# Patient Record
Sex: FEMALE | Race: WHITE | Employment: OTHER | URBAN - METROPOLITAN AREA
[De-identification: names, ages, dates, MRNs, and addresses within clinical notes are randomized per-mention and may not be internally consistent; named-entity substitution may affect disease eponyms.]

---

## 2017-01-16 PROBLEM — E11.9 DIABETES MELLITUS, TYPE 2 (H): Chronic | Status: ACTIVE | Noted: 2017-01-16

## 2017-01-23 ENCOUNTER — FCC EXTENDED DOCUMENTATION (OUTPATIENT)
Dept: BEHAVIORAL HEALTH | Facility: CLINIC | Age: 57
End: 2017-01-23

## 2017-01-23 NOTE — PROGRESS NOTES
Discharge Summary  Multiple Sessions    Client Name: Melody Ferguson MRN#: [id number] YOB: 1960      Intake / Discharge Date: 6/20/16  -  1/23/2017       DSM5 Diagnoses: (Sustained by DSM5 Criteria Listed Above)  DSM5 Diagnoses: (Sustained by DSM5 Criteria Listed Above)  Diagnoses: 296.32 Major Depressive Disorder, Recurrent Episode, Moderate _  300.01 (F41.0) Panic Disorder  300.02 (F41.1) Generalized Anxiety Disorder  309.81 (F43.10) Posttraumatic Stress Disorder (includes Posttraumatic Stress Disorder for Children 6 Years and Younger)  With dissociative symptoms  Psychosocial & Contextual Factors: financial stress  WHODAS 2.0 (12 item)            This questionnaire asks about difficulties due to health conditions. Health conditions  include  disease or illnesses, other health problems that may be short or long lasting,  injuries, mental health or emotional problems, and problems with alcohol or drugs.                     Think back over the past 30 days and answer these questions, thinking about how much  difficulty you had doing the following activities. For each question, please [place] only  one response.    S1 Standing for long periods such as 30 minutes? Mild =           2   S2 Taking care of household responsibilities? Mild =           2   S3 Learning a new task, for example, learning how to get to a new place? None =         1   S4 How much of a problem do you have joining community activities (for example, festivals, Worship or other activities) in the same way as anyone else can? Moderate =   3   S5 How much have you been emotionally affected by your health problems? Mild =           2     In the past 30 days, how much difficulty did you have in:   S6 Concentrating on doing something for ten minutes? None =         1   S7 Walking a long distance such as a kilometer (or equivalent)? Extreme / or cannot do = 5   S8 Washing your whole body? None =         1   S9 Getting  dressed? None =         1   S10 Dealing with people you do not know? Severe =       4   S11 Maintaining a friendship? None =         1   S12 Your day to day work? Mild =           2     H1 Overall, in the past 30 days, how many days were these difficulties present? Record number of days 15   H2 In the past 30 days, for how many days were you totally unable to carry out your usual activities or work because of any health condition? Record number of days  6   H3 In the past 30 days, not counting the days that you were totally unable, for how many days did you cut back or reduce your usual activities or work because of any health condition? Record number of days 20-25             Presenting Concern:  Ptsd, anxiety      Reason for Discharge:  Client did not return      Disposition at Time of Last Encounter:   Comments:   n/a     Risk Management:   Client denies a history of suicidal ideation, suicide attempts, self-injurious behavior, homicidal ideation, homicidal behavior and and other safety concerns  A safety and risk management plan has not been developed at this time, however client was given the after-hours number / 911 should there be a change in any of these risk factors.      Referred To:  PCP.  Client can return to [facility] at any point in the future if desired.          Garrett Villafuerte, LICSW   1/23/2017

## 2017-01-24 DIAGNOSIS — E66.01 MORBID OBESITY WITH BODY MASS INDEX OF 45.0-49.9 IN ADULT (H): ICD-10-CM

## 2017-01-24 DIAGNOSIS — G89.4 CHRONIC PAIN SYNDROME: Primary | ICD-10-CM

## 2017-01-24 DIAGNOSIS — M54.32 LEFT SIDED SCIATICA: ICD-10-CM

## 2017-01-24 DIAGNOSIS — M62.830 BACK MUSCLE SPASM: ICD-10-CM

## 2017-01-24 DIAGNOSIS — M54.5 LOW BACK PAIN, UNSPECIFIED BACK PAIN LATERALITY, UNSPECIFIED CHRONICITY, WITH SCIATICA PRESENCE UNSPECIFIED: ICD-10-CM

## 2017-01-24 RX ORDER — TRAMADOL HYDROCHLORIDE 50 MG/1
TABLET ORAL
Qty: 30 TABLET | Refills: 0 | Status: SHIPPED | OUTPATIENT
Start: 2017-01-24 | End: 2017-02-09

## 2017-01-24 RX ORDER — CYCLOBENZAPRINE HCL 5 MG
TABLET ORAL
Qty: 60 TABLET | Refills: 0 | Status: SHIPPED | OUTPATIENT
Start: 2017-01-24 | End: 2017-03-07

## 2017-01-24 NOTE — TELEPHONE ENCOUNTER
traMADol (ULTRAM) 50 MG tablet      Last Written Prescription Date:  12/17/16  Last Fill Quantity: 30,   # refills: 0  Last Office Visit with [facility], [facility] or [facility] prescribing provider: 11/10/16  Future Office visit:    Next 5 appointments (look out 90 days)     Jan 31, 2017 11:00 AM   [facility] Visit with Armond George MD   [facility]    [address] [facility] [address]   [phone]            Feb 01, 2017  7:30 AM   Return Visit with Shelby Lance MD   [facility])    [address] [place] [address]   [phone]                   Routing refill request to provider for review/approval because:  Drug not on the [facility], [facility] or [facility] refill protocol or controlled substance      cyclobenzaprine (FLEXERIL) 5 MG tablet      Last Written Prescription Date:  12/29/16  Last Fill Quantity: 60,   # refills: 0  Last Office Visit with [facility], [facility] or [facility] prescribing provider: 11/10/16  Future Office visit:    Next 5 appointments (look out 90 days)     Jan 31, 2017 11:00 AM   Return Visit with Armond George MD   [facility])    [address] [facility] [address]   [phone]            Feb 01, 2017  7:30 AM   Return Visit with Shelby Lance MD   [facility])    [address] [place] [address]   [phone]                   Routing refill request to provider for review/approval because:  Drug not on the [facility], [facility] or [facility] refill protocol or controlled substance      Kiki Pearson  [facility]

## 2017-01-24 NOTE — TELEPHONE ENCOUNTER
diclofenac (VOLTAREN) 25 MG EC tablet      Last Written Prescription Date: 12/29/16  Last Quantity: 180, # refills: 0  Last Office Visit with [facility] or [facility] prescribing provider: 11/10/16   Next 5 appointments (look out 90 days)     Jan 31, 2017 11:00 AM   [facility]   [facility]    [address] [facility] [place] [facility] [address]   [phone]            Feb 01, 2017  7:30 AM   Return Visit with Shelby Lance MD   [facility])    [address] [place] [id number] [phone]                   CREATININE   Date Value Ref Range Status   11/01/2016 0.47* 0.52 - 1.04 mg/dL Final     AST       11   11/1/2016  ALT       27   11/1/2016  BP Readings from Last 3 Encounters:   12/05/16 117/74   11/10/16 138/80   10/10/16 132/68         Nystatin (NYSTOP) 365356 UNIT/GM POWD pow      Last Written Prescription Date: 12/03/16  Last Fill Quantity: 60g,  # refills: 0   Last Office Visit with [facility] or [facility] prescribing provider: 11/10/16                                         Next 5 appointments (look out 90 days)     Jan 31, 2017 11:00 AM   [facility] Visit with [facility]ond [facility] MD   [facility]    [address] [facility] [place] [facility] [address]   [phone]            Feb 01, 2017  7:30 AM   Return Visit with Shelby Lance MD   [facility])    [address] [place] [id number] [phone]                      Kiki Pearson  [facility]

## 2017-01-24 NOTE — TELEPHONE ENCOUNTER
polyethylene glycol (MIRALAX/GLYCOLAX) powder      Last Written Prescription Date: 11/30/16  Last Fill Quantity: 510g,  # refills: 1   Last Office Visit with [facility], [facility] or [facility] prescribing provider: 11/10/16                                         Next 5 appointments (look out 90 days)     Jan 31, 2017 11:00 AM   Return Visit with Armond George MD   [facility]    [address] [facility] [id number] [phone]            Feb 01, 2017  7:30 AM   Return Visit with Shelby Lance MD   [facility]    [address] [place] [address] [phone]                          Jose Carlos Faarax  [facility]

## 2017-01-24 NOTE — TELEPHONE ENCOUNTER
Faxed Rx for the Ultram to [facility],  [phone], right fax confirmed at 4:12 pm today.  Yuli Giang MA/  For [employer] and Radha

## 2017-01-26 RX ORDER — NYSTATIN 100000 [USP'U]/G
POWDER TOPICAL
Qty: 60 G | Refills: 0 | Status: SHIPPED | OUTPATIENT
Start: 2017-01-26 | End: 2017-04-18

## 2017-01-26 RX ORDER — DICLOFENAC SODIUM 25 MG/1
25 TABLET, DELAYED RELEASE ORAL 3 TIMES DAILY PRN
Qty: 180 TABLET | Refills: 0 | Status: SHIPPED | OUTPATIENT
Start: 2017-01-26 | End: 2017-07-03

## 2017-01-26 RX ORDER — POLYETHYLENE GLYCOL 3350 17 G/17G
POWDER, FOR SOLUTION ORAL
Qty: 510 G | Refills: 1 | Status: SHIPPED | OUTPATIENT
Start: 2017-01-26 | End: 2017-03-07

## 2017-01-26 NOTE — TELEPHONE ENCOUNTER
Nystatin - Prescription approved per [facility] Refill Protocol.    Miralax - Prescription approved per [facility] Refill Protocol.    Voltaren - Routing refill request to provider for review/approval because:  Labs out of range:  Creatinine  Cara Maloney RN

## 2017-01-30 ENCOUNTER — TELEPHONE (OUTPATIENT)
Dept: ORTHOPEDICS | Facility: CLINIC | Age: 57
End: 2017-01-30

## 2017-01-30 RX ORDER — CYCLOBENZAPRINE HCL 5 MG
TABLET ORAL
Qty: 60 TABLET | Refills: 0 | OUTPATIENT
Start: 2017-01-30

## 2017-01-30 NOTE — TELEPHONE ENCOUNTER
Reason for Call:  Other call back    Detailed comments: Patient has questions regarding her up coming appointment scheduled for 01/31/17- patient would like to know if Gel will be used?  Also will she need a  to attend the appointment with her?  Please call to discuss further    Phone Number Patient can be reached at: Cell number on file:    Telephone Information:   Mobile [phone]       Best Time: anything    Can we leave a detailed message on this number? YES    Call taken on 1/30/2017 at 10:59 AM by Anthony Kimbrough

## 2017-01-30 NOTE — TELEPHONE ENCOUNTER
Spoke with patient regarding her appointment for 1/31/17. I let her know that we have her scheduled for a viscosupplementation injection. I asked if she had a chance to check with her insurance and she said that the shots would be covered. I informed her that she will not need a  for her appointment. She did not have any further questions.    CRESCENCIO PadillaC  Supervising physician: Armond George MD  Dept. of Orthopedics  [facility]

## 2017-01-30 NOTE — TELEPHONE ENCOUNTER
RN called pharmacy and was informed that the medication has been picked up.  No further action is required at this time.    Radha Diaz RN

## 2017-01-31 ENCOUNTER — OFFICE VISIT (OUTPATIENT)
Dept: ORTHOPEDICS | Facility: CLINIC | Age: 57
End: 2017-01-31
Payer: MEDICARE

## 2017-01-31 VITALS — HEIGHT: 68 IN | WEIGHT: 293 LBS | BODY MASS INDEX: 44.41 KG/M2 | RESPIRATION RATE: 18 BRPM

## 2017-01-31 DIAGNOSIS — M17.0 PRIMARY OSTEOARTHRITIS OF BOTH KNEES: Primary | ICD-10-CM

## 2017-01-31 PROCEDURE — 20610 DRAIN/INJ JOINT/BURSA W/O US: CPT | Mod: 50 | Performed by: ORTHOPAEDIC SURGERY

## 2017-01-31 NOTE — PROGRESS NOTES
The patient's bilateral knee was prepped with betadine solution after verification of allergies. Area approximately 10 cm x 10 cm prepped in a sterile fashion. After injection, betadine removed with soap and water and band-aids applied.    3 ml gel one was injected into each knee for a total of 6 ml by Dr. Armond George  PO:[id number]  LOT# [id number]  Exp.8-30-17

## 2017-01-31 NOTE — NURSING NOTE
"Chief Complaint   Patient presents with     RECHECK     Bilateral knee OA last injection 12/13/16.       Initial Resp 18  Ht 1.727 m (5' 8\")  Wt 142.883 kg (315 lb)  BMI 47.91 kg/m2 Estimated body mass index is 47.91 kg/(m^2) as calculated from the following:    Height as of this encounter: 1.727 m (5' 8\").    Weight as of this encounter: 142.883 kg (315 lb).  BP completed using cuff size: NA (Not Taken)  Stacey Freedman MA      "

## 2017-01-31 NOTE — PATIENT INSTRUCTIONS
Your knee was injected with Gel-1 (a lubricant) today.  It will not numb the knee.  There should be some improvement right away, but it reaches peak effect in two months.  Resume activity as tolerated.

## 2017-01-31 NOTE — PROGRESS NOTES
Follow up bilateral knee osteoarthritis.  X-ray on 3/15/16 shows bilateral moderate osteoarthritis.  Has had prior treatment with Symptom treatment  Activity modification  Steroid injection  NSAID use: diclofenac ([place], Voltaren)  Physical Therapy: No  Pain limits her with standing, walking and ambulating stairs.  Range of motion 2-114, 2-119.    Tenderness at medial knee.  Prior Gel-1 injection? No  Any other joint disease such as rheumatoid arthritis or psoriatic arthritis?  No    Assessment:  Bilateral knee osteoarthritis.  She  desires Gel-1 injection today of bilateral knee(s).  Risks, benefits, potential complications and alternatives were discussed.   With the patient's consent, sterile prep was performed of bilateral knee(s).  Each knee was injected with 3 cc Gel-1 after lidocaine infiltration of the needle tract at anterolateral site.  Return to clinic as needed.

## 2017-02-01 ENCOUNTER — OFFICE VISIT (OUTPATIENT)
Dept: OPHTHALMOLOGY | Facility: CLINIC | Age: 57
End: 2017-02-01
Payer: MEDICARE

## 2017-02-01 DIAGNOSIS — H02.836 DERMATOCHALASIS OF EYELIDS OF BOTH EYES: Primary | ICD-10-CM

## 2017-02-01 DIAGNOSIS — H02.833 DERMATOCHALASIS OF EYELIDS OF BOTH EYES: Primary | ICD-10-CM

## 2017-02-01 PROCEDURE — 99024 POSTOP FOLLOW-UP VISIT: CPT | Performed by: OPHTHALMOLOGY

## 2017-02-01 ASSESSMENT — VISUAL ACUITY
OS_SC: 20/20
OD_SC: 20/25
METHOD: SNELLEN - LINEAR

## 2017-02-01 NOTE — PROGRESS NOTES
Postop both upper eyelid blepharoplasty and ptosis repair (mmcr 10, 10+1). Very happy with result. No concerns. No dry eye issues.     MRD1: 4 right eye, 3.5 left eye, no lagophthalmos. Significantly improved compared to preop photographs.    Warm soaks  Sees Dr. Longo annually.   F/u with me as needed.    Attending Physician Attestation:  Complete documentation of historical and exam elements from today's encounter can be found in the full encounter summary report (not reduplicated in this progress note).  I personally obtained the chief complaint(s) and history of present illness.  I confirmed and edited as necessary the review of systems, past medical/surgical history, family history, social history, and examination findings as documented by others; and I examined the patient myself.  I personally reviewed the relevant tests, images, and reports as documented above.  I formulated and edited as necessary the assessment and plan and discussed the findings and management plan with the patient and family. - Shelby Lance MD

## 2017-02-09 ENCOUNTER — MYC MEDICAL ADVICE (OUTPATIENT)
Dept: FAMILY MEDICINE | Facility: CLINIC | Age: 57
End: 2017-02-09

## 2017-02-09 ENCOUNTER — MYC REFILL (OUTPATIENT)
Dept: FAMILY MEDICINE | Facility: CLINIC | Age: 57
End: 2017-02-09

## 2017-02-09 DIAGNOSIS — G89.4 CHRONIC PAIN SYNDROME: ICD-10-CM

## 2017-02-09 DIAGNOSIS — M54.42 ACUTE LEFT-SIDED LOW BACK PAIN WITH LEFT-SIDED SCIATICA: ICD-10-CM

## 2017-02-09 RX ORDER — TRAMADOL HYDROCHLORIDE 50 MG/1
TABLET ORAL
Qty: 30 TABLET | Refills: 0 | Status: SHIPPED | OUTPATIENT
Start: 2017-02-24 | End: 2017-03-07

## 2017-02-09 RX ORDER — GABAPENTIN 300 MG/1
300 CAPSULE ORAL AT BEDTIME
Qty: 90 CAPSULE | Refills: 1 | Status: SHIPPED | OUTPATIENT
Start: 2017-02-09 | End: 2017-03-07

## 2017-02-09 NOTE — TELEPHONE ENCOUNTER
Message from Ayla:  Original authorizing provider: Elizabeth Lamas MD    Melody Freguson would like a refill of the following medications:  gabapentin (NEURONTIN) 300 MG capsule [Elizabeth Lamas MD]  traMADol (ULTRAM) 50 MG tablet [Elizabeth Lamas MD]    Preferred pharmacy: [facility] [id number] [place] [address] - [address] AT [facility] OF [facility] [address]    Comment:      Medication renewals requested in this message routed to other providers:  pioglitazone (ACTOS) 15 MG tablet [CARMEN Burton CNP]  polyethylene glycol (MIRALAX/GLYCOLAX) powder [CARMEN Burton CNP]  diclofenac (VOLTAREN) 25 MG EC tablet [CARMEN Burton CNP]  Nystatin (NYSTOP) [id number] UNIT/GM POWD powder [CARMEN Burton CNP]

## 2017-02-10 NOTE — TELEPHONE ENCOUNTER
Received Rx, unsigned. Trisha Cast signed Ultram  Rx for Dr Lamas. Faxed Rx for the Ultram to [facility], [phone], right fax confirmed at 9:48  Am today.  Yuli Giang MA/  For [employer] and Radha

## 2017-02-13 RX ORDER — POLYETHYLENE GLYCOL 3350 17 G/17G
POWDER, FOR SOLUTION ORAL
Qty: 510 G | Refills: 0 | OUTPATIENT
Start: 2017-02-13

## 2017-03-07 ENCOUNTER — MYC REFILL (OUTPATIENT)
Dept: FAMILY MEDICINE | Facility: CLINIC | Age: 57
End: 2017-03-07

## 2017-03-07 DIAGNOSIS — E66.01 MORBID OBESITY WITH BODY MASS INDEX OF 45.0-49.9 IN ADULT (H): ICD-10-CM

## 2017-03-07 DIAGNOSIS — M54.32 LEFT SIDED SCIATICA: ICD-10-CM

## 2017-03-07 DIAGNOSIS — E11.21 TYPE 2 DIABETES MELLITUS WITH DIABETIC NEPHROPATHY, WITHOUT LONG-TERM CURRENT USE OF INSULIN (H): Chronic | ICD-10-CM

## 2017-03-07 DIAGNOSIS — G89.4 CHRONIC PAIN SYNDROME: ICD-10-CM

## 2017-03-07 DIAGNOSIS — M54.42 ACUTE LEFT-SIDED LOW BACK PAIN WITH LEFT-SIDED SCIATICA: ICD-10-CM

## 2017-03-07 DIAGNOSIS — M62.830 BACK MUSCLE SPASM: ICD-10-CM

## 2017-03-07 DIAGNOSIS — M54.5 LOW BACK PAIN, UNSPECIFIED BACK PAIN LATERALITY, UNSPECIFIED CHRONICITY, WITH SCIATICA PRESENCE UNSPECIFIED: ICD-10-CM

## 2017-03-07 NOTE — TELEPHONE ENCOUNTER
Message from [employer]  Original authorizing provider: CARMEN Burton CNP    Melody Ferguson would like a refill of the following medications:  polyethylene glycol (MIRALAX/GLYCOLAX) powder [CARMEN Burton CNP]    Preferred pharmacy: [facility] [id number] [place] [address] - [address] AT [facility] OF [facility] & [address]    Comment:      Medication renewals requested in this message routed to other providers:  cyclobenzaprine (FLEXERIL) 5 MG tablet [Elizabeth Lamas MD]  gabapentin (NEURONTIN) 300 MG capsule [Elizabeth Lamas MD]

## 2017-03-07 NOTE — TELEPHONE ENCOUNTER
cyclobenzaprine       Last Written Prescription Date:  1/24/17  Last Fill Quantity: 60,   # refills: 0  Last Office Visit with [facility], [facility] or [facility] prescribing provider: 11/10/16  Future Office visit:    Next 5 appointments (look out 90 days)     Mar 14, 2017  9:00 AM CDT   [facility]    [address] [facility] [address]   [phone]                   Routing refill request to provider for review/approval because:  Drug not on the [facility], [facility] or [facility] refill protocol or controlled substance      traMADol       Last Written Prescription Date:  2/24/17  Last Fill Quantity: 30,   # refills: 0  Last Office Visit with [facility], [facility] or [facility] prescribing provider: 11/10/16  Future Office visit:    Next 5 appointments (look out 90 days)     Mar 14, 2017  9:00 AM CDT   [facility] Visit with Armond [facility] MD   [facility]    [address] [facility] [address]   [phone]                   Routing refill request to provider for review/approval because:  Drug not on the [facility], [facility] or [facility] refill protocol or controlled substance      Nystatin       Last Written Prescription Date: 1/26/17  Last Fill Quantity: 60g,  # refills: 0   Last Office Visit with [facility], [facility] or [facility] prescribing provider: 11/10/16                                         Next 5 appointments (look out 90 days)     Mar 14, 2017  9:00 AM CDT   Return Visit with Armond George MD   [facility]    [address] [facility] [address]   [phone]                    diclofenac       Last Written Prescription Date: 1/26/17  Last Quantity: 180, # refills: 0  Last Office Visit with [facility], [facility] or [facility] prescribing provider: 11/10/16  Next 5 appointments (look out 90 days)     Mar 14, 2017  9:00 AM CDT    Return Visit with Armond George MD   [facility] ([facility])    [address] [place] [address] [phone]                   Creatinine   Date Value Ref Range Status   11/01/2016 0.47 (L) 0.52 - 1.04 mg/dL Final     Lab Results   Component Value Date    AST 11 11/01/2016     Lab Results   Component Value Date    ALT 27 11/01/2016     BP Readings from Last 3 Encounters:   12/05/16 117/74   11/10/16 138/80   10/10/16 132/68

## 2017-03-07 NOTE — TELEPHONE ENCOUNTER
Message from [facility]:  Original authorizing provider: Elizabeth Lamas MD    Melody Ferguson would like a refill of the following medications:  cyclobenzaprine (FLEXERIL) 5 MG tablet [Elizabeth Lamas MD]  gabapentin (NEURONTIN) 300 MG capsule [Elizabeth Lamas MD]    Preferred pharmacy: [facility] [id number] [place] [address] - [address] AT [facility] OF [facility] [address]    Comment:      Medication renewals requested in this message routed to other providers:  polyethylene glycol (MIRALAX/GLYCOLAX) powder [Randa Cast, APRN CNP]

## 2017-03-07 NOTE — TELEPHONE ENCOUNTER
Message from [employer]:  Original authorizing provider: Anahi Macias MD    Melody Ferguson would like a refill of the following medications:  pioglitazone (ACTOS) 30 MG tablet [Anahi Macias MD]    Preferred pharmacy: [facility] [address] [place], [place] - [address] [facility] [address]    Comment:  Bupropion SR 150mg Tablets Rx #: [id number] Qty: 60 Nexium 40mg Capsules Rx #: [id number] Qty: 30-- Vesicare 10mg Tablets Rx #: [id number] Qty: 30 -- Need refills for these also    Medication renewals requested in this message routed to other providers:  pioglitazone (ACTOS) 15 MG tablet [Randa Cast, APRN CNP]

## 2017-03-08 RX ORDER — PIOGLITAZONEHYDROCHLORIDE 30 MG/1
TABLET ORAL
Qty: 135 TABLET | Refills: 1 | Status: CANCELLED | OUTPATIENT
Start: 2017-03-08

## 2017-03-08 NOTE — TELEPHONE ENCOUNTER
Routing refill request to provider for review/approval because:  Labs out of range:  Creatinine  Radha Diaz RN

## 2017-03-08 NOTE — TELEPHONE ENCOUNTER
polyethylene glycol (MIRALAX/GLYCOLAX) powder      Last Written Prescription Date: 1/26/17  Last Fill Quantity: 510g,  # refills: 1   Last Office Visit with [facility], [facility] or [facility] prescribing provider: 11/10/16                                         Next 5 appointments (look out 90 days)     Mar 14, 2017  9:00 AM CDT   Return Visit with Armond George MD   [facility] ([facility])    [address] [place] [address] [phone]

## 2017-03-08 NOTE — TELEPHONE ENCOUNTER
pioglitazone (ACTOS) 30 MG tablet         Last Written Prescription Date: 11/18/16  Last Fill Quantity: 135, # refills: 1  Last Office Visit with [facility], [facility] or [facility] prescribing provider:  11/10/16   Next 5 appointments (look out 90 days)     Mar 14, 2017  9:00 AM CDT   Return Visit with Armond George MD   [facility]    [address] [place] [address] [phone]                   BP Readings from Last 3 Encounters:   12/05/16 117/74   11/10/16 138/80   10/10/16 132/68     Lab Results   Component Value Date    MICROL 7 11/01/2016     No results found for: MICROALBUMIN  Creatinine   Date Value Ref Range Status   11/01/2016 0.47 (L) 0.52 - 1.04 mg/dL Final   ]  GFR Estimate   Date Value Ref Range Status   11/01/2016 >90  Non  GFR Calc   >60 mL/min/1.7m2 Final   03/01/2016 >90  Non  GFR Calc   >60 mL/min/1.7m2 Final     GFR Estimate If Black   Date Value Ref Range Status   11/01/2016 >90   GFR Calc   >60 mL/min/1.7m2 Final   03/01/2016 >90   GFR Calc   >60 mL/min/1.7m2 Final     Lab Results   Component Value Date    CHOL 168 11/01/2016     Lab Results   Component Value Date    HDL 61 11/01/2016     Lab Results   Component Value Date    LDL 75 11/01/2016     Lab Results   Component Value Date    TRIG 159 11/01/2016     No results found for: CHOLHDLRATIO  Lab Results   Component Value Date    AST 11 11/01/2016     Lab Results   Component Value Date    ALT 27 11/01/2016     Lab Results   Component Value Date    A1C 6.7 11/01/2016    A1C 6.2 03/01/2016     Potassium   Date Value Ref Range Status   11/01/2016 3.8 3.4 - 5.3 mmol/L Final

## 2017-03-09 RX ORDER — DICLOFENAC SODIUM 25 MG/1
TABLET, DELAYED RELEASE ORAL
Qty: 180 TABLET | Refills: 0 | OUTPATIENT
Start: 2017-03-09

## 2017-03-09 RX ORDER — CYCLOBENZAPRINE HCL 5 MG
TABLET ORAL
Qty: 60 TABLET | Refills: 0 | Status: SHIPPED | OUTPATIENT
Start: 2017-03-09 | End: 2017-07-11

## 2017-03-09 RX ORDER — PIOGLITAZONEHYDROCHLORIDE 30 MG/1
TABLET ORAL
Qty: 135 TABLET | Refills: 1 | Status: SHIPPED | OUTPATIENT
Start: 2017-03-09 | End: 2017-07-13

## 2017-03-09 RX ORDER — TRAMADOL HYDROCHLORIDE 50 MG/1
TABLET ORAL
Qty: 30 TABLET | Refills: 0 | Status: SHIPPED | OUTPATIENT
Start: 2017-03-09 | End: 2017-04-13

## 2017-03-09 RX ORDER — NYSTATIN 100000 [USP'U]/G
POWDER TOPICAL
Qty: 60 G | Refills: 0 | Status: SHIPPED | OUTPATIENT
Start: 2017-03-09 | End: 2017-04-13

## 2017-03-09 NOTE — TELEPHONE ENCOUNTER
Faxed Rx for the Ultram to [facility],  [phone], right fax confirmed at 9:45 am today.  Yuli Giang MA/  For [employer] and Radha

## 2017-03-10 RX ORDER — POLYETHYLENE GLYCOL 3350 17 G/17G
POWDER, FOR SOLUTION ORAL
Qty: 510 G | Refills: 1 | Status: SHIPPED | OUTPATIENT
Start: 2017-03-10 | End: 2017-05-12

## 2017-03-10 RX ORDER — GABAPENTIN 300 MG/1
300 CAPSULE ORAL AT BEDTIME
Qty: 90 CAPSULE | Refills: 1 | Status: SHIPPED | OUTPATIENT
Start: 2017-03-10 | End: 2017-04-13

## 2017-03-10 RX ORDER — CYCLOBENZAPRINE HCL 5 MG
TABLET ORAL
Qty: 60 TABLET | Refills: 0 | Status: SHIPPED | OUTPATIENT
Start: 2017-03-10 | End: 2017-04-18

## 2017-03-10 NOTE — TELEPHONE ENCOUNTER
Prescription approved per [facility] Refill Protocol.  MABEL Alberto, Clinical RN [facility].

## 2017-03-10 NOTE — TELEPHONE ENCOUNTER
Routing refill request to provider for review/approval because:  Drug not on the [facility] refill protocol   MABEL Alberto, Clinical RN [facility].

## 2017-03-14 ENCOUNTER — RADIANT APPOINTMENT (OUTPATIENT)
Dept: GENERAL RADIOLOGY | Facility: CLINIC | Age: 57
End: 2017-03-14
Attending: ORTHOPAEDIC SURGERY
Payer: MEDICARE

## 2017-03-14 ENCOUNTER — OFFICE VISIT (OUTPATIENT)
Dept: ORTHOPEDICS | Facility: CLINIC | Age: 57
End: 2017-03-14
Payer: MEDICARE

## 2017-03-14 ENCOUNTER — MYC MEDICAL ADVICE (OUTPATIENT)
Dept: FAMILY MEDICINE | Facility: CLINIC | Age: 57
End: 2017-03-14

## 2017-03-14 VITALS — BODY MASS INDEX: 45.99 KG/M2 | HEIGHT: 67 IN | WEIGHT: 293 LBS | RESPIRATION RATE: 18 BRPM

## 2017-03-14 DIAGNOSIS — M47.817 LUMBOSACRAL SPONDYLOSIS WITHOUT MYELOPATHY: Primary | ICD-10-CM

## 2017-03-14 DIAGNOSIS — M17.12 PRIMARY OSTEOARTHRITIS OF LEFT KNEE: Primary | ICD-10-CM

## 2017-03-14 DIAGNOSIS — M17.0 PRIMARY OSTEOARTHRITIS OF BOTH KNEES: ICD-10-CM

## 2017-03-14 DIAGNOSIS — M17.11 PRIMARY OSTEOARTHRITIS OF RIGHT KNEE: ICD-10-CM

## 2017-03-14 PROCEDURE — 99213 OFFICE O/P EST LOW 20 MIN: CPT | Mod: 25 | Performed by: ORTHOPAEDIC SURGERY

## 2017-03-14 PROCEDURE — 20610 DRAIN/INJ JOINT/BURSA W/O US: CPT | Mod: RT | Performed by: ORTHOPAEDIC SURGERY

## 2017-03-14 PROCEDURE — 73562 X-RAY EXAM OF KNEE 3: CPT | Mod: RT

## 2017-03-14 RX ORDER — METHYLPREDNISOLONE ACETATE 80 MG/ML
80 INJECTION, SUSPENSION INTRA-ARTICULAR; INTRALESIONAL; INTRAMUSCULAR; SOFT TISSUE ONCE
Qty: 1 ML | Refills: 0 | OUTPATIENT
Start: 2017-03-14 | End: 2017-03-14

## 2017-03-14 NOTE — PROGRESS NOTES
The patient's right knee was prepped with betadine solution after verification of allergies. Area approximately 10 cm x 10 cm prepped in a sterile fashion. After injection, betadine removed with soap and water and band-aids applied.    1ml depo medrol with 1% lidocaine plain injected into patient's right knee by Dr. Armond George  LOT# [id number]  Exp.3/19

## 2017-03-14 NOTE — MR AVS SNAPSHOT
After Visit Summary   3/14/2017    Melody Ferguson    MRN: [id number]           Patient Information     Date Of Birth          1960        Visit Information        Provider Department      3/14/2017 9:00 AM Armond George MD [facility]        Today's Diagnoses     Primary osteoarthritis of both knees    -  1      Care Instructions    Ms. Ferguson and I talked about her condition and diagnosis.  Because of severe arthritis, and failure of conservative measures, we have decided to proceed with  total knee arthroplasty.      We have talked in depth about the procedure and the risks, which include, but are not limited to:  * blood loss possibly requiring transfusion,   * blood clots with possible pulmonary embolism  * infection or wound healing problems  * nerve damage - there will always be a bit of numbness just to outside of knee,  * vascular circulation problems  * continued pain  * instability of the knee.  * Loosening or wear  * anesthetic risks  * The possibility of needing additional procedures.      We also talked about recovery time, which differs from patient to patient.    Average 2-3 days in the hospital.  Most people can return home at that point.  You will be in Physical Therapy for 1-2 months until you have gained full range of motion.    We've encouraged attendance at the joint replacement class at the hospital.    Once you are placed on the surgery schedule, the [facility] will call you with the joint replacement class dates and times.  If you wish to schedule this yourself, or have additional questions about the class, you may call them at [phone].  Preop xrays have been ordered, and medical clearance will need to be obtained.    Preop physical with primary physician is needed within 30 days of the surgery.  Nothing to eat or drink for 8 hours before surgery.  Stop blood thinners 5 days before surgery (aspirin, diclofenac).        Call  "[phone] to schedule your surgery.              Follow-ups after your visit        Your next 10 appointments already scheduled     Apr 10, 2017 12:45 PM CDT   New Visit with Talon Katz MD   [facility] ([facility] [place])    [address]  [facility] [id number] [phone]              Who to contact     If you have questions or need follow up information about today's clinic visit or your schedule please contact [facility] [place] directly at [phone].  Normal or non-critical lab and imaging results will be communicated to you by Kanmuhart, letter or phone within 4 business days after the clinic has received the results. If you do not hear from us within 7 days, please contact the clinic through Kanmuhart or phone. If you have a critical or abnormal lab result, we will notify you by phone as soon as possible.  Submit refill requests through Psonar or call your pharmacy and they will forward the refill request to us. Please allow 3 business days for your refill to be completed.          Additional Information About Your Visit        MyChart Information     Psonar gives you secure access to your electronic health record. If you see a primary care provider, you can also send messages to your care team and make appointments. If you have questions, please call your primary care clinic.  If you do not have a primary care provider, please call [phone] and they will assist you.        Care EveryWhere ID     This is your Care EveryWhere ID. This could be used by other organizations to access your [place] medical records  [id number]        Your Vitals Were     Respirations Height BMI (Body Mass Index)             18 1.702 m (5' 7\") 50.43 kg/m2          Blood Pressure from Last 3 Encounters:   12/05/16 117/74   11/10/16 138/80   10/10/16 132/68    Weight from Last 3 Encounters:   03/14/17 (!) 146.1 " kg (322 lb)   01/31/17 (!) 142.9 kg (315 lb)   12/13/16 (!) 142.9 kg (315 lb)               Primary Care Provider Office Phone # Fax #    Elizabeth Lamas [phone]       [facility] [address]  [facility] [address]        Thank you!     Thank you for choosing [facility]  for your care. Our goal is always to provide you with excellent care. Hearing back from our patients is one way we can continue to improve our services. Please take a few minutes to complete the written survey that you may receive in the mail after your visit with us. Thank you!             Your Updated Medication List - Protect others around you: Learn how to safely use, store and throw away your medicines at www.disposemymeds.org.          This list is accurate as of: 3/14/17  9:32 AM.  Always use your most recent med list.                   Brand Name Dispense Instructions for use    ACE NOT PRESCRIBED (INTENTIONAL)     0 each    ACE Inhibitor not prescribed due to Allergy       albuterol 108 (90 BASE) MCG/ACT Inhaler    PROAIR HFA/PROVENTIL HFA/VENTOLIN HFA     Inhale 2 puffs into the lungs every 6 hours       ASPIRIN PO      Take 81 mg by mouth daily       ATORVASTATIN CALCIUM PO      Take 40 mg by mouth daily       BUPROPION HCL PO          citalopram 40 MG tablet    celeXA    90 tablet    Take 1 tablet (40 mg) by mouth every morning       * cyclobenzaprine 5 MG tablet    FLEXERIL    60 tablet    TAKE 1 TABLET BY MOUTH TWICE DAILY AS NEEDED( GENERIC EQUIVALENT FOR FLEXERIL)       * cyclobenzaprine 5 MG tablet    FLEXERIL    60 tablet    TAKE 1 TABLET BY MOUTH TWICE DAILY AS NEEDED       diclofenac 25 MG EC tablet    VOLTAREN    180 tablet    Take 1 tablet (25 mg) by mouth 3 times daily as needed for moderate pain       ESOMEPRAZOLE MAGNESIUM PO      Take 40 mg by mouth every morning (before breakfast)       fluticasone 27.5 MCG/SPRAY spray    VERAMYST     Spray 2 sprays into both  nostrils daily       gabapentin 300 MG capsule    NEURONTIN    90 capsule    Take 1 capsule (300 mg) by mouth At Bedtime       glipiZIDE-metFORMIN 5-500 MG per tablet    METAGLIP    360 tablet    Take 2 tablets by mouth 2 times daily (before meals)       * LOSARTAN POTASSIUM PO      Take 50 mg by mouth daily       * losartan 50 MG tablet    COZAAR    90 tablet    Take 1 tablet (50 mg) by mouth daily       * melatonin 3 MG tablet     30 tablet    TAKE 1 TABLET BY MOUTH ONCE AT BEDTIME AS NEEDED FOR SLEEP       * melatonin 3 MG tablet     30 tablet    TAKE 1 TABLET BY MOUTH AT BEDTIME AS NEEDED FOR SLEEP       montelukast 10 MG tablet    SINGULAIR    90 tablet    TAKE 1 TABLET BY MOUTH AT BEDTIME       * NYSTOP 011165 UNIT/GM Powd powder     60 g    APPLY TO THE AFFECTED AREA TWO TO THREE TIMES DAILY AS NEEDED       * NYSTOP 814530 UNIT/GM Powd powder     60 g    APPLY TO THE AFFECTED AREA TWO TO THREE TIMES DAILY AS NEEDED       order for DME     3 Month    Diabetic test strips and lancets per insurance formulary Check blood sugar 2 times daily       * pioglitazone 15 MG tablet    ACTOS    90 tablet    Take 1 tablet (15 mg) by mouth daily       * pioglitazone 30 MG tablet    ACTOS    135 tablet    Take 0.5 tablet by mouth in the morning with a meal and 1 tablet by mouth in the evening with a meal.       polyethylene glycol powder    MIRALAX/GLYCOLAX    510 g    DISSOLVE 17 GRAMS IN LIQUID AND DRINK DAILY AS DIRECTED       SOLIFENACIN SUCCINATE PO      Take 10 mg by mouth daily       traMADol 50 MG tablet    ULTRAM    30 tablet    TAKE 1 TABLET BY MOUTH EVERY NIGHT AT BEDTIME AS NEEDED       * Notice:  This list has 10 medication(s) that are the same as other medications prescribed for you. Read the directions carefully, and ask your doctor or other care provider to review them with you.

## 2017-03-14 NOTE — PATIENT INSTRUCTIONS
Ms. Ferguson and I talked about her condition and diagnosis.  Because of severe arthritis, and failure of conservative measures, we have decided to proceed with  total knee arthroplasty.      We have talked in depth about the procedure and the risks, which include, but are not limited to:  * blood loss possibly requiring transfusion,   * blood clots with possible pulmonary embolism  * infection or wound healing problems  * nerve damage - there will always be a bit of numbness just to outside of knee,  * vascular circulation problems  * continued pain  * instability of the knee.  * Loosening or wear  * anesthetic risks  * The possibility of needing additional procedures.      We also talked about recovery time, which differs from patient to patient.    Average 2-3 days in the hospital.  Most people can return home at that point.  You will be in Physical Therapy for 1-2 months until you have gained full range of motion.    We've encouraged attendance at the joint replacement class at the hospital.    Once you are placed on the surgery schedule, the [facility] will call you with the joint replacement class dates and times.  If you wish to schedule this yourself, or have additional questions about the class, you may call them at [phone].  Preop xrays have been ordered, and medical clearance will need to be obtained.    Preop physical with primary physician is needed within 30 days of the surgery.  Nothing to eat or drink for 8 hours before surgery.  Stop blood thinners 5 days before surgery (aspirin, diclofenac).        Call [phone] to schedule your surgery.

## 2017-03-14 NOTE — PROGRESS NOTES
HISTORY OF PRESENT ILLNESS    Melody Ferguson is a 56 year old female who is seen in follow-up for evaluation of  bilateral knee pain that has been present approximately several years.      Present symptoms: pain medially  and anteriorly, pain sharp, shooting, dull/achy , severe pain, mild swelling, +catching/popping, no locking, no giving way.    Symptoms occur walking, pivoting, getting up from seated or lying-down position , going up and down stairs and at night.    The symptoms occur are constant.  The symptoms are increasing  Limitations of activities of daily living:  All activities limited.  Fall risk?: No    Treatments tried to this point: NSAIDs, Glucosamine, Corticosteroid injections and viscous supplementation injection  Response to treatment:   NSAIDs: diclofenac   Glucosamine: no help.   Corticosteroid injections: multiple   viscous supplementation injection: Gel-One no help.   Arthroscopy: Not done   Physical Therapy: Not done      History reviewed. No pertinent past medical history.    History reviewed. No pertinent past surgical history.    Family History   Problem Relation Age of Onset     Thyroid Disease Sister      CANCER Brother      CANCER Sister      breast cancer     CEREBROVASCULAR DISEASE Sister 62     Other - See Comments Sister      Angioplasty 8/2016     Hypertension Father      Glaucoma Maternal Grandmother      CANCER Maternal Grandfather      CANCER Paternal Grandmother      DIABETES No family hx of      Macular Degeneration No family hx of        Social History     Social History     Marital status: Single     Spouse name: N/A     Number of children: N/A     Years of education: N/A     Occupational History      Disabled     Social History Main Topics     Smoking status: Never Smoker     Smokeless tobacco: Never Used     Alcohol use 0.0 oz/week     0 Standard drinks or equivalent per week     Drug use: No     Sexual activity: Yes     Partners: Female     Other Topics Concern     Not on  file     Social History Narrative       Current Outpatient Prescriptions   Medication Sig Dispense Refill     methylPREDNISolone acetate (DEPO-MEDROL) 80 MG/ML injection 1 mL (80 mg) by INTRA-ARTICULAR route once for 1 dose 1 mL 0     polyethylene glycol (MIRALAX/GLYCOLAX) powder DISSOLVE 17 GRAMS IN LIQUID AND DRINK DAILY AS DIRECTED 510 g 1     cyclobenzaprine (FLEXERIL) 5 MG tablet TAKE 1 TABLET BY MOUTH TWICE DAILY AS NEEDED 60 tablet 0     gabapentin (NEURONTIN) 300 MG capsule Take 1 capsule (300 mg) by mouth At Bedtime 90 capsule 1     traMADol (ULTRAM) 50 MG tablet TAKE 1 TABLET BY MOUTH EVERY NIGHT AT BEDTIME AS NEEDED 30 tablet 0     Nystatin (NYSTOP) 567374 UNIT/GM POWD powder APPLY TO THE AFFECTED AREA TWO TO THREE TIMES DAILY AS NEEDED 60 g 0     cyclobenzaprine (FLEXERIL) 5 MG tablet TAKE 1 TABLET BY MOUTH TWICE DAILY AS NEEDED( GENERIC EQUIVALENT FOR FLEXERIL) 60 tablet 0     pioglitazone (ACTOS) 30 MG tablet Take 0.5 tablet by mouth in the morning with a meal and 1 tablet by mouth in the evening with a meal. 135 tablet 1     diclofenac (VOLTAREN) 25 MG EC tablet Take 1 tablet (25 mg) by mouth 3 times daily as needed for moderate pain 180 tablet 0     Nystatin (NYSTOP) 044926 UNIT/GM POWD powder APPLY TO THE AFFECTED AREA TWO TO THREE TIMES DAILY AS NEEDED 60 g 0     losartan (COZAAR) 50 MG tablet Take 1 tablet (50 mg) by mouth daily 90 tablet 1     glipiZIDE-metFORMIN (METAGLIP) 5-500 MG per tablet Take 2 tablets by mouth 2 times daily (before meals) 360 tablet 1     melatonin 3 MG tablet TAKE 1 TABLET BY MOUTH AT BEDTIME AS NEEDED FOR SLEEP 30 tablet 3     pioglitazone (ACTOS) 15 MG tablet Take 1 tablet (15 mg) by mouth daily 90 tablet 1     melatonin 3 MG tablet TAKE 1 TABLET BY MOUTH ONCE AT BEDTIME AS NEEDED FOR SLEEP 30 tablet 0     citalopram (CELEXA) 40 MG tablet Take 1 tablet (40 mg) by mouth every morning 90 tablet 1     montelukast (SINGULAIR) 10 MG tablet TAKE 1 TABLET BY MOUTH AT BEDTIME  "90 tablet 1     order for DME Diabetic test strips and lancets per insurance formulary Check blood sugar 2 times daily 3 Month 3     albuterol (PROAIR HFA, PROVENTIL HFA, VENTOLIN HFA) 108 (90 BASE) MCG/ACT inhaler Inhale 2 puffs into the lungs every 6 hours       ASPIRIN PO Take 81 mg by mouth daily       ATORVASTATIN CALCIUM PO Take 40 mg by mouth daily       BUPROPION HCL PO        ESOMEPRAZOLE MAGNESIUM PO Take 40 mg by mouth every morning (before breakfast)       fluticasone (VERAMYST) 27.5 MCG/SPRAY nasal spray Spray 2 sprays into both nostrils daily       SOLIFENACIN SUCCINATE PO Take 10 mg by mouth daily       LOSARTAN POTASSIUM PO Take 50 mg by mouth daily       ACE NOT PRESCRIBED, INTENTIONAL, ACE Inhibitor not prescribed due to Allergy 0 each 0       Allergies   Allergen Reactions     Bee Venom      Latex      Lisinopril Cough     Chlorine Rash       REVIEW OF SYSTEMS:  CONSTITUTIONAL:  NEGATIVE for fever, chills, change in weight  INTEGUMENTARY/SKIN:  NEGATIVE for worrisome rashes, moles or lesions  EYES:  NEGATIVE for vision changes or irritation  ENT/MOUTH:  NEGATIVE for ear, mouth and throat problems  RESP:  NEGATIVE for significant cough or SOB  BREAST:  NEGATIVE for masses, tenderness or discharge  CV:  NEGATIVE for chest pain, palpitations or peripheral edema  GI:  NEGATIVE for nausea, abdominal pain, heartburn, or change in bowel habits  :  Negative   MUSCULOSKELETAL:  See HPI above  NEURO:  NEGATIVE for weakness, dizziness or paresthesias  ENDOCRINE:  NEGATIVE for temperature intolerance, skin/hair changes  HEME/ALLERGY/IMMUNE:  NEGATIVE for bleeding problems  PSYCHIATRIC:  NEGATIVE for changes in mood or affect    PHYSICAL EXAM:    Vitals: Resp 18  Ht 1.702 m (5' 7\")  Wt (!) 146.1 kg (322 lb)  BMI 50.43 kg/m2  BMI= Body mass index is 50.43 kg/(m^2).  GENERAL APPEARANCE: healthy, alert and no distress   SKIN: no suspicious lesions or rashes  NEURO: Normal strength and tone, mentation intact and " speech normal  PSYCH:  mentation appears normal and affect normal/bright  HEENT:  Atraumatic, EOMI  Neck:  Neck supple with midline trachea  Respiratory: Breathing not labored.  Cardiovascular: Regular rate and rhythm  Abdomen: obese Abdomen soft, non-tender without masses or organomegaly  Lymph: no adenopathy  Skin: No rashes,worrisome lesions or skin problems  SPINE: no pain to palpation, good flexion and extension, negative SLR test  Sacroiliac: non-tender  Sciatic notch: non-tender  HIPS:  Full range of motion.  Greater trochanters: non-tender    KNEE EXAM:   Gait: walks with antalgic gait favoring both sides  Alignment: Right: moderate varus, Left:  moderate varus  ROM: Right knee: 5 extension to 115 flexion, Left knee: 5 extension to 115 flexion  Effusion: Right: mild, Left: mild  Tender: Right: medial joint line, Left: medial joint line  Ligaments:  Lachman's Stable, Anterior and posterior drawer stable.  Right medial collateral ligament:  no laxity,  Lateral collateral ligament  no laxity.    Left medial collateral ligament:  no laxity, lateral collateral ligament:  no laxity.   moderate pain with Varus/Valgus stress testing.    Patellofemoral joint: moderate crepitations in the bilateral patellofemoral joint.    Apprehension: negative      X-RAY:  From today 3/14/2017: shows severe bilateral medial joint space narrowing  There is bone-on-bone medially  by x-ray.    Oxford score: 6     Impression: Right Knee: Osteoarthritis severe.  Left Knee: Osteoarthritis severe.    Plan: Total Knee Arthroplasty: We talked about the patient's condition and diagnosis.  Because of severe arthritis, and failure of conservative measures, I have suggested total knee arthroplasty of the left knee(s).  I've talked in depth about the procedure and the risks, which include, but are not limited to blood loss requiring transfusion, infection, neurovascular injury, DVT, PE, continued pain, (both perioperative and persistent  post-recovery pain), numbness around the wound, instability, and potential anesthetic and perioperative medical complications, and the possibility of needing additional procedures. We also talked about recovery time, which differs from patient to patient.  We've encouraged attendance at the arthroplasty class at the hospital.  Medical clearance will need to be obtained.  Special considerations: ingrowth.    She  desires injection today of right knee(s).  Risks, benefits, potential complications and alternatives were discussed.   With the patient's consent, sterile prep was performed of right knee(s).  Right knee was injected with Depo Medrol 80 mg and lidocaine at anteromedial site.  Return to clinic as needed.

## 2017-03-15 ENCOUNTER — TELEPHONE (OUTPATIENT)
Dept: ORTHOPEDICS | Facility: CLINIC | Age: 57
End: 2017-03-15

## 2017-03-20 ENCOUNTER — MYC MEDICAL ADVICE (OUTPATIENT)
Dept: FAMILY MEDICINE | Facility: CLINIC | Age: 57
End: 2017-03-20

## 2017-03-20 DIAGNOSIS — E11.9 DM TYPE 2 WITHOUT RETINOPATHY (H): Primary | ICD-10-CM

## 2017-03-22 DIAGNOSIS — F33.1 MAJOR DEPRESSIVE DISORDER, RECURRENT EPISODE, MODERATE (H): ICD-10-CM

## 2017-03-23 NOTE — TELEPHONE ENCOUNTER
Routing refill request to provider for review/approval because:  PHQ-9 over 4  Radha Diaz RN

## 2017-03-23 NOTE — TELEPHONE ENCOUNTER
citalopram      Last Written Prescription Date: 10/10/16  Last Fill Quantity: 90, # refills: 1  Last Office Visit with [facility] primary care provider:  11/10/16   Next 5 appointments (look out 90 days)     Apr 18, 2017  8:00 AM CDT   Pre-Op physical with Elizabeth Lamas MD   [facility]    [address] [address]   [phone]            May 09, 2017  8:00 AM CDT   Return Visit with Armond George MD   [facility])    [address] [id number] [phone]                   Last PHQ-9 score on record=   PHQ-9 SCORE 12/15/2016   Total Score MyChart -   Total Score 14

## 2017-03-25 RX ORDER — CITALOPRAM HYDROBROMIDE 40 MG/1
TABLET ORAL
Qty: 90 TABLET | Refills: 0 | Status: SHIPPED | OUTPATIENT
Start: 2017-03-25 | End: 2017-07-03

## 2017-04-13 ENCOUNTER — MYC REFILL (OUTPATIENT)
Dept: FAMILY MEDICINE | Facility: CLINIC | Age: 57
End: 2017-04-13

## 2017-04-13 ENCOUNTER — TELEPHONE (OUTPATIENT)
Dept: FAMILY MEDICINE | Facility: CLINIC | Age: 57
End: 2017-04-13

## 2017-04-13 DIAGNOSIS — M54.5 LOW BACK PAIN, UNSPECIFIED BACK PAIN LATERALITY, UNSPECIFIED CHRONICITY, WITH SCIATICA PRESENCE UNSPECIFIED: ICD-10-CM

## 2017-04-13 DIAGNOSIS — G89.4 CHRONIC PAIN SYNDROME: ICD-10-CM

## 2017-04-13 DIAGNOSIS — E66.01 MORBID OBESITY WITH BODY MASS INDEX OF 45.0-49.9 IN ADULT (H): ICD-10-CM

## 2017-04-13 DIAGNOSIS — M54.42 ACUTE LEFT-SIDED LOW BACK PAIN WITH LEFT-SIDED SCIATICA: ICD-10-CM

## 2017-04-13 RX ORDER — NYSTATIN 100000 [USP'U]/G
POWDER TOPICAL
Qty: 60 G | Refills: 0 | Status: SHIPPED | OUTPATIENT
Start: 2017-04-13 | End: 2017-05-12

## 2017-04-13 RX ORDER — GABAPENTIN 300 MG/1
300 CAPSULE ORAL AT BEDTIME
Qty: 90 CAPSULE | Refills: 1 | Status: SHIPPED | OUTPATIENT
Start: 2017-04-13 | End: 2017-07-13

## 2017-04-13 RX ORDER — TRAMADOL HYDROCHLORIDE 50 MG/1
TABLET ORAL
Qty: 30 TABLET | Refills: 0 | Status: SHIPPED | OUTPATIENT
Start: 2017-04-13 | End: 2017-05-12

## 2017-04-13 NOTE — TELEPHONE ENCOUNTER
gabapentin (NEURONTIN) 300 MG capsule      Last Written Prescription Date: 03/10/17  Last Quantity: 90, # refills: 1  Last Office Visit with [facility], P or [facility] prescribing provider: 11/10/16    Next 5 appointments (look out 90 days)     Apr 18, 2017  8:00 AM CDT   [facility]    [address] [facility] [address] [address]   [phone]            May 09, 2017  8:00 AM CDT   Return Visit with Armond [facility] MD   [facility]    [address] [facility] [address] [facility] [address]   [phone]                   Creatinine   Date Value Ref Range Status   11/01/2016 0.47 (L) 0.52 - 1.04 mg/dL Final     Lab Results   Component Value Date    AST 11 11/01/2016     Lab Results   Component Value Date    ALT 27 11/01/2016     BP Readings from Last 3 Encounters:   12/05/16 117/74   11/10/16 138/80   10/10/16 132/68         Kiki Pearson  [facility]

## 2017-04-13 NOTE — TELEPHONE ENCOUNTER
Routing refill request to provider for review/approval because:  Drug not on the [facility] refill protocol   Radha Diaz RN

## 2017-04-13 NOTE — TELEPHONE ENCOUNTER
diclofenac (VOLTAREN) 25 MG EC tablet      Last Written Prescription Date: 01/26/17  Last Quantity: 180, # refills: 0  Last Office Visit with G, P or [facility] prescribing provider: 11/10/16    Next 5 appointments (look out 90 days)     Apr 18, 2017  8:00 AM CDT   Pre-Op physical with Elizabeth Lamas MD   [facility]    [address] [address] [facility] [address]   [phone]            May 09, 2017  8:00 AM CDT   Return Visit with Armond George MD   [facility] ([facility])    [address] [facility] [address]   [phone]                   Creatinine   Date Value Ref Range Status   11/01/2016 0.47 (L) 0.52 - 1.04 mg/dL Final     Lab Results   Component Value Date    AST 11 11/01/2016     Lab Results   Component Value Date    ALT 27 11/01/2016     BP Readings from Last 3 Encounters:   12/05/16 117/74   11/10/16 138/80   10/10/16 132/68         Kiki Pearson  [facility]

## 2017-04-13 NOTE — TELEPHONE ENCOUNTER
Faxed Rx for the Ultram to [facility],  Right fax confirmed at 3:52 pm today.  Yuli Giang MA/  For [employer] and Radha

## 2017-04-13 NOTE — TELEPHONE ENCOUNTER
Message from [employer]:  Original authorizing provider: Elizabeth Lamas MD    Melody Ferguson would like a refill of the following medications:  gabapentin (NEURONTIN) 300 MG capsule [Elizabeth Lamas MD]    Preferred pharmacy: [id number] - [place], [place] - [address] [facility] [address] [facility] [facility] [address]    Comment:

## 2017-04-13 NOTE — TELEPHONE ENCOUNTER
Nystatin (NYSTOP) 293138 UNIT/GM POWD powder      Last Written Prescription Date: 03/09/17  Last Fill Quantity: 60g,  # refills: 0   Last Office Visit with [facility], [facility] or [facility] prescribing provider: 11/10/16                                         Next 5 appointments (look out 90 days)     Apr 18, 2017  8:00 AM CDT   Pre-Op physical with Elizabeth Lamas MD   [facility]    [address] [address]   [phone]            May 09, 2017  8:00 AM CDT   Return Visit with Armond George MD   [facility] ([facility])    [address] [address]   [phone]                    traMADol (ULTRAM) 50 MG tablet      Last Written Prescription Date:  03/09/17  Last Fill Quantity: 30,   # refills: 0  Last Office Visit with [facility], [facility] or [facility] prescribing provider: 11/10/16  Future Office visit:    Next 5 appointments (look out 90 days)     Apr 18, 2017  8:00 AM CDT   Pre-Op physical with Elizabeth Lamas MD   [facility] ([facility]    [address] [address]   [phone]            May 09, 2017  8:00 AM CDT   Return Visit with Armond George MD   [facility] ([facility])    [address] [address]   [phone]                   Routing refill request to provider for review/approval because:  Drug not on the [facility], [facility] or [facility] refill protocol or controlled substance        Kiki Pearson  [facility]

## 2017-04-18 ENCOUNTER — OFFICE VISIT (OUTPATIENT)
Dept: FAMILY MEDICINE | Facility: CLINIC | Age: 57
End: 2017-04-18
Payer: MEDICARE

## 2017-04-18 ENCOUNTER — TELEPHONE (OUTPATIENT)
Dept: FAMILY MEDICINE | Facility: CLINIC | Age: 57
End: 2017-04-18

## 2017-04-18 VITALS
OXYGEN SATURATION: 98 % | TEMPERATURE: 98.7 F | BODY MASS INDEX: 45.99 KG/M2 | WEIGHT: 293 LBS | HEART RATE: 91 BPM | DIASTOLIC BLOOD PRESSURE: 72 MMHG | HEIGHT: 67 IN | SYSTOLIC BLOOD PRESSURE: 131 MMHG

## 2017-04-18 VITALS
BODY MASS INDEX: 45.99 KG/M2 | TEMPERATURE: 98.7 F | DIASTOLIC BLOOD PRESSURE: 72 MMHG | HEART RATE: 91 BPM | WEIGHT: 293 LBS | HEIGHT: 67 IN | SYSTOLIC BLOOD PRESSURE: 131 MMHG | OXYGEN SATURATION: 98 %

## 2017-04-18 DIAGNOSIS — M17.0 PRIMARY OSTEOARTHRITIS OF BOTH KNEES: ICD-10-CM

## 2017-04-18 DIAGNOSIS — R94.31 ABNORMAL ELECTROCARDIOGRAM: ICD-10-CM

## 2017-04-18 DIAGNOSIS — E11.21 TYPE 2 DIABETES MELLITUS WITH DIABETIC NEPHROPATHY, WITHOUT LONG-TERM CURRENT USE OF INSULIN (H): ICD-10-CM

## 2017-04-18 DIAGNOSIS — Z01.818 PRE-OP EXAM: Primary | ICD-10-CM

## 2017-04-18 DIAGNOSIS — J45.20 MILD INTERMITTENT ASTHMA WITHOUT COMPLICATION: Chronic | ICD-10-CM

## 2017-04-18 DIAGNOSIS — E66.01 MORBID OBESITY WITH BMI OF 45.0-49.9, ADULT (H): Chronic | ICD-10-CM

## 2017-04-18 DIAGNOSIS — R94.31 NONSPECIFIC ABNORMAL ELECTROCARDIOGRAM (ECG) (EKG): ICD-10-CM

## 2017-04-18 DIAGNOSIS — I10 HTN, GOAL BELOW 140/90: Chronic | ICD-10-CM

## 2017-04-18 DIAGNOSIS — R94.39 ABNORMAL CARDIOVASCULAR STRESS TEST: ICD-10-CM

## 2017-04-18 DIAGNOSIS — Z01.818 PREOP GENERAL PHYSICAL EXAM: Primary | ICD-10-CM

## 2017-04-18 LAB
ALBUMIN SERPL-MCNC: 3.8 G/DL (ref 3.4–5)
ALBUMIN UR-MCNC: NEGATIVE MG/DL
ANION GAP SERPL CALCULATED.3IONS-SCNC: 11 MMOL/L (ref 3–14)
APPEARANCE UR: CLEAR
BACTERIA #/AREA URNS HPF: ABNORMAL /HPF
BILIRUB UR QL STRIP: NEGATIVE
BUN SERPL-MCNC: 11 MG/DL (ref 7–30)
CALCIUM SERPL-MCNC: 9.6 MG/DL (ref 8.5–10.1)
CHLORIDE SERPL-SCNC: 106 MMOL/L (ref 94–109)
CO2 SERPL-SCNC: 25 MMOL/L (ref 20–32)
COLOR UR AUTO: YELLOW
CREAT SERPL-MCNC: 0.47 MG/DL (ref 0.52–1.04)
ERYTHROCYTE [DISTWIDTH] IN BLOOD BY AUTOMATED COUNT: 14.5 % (ref 10–15)
GFR SERPL CREATININE-BSD FRML MDRD: ABNORMAL ML/MIN/1.7M2
GLUCOSE SERPL-MCNC: 125 MG/DL (ref 70–99)
GLUCOSE UR STRIP-MCNC: NEGATIVE MG/DL
HBA1C MFR BLD: 6.1 % (ref 4.3–6)
HCT VFR BLD AUTO: 38.8 % (ref 35–47)
HGB BLD-MCNC: 12.8 G/DL (ref 11.7–15.7)
HGB UR QL STRIP: NEGATIVE
KETONES UR STRIP-MCNC: NEGATIVE MG/DL
LEUKOCYTE ESTERASE UR QL STRIP: ABNORMAL
MCH RBC QN AUTO: 30 PG (ref 26.5–33)
MCHC RBC AUTO-ENTMCNC: 33 G/DL (ref 31.5–36.5)
MCV RBC AUTO: 91 FL (ref 78–100)
NITRATE UR QL: NEGATIVE
NON-SQ EPI CELLS #/AREA URNS LPF: ABNORMAL /LPF
PH UR STRIP: 6.5 PH (ref 5–7)
PLATELET # BLD AUTO: 259 10E9/L (ref 150–450)
POTASSIUM SERPL-SCNC: 3.9 MMOL/L (ref 3.4–5.3)
RBC # BLD AUTO: 4.27 10E12/L (ref 3.8–5.2)
RBC #/AREA URNS AUTO: ABNORMAL /HPF (ref 0–2)
SODIUM SERPL-SCNC: 142 MMOL/L (ref 133–144)
SP GR UR STRIP: 1.01 (ref 1–1.03)
URN SPEC COLLECT METH UR: ABNORMAL
UROBILINOGEN UR STRIP-ACNC: 1 EU/DL (ref 0.2–1)
WBC # BLD AUTO: 8 10E9/L (ref 4–11)
WBC #/AREA URNS AUTO: ABNORMAL /HPF (ref 0–2)

## 2017-04-18 PROCEDURE — 85027 COMPLETE CBC AUTOMATED: CPT | Performed by: PREVENTIVE MEDICINE

## 2017-04-18 PROCEDURE — 93000 ELECTROCARDIOGRAM COMPLETE: CPT | Performed by: PREVENTIVE MEDICINE

## 2017-04-18 PROCEDURE — 81001 URINALYSIS AUTO W/SCOPE: CPT | Performed by: PREVENTIVE MEDICINE

## 2017-04-18 PROCEDURE — 99212 OFFICE O/P EST SF 10 MIN: CPT | Performed by: PREVENTIVE MEDICINE

## 2017-04-18 PROCEDURE — 36415 COLL VENOUS BLD VENIPUNCTURE: CPT | Performed by: PREVENTIVE MEDICINE

## 2017-04-18 PROCEDURE — 83036 HEMOGLOBIN GLYCOSYLATED A1C: CPT | Performed by: PREVENTIVE MEDICINE

## 2017-04-18 PROCEDURE — 99214 OFFICE O/P EST MOD 30 MIN: CPT | Performed by: PREVENTIVE MEDICINE

## 2017-04-18 PROCEDURE — 82040 ASSAY OF SERUM ALBUMIN: CPT | Performed by: PREVENTIVE MEDICINE

## 2017-04-18 PROCEDURE — 80048 BASIC METABOLIC PNL TOTAL CA: CPT | Performed by: PREVENTIVE MEDICINE

## 2017-04-18 RX ORDER — DICLOFENAC SODIUM 25 MG/1
TABLET, DELAYED RELEASE ORAL
Qty: 60 TABLET | Refills: 0 | Status: ON HOLD | OUTPATIENT
Start: 2017-04-18 | End: 2017-04-24

## 2017-04-18 ASSESSMENT — PAIN SCALES - GENERAL
PAINLEVEL: EXTREME PAIN (8)
PAINLEVEL: NO PAIN (0)

## 2017-04-18 NOTE — MR AVS SNAPSHOT
After Visit Summary   4/18/2017    Melody Ferguson    MRN: [id number]           Patient Information     Date Of Birth          1960        Visit Information        Provider Department      4/18/2017 2:20 PM Elizabeth Lamas MD [facility]        Today's Diagnoses     Pre-op exam    -  1    Abnormal electrocardiogram        Type 2 diabetes mellitus with diabetic nephropathy, without long-term current use of insulin (H)          Care Instructions    At [facility], we strive to deliver an exceptional experience to you, every time we see you.    If you receive a survey in the mail, please send us back your thoughts. We really do value your feedback.    Thank you for visiting [facility]    Normal or non-critical lab and imaging results will be communicated to you by MyChart, letter or phone within 7 days.  If you do not hear from us within 10 days, please call the clinic. If you have a critical or abnormal lab result, we will notify you by phone as soon as possible.     If you have any questions regarding your visit please contact:     Team Radha/Spirit  Clinic Hours Telephone Number   Dr. Betzy Smith   7am-7pm  Monday through Thursday  7am-5pm Friday [phone]  Milly COSTELLO RN   Pharmacy 8:30am-9pm Monday-Friday    9am-5pm Saturday-Sunday [phone]   Urgent Care 11am-9pm Monday-Friday        9am-5pm Saturday-Sunday [phone]     After hours, weekend or if you need to make an appointment with your primary provider please call [phone].   After Hours nurse advise: call [place] [facility]: [phone]    Use [email or web address] (secure email communication and access to your chart) to send your primary care provider a message or make an appointment. Ask someone on your Team how to sign up for enGene. To log on to Santech or for more  information in Carista Appt please visit the website at [email or web address].   As of October 8, 2013, all password changes, disabled accounts, or ID changes in CredSimplet/MyHealth will be done by our Access Services Department.   If you need help with your account or password, call: [phone]. Clinic staff no longer has the ability to change passwords.           Follow-ups after your visit        Follow-up notes from your care team     Return if symptoms worsen or fail to improve.      Your next 10 appointments already scheduled     Apr 19, 2017  8:30 AM CDT   New Visit with Talon Katz MD   [facility] ([facility] [place])    [facility] [address]  [facility] [address]   [phone]            Apr 20, 2017 11:30 AM CDT   NM SH CV MPI MULT RST ST DAY 1 with SCINM1   [facility] ([facility])    [address]  [address]  [facility] [address]   [phone]            Apr 21, 2017  9:30 AM CDT   NM SH CV MPI MULT RST ST DAY 2 with SCINM1   [facility] ([facility])    [address]  [address]  [facility] [address]   [phone]            May 09, 2017  8:00 AM CDT   Return Visit with Armond George MD   [facility] ([facility]    [address] [facility] [address]   [phone]            May 15, 2017 11:00 AM CDT   REMEDIOS Extremity with Zandra Wilkins PT   [facility] [place] ([facility]  )    [address] [facility] [id number] [phone]            May 17, 2017 10:10 AM CDT   REMEDIOS Extremity with Zandra Wilkins PT   [facility] [place] ([facility]  )    [address]  [facility] [address] [phone]            May  19, 2017 10:10 AM CDT   REMEDIOS Extremity with Z[facility], [facility] ([facility] [place]  )    [address]  [facility] [address]   [phone]            May 22, 2017 10:10 AM CDT   REMEDIOS Extremity with [facility] ([facility]  )    [address]  [facility] [address]   [phone]            May 24, 2017 10:10 AM CDT   REMEDIOS Extremity with [facility]  )    [address]  [facility] [address]   [phone]              Future tests that were ordered for you today     Open Future Orders        Priority Expected Expires Ordered    NM Exercise stress test Routine  4/18/2018 4/18/2017    NM Exercise stress test Routine  4/18/2018 4/18/2017            Who to contact     If you have questions or need follow up information about today's clinic visit or your schedule please contact [facility] directly at [phone].  Normal or non-critical lab and imaging results will be communicated to you by Lazada Indonesiahart, letter or phone within 4 business days after the clinic has received the results. If you do not hear from us within 7 days, please contact the clinic through Stichert or phone. If you have a critical or abnormal lab result, we will notify you by phone as soon as possible.  Submit refill requests through Dataminr or call your pharmacy and they will forward the refill request to us. Please allow 3 business days for your refill to be completed.          Additional Information About Your Visit        Dataminr Information     Dataminr gives you secure access to your electronic health record. If you see a primary care provider, you can also send messages to your care team and make appointments. If you have questions, please call your primary care clinic.  If you do not have a primary care provider, please  "call [phone] and they will assist you.        Care EveryWhere ID     This is your Care EveryWhere ID. This could be used by other organizations to access your [place] medical records  [id number]        Your Vitals Were     Pulse Temperature Height Pulse Oximetry Breastfeeding? BMI (Body Mass Index)    91 98.7  F (37.1  C) 5' 7\" (1.702 m) 98% No 50.43 kg/m2       Blood Pressure from Last 3 Encounters:   04/18/17 131/72   04/18/17 131/72   12/05/16 117/74    Weight from Last 3 Encounters:   04/18/17 (!) 322 lb (146.1 kg)   04/18/17 (!) 322 lb (146.1 kg)   03/14/17 (!) 322 lb (146.1 kg)                 Today's Medication Changes          These changes are accurate as of: 4/18/17  3:17 PM.  If you have any questions, ask your nurse or doctor.               These medicines have changed or have updated prescriptions.        Dose/Directions    cyclobenzaprine 5 MG tablet   Commonly known as:  FLEXERIL   This may have changed:  Another medication with the same name was removed. Continue taking this medication, and follow the directions you see here.   Used for:  Left sided sciatica, Back muscle spasm   Changed by:  Elizabeth Lamas MD        TAKE 1 TABLET BY MOUTH TWICE DAILY AS NEEDED( GENERIC EQUIVALENT FOR FLEXERIL)   Quantity:  60 tablet   Refills:  0       losartan 50 MG tablet   Commonly known as:  COZAAR   This may have changed:  Another medication with the same name was removed. Continue taking this medication, and follow the directions you see here.   Used for:  HTN, goal below 140/90, Type 2 diabetes mellitus with diabetic nephropathy, without long-term current use of insulin (H)   Changed by:  Elizabeth Lamas MD        Dose:  50 mg   Take 1 tablet (50 mg) by mouth daily   Quantity:  90 tablet   Refills:  1       melatonin 3 MG tablet   This may have changed:  Another medication with the same name was removed. Continue taking this medication, and follow the directions you see here.   Used for:  Major " depressive disorder, recurrent episode, moderate (H)   Changed by:  Elizabeth Lamas MD        TAKE 1 TABLET BY MOUTH ONCE AT BEDTIME AS NEEDED FOR SLEEP   Quantity:  30 tablet   Refills:  0       NYSTOP [id number] UNIT/GM Powd   This may have changed:  Another medication with the same name was removed. Continue taking this medication, and follow the directions you see here.   Used for:  Morbid obesity with body mass index of 45.0-49.9 in adult (H)   Generic drug:  nystatin   Changed by:  Elizabeth Lamas MD        APPLY TO THE AFFECTED AREA TWO TO THREE TIMES DAILY AS NEEDED   Quantity:  60 g   Refills:  0                Primary Care Provider Office Phone # Fax #    Elizabeth Lamas [phone]       [facility] [address] [facility] [address]        Thank you!     Thank you for choosing [facility]  for your care. Our goal is always to provide you with excellent care. Hearing back from our patients is one way we can continue to improve our services. Please take a few minutes to complete the written survey that you may receive in the mail after your visit with us. Thank you!             Your Updated Medication List - Protect others around you: Learn how to safely use, store and throw away your medicines at www.disposemymeds.org.          This list is accurate as of: 4/18/17  3:17 PM.  Always use your most recent med list.                   Brand Name Dispense Instructions for use    ACE NOT PRESCRIBED (INTENTIONAL)     0 each    ACE Inhibitor not prescribed due to Allergy       albuterol 108 (90 BASE) MCG/ACT Inhaler    PROAIR HFA/PROVENTIL HFA/VENTOLIN HFA     Inhale 2 puffs into the lungs every 6 hours       ASPIRIN PO      Take 81 mg by mouth daily       ATORVASTATIN CALCIUM PO      Take 40 mg by mouth daily       BUPROPION HCL PO          citalopram 40 MG tablet    celeXA    90 tablet    TAKE 1 TABLET BY MOUTH EVERY MORNING       cyclobenzaprine 5 MG tablet     FLEXERIL    60 tablet    TAKE 1 TABLET BY MOUTH TWICE DAILY AS NEEDED( GENERIC EQUIVALENT FOR FLEXERIL)       * diclofenac 25 MG EC tablet    VOLTAREN    180 tablet    Take 1 tablet (25 mg) by mouth 3 times daily as needed for moderate pain       * diclofenac 25 MG EC tablet    VOLTAREN    60 tablet    TAKE 3 TABLETS BY MOUTH TWICE DAILY AS NEEDED FOR MODERATE PAIN. TAKE WITH FOOD       ESOMEPRAZOLE MAGNESIUM PO      Take 40 mg by mouth every morning (before breakfast)       fluticasone 27.5 MCG/SPRAY spray    VERAMYST     Spray 2 sprays into both nostrils daily       gabapentin 300 MG capsule    NEURONTIN    90 capsule    Take 1 capsule (300 mg) by mouth At Bedtime       glipiZIDE-metFORMIN 5-500 MG per tablet    METAGLIP    360 tablet    Take 2 tablets by mouth 2 times daily (before meals)       losartan 50 MG tablet    COZAAR    90 tablet    Take 1 tablet (50 mg) by mouth daily       melatonin 3 MG tablet     30 tablet    TAKE 1 TABLET BY MOUTH ONCE AT BEDTIME AS NEEDED FOR SLEEP       montelukast 10 MG tablet    SINGULAIR    90 tablet    TAKE 1 TABLET BY MOUTH AT BEDTIME       NYSTOP [id number] UNIT/GM Powd   Generic drug:  nystatin     60 g    APPLY TO THE AFFECTED AREA TWO TO THREE TIMES DAILY AS NEEDED       order for DME     3 Month    Diabetic test strips and lancets per insurance formulary Check blood sugar 2 times daily       pioglitazone 30 MG tablet    ACTOS    135 tablet    Take 0.5 tablet by mouth in the morning with a meal and 1 tablet by mouth in the evening with a meal.       polyethylene glycol powder    MIRALAX/GLYCOLAX    510 g    DISSOLVE 17 GRAMS IN LIQUID AND DRINK DAILY AS DIRECTED       SOLIFENACIN SUCCINATE PO      Take 10 mg by mouth daily       traMADol 50 MG tablet    ULTRAM    30 tablet    TAKE 1 TABLET BY MOUTH EVERY NIGHT AT BEDTIME AS NEEDED       * Notice:  This list has 2 medication(s) that are the same as other medications prescribed for you. Read the directions carefully, and ask  your doctor or other care provider to review them with you.

## 2017-04-18 NOTE — NURSING NOTE
"Chief Complaint   Patient presents with     Pre-Op Exam       Initial /72  Pulse 91  Temp 98.7  F (37.1  C) (Oral)  Ht 5' 7\" (1.702 m)  Wt (!) 322 lb (146.1 kg)  SpO2 98%  Breastfeeding? No  BMI 50.43 kg/m2 Estimated body mass index is 50.43 kg/(m^2) as calculated from the following:    Height as of this encounter: 5' 7\" (1.702 m).    Weight as of this encounter: 322 lb (146.1 kg).  Medication Reconciliation: complete   Arianne ROBERTS        "

## 2017-04-18 NOTE — MR AVS SNAPSHOT
After Visit Summary   4/18/2017    Melody Ferguson    MRN: [id number]           Patient Information     Date Of Birth          1960        Visit Information        Provider Department      4/18/2017 8:00 AM Elizabeth Lamas MD [facility]        Today's Diagnoses     Preop general physical exam    -  1    Type 2 diabetes mellitus with diabetic nephropathy, without long-term current use of insulin (H)        Morbid obesity with BMI of 45.0-49.9, adult (H)        Primary osteoarthritis of both knees        Mild intermittent asthma without complication        HTN, goal below 140/90        Nonspecific abnormal electrocardiogram (ECG) (EKG)          Care Instructions    At [facility], we strive to deliver an exceptional experience to you, every time we see you.    If you receive a survey in the mail, please send us back your thoughts. We really do value your feedback.    Thank you for visiting [facility]    Normal or non-critical lab and imaging results will be communicated to you by MyChart, letter or phone within 7 days.  If you do not hear from us within 10 days, please call the clinic. If you have a critical or abnormal lab result, we will notify you by phone as soon as possible.     If you have any questions regarding your visit please contact:     Team Radha/Spirit  Clinic Hours Telephone Number   Dr. Betzy Smith   7am-7pm  Monday through Thursday  7am-5pm Friday [phone]  Milly COSTELLO RN   Pharmacy 8:30am-9pm Monday-Friday    9am-5pm Saturday-Sunday [phone]   Urgent Care 11am-9pm Monday-Friday        9am-5pm Saturday-Sunday [phone]     After hours, weekend or if you need to make an appointment with your primary provider please call [phone].   After Hours nurse advise: call [place] [facility]:  [phone]    Use Webee (secure email communication and access to your chart) to send your primary care provider a message or make an appointment. Ask someone on your Team how to sign up for Webee. To log on to SilverRail Technologies or for more information in Adpeps please visit the website at [email or web address].   As of October 8, 2013, all password changes, disabled accounts, or ID changes in Webee/MyHealth will be done by our Access Services Department.   If you need help with your account or password, call: [phone]. Clinic staff no longer has the ability to change passwords.     Before Your Surgery      Call your surgeon if there is any change in your health. This includes signs of a cold or flu (such as a sore throat, runny nose, cough, rash or fever).    Do not smoke, drink alcohol or take over the counter medicine (unless your surgeon or primary care doctor tells you to) for the 24 hours before and after surgery.    If you take prescribed drugs: Follow your doctor s orders about which medicines to take and which to stop until after surgery.    Eating and drinking prior to surgery: follow the instructions from your surgeon    Take a shower or bath the night before surgery. Use the soap your surgeon gave you to gently clean your skin. If you do not have soap from your surgeon, use your regular soap. Do not shave or scrub the surgery site.  Wear clean pajamas and have clean sheets on your bed.         Follow-ups after your visit        Your next 10 appointments already scheduled     Apr 19, 2017  8:30 AM CDT   New Visit with Talon Katz MD   [facility] ([facility] [place])    [address]  [facility] [address] [phone]            May 09, 2017  8:00 AM CDT   Return Visit with Armond George MD   [facility] ([facility])    [address] [place] [address]  "[place] [address]   [phone]              Future tests that were ordered for you today     Open Future Orders        Priority Expected Expires Ordered    NM Exercise stress test Routine  4/18/2018 4/18/2017            Who to contact     If you have questions or need follow up information about today's clinic visit or your schedule please contact [facility] MELVIN VELAZQUEZ directly at [phone].  Normal or non-critical lab and imaging results will be communicated to you by Flipiturehart, letter or phone within 4 business days after the clinic has received the results. If you do not hear from us within 7 days, please contact the clinic through Stingray Geophysicalt or phone. If you have a critical or abnormal lab result, we will notify you by phone as soon as possible.  Submit refill requests through UrbanBuz or call your pharmacy and they will forward the refill request to us. Please allow 3 business days for your refill to be completed.          Additional Information About Your Visit        FlipitureharGaiaX Co.Ltd. Information     UrbanBuz gives you secure access to your electronic health record. If you see a primary care provider, you can also send messages to your care team and make appointments. If you have questions, please call your primary care clinic.  If you do not have a primary care provider, please call [phone] and they will assist you.        Care EveryWhere ID     This is your Care EveryWhere ID. This could be used by other organizations to access your [place] medical records  [id number]        Your Vitals Were     Pulse Temperature Height Pulse Oximetry Breastfeeding? BMI (Body Mass Index)    91 98.7  F (37.1  C) (Oral) 5' 7\" (1.702 m) 98% No 50.43 kg/m2       Blood Pressure from Last 3 Encounters:   04/18/17 131/72   12/05/16 117/74   11/10/16 138/80    Weight from Last 3 Encounters:   04/18/17 (!) 322 lb (146.1 kg)   03/14/17 (!) 322 lb (146.1 kg)   01/31/17 (!) 315 lb (142.9 kg)              We Performed the " Following     ALBUMIN SERUM     Asthma Action Plan (AAP)     Basic metabolic panel     CBC with platelets     EKG 12-lead complete w/read - Clinics     Hemoglobin A1c     UA reflex to Microscopic and Culture          Today's Medication Changes          These changes are accurate as of: 4/18/17  8:44 AM.  If you have any questions, ask your nurse or doctor.               These medicines have changed or have updated prescriptions.        Dose/Directions    cyclobenzaprine 5 MG tablet   Commonly known as:  FLEXERIL   This may have changed:  Another medication with the same name was removed. Continue taking this medication, and follow the directions you see here.   Used for:  Left sided sciatica, Back muscle spasm   Changed by:  Elizabeth Lamas MD        TAKE 1 TABLET BY MOUTH TWICE DAILY AS NEEDED( GENERIC EQUIVALENT FOR FLEXERIL)   Quantity:  60 tablet   Refills:  0       losartan 50 MG tablet   Commonly known as:  COZAAR   This may have changed:  Another medication with the same name was removed. Continue taking this medication, and follow the directions you see here.   Used for:  HTN, goal below 140/90, Type 2 diabetes mellitus with diabetic nephropathy, without long-term current use of insulin (H)   Changed by:  Elizabeth Lamas MD        Dose:  50 mg   Take 1 tablet (50 mg) by mouth daily   Quantity:  90 tablet   Refills:  1       melatonin 3 MG tablet   This may have changed:  Another medication with the same name was removed. Continue taking this medication, and follow the directions you see here.   Used for:  Major depressive disorder, recurrent episode, moderate (H)   Changed by:  Elizabeth Lamas MD        TAKE 1 TABLET BY MOUTH ONCE AT BEDTIME AS NEEDED FOR SLEEP   Quantity:  30 tablet   Refills:  0       NYSTOP [id number] UNIT/GM Powd   This may have changed:  Another medication with the same name was removed. Continue taking this medication, and follow the directions you see here.   Used for:  Morbid obesity  with body mass index of 45.0-49.9 in adult (H)   Generic drug:  nystatin   Changed by:  Elizabeth Lamas MD        APPLY TO THE AFFECTED AREA TWO TO THREE TIMES DAILY AS NEEDED   Quantity:  60 g   Refills:  0                Primary Care Provider Office Phone # Fax #    Elizabeth Lamas [phone]       [facility] [address]  [facility] [address]        Thank you!     Thank you for choosing [facility]  for your care. Our goal is always to provide you with excellent care. Hearing back from our patients is one way we can continue to improve our services. Please take a few minutes to complete the written survey that you may receive in the mail after your visit with us. Thank you!             Your Updated Medication List - Protect others around you: Learn how to safely use, store and throw away your medicines at www.disposemymeds.org.          This list is accurate as of: 4/18/17  8:44 AM.  Always use your most recent med list.                   Brand Name Dispense Instructions for use    ACE NOT PRESCRIBED (INTENTIONAL)     0 each    ACE Inhibitor not prescribed due to Allergy       albuterol 108 (90 BASE) MCG/ACT Inhaler    PROAIR HFA/PROVENTIL HFA/VENTOLIN HFA     Inhale 2 puffs into the lungs every 6 hours       ASPIRIN PO      Take 81 mg by mouth daily       ATORVASTATIN CALCIUM PO      Take 40 mg by mouth daily       BUPROPION HCL PO          citalopram 40 MG tablet    celeXA    90 tablet    TAKE 1 TABLET BY MOUTH EVERY MORNING       cyclobenzaprine 5 MG tablet    FLEXERIL    60 tablet    TAKE 1 TABLET BY MOUTH TWICE DAILY AS NEEDED( GENERIC EQUIVALENT FOR FLEXERIL)       diclofenac 25 MG EC tablet    VOLTAREN    180 tablet    Take 1 tablet (25 mg) by mouth 3 times daily as needed for moderate pain       ESOMEPRAZOLE MAGNESIUM PO      Take 40 mg by mouth every morning (before breakfast)       fluticasone 27.5 MCG/SPRAY spray    VERAMYST     [place] 2 sprays  into both nostrils daily       gabapentin 300 MG capsule    NEURONTIN    90 capsule    Take 1 capsule (300 mg) by mouth At Bedtime       glipiZIDE-metFORMIN 5-500 MG per tablet    METAGLIP    360 tablet    Take 2 tablets by mouth 2 times daily (before meals)       losartan 50 MG tablet    COZAAR    90 tablet    Take 1 tablet (50 mg) by mouth daily       melatonin 3 MG tablet     30 tablet    TAKE 1 TABLET BY MOUTH ONCE AT BEDTIME AS NEEDED FOR SLEEP       montelukast 10 MG tablet    SINGULAIR    90 tablet    TAKE 1 TABLET BY MOUTH AT BEDTIME       NYSTOP [id number] UNIT/GM Powd   Generic drug:  nystatin     60 g    APPLY TO THE AFFECTED AREA TWO TO THREE TIMES DAILY AS NEEDED       order for DME     3 Month    Diabetic test strips and lancets per insurance formulary Check blood sugar 2 times daily       pioglitazone 30 MG tablet    ACTOS    135 tablet    Take 0.5 tablet by mouth in the morning with a meal and 1 tablet by mouth in the evening with a meal.       polyethylene glycol powder    MIRALAX/GLYCOLAX    510 g    DISSOLVE 17 GRAMS IN LIQUID AND DRINK DAILY AS DIRECTED       SOLIFENACIN SUCCINATE PO      Take 10 mg by mouth daily       traMADol 50 MG tablet    ULTRAM    30 tablet    TAKE 1 TABLET BY MOUTH EVERY NIGHT AT BEDTIME AS NEEDED

## 2017-04-18 NOTE — PROGRESS NOTES
SUBJECTIVE:                                                    Melody Ferguson is a 56 year old female who presents to clinic today for the following health issues:      Wants another EKG:    Patient comes in this afternoon requesting a repeat EKG.   Had one this morning with me as part of a pre op visit. There were a few changes compared to previous EKG, hence was referred to have a stress test as is scheduled to undergo joint replacement on 4/26/17. Was unable to get an appointment for a stress test at [facility]. Does not want to delay surgery in any way.       Problem list and histories reviewed & adjusted, as indicated.  Additional history: as documented    Patient Active Problem List   Diagnosis     Primary osteoarthritis of both knees     PTSD (post-traumatic stress disorder)     Chronic pain syndrome     Fibromyalgia     History of TIA (transient ischemic attack) and stroke     Major depressive disorder, recurrent episode, moderate (H)     Urge incontinence of urine     Prolapse of vaginal wall     Mild intermittent asthma without complication     HTN, goal below 140/90     Hyperlipidemia LDL goal <100     Type 2 diabetes mellitus with diabetic nephropathy (H)     Allergic rhinitis, unspecified allergic rhinitis type     Morbid obesity with BMI of 45.0-49.9, adult (H)     History of colonic polyps     Vaginal high risk HPV DNA test positive     DM type 2 without retinopathy (H)     Hiatal hernia     DDD (degenerative disc disease), lumbar     Diabetes mellitus, type 2 (H)     No past surgical history on file.    Social History   Substance Use Topics     Smoking status: Never Smoker     Smokeless tobacco: Never Used     Alcohol use 0.0 oz/week     0 Standard drinks or equivalent per week     Family History   Problem Relation Age of Onset     Thyroid Disease Sister      CANCER Brother      CANCER Sister      breast cancer     CEREBROVASCULAR DISEASE Sister 62     Other - See Comments Sister      Angioplasty  8/2016     Hypertension Father      Glaucoma Maternal Grandmother      CANCER Maternal Grandfather      CANCER Paternal Grandmother      DIABETES No family hx of      Macular Degeneration No family hx of          Current Outpatient Prescriptions   Medication Sig Dispense Refill     diclofenac (VOLTAREN) 25 MG EC tablet TAKE 3 TABLETS BY MOUTH TWICE DAILY AS NEEDED FOR MODERATE PAIN. TAKE WITH FOOD 60 tablet 0     NYSTOP 335965 UNIT/GM POWD powder APPLY TO THE AFFECTED AREA TWO TO THREE TIMES DAILY AS NEEDED 60 g 0     traMADol (ULTRAM) 50 MG tablet TAKE 1 TABLET BY MOUTH EVERY NIGHT AT BEDTIME AS NEEDED 30 tablet 0     gabapentin (NEURONTIN) 300 MG capsule Take 1 capsule (300 mg) by mouth At Bedtime 90 capsule 1     citalopram (CELEXA) 40 MG tablet TAKE 1 TABLET BY MOUTH EVERY MORNING 90 tablet 0     polyethylene glycol (MIRALAX/GLYCOLAX) powder DISSOLVE 17 GRAMS IN LIQUID AND DRINK DAILY AS DIRECTED 510 g 1     cyclobenzaprine (FLEXERIL) 5 MG tablet TAKE 1 TABLET BY MOUTH TWICE DAILY AS NEEDED( GENERIC EQUIVALENT FOR FLEXERIL) 60 tablet 0     pioglitazone (ACTOS) 30 MG tablet Take 0.5 tablet by mouth in the morning with a meal and 1 tablet by mouth in the evening with a meal. 135 tablet 1     diclofenac (VOLTAREN) 25 MG EC tablet Take 1 tablet (25 mg) by mouth 3 times daily as needed for moderate pain 180 tablet 0     losartan (COZAAR) 50 MG tablet Take 1 tablet (50 mg) by mouth daily 90 tablet 1     glipiZIDE-metFORMIN (METAGLIP) 5-500 MG per tablet Take 2 tablets by mouth 2 times daily (before meals) 360 tablet 1     melatonin 3 MG tablet TAKE 1 TABLET BY MOUTH ONCE AT BEDTIME AS NEEDED FOR SLEEP 30 tablet 0     montelukast (SINGULAIR) 10 MG tablet TAKE 1 TABLET BY MOUTH AT BEDTIME 90 tablet 1     order for DME Diabetic test strips and lancets per insurance formulary Check blood sugar 2 times daily 3 Month 3     albuterol (PROAIR HFA, PROVENTIL HFA, VENTOLIN HFA) 108 (90 BASE) MCG/ACT inhaler Inhale 2 puffs into  the lungs every 6 hours       ASPIRIN PO Take 81 mg by mouth daily       ATORVASTATIN CALCIUM PO Take 40 mg by mouth daily       BUPROPION HCL PO        ESOMEPRAZOLE MAGNESIUM PO Take 40 mg by mouth every morning (before breakfast)       fluticasone (VERAMYST) 27.5 MCG/SPRAY nasal spray Spray 2 sprays into both nostrils daily       SOLIFENACIN SUCCINATE PO Take 10 mg by mouth daily       ACE NOT PRESCRIBED, INTENTIONAL, ACE Inhibitor not prescribed due to Allergy 0 each 0     [DISCONTINUED] pioglitazone (ACTOS) 15 MG tablet Take 1 tablet (15 mg) by mouth daily 90 tablet 1     [DISCONTINUED] LOSARTAN POTASSIUM PO Take 50 mg by mouth daily       Allergies   Allergen Reactions     Bee Venom Swelling     Latex      Lisinopril Cough     Onion GI Disturbance     Aloe Rash     pain     Chlorine Rash     Recent Labs   Lab Test  04/18/17   0900  11/01/16   0752  03/01/16   0806   A1C  6.1*  6.7*  6.2*   LDL   --   75  66   HDL   --   61  53   TRIG   --   159*  109   ALT   --   27   --    CR  0.47*  0.47*  0.56   GFRESTIMATED  >90  Non  GFR Calc    >90  Non  GFR Calc    >90  Non  GFR Calc     GFRESTBLACK  >90   GFR Calc    >90   GFR Calc    >90   GFR Calc     POTASSIUM  3.9  3.8  3.4   TSH   --   1.20   --       BP Readings from Last 3 Encounters:   04/18/17 131/72   04/18/17 131/72   12/05/16 117/74    Wt Readings from Last 3 Encounters:   04/18/17 (!) 322 lb (146.1 kg)   04/18/17 (!) 322 lb (146.1 kg)   03/14/17 (!) 322 lb (146.1 kg)                    Reviewed and updated as needed this visit by clinical staff  Tobacco  Allergies  Meds       Reviewed and updated as needed this visit by Provider         ROS:  Constitutional, HEENT, cardiovascular, pulmonary, gi and gu systems are negative, except as otherwise noted.    OBJECTIVE:                                                    /72  Pulse 91  Temp 98.7  F (37.1  C)   "Ht 5' 7\" (1.702 m)  Wt (!) 322 lb (146.1 kg)  SpO2 98%  Breastfeeding? No  BMI 50.43 kg/m2  Body mass index is 50.43 kg/(m^2).  GENERAL APPEARANCE: healthy, alert and no distress  SKIN: no suspicious lesions or rashes  NEURO: Normal strength and tone, mentation intact and speech normal  PSYCH: mentation appears normal and affect normal/bright    Diagnostic test results:  Diagnostic Test Results:  Results for orders placed or performed in visit on 04/18/17 (from the past 24 hour(s))   CBC with platelets   Result Value Ref Range    WBC 8.0 4.0 - 11.0 10e9/L    RBC Count 4.27 3.8 - 5.2 10e12/L    Hemoglobin 12.8 11.7 - 15.7 g/dL    Hematocrit 38.8 35.0 - 47.0 %    MCV 91 78 - 100 fl    MCH 30.0 26.5 - 33.0 pg    MCHC 33.0 31.5 - 36.5 g/dL    RDW 14.5 10.0 - 15.0 %    Platelet Count 259 150 - 450 10e9/L   Hemoglobin A1c   Result Value Ref Range    Hemoglobin A1C 6.1 (H) 4.3 - 6.0 %   Basic metabolic panel   Result Value Ref Range    Sodium 142 133 - 144 mmol/L    Potassium 3.9 3.4 - 5.3 mmol/L    Chloride 106 94 - 109 mmol/L    Carbon Dioxide 25 20 - 32 mmol/L    Anion Gap 11 3 - 14 mmol/L    Glucose 125 (H) 70 - 99 mg/dL    Urea Nitrogen 11 7 - 30 mg/dL    Creatinine 0.47 (L) 0.52 - 1.04 mg/dL    GFR Estimate >90  Non  GFR Calc   >60 mL/min/1.7m2    GFR Estimate If Black >90   GFR Calc   >60 mL/min/1.7m2    Calcium 9.6 8.5 - 10.1 mg/dL   Albumin level   Result Value Ref Range    Albumin 3.8 3.4 - 5.0 g/dL   UA reflex to Microscopic and Culture   Result Value Ref Range    Color Urine Yellow     Appearance Urine Clear     Glucose Urine Negative NEG mg/dL    Bilirubin Urine Negative NEG    Ketones Urine Negative NEG mg/dL    Specific Gravity Urine 1.010 1.003 - 1.035    Blood Urine Negative NEG    pH Urine 6.5 5.0 - 7.0 pH    Protein Albumin Urine Negative NEG mg/dL    Urobilinogen Urine 1.0 0.2 - 1.0 EU/dL    Nitrite Urine Negative NEG    Leukocyte Esterase Urine Small (A) NEG    " Source Midstream Urine    Urine Microscopic   Result Value Ref Range    WBC Urine 2-5 (A) 0 - 2 /HPF    RBC Urine O - 2 0 - 2 /HPF    Squamous Epithelial /LPF Urine Few FEW /LPF    Bacteria Urine Moderate (A) NEG /HPF        ASSESSMENT/PLAN:                                                    1. Pre-op exam  -Pending final approval     2. Abnormal electrocardiogram  - NM Exercise stress test; Future  -We were able to get the patient scheduled for a stress test at [facility] for 4/20/17 and 4/21/17 (2 day test)     3. Type 2 diabetes mellitus with diabetic nephropathy, without long-term current use of insulin (H)  - NM Exercise stress test; Future      Follow up with Provider - Will contact with results when available      Elizabeth Lamas MD MPH    [facility]

## 2017-04-18 NOTE — PATIENT INSTRUCTIONS
At [facility], we strive to deliver an exceptional experience to you, every time we see you.    If you receive a survey in the mail, please send us back your thoughts. We really do value your feedback.    Thank you for visiting [facility]    Normal or non-critical lab and imaging results will be communicated to you by MyChart, letter or phone within 7 days.  If you do not hear from us within 10 days, please call the clinic. If you have a critical or abnormal lab result, we will notify you by phone as soon as possible.     If you have any questions regarding your visit please contact:     Team [facility]  Clinic Hours Telephone Number   Dr. Betzy Smith   7am-7pm  Monday through Thursday  7am-5pm Friday [phone]  Milly COSTELLO RN   Pharmacy 8:30am-9pm Monday-Friday    9am-5pm Saturday-Sunday [phone]   Urgent Care 11am-9pm Monday-Friday        9am-5pm Saturday-Sunday [phone]     After hours, weekend or if you need to make an appointment with your primary provider please call [phone].   After Hours nurse advise: call [facility]: [phone]    Use [employer] (secure email communication and access to your chart) to send your primary care provider a message or make an appointment. Ask someone on your Team how to sign up for [employer]. To log on to [employer] or for more information in [employer] please visit the website at [email or web address].   As of October 8, 2013, all password changes, disabled accounts, or ID changes in [email or web address]/MyHealth will be done by our Access Services Department.   If you need help with your account or password, call: [phone]. Clinic staff no longer has the ability to change passwords.

## 2017-04-18 NOTE — PROGRESS NOTES
[address] [address]  [phone]  Dept: [phone]    PRE-OP EVALUATION:  Today's date: 2017    Melody Ferguson (: 1960) presents for pre-operative evaluation assessment as requested by Dr. George.  She requires evaluation and anesthesia risk assessment prior to undergoing surgery/procedure for treatment of OA left knee .  Proposed procedure: left knee replacement    Date of Surgery/ Procedure: 17  Time of Surgery/ Procedure: 7:30 am  Hospital/Surgical Facility: [facility]  Fax number for surgical facility: [phone]  Primary Physician: Elizabeth Lamas  Type of Anesthesia Anticipated: General    Patient has a Health Care Directive or Living Will:  NO    1. NO - Do you have a history of heart attack, stroke, stent, bypass or surgery on an artery in the head, neck, heart or legs?  2. NO - Do you ever have any pain or discomfort in your chest?  3. NO - Do you have a history of  Heart Failure?  4. NO - Are you troubled by shortness of breath when: walking on the level, up a slight hill or at night?  5. NO - Do you currently have a cold, bronchitis or other respiratory infection?  6. NO - Do you have a cough, shortness of breath or wheezing?  7. YES - Do you sometimes get pains in the calves of your legs when you walk? Pain in knees and inner thighs   8. YES - Do you or anyone in your family have previous history of blood clots? Blood clot in father at age 83 years   9. NO - Do you or does anyone in your family have a serious bleeding problem such as prolonged bleeding following surgeries or cuts?  10. YES - Have you ever had problems with anemia or been told to take iron pills? Iron pills many years ago after pregnancy   11. NO - Have you had any abnormal blood loss such as black, tarry or bloody stools, or abnormal vaginal bleeding?  12. NO - Have you ever had a blood transfusion?  13. NO - Have you or any of your  relatives ever had problems with anesthesia?  14. NO - Do you have sleep apnea, excessive snoring or daytime drowsiness?  15. NO - Do you have any prosthetic heart valves?  16. NO - Do you have prosthetic joints?  17. NO - Is there any chance that you may be pregnant?      HPI:                                                      Brief HPI related to upcoming procedure: Pain in both knees for many years, no improvement with medication, physical therapy, cortisone and gel injections.       See problem list for active medical problems.  Problems all longstanding and stable, except as noted/documented.  See ROS for pertinent symptoms related to these conditions.                                                                                                  .    MEDICAL HISTORY:                                                      Patient Active Problem List    Diagnosis Date Noted     Diabetes mellitus, type 2 (H) 01/16/2017     Priority: Medium     DDD (degenerative disc disease), lumbar 11/02/2016     Priority: Medium     Hiatal hernia 07/27/2016     Priority: Medium     DM type 2 without retinopathy (H) 04/20/2016     Priority: Medium     Primary osteoarthritis of both knees 03/01/2016     Priority: Medium     PTSD (post-traumatic stress disorder) 03/01/2016     Priority: Medium     Chronic pain syndrome 03/01/2016     Priority: Medium       Patient is followed by Elizabeth Lamas MD for ongoing prescription of pain medication.  All refills should be approved by this provider, or covering partner.    Medication(s): Tramadol.   Maximum quantity per month: 30  Clinic visit frequency required: Q 3 months     Controlled substance agreement:  Encounter-Level CSA - 3/1/16:               Controlled Substance Agreement - Scan on 3/7/2016  9:41 AM : CONTROLLED SUBSTANCE AGREEMENT (below)            Pain Clinic evaluation in the past: Yes       Date/Location:  [place] several years ago    DIRE Total Score(s):  No flowsheet data  found.    Last [facility] website verification:  done on 12/15/16   [email or web address]         Fibromyalgia 03/01/2016     Priority: Medium     Major depressive disorder, recurrent episode, moderate (H) 03/01/2016     Priority: Medium     Urge incontinence of urine 03/01/2016     Priority: Medium     Prolapse of vaginal wall 03/01/2016     Priority: Medium     Mild intermittent asthma without complication 03/01/2016     Priority: Medium     HTN, goal below 140/90 03/01/2016     Priority: Medium     Hyperlipidemia LDL goal <100 03/01/2016     Priority: Medium     Type 2 diabetes mellitus with diabetic nephropathy (H) 03/01/2016     Priority: Medium     Allergic rhinitis, unspecified allergic rhinitis type 03/01/2016     Priority: Medium     Morbid obesity with BMI of 45.0-49.9, adult (H) 03/01/2016     Priority: Medium     Vaginal high risk HPV DNA test positive 03/01/2016     Priority: Medium     Last Pap smear 12/2015 in [place]       History of TIA (transient ischemic attack) and stroke 03/01/2016     In [place]        History of colonic polyps 03/01/2016     Colonoscopy in 2015 in [place]        No past medical history on file.  No past surgical history on file.  Current Outpatient Prescriptions   Medication Sig Dispense Refill     NYSTOP 638814 UNIT/GM POWD powder APPLY TO THE AFFECTED AREA TWO TO THREE TIMES DAILY AS NEEDED 60 g 0     traMADol (ULTRAM) 50 MG tablet TAKE 1 TABLET BY MOUTH EVERY NIGHT AT BEDTIME AS NEEDED 30 tablet 0     gabapentin (NEURONTIN) 300 MG capsule Take 1 capsule (300 mg) by mouth At Bedtime 90 capsule 1     citalopram (CELEXA) 40 MG tablet TAKE 1 TABLET BY MOUTH EVERY MORNING 90 tablet 0     polyethylene glycol (MIRALAX/GLYCOLAX) powder DISSOLVE 17 GRAMS IN LIQUID AND DRINK DAILY AS DIRECTED 510 g 1     cyclobenzaprine (FLEXERIL) 5 MG tablet TAKE 1 TABLET BY MOUTH TWICE DAILY AS NEEDED 60 tablet 0     cyclobenzaprine (FLEXERIL) 5 MG tablet TAKE 1 TABLET BY MOUTH TWICE DAILY AS NEEDED(  GENERIC EQUIVALENT FOR FLEXERIL) 60 tablet 0     pioglitazone (ACTOS) 30 MG tablet Take 0.5 tablet by mouth in the morning with a meal and 1 tablet by mouth in the evening with a meal. 135 tablet 1     diclofenac (VOLTAREN) 25 MG EC tablet Take 1 tablet (25 mg) by mouth 3 times daily as needed for moderate pain 180 tablet 0     Nystatin (NYSTOP) 256251 UNIT/GM POWD powder APPLY TO THE AFFECTED AREA TWO TO THREE TIMES DAILY AS NEEDED 60 g 0     losartan (COZAAR) 50 MG tablet Take 1 tablet (50 mg) by mouth daily 90 tablet 1     glipiZIDE-metFORMIN (METAGLIP) 5-500 MG per tablet Take 2 tablets by mouth 2 times daily (before meals) 360 tablet 1     melatonin 3 MG tablet TAKE 1 TABLET BY MOUTH AT BEDTIME AS NEEDED FOR SLEEP 30 tablet 3     pioglitazone (ACTOS) 15 MG tablet Take 1 tablet (15 mg) by mouth daily 90 tablet 1     melatonin 3 MG tablet TAKE 1 TABLET BY MOUTH ONCE AT BEDTIME AS NEEDED FOR SLEEP 30 tablet 0     montelukast (SINGULAIR) 10 MG tablet TAKE 1 TABLET BY MOUTH AT BEDTIME 90 tablet 1     order for DME Diabetic test strips and lancets per insurance formulary Check blood sugar 2 times daily 3 Month 3     albuterol (PROAIR HFA, PROVENTIL HFA, VENTOLIN HFA) 108 (90 BASE) MCG/ACT inhaler Inhale 2 puffs into the lungs every 6 hours       ASPIRIN PO Take 81 mg by mouth daily       ATORVASTATIN CALCIUM PO Take 40 mg by mouth daily       BUPROPION HCL PO        ESOMEPRAZOLE MAGNESIUM PO Take 40 mg by mouth every morning (before breakfast)       fluticasone (VERAMYST) 27.5 MCG/SPRAY nasal spray Spray 2 sprays into both nostrils daily       SOLIFENACIN SUCCINATE PO Take 10 mg by mouth daily       LOSARTAN POTASSIUM PO Take 50 mg by mouth daily       ACE NOT PRESCRIBED, INTENTIONAL, ACE Inhibitor not prescribed due to Allergy 0 each 0     OTC products: Aspirin and NSAIDS    Allergies   Allergen Reactions     Bee Venom      Latex      Lisinopril Cough     Chlorine Rash      Latex Allergy: YES: Precautions to take:  "Avoid Latex products     Social History   Substance Use Topics     Smoking status: Never Smoker     Smokeless tobacco: Never Used     Alcohol use 0.0 oz/week     0 Standard drinks or equivalent per week     History   Drug Use No       REVIEW OF SYSTEMS:                                                    Constitutional, neuro, ENT, endocrine, pulmonary, cardiac, gastrointestinal, genitourinary, musculoskeletal, integument and psychiatric systems are negative, except as otherwise noted.    EXAM:                                                    /72  Pulse 91  Temp 98.7  F (37.1  C) (Oral)  Ht 5' 7\" (1.702 m)  Wt (!) 322 lb (146.1 kg)  SpO2 98%  Breastfeeding? No  BMI 50.43 kg/m2    GENERAL APPEARANCE: healthy, alert and no distress     EYES: EOMI, PERRL     HENT: ear canals and TM's normal and nose and mouth without ulcers or lesions     NECK: no adenopathy, no asymmetry, masses     RESP: lungs clear to auscultation - no rales, rhonchi or wheezes     CV: regular rates and rhythm, normal S1 S2, no S3 or S4 and no murmur, click or rub     ABDOMEN:  soft, nontender, no HSM or masses      MS: extremities normal- no gross deformities noted, no evidence of inflammation in joints, FROM in all extremities.     SKIN: no suspicious lesions or rashes     NEURO: Normal strength and tone, sensory exam grossly normal, mentation intact and speech normal     PSYCH: mentation appears normal. and affect normal/bright     LYMPHATICS: No cervical, or supraclavicular nodes    DIAGNOSTICS:                                                      EKG: Normal sinus rhythm, no acute ST changes. Incomplete left bundle branch block, changes in lead V6 compared to previous EKG.  Labs Resulted Today:   Results for orders placed or performed in visit on 03/14/17   XR Knee Bilateral 3 Views    Narrative    KNEE BILATERAL THREE VIEWS  3/14/2017 8:55 AM     HISTORY: Bilateral primary osteoarthritis of knee.    COMPARISON: 3/15/2016.      " Impression    IMPRESSION: There is complete loss of the medial compartment joint  spaces bilaterally, progressed since the previous examination. Mild  degenerative changes in the lateral compartments. Moderate  degenerative change in the right patellofemoral compartment. No  evidence of fracture.    KAYLEY VILLALBA MD     Labs Drawn and in Process:   Unresulted Labs Ordered in the Past 30 Days of this Admission     No orders found from 2/17/2017 to 4/19/2017.          Recent Labs   Lab Test  11/01/16   0752  03/01/16   0806   NA  140  140   POTASSIUM  3.8  3.4   CR  0.47*  0.56   A1C  6.7*  6.2*      GATED MYOCARDIAL PERFUSION SCINTIGRAPHY WITH INTRAVENOUS PHARMACOLOGIC  VASODILATATION LEXISCAN -TWO DAY STUDY     4/21/2017 9:31 AM EDISON BORDEN 56 years Female 1960.     Exams Performed on: Rest 4/20/2017 and Stress 4/21/2017     Indication/Clinical History: Preop clearance, abnormal EKG     Impression  1. Myocardial perfusion imaging using single isotope technique  demonstrated moderate size partially reversible inferolateral and  apicolateral defect that could represent a small basal nontransmural  scar with mild to moderate ischemia. Presence of significant visceral  tracer artifact decreases specificity of this finding. If clinically  appropriate, consider cardiology consultation and CT coronary  angiography.   2. Gated images demonstrated borderline dilated left ventricle with  possible mild inferolateral hypokinesis. The left ventricular  systolic function is borderline reduced with ejection fraction 49%.  Visually, the ejection fraction appears at least low normal.  3. Compared to the prior study from  .Findings discussed with Dr. Bethany Alexander (covering for Dr Lamas). Will arrange cardiology consult  per her request.        EKG Findings  The resting EKG demonstrated sinus rhythm with poor R wave  progression. The stress EKG demonstrated no EKG changes suggestive  ischemia. Occasional premature ventricular  complexes were seen.      PENNIE RAMOS MD  IMPRESSION:                                                    Reason for surgery/procedure: Primary osteoarthritis of both knees, scheduled for left knee replacement   Diagnosis/reason for consult: Pre operative evaluation     The proposed surgical procedure is considered INTERMEDIATE risk.    REVISED CARDIAC RISK INDEX  The patient has the following serious cardiovascular risks for perioperative complications such as (MI, PE, VFib and 3  AV Block):  No serious cardiac risks  INTERPRETATION: 1 risks: Class II (low risk - 0.9% complication rate)    The patient has the following additional risks for perioperative complications:  No identified additional risks  The 10-year ASCVD risk score ([place] TIM Jr, et al., 2013) is: 5.7%    Values used to calculate the score:      Age: 56 years      Sex: Female      Is Non- : No      Diabetic: Yes      Tobacco smoker: No      Systolic Blood Pressure: 131 mmHg      Is BP treated: Yes      HDL Cholesterol: 61 mg/dL      Total Cholesterol: 168 mg/dL    Encounter Diagnoses   Name Primary?     Preop general physical exam Yes     Type 2 diabetes mellitus with diabetic nephropathy, without long-term current use of insulin (H)      Morbid obesity with BMI of 45.0-49.9, adult (H)      Primary osteoarthritis of both knees      Mild intermittent asthma without complication      HTN, goal below 140/90      Nonspecific abnormal electrocardiogram (ECG) (EKG)      Abnormal cardiovascular stress test          RECOMMENDATIONS:                                                      Due to abnormal stress test, the patient will need to undergo Cardiology evaluation prior to proceeding with elective knee replacement.   I am unable to provide clearance for surgery at this time. Clearance will have to be provided by Cardiology.  ER precautions reviewed, if develops any chest pain then needs to call 911.       Signed Electronically by:  Elizabeth Lamas MD MPH    Copy of this evaluation report is provided to requesting physician.    [place] Preop Guidelines

## 2017-04-18 NOTE — LETTER
My Asthma Action Plan  Name: Melody Ferguson   YOB: 1960  Date: 4/18/2017   My doctor: Elizabeth Lamas MD   My clinic: [facility]        My Control Medicine: None  My Rescue Medicine: Albuterol (Proair/Ventolin/Proventil) inhaler as needed every 6 hours   My Asthma Severity: intermittent  Avoid your asthma triggers: upper respiratory infections               GREEN ZONE     Good Control    I feel good    No cough or wheeze    Can work, sleep and play without asthma symptoms       Take your asthma control medicine every day.     1. If exercise triggers your asthma, take your rescue medication    15 minutes before exercise or sports, and    During exercise if you have asthma symptoms  2. Spacer to use with inhaler: If you have a spacer, make sure to use it with your inhaler             YELLOW ZONE     Getting Worse  I have ANY of these:    I do not feel good    Cough or wheeze    Chest feels tight    Wake up at night   1. Keep taking your Green Zone medications  2. Start taking your rescue medicine:    every 20 minutes for up to 1 hour. Then every 4 hours for 24-48 hours.  3. If you stay in the Yellow Zone for more than 12-24 hours, contact your doctor.  4. If you do not return to the Green Zone in 12-24 hours or you get worse, start taking your oral steroid medicine if prescribed by your provider.           RED ZONE     Medical Alert - Get Help  I have ANY of these:    I feel awful    Medicine is not helping    Breathing getting harder    Trouble walking or talking    Nose opens wide to breathe       1. Take your rescue medicine NOW  2. If your provider has prescribed an oral steroid medicine, start taking it NOW  3. Call your doctor NOW  4. If you are still in the Red Zone after 20 minutes and you have not reached your doctor:    Take your rescue medicine again and    Call 911 or go to the emergency room right away    See your regular doctor within 2 weeks of an Emergency Room or  Urgent Care visit for follow-up treatment.        Electronically signed by: Elizabeth Lamas, April 18, 2017    Annual Reminders:  Meet with Asthma Educator,  Flu Shot in the Fall, consider Pneumonia Vaccination for patients with asthma (aged 19 and older).    Pharmacy: [facility] [id number] [place] [address] - [address] [facility] [address]                    Asthma Triggers  How To Control Things That Make Your Asthma Worse    Triggers are things that make your asthma worse.  Look at the list below to help you find your triggers and what you can do about them.  You can help prevent asthma flare-ups by staying away from your triggers.      Trigger                                                          What you can do   Cigarette Smoke  Tobacco smoke can make asthma worse. Do not allow smoking in your home, car or around you.  Be sure no one smokes at a child s day care or school.  If you smoke, ask your health care provider for ways to help you quit.  Ask family members to quit too.  Ask your health care provider for a referral to Quit Plan to help you quit smoking, or call 2-151-404PLAN.     Colds, Flu, Bronchitis  These are common triggers of asthma. Wash your hands often.  Don t touch your eyes, nose or mouth.  Get a flu shot every year.     Dust Mites  These are tiny bugs that live in cloth or carpet. They are too small to see. Wash sheets and blankets in hot water every week.   Encase pillows and mattress in dust mite proof covers.  Avoid having carpet if you can. If you have carpet, vacuum weekly.   Use a dust mask and HEPA vacuum.   Pollen and Outdoor Mold  Some people are allergic to trees, grass, or weed pollen, or molds. Try to keep your windows closed.  Limit time out doors when pollen count is high.   Ask you health care provider about taking medicine during allergy season.     Animal Dander  Some people are allergic to skin flakes, urine or saliva from pets with fur or feathers. Keep  pets with fur or feathers out of your home.    If you can t keep the pet outdoors, then keep the pet out of your bedroom.  Keep the bedroom door closed.  Keep pets off cloth furniture and away from stuffed toys.     Mice, Rats, and Cockroaches  Some people are allergic to the waste from these pests.   Cover food and garbage.  Clean up spills and food crumbs.  Store grease in the refrigerator.   Keep food out of the bedroom.   Indoor Mold  This can be a trigger if your home has high moisture. Fix leaking faucets, pipes, or other sources of water.   Clean moldy surfaces.  Dehumidify basement if it is damp and smelly.   Smoke, Strong Odors, and Sprays  These can reduce air quality. Stay away from strong odors and sprays, such as perfume, powder, hair spray, paints, smoke incense, paint, cleaning products, candles and new carpet.   Exercise or Sports  Some people with asthma have this trigger. Be active!  Ask your doctor about taking medicine before sports or exercise to prevent symptoms.    Warm up for 5-10 minutes before and after sports or exercise.     Other Triggers of Asthma  Cold air:  Cover your nose and mouth with a scarf.  Sometimes laughing or crying can be a trigger.  Some medicines and food can trigger asthma.

## 2017-04-18 NOTE — PATIENT INSTRUCTIONS
At [facility], we strive to deliver an exceptional experience to you, every time we see you.    If you receive a survey in the mail, please send us back your thoughts. We really do value your feedback.    Thank you for visiting [facility]    Normal or non-critical lab and imaging results will be communicated to you by MyChart, letter or phone within 7 days.  If you do not hear from us within 10 days, please call the clinic. If you have a critical or abnormal lab result, we will notify you by phone as soon as possible.     If you have any questions regarding your visit please contact:     Team Radha/Spirit  Clinic Hours Telephone Number   Dr. Betzy Smith   7am-7pm  Monday through Thursday  7am-5pm Friday [phone]  Milly COSTELLO RN   Pharmacy 8:30am-9pm Monday-Friday    9am-5pm Saturday-Sunday [phone]   Urgent Care 11am-9pm Monday-Friday        9am-5pm Saturday-Sunday [phone]     After hours, weekend or if you need to make an appointment with your primary provider please call [phone].   After Hours nurse advise: call [facility]: [phone]    Use [employer]zz[employer] (secure email communication and access to your chart) to send your primary care provider a message or make an appointment. Ask someone on your Team how to sign up for uberlife. To log on to [facility] [employer] or for more information in [place] please visit the website at [email or web address].   As of October 8, 2013, all password changes, disabled accounts, or ID changes in uberlife/MyHealth will be done by our Access Services Department.   If you need help with your account or password, call: [phone]. Clinic staff no longer has the ability to change passwords.     Before Your Surgery      Call your surgeon if there is any change in your health. This includes signs of a cold or flu (such  as a sore throat, runny nose, cough, rash or fever).    Do not smoke, drink alcohol or take over the counter medicine (unless your surgeon or primary care doctor tells you to) for the 24 hours before and after surgery.    If you take prescribed drugs: Follow your doctor s orders about which medicines to take and which to stop until after surgery.    Eating and drinking prior to surgery: follow the instructions from your surgeon    Take a shower or bath the night before surgery. Use the soap your surgeon gave you to gently clean your skin. If you do not have soap from your surgeon, use your regular soap. Do not shave or scrub the surgery site.  Wear clean pajamas and have clean sheets on your bed.

## 2017-04-18 NOTE — TELEPHONE ENCOUNTER
Reason for Call:  Other appointment    Detailed comments: cant get into cardiology until 4/26 for ekg follow up    Phone Number Patient can be reached at: Cell number on file:    Telephone Information:   Mobile [phone]       Best Time: any    Can we leave a detailed message on this number? YES    Call taken on 4/18/2017 at 9:42 AM by Norma Grossman

## 2017-04-19 ENCOUNTER — MYC MEDICAL ADVICE (OUTPATIENT)
Dept: FAMILY MEDICINE | Facility: CLINIC | Age: 57
End: 2017-04-19

## 2017-04-19 DIAGNOSIS — R10.13 DYSPEPSIA: Primary | ICD-10-CM

## 2017-04-19 RX ORDER — ESOMEPRAZOLE MAGNESIUM 40 MG/1
CAPSULE, DELAYED RELEASE ORAL
Qty: 30 CAPSULE | Refills: 0 | Status: SHIPPED | OUTPATIENT
Start: 2017-04-19 | End: 2017-05-12

## 2017-04-19 ASSESSMENT — ASTHMA QUESTIONNAIRES: ACT_TOTALSCORE: 25

## 2017-04-19 NOTE — TELEPHONE ENCOUNTER
ESOMEPRAZOL      Last Written Prescription Date:  na  Last Fill Quantity: na,   # refills: na  Last Office Visit with [facility], [facility] or [facility] prescribing provider: 04/18/17  Future Office visit:    Next 5 appointments (look out 90 days)     May 09, 2017  8:00 AM CDT   Return Visit with Armond George MD   [facility] ([facility])    [address] [place] [address] [id number] [phone]                   Routing refill request to provider for review/approval because:  Medication is reported/historical      Kiki Pearson  [facility]

## 2017-04-21 ENCOUNTER — HOSPITAL ENCOUNTER (OUTPATIENT)
Dept: CARDIOLOGY | Facility: CLINIC | Age: 57
End: 2017-04-21
Payer: MEDICARE

## 2017-04-21 ENCOUNTER — TELEPHONE (OUTPATIENT)
Dept: FAMILY MEDICINE | Facility: CLINIC | Age: 57
End: 2017-04-21

## 2017-04-21 VITALS — DIASTOLIC BLOOD PRESSURE: 60 MMHG | HEART RATE: 75 BPM | SYSTOLIC BLOOD PRESSURE: 124 MMHG

## 2017-04-21 DIAGNOSIS — R94.31 NONSPECIFIC ABNORMAL ELECTROCARDIOGRAM (ECG) (EKG): ICD-10-CM

## 2017-04-21 DIAGNOSIS — Z01.818 PREOP GENERAL PHYSICAL EXAM: ICD-10-CM

## 2017-04-21 DIAGNOSIS — R94.39 ABNORMAL NUCLEAR STRESS TEST: Primary | ICD-10-CM

## 2017-04-21 DIAGNOSIS — Z01.810 PRE-OPERATIVE CARDIOVASCULAR EXAMINATION: ICD-10-CM

## 2017-04-21 PROCEDURE — A9502 TC99M TETROFOSMIN: HCPCS | Performed by: PREVENTIVE MEDICINE

## 2017-04-21 PROCEDURE — 93018 CV STRESS TEST I&R ONLY: CPT | Performed by: INTERNAL MEDICINE

## 2017-04-21 PROCEDURE — 25000128 H RX IP 250 OP 636: Performed by: INTERNAL MEDICINE

## 2017-04-21 PROCEDURE — 34300033 ZZH RX 343: Performed by: PREVENTIVE MEDICINE

## 2017-04-21 PROCEDURE — 93016 CV STRESS TEST SUPVJ ONLY: CPT | Performed by: INTERNAL MEDICINE

## 2017-04-21 PROCEDURE — 93017 CV STRESS TEST TRACING ONLY: CPT

## 2017-04-21 PROCEDURE — 78452 HT MUSCLE IMAGE SPECT MULT: CPT | Mod: 26 | Performed by: INTERNAL MEDICINE

## 2017-04-21 RX ORDER — REGADENOSON 0.08 MG/ML
0.4 INJECTION, SOLUTION INTRAVENOUS ONCE
Status: COMPLETED | OUTPATIENT
Start: 2017-04-21 | End: 2017-04-21

## 2017-04-21 RX ORDER — ALBUTEROL SULFATE 90 UG/1
2 AEROSOL, METERED RESPIRATORY (INHALATION) EVERY 5 MIN PRN
Status: DISCONTINUED | OUTPATIENT
Start: 2017-04-21 | End: 2017-04-22 | Stop reason: HOSPADM

## 2017-04-21 RX ORDER — ACYCLOVIR 200 MG/1
0-1 CAPSULE ORAL
Status: DISCONTINUED | OUTPATIENT
Start: 2017-04-21 | End: 2017-04-22 | Stop reason: HOSPADM

## 2017-04-21 RX ORDER — AMINOPHYLLINE 25 MG/ML
50-100 INJECTION, SOLUTION INTRAVENOUS
Status: DISCONTINUED | OUTPATIENT
Start: 2017-04-21 | End: 2017-04-22 | Stop reason: HOSPADM

## 2017-04-21 RX ADMIN — Medication 20.8 MCI.: at 09:30

## 2017-04-21 RX ADMIN — REGADENOSON 0.4 MG: 0.08 INJECTION, SOLUTION INTRAVENOUS at 09:25

## 2017-04-21 NOTE — TELEPHONE ENCOUNTER
Abnormal Nuclear stress test and needs cardiology follow up before cleared for surgery. Referral made to [facility]. Please call surgery to let them know that she will need more evaluation and possible cardiac treatment before surgery.    Caller Dr. Valle. He will set up cardiology appointment at [facility].     Aida Lamas MD

## 2017-04-21 NOTE — TELEPHONE ENCOUNTER
Called patient. Patient was told by someone that her results were back. they told her that she should speak with her dr. about  her results . She is wondering if she is to be resting or can do daily activities for the weekend.  Will route to provider to advise.    Bambi Rainey CMA

## 2017-04-21 NOTE — TELEPHONE ENCOUNTER
Reason for Call:  Request for results:    Name of test or procedure: NUC MED TEST CARDIAC    Date of test of procedure: 4/20 & 4/21    Location of the test or procedure:     OK to leave the result message on voice mail or with a family member? YES    Phone number Patient can be reached at:  Cell number on file:    Telephone Information:   Mobile [phone]       Additional comments:     Call taken on 4/21/2017 at 4:01 PM by Halie Rodriguez

## 2017-04-23 NOTE — PROGRESS NOTES
Melody,     The stress test was Abnormal. This is concerning for heart disease. The cardiologists who did the stress test will be contacting you to set up an appointment. I have also placed a referral for the [facility] location if you prefer that, you can call [phone] to make an appointment.    At this time, I am unable to provide clearance for your surgery, this will have to be delayed till you have been evaluated by the Heart Specialist, they will need to provide any surgical clearance at this point.     Please continue with your normal level of physical activity. Do not start any new exercise programs. If you develop any chest pain, jaw pain or sudden shortness of breath, call [phone]. The office will also be contacting you with this information.     Please do not hesitate to call us at [phone] if you have any questions or concerns.    Thank you,    Elizabeth Lamas MD MPH

## 2017-04-23 NOTE — TELEPHONE ENCOUNTER
Please CALL patient. The stress test for the heart was Abnormal, this is concerning for heart disease. She needs to be seen by Cardiology prior to proceeding with the surgery. Hence, I am unable to clear her for surgery at this time, elective knee replacement will have to be rescheduled pending Cardiology evaluation. The Cardiologists who did the stress test will be contacting her to set up an appointment. I have also placed a referral for the [facility] location if she prefers that (call [phone]) to make an appointment.     In the meantime, continue with usual physical activity routine. Do not start any new exercise regimens. If she develops any chest pain, jaw pain, sudden shortness of breath, she needs to call [phone]. Please let me know if any questions.    Thanks,    Elizabeth Lamas MD MPH

## 2017-04-23 NOTE — PROGRESS NOTES
Melody,     Urine sample did not show a definite infection. Basic blood count did not show anemia. Electrolytes, non fasting glucose, protein levels and kidney function are normal. Three month glucose value looks great at 6.1     Please do not hesitate to call us at [phone] if you have any questions or concerns.    Thank you,    Elizabeth Lamas MD MPH

## 2017-04-24 ENCOUNTER — MYC MEDICAL ADVICE (OUTPATIENT)
Dept: FAMILY MEDICINE | Facility: CLINIC | Age: 57
End: 2017-04-24

## 2017-04-24 ENCOUNTER — HOSPITAL ENCOUNTER (OUTPATIENT)
Facility: CLINIC | Age: 57
Discharge: HOME | End: 2017-04-24
Attending: INTERNAL MEDICINE | Admitting: INTERNAL MEDICINE
Payer: MEDICARE

## 2017-04-24 ENCOUNTER — OFFICE VISIT (OUTPATIENT)
Dept: CARDIOLOGY | Facility: CLINIC | Age: 57
End: 2017-04-24
Payer: MEDICARE

## 2017-04-24 ENCOUNTER — APPOINTMENT (OUTPATIENT)
Dept: CARDIOLOGY | Facility: CLINIC | Age: 57
End: 2017-04-24
Attending: INTERNAL MEDICINE
Payer: MEDICARE

## 2017-04-24 VITALS
DIASTOLIC BLOOD PRESSURE: 88 MMHG | SYSTOLIC BLOOD PRESSURE: 148 MMHG | TEMPERATURE: 98.3 F | OXYGEN SATURATION: 93 % | HEIGHT: 67 IN | RESPIRATION RATE: 16 BRPM | WEIGHT: 293 LBS | HEART RATE: 82 BPM | BODY MASS INDEX: 45.99 KG/M2

## 2017-04-24 VITALS
BODY MASS INDEX: 45.99 KG/M2 | HEIGHT: 67 IN | DIASTOLIC BLOOD PRESSURE: 80 MMHG | HEART RATE: 88 BPM | WEIGHT: 293 LBS | SYSTOLIC BLOOD PRESSURE: 134 MMHG

## 2017-04-24 DIAGNOSIS — R94.31 ABNORMAL ELECTROCARDIOGRAM: ICD-10-CM

## 2017-04-24 DIAGNOSIS — E11.21 TYPE 2 DIABETES MELLITUS WITH DIABETIC NEPHROPATHY, WITHOUT LONG-TERM CURRENT USE OF INSULIN (H): ICD-10-CM

## 2017-04-24 DIAGNOSIS — I25.2 OLD MYOCARDIAL INFARCTION: Primary | ICD-10-CM

## 2017-04-24 DIAGNOSIS — R94.31 NONSPECIFIC ABNORMAL ELECTROCARDIOGRAM (ECG) (EKG): ICD-10-CM

## 2017-04-24 DIAGNOSIS — R94.39 ABNORMAL CARDIOVASCULAR STRESS TEST: Primary | ICD-10-CM

## 2017-04-24 DIAGNOSIS — I25.2 OLD MYOCARDIAL INFARCTION: ICD-10-CM

## 2017-04-24 DIAGNOSIS — E78.5 HYPERLIPIDEMIA LDL GOAL <100: ICD-10-CM

## 2017-04-24 LAB
GLUCOSE BLDC GLUCOMTR-MCNC: 102 MG/DL (ref 70–99)
GLUCOSE BLDC GLUCOMTR-MCNC: 128 MG/DL (ref 70–99)

## 2017-04-24 PROCEDURE — 99152 MOD SED SAME PHYS/QHP 5/>YRS: CPT | Mod: GC | Performed by: INTERNAL MEDICINE

## 2017-04-24 PROCEDURE — 93458 L HRT ARTERY/VENTRICLE ANGIO: CPT | Mod: 26 | Performed by: INTERNAL MEDICINE

## 2017-04-24 PROCEDURE — 99153 MOD SED SAME PHYS/QHP EA: CPT

## 2017-04-24 PROCEDURE — 27210795 ZZH PAD DEFIB QUICK CR4

## 2017-04-24 PROCEDURE — 82962 GLUCOSE BLOOD TEST: CPT | Mod: 91

## 2017-04-24 PROCEDURE — 40000852 ZZH STATISTIC HEART CATH LAB OR EP LAB

## 2017-04-24 PROCEDURE — B2111ZZ FLUOROSCOPY OF MULTIPLE CORONARY ARTERIES USING LOW OSMOLAR CONTRAST: ICD-10-PCS | Performed by: INTERNAL MEDICINE

## 2017-04-24 PROCEDURE — 25000125 ZZHC RX 250: Performed by: INTERNAL MEDICINE

## 2017-04-24 PROCEDURE — 25000132 ZZH RX MED GY IP 250 OP 250 PS 637: Mod: GY | Performed by: INTERNAL MEDICINE

## 2017-04-24 PROCEDURE — 27210787 ZZH MANIFOLD CR2

## 2017-04-24 PROCEDURE — A9270 NON-COVERED ITEM OR SERVICE: HCPCS | Mod: GY | Performed by: INTERNAL MEDICINE

## 2017-04-24 PROCEDURE — 25000128 H RX IP 250 OP 636: Performed by: INTERNAL MEDICINE

## 2017-04-24 PROCEDURE — 27211089 ZZH KIT ACIST INJECTOR CR3

## 2017-04-24 PROCEDURE — 4A023N7 MEASUREMENT OF CARDIAC SAMPLING AND PRESSURE, LEFT HEART, PERCUTANEOUS APPROACH: ICD-10-PCS | Performed by: INTERNAL MEDICINE

## 2017-04-24 PROCEDURE — 99153 MOD SED SAME PHYS/QHP EA: CPT | Mod: GC | Performed by: INTERNAL MEDICINE

## 2017-04-24 PROCEDURE — 27210946 ZZH KIT HC TOTES DISP CR8

## 2017-04-24 PROCEDURE — B2151ZZ FLUOROSCOPY OF LEFT HEART USING LOW OSMOLAR CONTRAST: ICD-10-PCS | Performed by: INTERNAL MEDICINE

## 2017-04-24 PROCEDURE — 93458 L HRT ARTERY/VENTRICLE ANGIO: CPT

## 2017-04-24 PROCEDURE — 40000141 ZZH STATISTIC PERIPHERAL IV START W/O US GUIDANCE

## 2017-04-24 PROCEDURE — 27210856 ZZH ACCESS HEART CATH CR2

## 2017-04-24 PROCEDURE — 99204 OFFICE O/P NEW MOD 45 MIN: CPT | Performed by: INTERNAL MEDICINE

## 2017-04-24 PROCEDURE — 99152 MOD SED SAME PHYS/QHP 5/>YRS: CPT

## 2017-04-24 PROCEDURE — C1769 GUIDE WIRE: HCPCS

## 2017-04-24 PROCEDURE — 27210914 ZZH SHEATH CR8

## 2017-04-24 RX ORDER — DIPHENHYDRAMINE HYDROCHLORIDE 50 MG/ML
25-50 INJECTION INTRAMUSCULAR; INTRAVENOUS
Status: DISCONTINUED | OUTPATIENT
Start: 2017-04-24 | End: 2017-04-24 | Stop reason: HOSPADM

## 2017-04-24 RX ORDER — LIDOCAINE 40 MG/G
CREAM TOPICAL
Status: CANCELLED | OUTPATIENT
Start: 2017-04-24

## 2017-04-24 RX ORDER — SODIUM CHLORIDE 9 MG/ML
INJECTION, SOLUTION INTRAVENOUS CONTINUOUS
Status: DISCONTINUED | OUTPATIENT
Start: 2017-04-24 | End: 2017-04-24 | Stop reason: HOSPADM

## 2017-04-24 RX ORDER — NIFEDIPINE 10 MG/1
10 CAPSULE ORAL
Status: DISCONTINUED | OUTPATIENT
Start: 2017-04-24 | End: 2017-04-24 | Stop reason: HOSPADM

## 2017-04-24 RX ORDER — NALOXONE HYDROCHLORIDE 0.4 MG/ML
0.4 INJECTION, SOLUTION INTRAMUSCULAR; INTRAVENOUS; SUBCUTANEOUS EVERY 5 MIN PRN
Status: DISCONTINUED | OUTPATIENT
Start: 2017-04-24 | End: 2017-04-24 | Stop reason: HOSPADM

## 2017-04-24 RX ORDER — NICARDIPINE HYDROCHLORIDE 2.5 MG/ML
100 INJECTION INTRAVENOUS
Status: DISCONTINUED | OUTPATIENT
Start: 2017-04-24 | End: 2017-04-24 | Stop reason: HOSPADM

## 2017-04-24 RX ORDER — POTASSIUM CHLORIDE 29.8 MG/ML
20 INJECTION INTRAVENOUS
Status: DISCONTINUED | OUTPATIENT
Start: 2017-04-24 | End: 2017-04-24 | Stop reason: HOSPADM

## 2017-04-24 RX ORDER — FENTANYL CITRATE 50 UG/ML
25-50 INJECTION, SOLUTION INTRAMUSCULAR; INTRAVENOUS
Status: DISCONTINUED | OUTPATIENT
Start: 2017-04-24 | End: 2017-04-24 | Stop reason: HOSPADM

## 2017-04-24 RX ORDER — PRASUGREL 10 MG/1
10-60 TABLET, FILM COATED ORAL
Status: DISCONTINUED | OUTPATIENT
Start: 2017-04-24 | End: 2017-04-24 | Stop reason: HOSPADM

## 2017-04-24 RX ORDER — DOPAMINE HYDROCHLORIDE 160 MG/100ML
2-20 INJECTION, SOLUTION INTRAVENOUS CONTINUOUS PRN
Status: DISCONTINUED | OUTPATIENT
Start: 2017-04-24 | End: 2017-04-24 | Stop reason: HOSPADM

## 2017-04-24 RX ORDER — NITROGLYCERIN 0.4 MG/1
0.4 TABLET SUBLINGUAL EVERY 5 MIN PRN
Status: DISCONTINUED | OUTPATIENT
Start: 2017-04-24 | End: 2017-04-24 | Stop reason: HOSPADM

## 2017-04-24 RX ORDER — POTASSIUM CHLORIDE 1500 MG/1
20 TABLET, EXTENDED RELEASE ORAL
Status: CANCELLED | OUTPATIENT
Start: 2017-04-24

## 2017-04-24 RX ORDER — SODIUM CHLORIDE 9 MG/ML
INJECTION, SOLUTION INTRAVENOUS CONTINUOUS
Status: CANCELLED | OUTPATIENT
Start: 2017-04-24

## 2017-04-24 RX ORDER — IOPAMIDOL 755 MG/ML
105 INJECTION, SOLUTION INTRAVASCULAR ONCE
Status: COMPLETED | OUTPATIENT
Start: 2017-04-24 | End: 2017-04-24

## 2017-04-24 RX ORDER — ATROPINE SULFATE 0.1 MG/ML
0.5 INJECTION INTRAVENOUS EVERY 5 MIN PRN
Status: DISCONTINUED | OUTPATIENT
Start: 2017-04-24 | End: 2017-04-24 | Stop reason: HOSPADM

## 2017-04-24 RX ORDER — MORPHINE SULFATE 2 MG/ML
1-2 INJECTION, SOLUTION INTRAMUSCULAR; INTRAVENOUS EVERY 5 MIN PRN
Status: DISCONTINUED | OUTPATIENT
Start: 2017-04-24 | End: 2017-04-24 | Stop reason: HOSPADM

## 2017-04-24 RX ORDER — FLUMAZENIL 0.1 MG/ML
0.2 INJECTION, SOLUTION INTRAVENOUS
Status: DISCONTINUED | OUTPATIENT
Start: 2017-04-24 | End: 2017-04-24 | Stop reason: HOSPADM

## 2017-04-24 RX ORDER — LIDOCAINE HYDROCHLORIDE 10 MG/ML
30 INJECTION, SOLUTION EPIDURAL; INFILTRATION; INTRACAUDAL; PERINEURAL
Status: DISCONTINUED | OUTPATIENT
Start: 2017-04-24 | End: 2017-04-24 | Stop reason: HOSPADM

## 2017-04-24 RX ORDER — EPTIFIBATIDE 2 MG/ML
15 INJECTION, SOLUTION INTRAVENOUS CONTINUOUS PRN
Status: DISCONTINUED | OUTPATIENT
Start: 2017-04-24 | End: 2017-04-24 | Stop reason: HOSPADM

## 2017-04-24 RX ORDER — ENALAPRILAT 1.25 MG/ML
1.25-2.5 INJECTION INTRAVENOUS
Status: DISCONTINUED | OUTPATIENT
Start: 2017-04-24 | End: 2017-04-24 | Stop reason: HOSPADM

## 2017-04-24 RX ORDER — CLOPIDOGREL BISULFATE 75 MG/1
75 TABLET ORAL
Status: DISCONTINUED | OUTPATIENT
Start: 2017-04-24 | End: 2017-04-24 | Stop reason: HOSPADM

## 2017-04-24 RX ORDER — ATROPINE SULFATE 0.1 MG/ML
.5-1 INJECTION INTRAVENOUS
Status: DISCONTINUED | OUTPATIENT
Start: 2017-04-24 | End: 2017-04-24 | Stop reason: HOSPADM

## 2017-04-24 RX ORDER — ADENOSINE 3 MG/ML
12-12000 INJECTION, SOLUTION INTRAVENOUS
Status: DISCONTINUED | OUTPATIENT
Start: 2017-04-24 | End: 2017-04-24 | Stop reason: HOSPADM

## 2017-04-24 RX ORDER — HYDROCODONE BITARTRATE AND ACETAMINOPHEN 5; 325 MG/1; MG/1
1-2 TABLET ORAL EVERY 4 HOURS PRN
Status: DISCONTINUED | OUTPATIENT
Start: 2017-04-24 | End: 2017-04-24 | Stop reason: HOSPADM

## 2017-04-24 RX ORDER — HEPARIN SODIUM 1000 [USP'U]/ML
1000-10000 INJECTION, SOLUTION INTRAVENOUS; SUBCUTANEOUS EVERY 5 MIN PRN
Status: DISCONTINUED | OUTPATIENT
Start: 2017-04-24 | End: 2017-04-24 | Stop reason: HOSPADM

## 2017-04-24 RX ORDER — ASPIRIN 325 MG
325 TABLET ORAL
Status: DISCONTINUED | OUTPATIENT
Start: 2017-04-24 | End: 2017-04-24 | Stop reason: HOSPADM

## 2017-04-24 RX ORDER — BUPIVACAINE HYDROCHLORIDE 2.5 MG/ML
1-10 INJECTION, SOLUTION EPIDURAL; INFILTRATION; INTRACAUDAL
Status: DISCONTINUED | OUTPATIENT
Start: 2017-04-24 | End: 2017-04-24 | Stop reason: HOSPADM

## 2017-04-24 RX ORDER — CLOPIDOGREL BISULFATE 75 MG/1
300-600 TABLET ORAL
Status: DISCONTINUED | OUTPATIENT
Start: 2017-04-24 | End: 2017-04-24 | Stop reason: HOSPADM

## 2017-04-24 RX ORDER — PROMETHAZINE HYDROCHLORIDE 25 MG/ML
6.25-25 INJECTION, SOLUTION INTRAMUSCULAR; INTRAVENOUS EVERY 4 HOURS PRN
Status: DISCONTINUED | OUTPATIENT
Start: 2017-04-24 | End: 2017-04-24 | Stop reason: HOSPADM

## 2017-04-24 RX ORDER — DOBUTAMINE HYDROCHLORIDE 200 MG/100ML
2-20 INJECTION INTRAVENOUS CONTINUOUS PRN
Status: DISCONTINUED | OUTPATIENT
Start: 2017-04-24 | End: 2017-04-24 | Stop reason: HOSPADM

## 2017-04-24 RX ORDER — LIDOCAINE 40 MG/G
CREAM TOPICAL
Status: DISCONTINUED | OUTPATIENT
Start: 2017-04-24 | End: 2017-04-24 | Stop reason: HOSPADM

## 2017-04-24 RX ORDER — PROTAMINE SULFATE 10 MG/ML
25-100 INJECTION, SOLUTION INTRAVENOUS EVERY 5 MIN PRN
Status: DISCONTINUED | OUTPATIENT
Start: 2017-04-24 | End: 2017-04-24 | Stop reason: HOSPADM

## 2017-04-24 RX ORDER — NITROGLYCERIN 5 MG/ML
100-200 VIAL (ML) INTRAVENOUS
Status: DISCONTINUED | OUTPATIENT
Start: 2017-04-24 | End: 2017-04-24 | Stop reason: HOSPADM

## 2017-04-24 RX ORDER — ONDANSETRON 2 MG/ML
4 INJECTION INTRAMUSCULAR; INTRAVENOUS EVERY 4 HOURS PRN
Status: DISCONTINUED | OUTPATIENT
Start: 2017-04-24 | End: 2017-04-24 | Stop reason: HOSPADM

## 2017-04-24 RX ORDER — EPTIFIBATIDE 2 MG/ML
180 INJECTION, SOLUTION INTRAVENOUS EVERY 10 MIN PRN
Status: DISCONTINUED | OUTPATIENT
Start: 2017-04-24 | End: 2017-04-24 | Stop reason: HOSPADM

## 2017-04-24 RX ORDER — DEXTROSE MONOHYDRATE 25 G/50ML
12.5-5 INJECTION, SOLUTION INTRAVENOUS EVERY 30 MIN PRN
Status: DISCONTINUED | OUTPATIENT
Start: 2017-04-24 | End: 2017-04-24 | Stop reason: HOSPADM

## 2017-04-24 RX ORDER — POTASSIUM CHLORIDE 7.45 MG/ML
10 INJECTION INTRAVENOUS
Status: DISCONTINUED | OUTPATIENT
Start: 2017-04-24 | End: 2017-04-24 | Stop reason: HOSPADM

## 2017-04-24 RX ORDER — ACETAMINOPHEN 325 MG/1
325-650 TABLET ORAL EVERY 4 HOURS PRN
Status: DISCONTINUED | OUTPATIENT
Start: 2017-04-24 | End: 2017-04-24 | Stop reason: HOSPADM

## 2017-04-24 RX ORDER — HYDRALAZINE HYDROCHLORIDE 20 MG/ML
10-20 INJECTION INTRAMUSCULAR; INTRAVENOUS
Status: DISCONTINUED | OUTPATIENT
Start: 2017-04-24 | End: 2017-04-24 | Stop reason: HOSPADM

## 2017-04-24 RX ORDER — ASPIRIN 81 MG/1
81-324 TABLET, CHEWABLE ORAL
Status: DISCONTINUED | OUTPATIENT
Start: 2017-04-24 | End: 2017-04-24 | Stop reason: HOSPADM

## 2017-04-24 RX ORDER — PHENYLEPHRINE HCL IN 0.9% NACL 1 MG/10 ML
20-100 SYRINGE (ML) INTRAVENOUS
Status: DISCONTINUED | OUTPATIENT
Start: 2017-04-24 | End: 2017-04-24 | Stop reason: HOSPADM

## 2017-04-24 RX ORDER — METOPROLOL SUCCINATE 25 MG/1
25 TABLET, EXTENDED RELEASE ORAL DAILY
Qty: 30 TABLET | Refills: 3 | Status: SHIPPED | OUTPATIENT
Start: 2017-04-24 | End: 2017-09-12

## 2017-04-24 RX ORDER — NALOXONE HYDROCHLORIDE 0.4 MG/ML
.2-.4 INJECTION, SOLUTION INTRAMUSCULAR; INTRAVENOUS; SUBCUTANEOUS
Status: DISCONTINUED | OUTPATIENT
Start: 2017-04-24 | End: 2017-04-24 | Stop reason: HOSPADM

## 2017-04-24 RX ORDER — NALOXONE HYDROCHLORIDE 0.4 MG/ML
.1-.4 INJECTION, SOLUTION INTRAMUSCULAR; INTRAVENOUS; SUBCUTANEOUS
Status: DISCONTINUED | OUTPATIENT
Start: 2017-04-24 | End: 2017-04-24 | Stop reason: HOSPADM

## 2017-04-24 RX ORDER — METOPROLOL TARTRATE 1 MG/ML
5 INJECTION, SOLUTION INTRAVENOUS EVERY 5 MIN PRN
Status: DISCONTINUED | OUTPATIENT
Start: 2017-04-24 | End: 2017-04-24 | Stop reason: HOSPADM

## 2017-04-24 RX ORDER — VERAPAMIL HYDROCHLORIDE 2.5 MG/ML
1-2.5 INJECTION, SOLUTION INTRAVENOUS
Status: COMPLETED | OUTPATIENT
Start: 2017-04-24 | End: 2017-04-24

## 2017-04-24 RX ORDER — PROTAMINE SULFATE 10 MG/ML
1-5 INJECTION, SOLUTION INTRAVENOUS
Status: DISCONTINUED | OUTPATIENT
Start: 2017-04-24 | End: 2017-04-24 | Stop reason: HOSPADM

## 2017-04-24 RX ORDER — SODIUM NITROPRUSSIDE 25 MG/ML
100-200 INJECTION INTRAVENOUS
Status: DISCONTINUED | OUTPATIENT
Start: 2017-04-24 | End: 2017-04-24 | Stop reason: HOSPADM

## 2017-04-24 RX ORDER — METHYLPREDNISOLONE SODIUM SUCCINATE 125 MG/2ML
125 INJECTION, POWDER, LYOPHILIZED, FOR SOLUTION INTRAMUSCULAR; INTRAVENOUS
Status: DISCONTINUED | OUTPATIENT
Start: 2017-04-24 | End: 2017-04-24 | Stop reason: HOSPADM

## 2017-04-24 RX ORDER — NITROGLYCERIN 5 MG/ML
100-500 VIAL (ML) INTRAVENOUS
Status: COMPLETED | OUTPATIENT
Start: 2017-04-24 | End: 2017-04-24

## 2017-04-24 RX ORDER — LIDOCAINE HYDROCHLORIDE 10 MG/ML
1-10 INJECTION, SOLUTION EPIDURAL; INFILTRATION; INTRACAUDAL; PERINEURAL
Status: COMPLETED | OUTPATIENT
Start: 2017-04-24 | End: 2017-04-24

## 2017-04-24 RX ORDER — FUROSEMIDE 10 MG/ML
20-100 INJECTION INTRAMUSCULAR; INTRAVENOUS
Status: DISCONTINUED | OUTPATIENT
Start: 2017-04-24 | End: 2017-04-24 | Stop reason: HOSPADM

## 2017-04-24 RX ORDER — NITROGLYCERIN 20 MG/100ML
.07-1.38 INJECTION INTRAVENOUS CONTINUOUS PRN
Status: DISCONTINUED | OUTPATIENT
Start: 2017-04-24 | End: 2017-04-24 | Stop reason: HOSPADM

## 2017-04-24 RX ORDER — POTASSIUM CHLORIDE 1500 MG/1
20 TABLET, EXTENDED RELEASE ORAL
Status: COMPLETED | OUTPATIENT
Start: 2017-04-24 | End: 2017-04-24

## 2017-04-24 RX ORDER — LORAZEPAM 2 MG/ML
.5-2 INJECTION INTRAMUSCULAR EVERY 4 HOURS PRN
Status: DISCONTINUED | OUTPATIENT
Start: 2017-04-24 | End: 2017-04-24 | Stop reason: HOSPADM

## 2017-04-24 RX ADMIN — LIDOCAINE HYDROCHLORIDE 20 MG: 10 INJECTION, SOLUTION EPIDURAL; INFILTRATION; INTRACAUDAL; PERINEURAL at 13:51

## 2017-04-24 RX ADMIN — FENTANYL CITRATE 25 MCG: 50 INJECTION, SOLUTION INTRAMUSCULAR; INTRAVENOUS at 13:51

## 2017-04-24 RX ADMIN — ASPIRIN 325 MG: 325 TABLET, DELAYED RELEASE ORAL at 12:46

## 2017-04-24 RX ADMIN — MIDAZOLAM HYDROCHLORIDE 1 MG: 1 INJECTION, SOLUTION INTRAMUSCULAR; INTRAVENOUS at 13:48

## 2017-04-24 RX ADMIN — IOPAMIDOL 105 ML: 755 INJECTION, SOLUTION INTRAVASCULAR at 14:30

## 2017-04-24 RX ADMIN — POTASSIUM CHLORIDE 20 MEQ: 1500 TABLET, EXTENDED RELEASE ORAL at 12:46

## 2017-04-24 RX ADMIN — LIDOCAINE HYDROCHLORIDE 10 MG: 10 INJECTION, SOLUTION EPIDURAL; INFILTRATION; INTRACAUDAL; PERINEURAL at 13:48

## 2017-04-24 RX ADMIN — MIDAZOLAM HYDROCHLORIDE 1 MG: 1 INJECTION, SOLUTION INTRAMUSCULAR; INTRAVENOUS at 13:46

## 2017-04-24 RX ADMIN — FENTANYL CITRATE 50 MCG: 50 INJECTION, SOLUTION INTRAMUSCULAR; INTRAVENOUS at 13:46

## 2017-04-24 RX ADMIN — VERAPAMIL HYDROCHLORIDE 2.5 MG: 2.5 INJECTION, SOLUTION INTRAVENOUS at 13:49

## 2017-04-24 RX ADMIN — Medication 5000 UNITS: at 13:55

## 2017-04-24 RX ADMIN — SODIUM CHLORIDE: 9 INJECTION, SOLUTION INTRAVENOUS at 12:20

## 2017-04-24 RX ADMIN — NITROGLYCERIN 200 MCG: 5 INJECTION, SOLUTION INTRAVENOUS at 13:48

## 2017-04-24 RX ADMIN — FENTANYL CITRATE 25 MCG: 50 INJECTION, SOLUTION INTRAMUSCULAR; INTRAVENOUS at 13:48

## 2017-04-24 NOTE — TELEPHONE ENCOUNTER
Patient notified of the message below.  Patient states that she saw Cardiologist and had angiogram done, which came back normal.    Radha Diaz RN

## 2017-04-24 NOTE — TELEPHONE ENCOUNTER
This writer attempted to contact Melody on 04/24/17    Reason for call Results and left message to return call.    When patient calls back, please contact clinic RN team. If no one available, document that pt called and route to care team. routine priority.        Radha Diaz, LASHAY

## 2017-04-24 NOTE — PROGRESS NOTES
HPI and Plan:   See dictation    No orders of the defined types were placed in this encounter.    Orders Placed This Encounter   Medications     metoprolol (TOPROL XL) 25 MG 24 hr tablet     Sig: Take 1 tablet (25 mg) by mouth daily     Dispense:  30 tablet     Refill:  3     There are no discontinued medications.      Encounter Diagnoses   Name Primary?     Old myocardial infarction Yes     Nonspecific abnormal electrocardiogram (ECG) (EKG)      Abnormal electrocardiogram      Type 2 diabetes mellitus with diabetic nephropathy, without long-term current use of insulin (H)      Hyperlipidemia LDL goal <100        CURRENT MEDICATIONS:  Current Outpatient Prescriptions   Medication Sig Dispense Refill     metoprolol (TOPROL XL) 25 MG 24 hr tablet Take 1 tablet (25 mg) by mouth daily 30 tablet 3     esomeprazole (NEXIUM) 40 MG CR capsule TAKE 1 CAPSULE BY MOUTH EVERY DAY 30 capsule 0     diclofenac (VOLTAREN) 25 MG EC tablet TAKE 3 TABLETS BY MOUTH TWICE DAILY AS NEEDED FOR MODERATE PAIN. TAKE WITH FOOD 60 tablet 0     NYSTOP 816830 UNIT/GM POWD powder APPLY TO THE AFFECTED AREA TWO TO THREE TIMES DAILY AS NEEDED 60 g 0     traMADol (ULTRAM) 50 MG tablet TAKE 1 TABLET BY MOUTH EVERY NIGHT AT BEDTIME AS NEEDED 30 tablet 0     gabapentin (NEURONTIN) 300 MG capsule Take 1 capsule (300 mg) by mouth At Bedtime 90 capsule 1     citalopram (CELEXA) 40 MG tablet TAKE 1 TABLET BY MOUTH EVERY MORNING 90 tablet 0     polyethylene glycol (MIRALAX/GLYCOLAX) powder DISSOLVE 17 GRAMS IN LIQUID AND DRINK DAILY AS DIRECTED 510 g 1     cyclobenzaprine (FLEXERIL) 5 MG tablet TAKE 1 TABLET BY MOUTH TWICE DAILY AS NEEDED( GENERIC EQUIVALENT FOR FLEXERIL) 60 tablet 0     pioglitazone (ACTOS) 30 MG tablet Take 0.5 tablet by mouth in the morning with a meal and 1 tablet by mouth in the evening with a meal. 135 tablet 1     diclofenac (VOLTAREN) 25 MG EC tablet Take 1 tablet (25 mg) by mouth 3 times daily as needed for moderate pain 180 tablet  0     losartan (COZAAR) 50 MG tablet Take 1 tablet (50 mg) by mouth daily 90 tablet 1     glipiZIDE-metFORMIN (METAGLIP) 5-500 MG per tablet Take 2 tablets by mouth 2 times daily (before meals) 360 tablet 1     melatonin 3 MG tablet TAKE 1 TABLET BY MOUTH ONCE AT BEDTIME AS NEEDED FOR SLEEP 30 tablet 0     montelukast (SINGULAIR) 10 MG tablet TAKE 1 TABLET BY MOUTH AT BEDTIME 90 tablet 1     order for DME Diabetic test strips and lancets per insurance formulary Check blood sugar 2 times daily 3 Month 3     albuterol (PROAIR HFA, PROVENTIL HFA, VENTOLIN HFA) 108 (90 BASE) MCG/ACT inhaler Inhale 2 puffs into the lungs every 6 hours       ASPIRIN PO Take 81 mg by mouth daily       ATORVASTATIN CALCIUM PO Take 40 mg by mouth daily       BUPROPION HCL PO        fluticasone (VERAMYST) 27.5 MCG/SPRAY nasal spray Spray 2 sprays into both nostrils daily       SOLIFENACIN SUCCINATE PO Take 10 mg by mouth daily       ACE NOT PRESCRIBED, INTENTIONAL, Reported on 4/24/2017 0 each 0       ALLERGIES     Allergies   Allergen Reactions     Bee Venom Swelling     Latex      Lisinopril Cough     Onion GI Disturbance     Aloe Rash     pain     Chlorine Rash       PAST MEDICAL HISTORY:  Past Medical History:   Diagnosis Date     DM type 2 (diabetes mellitus, type 2) (H)      History of TIA (transient ischemic attack)      HTN (hypertension)      Hyperlipidemia        PAST SURGICAL HISTORY:  No past surgical history on file.    FAMILY HISTORY:  Family History   Problem Relation Age of Onset     Thyroid Disease Sister      CANCER Brother      CANCER Sister      breast cancer     CEREBROVASCULAR DISEASE Sister 62     Other - See Comments Sister      Angioplasty 8/2016     Hypertension Father      Glaucoma Maternal Grandmother      CANCER Maternal Grandfather      CANCER Paternal Grandmother      DIABETES No family hx of      Macular Degeneration No family hx of        SOCIAL HISTORY:  Social History     Social History     Marital status:  "Single     Spouse name: N/A     Number of children: N/A     Years of education: N/A     Occupational History      Disabled     Social History Main Topics     Smoking status: Never Smoker     Smokeless tobacco: Never Used     Alcohol use 0.0 oz/week     0 Standard drinks or equivalent per week      Comment: moderate     Drug use: No     Sexual activity: Yes     Partners: Female     Other Topics Concern     None     Social History Narrative       Review of Systems:  Skin:  Negative     Eyes:  Negative    ENT:  Negative    Respiratory:  Positive for cough  Cardiovascular:    Positive for;edema;fatigue  Gastroenterology: Positive for    Genitourinary:  Negative    Musculoskeletal:  Positive for back pain;arthritis;joint pain  Neurologic:  Negative numbness or tingling of hands  Psychiatric:  Positive for sleep disturbances;anxiety;depression;excessive stress  Heme/Lymph/Imm:  Negative    Endocrine:  Positive for diabetes          Physical Exam:  Vitals: /80  Pulse 88  Ht 1.702 m (5' 7\")  Wt (!) 144.7 kg (319 lb 1.6 oz)  BMI 49.98 kg/m2  Constitutional: cooperative, alert and oriented, well developed, well nourished, in no acute distress   Skin: warm and dry to the touch, no apparent skin lesions or masses noted   Head: normocephalic, no masses or lesions   Eyes: pupils equal and round, conjunctivae and lids unremarkable, sclera white, no xanthalasma, EOMS intact, no nystagmus   ENT: no pallor or cyanosis, dentition good   Neck: carotid pulses are full and equal bilaterally, JVP normal, no carotid bruit, no thyromegaly   Chest: normal breath sounds, clear to auscultation, normal A-P diameter, normal symmetry, normal respiratory excursion, no use of accessory muscles   Cardiac: regular rhythm, normal S1/S2, no S3 or S4, apical impulse not displaced, no murmurs, gallops or rubs no presence of murmur   Abdomen: abdomen soft, non-tender, BS normoactive, no mass, no HSM, no bruits   Vascular: pulses full and equal, " no bruits auscultated   Extremities and Back: no deformities, clubbing, cyanosis, erythema observed   Neurological: affect appropriate, oriented to time, person and place     Recent Lab Results:  LIPID RESULTS:  Lab Results   Component Value Date    CHOL 168 11/01/2016    HDL 61 11/01/2016    LDL 75 11/01/2016    TRIG 159 (H) 11/01/2016       LIVER ENZYME RESULTS:  Lab Results   Component Value Date    AST 11 11/01/2016    ALT 27 11/01/2016       CBC RESULTS:  Lab Results   Component Value Date    WBC 8.0 04/18/2017    RBC 4.27 04/18/2017    HGB 12.8 04/18/2017    HCT 38.8 04/18/2017    MCV 91 04/18/2017    MCH 30.0 04/18/2017    MCHC 33.0 04/18/2017    RDW 14.5 04/18/2017     04/18/2017       BMP RESULTS:  Lab Results   Component Value Date     04/18/2017    POTASSIUM 3.9 04/18/2017    CHLORIDE 106 04/18/2017    CO2 25 04/18/2017    ANIONGAP 11 04/18/2017     (H) 04/18/2017    BUN 11 04/18/2017    CR 0.47 (L) 04/18/2017    GFRESTIMATED >90  Non  GFR Calc   04/18/2017    GFRESTBLACK >90   GFR Calc   04/18/2017    DHRUV 9.6 04/18/2017        A1C RESULTS:  Lab Results   Component Value Date    A1C 6.1 (H) 04/18/2017       INR RESULTS:  No results found for: INR        CC  Elizabeth Lamas MD  [facility] [place]  [address] [place] [address]

## 2017-04-24 NOTE — LETTER
4/24/2017    Elizabeth Lamas MD  [facility]  [address] [place] [address]    RE: Melody Ferguson       Dear Colleague,    I had the pleasure of seeing Melody Ferguson in the [facility].    Melody is a very pleasant 56-year-old female referred here for an abnormal stress test prior to orthopedic surgery.  She has a history dating back - she states she had a myocardial infarction many years ago in the early 1990s, perhaps 25 years ago or roughly.  She had chest discomfort off and on throughout the night and then the following day came into her primary physician, who did an EKG and told her she had a myocardial infarction.  There are Q-waves seen inferiorly.  No further diagnostic workup has been done and she states she has had no recurrent chest pain since then.  However, of late, she has had difficulty ambulating.  She is requiring surgery on both knees.  She has difficulty climbing stairs and really does not do so.  She does no exercise.  Because of her abnormal EKG, she underwent a nuclear stress test which showed inferior scar, small, with a moderate area of merry-infarction ischemia.  Her ejection fraction was mildly decreased at 49%.  TID was absent.      Melody has a history of asthma.  She also has a history of diabetes type 2 for many years, as well as hyperlipidemia.  She is not a smoker.  She has a sister who had a severe stroke at age 60.  She has never had otherwise congestive heart failure.  She has some swelling of her left lower extremity.  She has never had palpitations, heart racing, arrhythmia or syncope.     Outpatient Encounter Prescriptions as of 4/24/2017   Medication Sig Dispense Refill     metoprolol (TOPROL XL) 25 MG 24 hr tablet Take 1 tablet (25 mg) by mouth daily 30 tablet 3     [DISCONTINUED] esomeprazole (NEXIUM) 40 MG CR capsule TAKE 1 CAPSULE BY MOUTH EVERY DAY 30 capsule 0     [DISCONTINUED] diclofenac (VOLTAREN) 25 MG EC tablet TAKE 3  TABLETS BY MOUTH TWICE DAILY AS NEEDED FOR MODERATE PAIN. TAKE WITH FOOD 60 tablet 0     [DISCONTINUED] NYSTOP 361543 UNIT/GM POWD powder APPLY TO THE AFFECTED AREA TWO TO THREE TIMES DAILY AS NEEDED 60 g 0     [DISCONTINUED] traMADol (ULTRAM) 50 MG tablet TAKE 1 TABLET BY MOUTH EVERY NIGHT AT BEDTIME AS NEEDED 30 tablet 0     [DISCONTINUED] gabapentin (NEURONTIN) 300 MG capsule Take 1 capsule (300 mg) by mouth At Bedtime 90 capsule 1     [DISCONTINUED] citalopram (CELEXA) 40 MG tablet TAKE 1 TABLET BY MOUTH EVERY MORNING 90 tablet 0     [DISCONTINUED] polyethylene glycol (MIRALAX/GLYCOLAX) powder DISSOLVE 17 GRAMS IN LIQUID AND DRINK DAILY AS DIRECTED 510 g 1     [DISCONTINUED] cyclobenzaprine (FLEXERIL) 5 MG tablet TAKE 1 TABLET BY MOUTH TWICE DAILY AS NEEDED( GENERIC EQUIVALENT FOR FLEXERIL) 60 tablet 0     [DISCONTINUED] pioglitazone (ACTOS) 30 MG tablet Take 0.5 tablet by mouth in the morning with a meal and 1 tablet by mouth in the evening with a meal. 135 tablet 1     [DISCONTINUED] diclofenac (VOLTAREN) 25 MG EC tablet Take 1 tablet (25 mg) by mouth 3 times daily as needed for moderate pain 180 tablet 0     [DISCONTINUED] losartan (COZAAR) 50 MG tablet Take 1 tablet (50 mg) by mouth daily 90 tablet 1     [DISCONTINUED] glipiZIDE-metFORMIN (METAGLIP) 5-500 MG per tablet Take 2 tablets by mouth 2 times daily (before meals) 360 tablet 1     melatonin 3 MG tablet TAKE 1 TABLET BY MOUTH ONCE AT BEDTIME AS NEEDED FOR SLEEP 30 tablet 0     [DISCONTINUED] montelukast (SINGULAIR) 10 MG tablet TAKE 1 TABLET BY MOUTH AT BEDTIME 90 tablet 1     order for DME Diabetic test strips and lancets per insurance formulary Check blood sugar 2 times daily 3 Month 3     albuterol (PROAIR HFA, PROVENTIL HFA, VENTOLIN HFA) 108 (90 BASE) MCG/ACT inhaler Inhale 2 puffs into the lungs every 6 hours       ASPIRIN PO Take 81 mg by mouth daily       ATORVASTATIN CALCIUM PO Take 40 mg by mouth daily       BUPROPION HCL PO Take 50 mg by  mouth 2 times daily        fluticasone (VERAMYST) 27.5 MCG/SPRAY nasal spray Spray 2 sprays into both nostrils daily       [DISCONTINUED] SOLIFENACIN SUCCINATE PO Take 10 mg by mouth daily       ACE NOT PRESCRIBED, INTENTIONAL, Reported on 5/10/2017 0 each 0     No facility-administered encounter medications on file as of 4/24/2017.         ASSESSMENT AND PLAN:  Overall, Melody Ferguson is a very pleasant 56-year-old female with morbid obesity, sedentary lifestyle, particularly because of the recent knee injuries, diabetes, hyperlipidemia, who has had a prior infarction.  She is showing moderate ischemia on the stress test inferiorly.  I really want to proceed with cardiac catheterization, coronary angiogram prior to surgery so that we can make sure that it is not multivessel coronary disease.  The risks and benefits of the angiogram have been explained to the patient who agrees to proceed.  I have also placed her on beta blockers, Toprol-XL 25 mg p.o. q.p.m. as standard treatment post-myocardial infarction and with evidence of ischemia on stress testing.       ADDENDUM:    Angiogram from 4/24 showed no significant coronary artery disease.  Patient cleared to proceed with orthopedic surgery.      Again, thank you for allowing me to participate in the care of your patient.      Sincerely,    TAO POP MD     [facility]

## 2017-04-24 NOTE — PROGRESS NOTES
HISTORY OF PRESENT ILLNESS:  Melody is a very pleasant 56-year-old female referred here for an abnormal stress test prior to orthopedic surgery.  She has a history dating back - she states she had a myocardial infarction many years ago in the early 1990s, perhaps 25 years ago or roughly.  She had chest discomfort off and on throughout the night and then the following day came into her primary physician, who did an EKG and told her she had a myocardial infarction.  There are Q-waves seen inferiorly.  No further diagnostic workup has been done and she states she has had no recurrent chest pain since then.  However, of late, she has had difficulty ambulating.  She is requiring surgery on both knees.  She has difficulty climbing stairs and really does not do so.  She does no exercise.  Because of her abnormal EKG, she underwent a nuclear stress test which showed inferior scar, small, with a moderate area of merry-infarction ischemia.  Her ejection fraction was mildly decreased at 49%.  TID was absent.      Melody has a history of asthma.  She also has a history of diabetes type 2 for many years, as well as hyperlipidemia.  She is not a smoker.  She has a sister who had a severe stroke at age 60.  She has never had otherwise congestive heart failure.  She has some swelling of her left lower extremity.  She has never had palpitations, heart racing, arrhythmia or syncope.      ASSESSMENT AND PLAN:  Overall, Melody Ferguson is a very pleasant 56-year-old female with morbid obesity, sedentary lifestyle, particularly because of the recent knee injuries, diabetes, hyperlipidemia, who has had a prior infarction.  She is showing moderate ischemia on the stress test inferiorly.  I really want to proceed with cardiac catheterization, coronary angiogram prior to surgery so that we can make sure that it is not multivessel coronary disease.  The risks and benefits of the angiogram have been explained to the patient who agrees to proceed.   I have also placed her on beta blockers, Toprol-XL 25 mg p.o. q.p.m. as standard treatment post-myocardial infarction and with evidence of ischemia on stress testing.       ADDENDUM:    Angiogram from  showed no significant coronary artery disease.  Patient cleared to proceed with orthopedic surgery.          MD TAO Nick MD [facility]             D: 2017 10:55   T: 2017 11:45   MT: al      Name:     EDISON BORDEN   MRN:      [id number]        Account:      [id number]   :      1960           Service Date: 2017      Document: [id number]

## 2017-04-24 NOTE — IP AVS SNAPSHOT
MRN:[id number]                      After Visit Summary   4/24/2017    Melody Ferguson    MRN: [id number]           Visit Information        Department      4/24/2017 11:19 AM [facility]          Review of your medicines      UNREVIEWED medicines. Ask your doctor about these medicines        Dose / Directions    ACE NOT PRESCRIBED (INTENTIONAL)   Used for:  Type 2 diabetes mellitus without complication (H)        Reported on 4/24/2017   Quantity:  0 each   Refills:  0       albuterol 108 (90 BASE) MCG/ACT Inhaler   Commonly known as:  PROAIR HFA/PROVENTIL HFA/VENTOLIN HFA        Dose:  2 puff   Inhale 2 puffs into the lungs every 6 hours   Refills:  0       ASPIRIN PO        Dose:  81 mg   Take 81 mg by mouth daily   Refills:  0       ATORVASTATIN CALCIUM PO        Dose:  40 mg   Take 40 mg by mouth daily   Refills:  0       BUPROPION HCL PO        Refills:  0       citalopram 40 MG tablet   Commonly known as:  celeXA   Used for:  Major depressive disorder, recurrent episode, moderate (H)        TAKE 1 TABLET BY MOUTH EVERY MORNING   Quantity:  90 tablet   Refills:  0       cyclobenzaprine 5 MG tablet   Commonly known as:  FLEXERIL   Used for:  Left sided sciatica, Back muscle spasm        TAKE 1 TABLET BY MOUTH TWICE DAILY AS NEEDED( GENERIC EQUIVALENT FOR FLEXERIL)   Quantity:  60 tablet   Refills:  0       diclofenac 25 MG EC tablet   Commonly known as:  VOLTAREN   Used for:  Low back pain, unspecified back pain laterality, unspecified chronicity, with sciatica presence unspecified        Dose:  25 mg   Take 1 tablet (25 mg) by mouth 3 times daily as needed for moderate pain   Quantity:  180 tablet   Refills:  0       esomeprazole 40 MG CR capsule   Commonly known as:  nexIUM   Used for:  Dyspepsia        TAKE 1 CAPSULE BY MOUTH EVERY DAY   Quantity:  30 capsule   Refills:  0       fluticasone 27.5 MCG/SPRAY spray   Commonly known as:  VERAMYST        Dose:  2 spray   Spray 2  sprays into both nostrils daily   Refills:  0       gabapentin 300 MG capsule   Commonly known as:  NEURONTIN   Used for:  Acute left-sided low back pain with left-sided sciatica        Dose:  300 mg   Take 1 capsule (300 mg) by mouth At Bedtime   Quantity:  90 capsule   Refills:  1       glipiZIDE-metFORMIN 5-500 MG per tablet   Commonly known as:  METAGLIP   Used for:  Type 2 diabetes mellitus with diabetic nephropathy, without long-term current use of insulin (H)        Dose:  2 tablet   Take 2 tablets by mouth 2 times daily (before meals)   Quantity:  360 tablet   Refills:  1       losartan 50 MG tablet   Commonly known as:  COZAAR   Used for:  HTN, goal below 140/90, Type 2 diabetes mellitus with diabetic nephropathy, without long-term current use of insulin (H)        Dose:  50 mg   Take 1 tablet (50 mg) by mouth daily   Quantity:  90 tablet   Refills:  1       melatonin 3 MG tablet   Used for:  Major depressive disorder, recurrent episode, moderate (H)        TAKE 1 TABLET BY MOUTH ONCE AT BEDTIME AS NEEDED FOR SLEEP   Quantity:  30 tablet   Refills:  0       metoprolol 25 MG 24 hr tablet   Commonly known as:  TOPROL XL   Used for:  Old myocardial infarction        Dose:  25 mg   Take 1 tablet (25 mg) by mouth daily   Quantity:  30 tablet   Refills:  3       montelukast 10 MG tablet   Commonly known as:  SINGULAIR   Used for:  Sciatica of left side        TAKE 1 TABLET BY MOUTH AT BEDTIME   Quantity:  90 tablet   Refills:  1       NYSTOP 724767 UNIT/GM Powd   Used for:  Morbid obesity with body mass index of 45.0-49.9 in adult (H)   Generic drug:  nystatin        APPLY TO THE AFFECTED AREA TWO TO THREE TIMES DAILY AS NEEDED   Quantity:  60 g   Refills:  0       pioglitazone 30 MG tablet   Commonly known as:  ACTOS   Used for:  Type 2 diabetes mellitus with diabetic nephropathy, without long-term current use of insulin (H)        Take 0.5 tablet by mouth in the morning with a meal and 1 tablet by mouth in the  evening with a meal.   Quantity:  135 tablet   Refills:  1       polyethylene glycol powder   Commonly known as:  MIRALAX/GLYCOLAX   Used for:  Chronic pain syndrome        DISSOLVE 17 GRAMS IN LIQUID AND DRINK DAILY AS DIRECTED   Quantity:  510 g   Refills:  1       SOLIFENACIN SUCCINATE PO        Dose:  10 mg   Take 10 mg by mouth daily   Refills:  0       traMADol 50 MG tablet   Commonly known as:  ULTRAM   Used for:  Chronic pain syndrome        TAKE 1 TABLET BY MOUTH EVERY NIGHT AT BEDTIME AS NEEDED   Quantity:  30 tablet   Refills:  0         CONTINUE these medicines which have NOT CHANGED        Dose / Directions    order for DME   Used for:  Type 2 diabetes mellitus with diabetic nephropathy (H)        Diabetic test strips and lancets per insurance formulary Check blood sugar 2 times daily   Quantity:  3 Month   Refills:  3                Protect others around you: Learn how to safely use, store and throw away your medicines at www.disposemymeds.org.         Follow-ups after your visit        Your next 10 appointments already scheduled     May 09, 2017  8:00 AM CDT   [facility]    [address] [place] [address]   [phone]            May 10, 2017 10:10 AM CDT   Return Visit with Cristhian Dickens PA-C   [facility] [place] ([facility])    [address] [place] [address]   [phone]            May 15, 2017 11:00 AM CDT   REMEDIOS Extremity with Zandra Wilkins PT   [place] [facility] ([facility]  )    [address] [facility] [address]   [phone]            May 17, 2017 10:10 AM CDT   REMEDIOS Extremity with Zandra Wilkins PT   [facility] ([facility]  )    [address] [facility] [address]   [phone]            May 19, 2017  10:10 AM CDT   REMEDIOS Extremity with Zandra Wilkins PT   [place] [facility] ([facility]  )    [address]  [facility] [address]   [phone]            May 22, 2017 10:10 AM CDT   REMEDIOS Extremity with Elizabeth Myers [facility] ([facility]  )    [address]  [facility] [address]   [phone]            May 24, 2017 10:10 AM CDT   REMEDIOS Extremity with Elizabeth Myers [facility] ([facility]  )    [address]  [facility] [address]   [phone]               Care Instructions        After Care Instructions     Discharge Instructions - Follow up with [facility] Nurse Practitioner        Follow up with [facility] Nurse Practitioner at [facility] of patient preference in 7-10 days.            Discharge Instructions - IF on Metformin (Glucophage or Glucovance) or Metformin containing medications       IF on Metformin (Glucophage or Glucovance) or Metformin containing medications , schedule a Basic Metabolic Panel at [facility] or Primary Clinic in 48 - 72 hours post procedure and PRIOR TO resuming the Metformin or Metformin containing medications.  Hold Metformin (Glucophage or Glucovance) or Metformin containing medications until after the Basic Metabolic Panel on the 2nd or 3rd day following the procedure.  May resume after blood draw is complete.                  Further instructions from your care team       Cardiac Angiogram Discharge Instructions - Radial    After you go home:      Have an adult stay with you until tomorrow.    Drink extra fluids for 2 days.    You may resume your normal diet.    No smoking       For 24 hours - due to the sedation you received:    Relax and take it easy.    Do NOT make any important or legal decisions.    Do NOT drive or operate machines at home or at work.    Do NOT drink alcohol.    Care of Wrist Puncture  Site:      For the first 24 hrs - check the puncture site every 1-2 hours while awake.    It is normal to have soreness at the puncture site and mild tingling in your hand for up to 3 days.    Remove the bandaid after 24 hours. If there is minor oozing, apply another bandaid and remove it after 12 hours.    You may shower tomorrow.  Do NOT take a bath, or use a hot tub or pool for at least 3 days. Do NOT scrub the site. Do not use lotion or powder near the puncture site.           Activity:        For 2 days:     do not use your hand or arm to support your weight (such as rising from a chair)     do not bend your wrist (such as lifting a garage door).    do not lift more than 5 pounds or exercise your arm (such as tennis, golf or bowling).    Do NOT do any heavy activity such as exercise, lifting, or straining.     Bleeding:      If you start bleeding from the site in your wrist, sit down and press firmly on/above the site for 10 minutes.     Once bleeding stops, keep arm still for 2 hours.     Call [facility] as soon as you can.       Call 911 right away if you have heavy bleeding or bleeding that does not stop.      Medicines:      If you are taking antiplatelet medications such as Plavix, Brilinta or Effient, do not stop taking it until you talk to your cardiologist.        If you are on Metformin (Glucophage), do not restart it until you have blood tests (within 2 to 3 days after discharge).  After you have your blood drawn, you may restart the Metformin.    Take your medications, including blood thinners, unless your provider tells you not to.  If you take Coumadin (Warfarin), have your INR checked by your provider in  3-5 days. Call your clinic to schedule this.    If you have stopped any other medicines, check with your provider about when to restart them.    Follow Up Appointments:      Follow up with [facility] Nurse Practitioner at [facility] of patient preference in 7-10 days.    Call the clinic  "if:      You have a large or growing hard lump around the site.    The site is red, swollen, hot or tender.    Blood or fluid is draining from the site.    You have chills or a fever greater than 101 F (38 C).    Your arm turns feels numb, cool or changes color.    You have hives, a rash or unusual itching.    Any questions or concerns.          [facility] [place]:    [phone] UMP (7 days a week)             Additional Information About Your Visit        Insider PagesharHSTYLE Information     [employer] gives you secure access to your electronic health record. If you see a primary care provider, you can also send messages to your care team and make appointments. If you have questions, please call your primary care clinic.  If you do not have a primary care provider, please call [phone] and they will assist you.        Care EveryWhere ID     This is your Care EveryWhere ID. This could be used by other organizations to access your [place] medical records  [id number]        Your Vitals Were     Blood Pressure Pulse Temperature Respirations Height Weight    130/75 82 98.3  F (36.8  C) (Oral) 16 1.702 m (5' 7\") 144.6 kg (318 lb 12.8 oz)    Pulse Oximetry BMI (Body Mass Index)                97% 49.93 kg/m2           Primary Care Provider Office Phone # Fax #    Elizabeth Lamas [phone]      Thank you!     Thank you for choosing [place] for your care. Our goal is always to provide you with excellent care. Hearing back from our patients is one way we can continue to improve our services. Please take a few minutes to complete the written survey that you may receive in the mail after you visit with us. Thank you!             Medication List: This is a list of all your medications and when to take them. Check marks below indicate your daily home schedule. Keep this list as a reference.      Medications           Morning Afternoon Evening Bedtime As Needed    ACE NOT PRESCRIBED " (INTENTIONAL)   Reported on 4/24/2017                                albuterol 108 (90 BASE) MCG/ACT Inhaler   Commonly known as:  PROAIR HFA/PROVENTIL HFA/VENTOLIN HFA   Inhale 2 puffs into the lungs every 6 hours                                ASPIRIN PO   Take 81 mg by mouth daily   Last time this was given:  325 mg on 4/24/2017 12:46 PM                                ATORVASTATIN CALCIUM PO   Take 40 mg by mouth daily                                BUPROPION HCL PO                                citalopram 40 MG tablet   Commonly known as:  celeXA   TAKE 1 TABLET BY MOUTH EVERY MORNING                                cyclobenzaprine 5 MG tablet   Commonly known as:  FLEXERIL   TAKE 1 TABLET BY MOUTH TWICE DAILY AS NEEDED( GENERIC EQUIVALENT FOR FLEXERIL)                                diclofenac 25 MG EC tablet   Commonly known as:  VOLTAREN   Take 1 tablet (25 mg) by mouth 3 times daily as needed for moderate pain                                esomeprazole 40 MG CR capsule   Commonly known as:  nexIUM   TAKE 1 CAPSULE BY MOUTH EVERY DAY                                fluticasone 27.5 MCG/SPRAY spray   Commonly known as:  VERAMYST   Spray 2 sprays into both nostrils daily                                gabapentin 300 MG capsule   Commonly known as:  NEURONTIN   Take 1 capsule (300 mg) by mouth At Bedtime                                glipiZIDE-metFORMIN 5-500 MG per tablet   Commonly known as:  METAGLIP   Take 2 tablets by mouth 2 times daily (before meals)                                losartan 50 MG tablet   Commonly known as:  COZAAR   Take 1 tablet (50 mg) by mouth daily                                melatonin 3 MG tablet   TAKE 1 TABLET BY MOUTH ONCE AT BEDTIME AS NEEDED FOR SLEEP                                metoprolol 25 MG 24 hr tablet   Commonly known as:  TOPROL XL   Take 1 tablet (25 mg) by mouth daily                                montelukast 10 MG tablet   Commonly known as:  SINGULAIR   TAKE  1 TABLET BY MOUTH AT BEDTIME                                NYSTOP [id number] UNIT/GM Powd   APPLY TO THE AFFECTED AREA TWO TO THREE TIMES DAILY AS NEEDED   Generic drug:  nystatin                                order for DME   Diabetic test strips and lancets per insurance formulary Check blood sugar 2 times daily                                pioglitazone 30 MG tablet   Commonly known as:  ACTOS   Take 0.5 tablet by mouth in the morning with a meal and 1 tablet by mouth in the evening with a meal.                                polyethylene glycol powder   Commonly known as:  MIRALAX/GLYCOLAX   DISSOLVE 17 GRAMS IN LIQUID AND DRINK DAILY AS DIRECTED                                SOLIFENACIN SUCCINATE PO   Take 10 mg by mouth daily                                traMADol 50 MG tablet   Commonly known as:  ULTRAM   TAKE 1 TABLET BY MOUTH EVERY NIGHT AT BEDTIME AS NEEDED

## 2017-04-24 NOTE — MR AVS SNAPSHOT
After Visit Summary   4/24/2017    Melody Ferguson    MRN: [id number]           Patient Information     Date Of Birth          1960        Visit Information        Provider Department      4/24/2017 10:15 AM Eduardo Benson MD [facility] [place]        Today's Diagnoses     Old myocardial infarction    -  1    Nonspecific abnormal electrocardiogram (ECG) (EKG)        Abnormal electrocardiogram        Type 2 diabetes mellitus with diabetic nephropathy, without long-term current use of insulin (H)        Hyperlipidemia LDL goal <100           Follow-ups after your visit        Your next 10 appointments already scheduled     May 09, 2017  8:00 AM CDT   [facility]    [address] [facility] [address]   [phone]            May 10, 2017 10:10 AM CDT   Return Visit with Cristhian Dickens PA-C   [facility] [place] ([facility])    [address] [place] [address]   [phone]            May 15, 2017 11:00 AM CDT   REMEDIOS Extremity with Zandra Wilkins, PT   [place] [facility] [place] ([facility]  )    [address]  [facility] [address]   [phone]            May 17, 2017 10:10 AM CDT   REMEDIOS Extremity with Zandra Wilkins, PT   [place] [facility] [place] ([facility]  )    [address]  [facility] [address]   [phone]            May 19, 2017 10:10 AM CDT   REMEDIOS Extremity with Zandra Wilkins PT   [place] [facility] [place] ([facility]  )    [address]  [facility] [address]   [phone]            May 22, 2017 10:10 AM CDT   REMEDIOS Extremity with Elizabeth Myers PTA   [place] [facility] [place] ([facility]  )    [address]  [place]  "[address]   [phone]            May 24, 2017 10:10 AM CDT   REMEDIOS Extremity with Elizabeth Myers [facility]   [place] [facility] ([facility]  )    [address]  [place] [address]   [phone]              Who to contact     If you have questions or need follow up information about today's clinic visit or your schedule please contact [facility] AT [place] directly at [phone].  Normal or non-critical lab and imaging results will be communicated to you by EnzymeRxhart, letter or phone within 4 business days after the clinic has received the results. If you do not hear from us within 7 days, please contact the clinic through Kalibrrt or phone. If you have a critical or abnormal lab result, we will notify you by phone as soon as possible.  Submit refill requests through Ferfics or call your pharmacy and they will forward the refill request to us. Please allow 3 business days for your refill to be completed.          Additional Information About Your Visit        EnzymeRxhartrustedsafe Information     [employer] gives you secure access to your electronic health record. If you see a primary care provider, you can also send messages to your care team and make appointments. If you have questions, please call your primary care clinic.  If you do not have a primary care provider, please call [phone] and they will assist you.        Care EveryWhere ID     This is your Care EveryWhere ID. This could be used by other organizations to access your [place] medical records  [id number]        Your Vitals Were     Pulse Height BMI (Body Mass Index)             88 1.702 m (5' 7\") 49.98 kg/m2          Blood Pressure from Last 3 Encounters:   No data found for BP    Weight from Last 3 Encounters:   No data found for Wt              Today, you had the following     No orders found for display         Today's Medication Changes          These changes are accurate as of: " 4/24/17 11:19 AM.  If you have any questions, ask your nurse or doctor.               Start taking these medicines.        Dose/Directions    metoprolol 25 MG 24 hr tablet   Commonly known as:  TOPROL XL   Used for:  Old myocardial infarction        Dose:  25 mg   Take 1 tablet (25 mg) by mouth daily   Quantity:  30 tablet   Refills:  3            Where to get your medicines      These medications were sent to [facility] - [place], [place] - [address] AT [facility] [address], [facility] [address]     Phone:  [phone]     metoprolol 25 MG 24 hr tablet                Primary Care Provider Office Phone # Fax #    Elizabeth Lamas [phone]       [facility] [address] [facility] [address]        Thank you!     Thank you for choosing [facility] [place]  for your care. Our goal is always to provide you with excellent care. Hearing back from our patients is one way we can continue to improve our services. Please take a few minutes to complete the written survey that you may receive in the mail after your visit with us. Thank you!             Your Updated Medication List - Protect others around you: Learn how to safely use, store and throw away your medicines at www.disposemymeds.org.          This list is accurate as of: 4/24/17 11:19 AM.  Always use your most recent med list.                   Brand Name Dispense Instructions for use    ACE NOT PRESCRIBED (INTENTIONAL)     0 each    Reported on 4/24/2017       albuterol 108 (90 BASE) MCG/ACT Inhaler    PROAIR HFA/PROVENTIL HFA/VENTOLIN HFA     Inhale 2 puffs into the lungs every 6 hours       ASPIRIN PO      Take 81 mg by mouth daily       ATORVASTATIN CALCIUM PO      Take 40 mg by mouth daily       BUPROPION HCL PO          citalopram 40 MG tablet    celeXA    90 tablet    TAKE 1 TABLET BY MOUTH EVERY MORNING       cyclobenzaprine 5 MG tablet     FLEXERIL    60 tablet    TAKE 1 TABLET BY MOUTH TWICE DAILY AS NEEDED( GENERIC EQUIVALENT FOR FLEXERIL)       diclofenac 25 MG EC tablet    VOLTAREN    180 tablet    Take 1 tablet (25 mg) by mouth 3 times daily as needed for moderate pain       esomeprazole 40 MG CR capsule    nexIUM    30 capsule    TAKE 1 CAPSULE BY MOUTH EVERY DAY       fluticasone 27.5 MCG/SPRAY spray    VERAMYST     Spray 2 sprays into both nostrils daily       gabapentin 300 MG capsule    NEURONTIN    90 capsule    Take 1 capsule (300 mg) by mouth At Bedtime       glipiZIDE-metFORMIN 5-500 MG per tablet    METAGLIP    360 tablet    Take 2 tablets by mouth 2 times daily (before meals)       losartan 50 MG tablet    COZAAR    90 tablet    Take 1 tablet (50 mg) by mouth daily       melatonin 3 MG tablet     30 tablet    TAKE 1 TABLET BY MOUTH ONCE AT BEDTIME AS NEEDED FOR SLEEP       metoprolol 25 MG 24 hr tablet    TOPROL XL    30 tablet    Take 1 tablet (25 mg) by mouth daily       montelukast 10 MG tablet    SINGULAIR    90 tablet    TAKE 1 TABLET BY MOUTH AT BEDTIME       NYSTOP [id number] UNIT/GM Powd   Generic drug:  nystatin     60 g    APPLY TO THE AFFECTED AREA TWO TO THREE TIMES DAILY AS NEEDED       order for DME     3 Month    Diabetic test strips and lancets per insurance formulary Check blood sugar 2 times daily       pioglitazone 30 MG tablet    ACTOS    135 tablet    Take 0.5 tablet by mouth in the morning with a meal and 1 tablet by mouth in the evening with a meal.       polyethylene glycol powder    MIRALAX/GLYCOLAX    510 g    DISSOLVE 17 GRAMS IN LIQUID AND DRINK DAILY AS DIRECTED       SOLIFENACIN SUCCINATE PO      Take 10 mg by mouth daily       traMADol 50 MG tablet    ULTRAM    30 tablet    TAKE 1 TABLET BY MOUTH EVERY NIGHT AT BEDTIME AS NEEDED

## 2017-04-24 NOTE — IP AVS SNAPSHOT
[place] [address]    [place] [address]    Phone:  [phone]                                       After Visit Summary   4/24/2017    Melody Ferguson    MRN: [id number]           After Visit Summary Signature Page     I have received my discharge instructions, and my questions have been answered. I have discussed any challenges I see with this plan with the nurse or doctor.    ..........................................................................................................................................  Patient/Patient Representative Signature      ..........................................................................................................................................  Patient Representative Print Name and Relationship to Patient    ..................................................               ................................................  Date                                            Time    ..........................................................................................................................................  Reviewed by Signature/Title    ...................................................              ..............................................  Date                                                            Time

## 2017-04-24 NOTE — PROGRESS NOTES
Back to room post procedure. VSS. Denies pain TR band to left wrist. No bleeding/swelling at site.Has 15cc air. Instructed on activity restrictions. Taking fluids.

## 2017-04-24 NOTE — TELEPHONE ENCOUNTER
Pt returned call.   In an attempt to get her connected, pt hung up after waiting.  Thank you  HENRY Leon  [facility]

## 2017-04-24 NOTE — PROGRESS NOTES
1200  Patient is here for heart cath, denies chest discomfort.  Has been NPO since last evening.  Diabetic, type II.  Patient watched angio video.  Has current EKG and lab work.

## 2017-04-24 NOTE — DISCHARGE INSTRUCTIONS
Cardiac Angiogram Discharge Instructions - Radial    After you go home:      Have an adult stay with you until tomorrow.    Drink extra fluids for 2 days.    You may resume your normal diet.    No smoking       For 24 hours - due to the sedation you received:    Relax and take it easy.    Do NOT make any important or legal decisions.    Do NOT drive or operate machines at home or at work.    Do NOT drink alcohol.    Care of Wrist Puncture Site:      For the first 24 hrs - check the puncture site every 1-2 hours while awake.    It is normal to have soreness at the puncture site and mild tingling in your hand for up to 3 days.    Remove the bandaid after 24 hours. If there is minor oozing, apply another bandaid and remove it after 12 hours.    You may shower tomorrow.  Do NOT take a bath, or use a hot tub or pool for at least 3 days. Do NOT scrub the site. Do not use lotion or powder near the puncture site.           Activity:        For 2 days:     do not use your hand or arm to support your weight (such as rising from a chair)     do not bend your wrist (such as lifting a garage door).    do not lift more than 5 pounds or exercise your arm (such as tennis, golf or bowling).    Do NOT do any heavy activity such as exercise, lifting, or straining.     Bleeding:      If you start bleeding from the site in your wrist, sit down and press firmly on/above the site for 10 minutes.     Once bleeding stops, keep arm still for 2 hours.     Call [facility] as soon as you can.       Call 911 right away if you have heavy bleeding or bleeding that does not stop.      Medicines:      If you are taking antiplatelet medications such as Plavix, Brilinta or Effient, do not stop taking it until you talk to your cardiologist.        If you are on Metformin (Glucophage), do not restart it until you have blood tests (within 2 to 3 days after discharge).  After you have your blood drawn, you may restart the Metformin.    Take your  medications, including blood thinners, unless your provider tells you not to.  If you take Coumadin (Warfarin), have your INR checked by your provider in  3-5 days. Call your clinic to schedule this.    If you have stopped any other medicines, check with your provider about when to restart them.    Follow Up Appointments:      Follow up with [facility] Nurse Practitioner at [facility] of patient preference in 7-10 days.    Call the clinic if:      You have a large or growing hard lump around the site.    The site is red, swollen, hot or tender.    Blood or fluid is draining from the site.    You have chills or a fever greater than 101 F (38 C).    Your arm turns feels numb, cool or changes color.    You have hives, a rash or unusual itching.    Any questions or concerns.          [facility] [place]:    [phone] [facility] (7 days a week)

## 2017-04-24 NOTE — PROGRESS NOTES
Tolerated light diet. TR band off. No bleeding/swelling at site. VSS. Up in chair. Voided. Discharge instruction done with pt. States understanding. Will observe 30 min. Until discharge.    1750 IV DCD. Pt discharged to door #1 with friend. Left wrist remains D/I

## 2017-04-25 ENCOUNTER — TELEPHONE (OUTPATIENT)
Dept: FAMILY MEDICINE | Facility: CLINIC | Age: 57
End: 2017-04-25

## 2017-04-25 NOTE — TELEPHONE ENCOUNTER
Mike Prado [employer]        Phone Number: [phone]                     Routed to the wrong care team.  Mike Prado,  [facility]            Previous Messages       ----- Message -----      From: Xena Walls      Sent: 4/25/2017  11:48 AM        To: Elizabeth Lamas MD, Robbie [employer]   Subject: FW: Failed Preop  for surgery 04/26/2017          [facility] is still waiting for a ok on patients preop if this surgery has been canceled there are other patients that will need to be moved. Please advise Thank you  / [facility] [phone] or surgery scheduling   [phone]   ----- Message -----      From: Xena Walls      Sent: 4/25/2017   9:58 AM        To: Bk [employer]   Subject: Fail Preop  for surgery 04/26/2017               Lisa from [facility] called regarding patients failed preop, they have spoke with patient who is noting that she has followed up with Cardiology and had additional tested and that Dr. Lamas passed patient for surgery. Please contact [facility] to confirm that patient can have surgery with Dr. George 04/26/2017     [facility] [phone] Thank you.

## 2017-04-25 NOTE — TELEPHONE ENCOUNTER
It is 12:05 and Dr Benson still has not signed the notes.  Called and spoke to Dr Sutton nurse and she states that   The provider is still in surgery but will have him sign and they  Will call the surgery scheduling to let them know that this has  Been cleared.  I spoke to Xena in Surgery scheduling, [phone] and explained  That Dr Benson in Cardiology need to sign his notes....  Yuli Giang MA/  For [employer] [facility]

## 2017-04-25 NOTE — TELEPHONE ENCOUNTER
Patient was seen by Cardiology and underwent an angiogram. Hence, will close encounter.  Elizabeth Lamas MD MPH

## 2017-04-25 NOTE — TELEPHONE ENCOUNTER
Spoke to Dr Benson's nurse this am and she stated that she will   Be in contact with Dr Benson and to explain that his notes need to be signed.  I am to call nurse back if notes not signed by frank, [phone].  Yuli Giang MA/  [facility]

## 2017-04-25 NOTE — TELEPHONE ENCOUNTER
Mike Prado [employer]        Phone Number: [phone]                     Routed to the wrong care team.  Mike Prado,  [facility]            Previous Messages       ----- Message -----      From: Xena Walls      Sent: 4/25/2017   9:58 AM        To: Robbie [employer]   Subject: Fail Preop  for surgery 04/26/2017               Lisa from [facility] called regarding patients failed preop, they have spoke with patient who is noting that she has followed up with Cardiology and had additional tested and that Dr. Lamas passed patient for surgery. Please contact [facility] to confirm that patient can have surgery with Dr. George 04/26/2017     [facility] [phone] Thank you.

## 2017-04-25 NOTE — TELEPHONE ENCOUNTER
"Looks like Cardiology signed notes. Called surgery scheduling  And spoke to Xena. She stated that the clearance should have  Gone to Maryellen Sood. Xena to call and see if they have received.  Xena called back and stated that they did not. We review Dr Benson's  Notes together and it does state that patient is \"cleared for orthopedic  Surgery\". Xena stated that she will print notes and send.  Yuli Giang MA/  For [employer] and Radha    "

## 2017-04-26 ENCOUNTER — TRANSFERRED RECORDS (OUTPATIENT)
Dept: HEALTH INFORMATION MANAGEMENT | Facility: CLINIC | Age: 57
End: 2017-04-26

## 2017-04-27 ENCOUNTER — TRANSFERRED RECORDS (OUTPATIENT)
Dept: HEALTH INFORMATION MANAGEMENT | Facility: CLINIC | Age: 57
End: 2017-04-27

## 2017-04-27 DIAGNOSIS — Z96.652 STATUS POST TOTAL LEFT KNEE REPLACEMENT: Primary | ICD-10-CM

## 2017-04-30 ENCOUNTER — TELEPHONE (OUTPATIENT)
Dept: NURSING | Facility: CLINIC | Age: 57
End: 2017-04-30

## 2017-04-30 NOTE — TELEPHONE ENCOUNTER
Call Type: Triage Call    Presenting Problem: LASHAY Joseph with [facility] calling to request  verbal order for OK to start Home Care Service until tomorrow 5/1  due to unexpected staffing constraints.  Triage Note:  Guideline Title: Information Only Call; No Symptom Triage (Adult)  Recommended Disposition: Call Provider When Office is Open  Original Inclination: Would have called clinic  Override Disposition:  Intended Action: Follow advice given  Physician Contacted: No  Requesting provider information for recently scheduled test, procedure; no triage  required. Needed information not available per approved resources or clinical  experience. ?  YES  Requesting regular office appointment ? NO  Sign(s) or symptom(s) associated with a diagnosed condition or with a new illness  ? NO  Requesting information about provider, services or community resources ? NO  Call back to complete assessment/clarification of information from prior caller to  complete triage ? NO  Requesting information and provider is best resource; no triage required. ? NO  Physician Instructions:  Care Advice:

## 2017-05-01 ENCOUNTER — TELEPHONE (OUTPATIENT)
Dept: ORTHOPEDICS | Facility: CLINIC | Age: 57
End: 2017-05-01

## 2017-05-01 ENCOUNTER — TELEPHONE (OUTPATIENT)
Dept: FAMILY MEDICINE | Facility: CLINIC | Age: 57
End: 2017-05-01

## 2017-05-01 ENCOUNTER — OFFICE VISIT (OUTPATIENT)
Dept: ORTHOPEDICS | Facility: CLINIC | Age: 57
End: 2017-05-01
Payer: MEDICARE

## 2017-05-01 VITALS — HEIGHT: 67 IN | TEMPERATURE: 97.4 F | WEIGHT: 293 LBS | BODY MASS INDEX: 45.99 KG/M2 | RESPIRATION RATE: 18 BRPM

## 2017-05-01 DIAGNOSIS — Z96.652 HISTORY OF TOTAL KNEE ARTHROPLASTY, LEFT: Primary | ICD-10-CM

## 2017-05-01 PROCEDURE — 99024 POSTOP FOLLOW-UP VISIT: CPT | Performed by: ORTHOPAEDIC SURGERY

## 2017-05-01 RX ORDER — OXYCODONE AND ACETAMINOPHEN 5; 325 MG/1; MG/1
TABLET ORAL EVERY 4 HOURS PRN
COMMUNITY
End: 2017-05-09

## 2017-05-01 NOTE — PATIENT INSTRUCTIONS
left total knee arthroplasty with bruising, hemovac seepage and bloody oozing from bottom of wound.  I think this is due to hemarthrosis and swelling.  Wound cleaned.  New aquacel applied.  2 ace wraps applied.  Hold aspirin until drainage stops.  Return to clinic next week.

## 2017-05-01 NOTE — TELEPHONE ENCOUNTER
Reason for Call:  Home Health Care    Skilled Nursing: need delay start of care skilled rn start 5/2 pt doing great    Pt Provider: Adams     Phone Number Homecare Nurse can be reached at: [phone]    Can we leave a detailed message on this number? YES    Best Time: any    Call taken on 5/1/2017 at 9:08 AM by Norma Grossman

## 2017-05-01 NOTE — TELEPHONE ENCOUNTER
Calling for orders    Skilled nursing   3 times a week for 2 weeks  2 times a week for 2 weeks  1 time a week for 2 weeks  3 prn    PT / OT eval and treat one day one    Ok to leave voicemail  And wants to talk to nurse regarding pt dressing  And condition of her knee  Thinks pt should be seen    STEPHEN (HOME CARE) [phone]

## 2017-05-01 NOTE — MR AVS SNAPSHOT
After Visit Summary   5/1/2017    Melody Ferguson    MRN: [id number]           Patient Information     Date Of Birth          1960        Visit Information        Provider Department      5/1/2017 4:00 PM Armond George MD [facility]        Today's Diagnoses     History of total knee arthroplasty, left    -  1      Care Instructions     left total knee arthroplasty with bruising, hemovac seepage and bloody oozing from bottom of wound.  I think this is due to hemarthrosis and swelling.  Wound cleaned.  New aquacel applied.  2 ace wraps applied.  Hold aspirin until drainage stops.  Return to clinic next week.        Follow-ups after your visit        Your next 10 appointments already scheduled     May 09, 2017  8:00 AM CDT   Return Visit with Armond George MD   [facility]    [address] [address]   [phone]            May 10, 2017 10:10 AM CDT   Return Visit with Cristhian Dickens PA-C   [facility] ([facility])    [address] [place] [address]   [phone]            May 15, 2017 11:00 AM CDT   REMEDIOS Extremity with Zandra Wilkins, PT   [facility] [place] ([facility]  )    [address]  [place] [address]   [phone]            May 17, 2017 10:10 AM CDT   REMEDIOS Extremity with Zandra Wilkins, PT   [facility] ([facility]  )    [address]  [place] [address]   [phone]            May 19, 2017 10:10 AM CDT   REMEDIOS Extremity with Zandra Wilkins PT   [facility] ([facility]  )    [address]  [place] [address]   [phone]            May 22, 2017 10:10 AM CDT   REMEDIOS Extremity with Elizabeth Myers PTA   [facility] ([facility]  "[facility]  )    [address]  [place] [address]   [phone]            May 24, 2017 10:10 AM BIMAL Kat with Elizabeth Myers, [facility]   [facility] [place] ([facility]  )    [address]  [place] [address]   [phone]              Who to contact     If you have questions or need follow up information about today's clinic visit or your schedule please contact [facility] directly at [phone].  Normal or non-critical lab and imaging results will be communicated to you by iContainershart, letter or phone within 4 business days after the clinic has received the results. If you do not hear from us within 7 days, please contact the clinic through Bensatat or phone. If you have a critical or abnormal lab result, we will notify you by phone as soon as possible.  Submit refill requests through Green A or call your pharmacy and they will forward the refill request to us. Please allow 3 business days for your refill to be completed.          Additional Information About Your Visit        iContainershart Information     Green A gives you secure access to your electronic health record. If you see a primary care provider, you can also send messages to your care team and make appointments. If you have questions, please call your primary care clinic.  If you do not have a primary care provider, please call [phone] and they will assist you.        Care EveryWhere ID     This is your Care EveryWhere ID. This could be used by other organizations to access your [facility] medical records  [id number]        Your Vitals Were     Temperature Respirations Height BMI (Body Mass Index)          97.4  F (36.3  C) 18 1.702 m (5' 7\") 50.12 kg/m2         Blood Pressure from Last 3 Encounters:   04/24/17 148/88   04/24/17 134/80   04/21/17 124/60    Weight from Last 3 Encounters:   05/01/17 (!) 145.2 kg (320 lb)   04/24/17 (!) 144.6 kg (318 lb 12.8 oz)   04/24/17 (!) " 144.7 kg (319 lb 1.6 oz)              Today, you had the following     No orders found for display       Primary Care Provider Office Phone # Fax #    Elizabeth Lamas [phone]       [facility] [address]  [facility] [address]        Thank you!     Thank you for choosing [facility]  for your care. Our goal is always to provide you with excellent care. Hearing back from our patients is one way we can continue to improve our services. Please take a few minutes to complete the written survey that you may receive in the mail after your visit with us. Thank you!             Your Updated Medication List - Protect others around you: Learn how to safely use, store and throw away your medicines at www.disposemymeds.org.          This list is accurate as of: 5/1/17  4:59 PM.  Always use your most recent med list.                   Brand Name Dispense Instructions for use    ACE NOT PRESCRIBED (INTENTIONAL)     0 each    Reported on 4/24/2017       albuterol 108 (90 BASE) MCG/ACT Inhaler    PROAIR HFA/PROVENTIL HFA/VENTOLIN HFA     Inhale 2 puffs into the lungs every 6 hours       ASPIRIN PO      Take 81 mg by mouth daily       ATORVASTATIN CALCIUM PO      Take 40 mg by mouth daily       BUPROPION HCL PO          citalopram 40 MG tablet    celeXA    90 tablet    TAKE 1 TABLET BY MOUTH EVERY MORNING       cyclobenzaprine 5 MG tablet    FLEXERIL    60 tablet    TAKE 1 TABLET BY MOUTH TWICE DAILY AS NEEDED( GENERIC EQUIVALENT FOR FLEXERIL)       diclofenac 25 MG EC tablet    VOLTAREN    180 tablet    Take 1 tablet (25 mg) by mouth 3 times daily as needed for moderate pain       esomeprazole 40 MG CR capsule    nexIUM    30 capsule    TAKE 1 CAPSULE BY MOUTH EVERY DAY       fluticasone 27.5 MCG/SPRAY spray    VERAMYST     Spray 2 sprays into both nostrils daily       gabapentin 300 MG capsule    NEURONTIN    90 capsule    Take 1 capsule (300 mg) by mouth At Bedtime        glipiZIDE-metFORMIN 5-500 MG per tablet    METAGLIP    360 tablet    Take 2 tablets by mouth 2 times daily (before meals)       losartan 50 MG tablet    COZAAR    90 tablet    Take 1 tablet (50 mg) by mouth daily       melatonin 3 MG tablet     30 tablet    TAKE 1 TABLET BY MOUTH ONCE AT BEDTIME AS NEEDED FOR SLEEP       metoprolol 25 MG 24 hr tablet    TOPROL XL    30 tablet    Take 1 tablet (25 mg) by mouth daily       montelukast 10 MG tablet    SINGULAIR    90 tablet    TAKE 1 TABLET BY MOUTH AT BEDTIME       NYSTOP 895728 UNIT/GM Powd   Generic drug:  nystatin     60 g    APPLY TO THE AFFECTED AREA TWO TO THREE TIMES DAILY AS NEEDED       order for DME     3 Month    Diabetic test strips and lancets per insurance formulary Check blood sugar 2 times daily       oxyCODONE-acetaminophen 5-325 MG per tablet    PERCOCET     Take by mouth every 4 hours as needed for moderate to severe pain       pioglitazone 30 MG tablet    ACTOS    135 tablet    Take 0.5 tablet by mouth in the morning with a meal and 1 tablet by mouth in the evening with a meal.       polyethylene glycol powder    MIRALAX/GLYCOLAX    510 g    DISSOLVE 17 GRAMS IN LIQUID AND DRINK DAILY AS DIRECTED       SOLIFENACIN SUCCINATE PO      Take 10 mg by mouth daily       traMADol 50 MG tablet    ULTRAM    30 tablet    TAKE 1 TABLET BY MOUTH EVERY NIGHT AT BEDTIME AS NEEDED

## 2017-05-01 NOTE — LETTER
5/1/2017       RE: Melody Ferguson  [address] [address]           Dear Colleague,    Thank you for referring your patient, Melody Ferguson, to the [facility]. Please see a copy of my visit note below.    Follow up left total knee arthroplasty on 4/26/17.   She complains of bloody dressing, hemovac seepage and wound discoloration.  Physical Therapy was concerned.  She has not been using compression.  Pain: moderate  Wound is covered with aquacel.  The lower 2/3 of aquacel was removed.  There is bloody drainage under the dermabond on lowest aspect of wound.  This was seen on Saturday when I changed dressing also.  There is serous seepage from hemovac sites.  Erythema: mild.  Appears to be mostly due to hemarthrosis in knee.  Increased warmth: mild   Tenderness: mild  Effusion: mild  Range of motion 7-90 degrees  Stability: good      Assessment/Plan: left total knee arthroplasty with bruising, hemovac seepage and bloody oozing from bottom of wound.  I think this is due to hemarthrosis and swelling.  Wound cleaned.  New aquacel applied.  2 ace wraps applied.  Hold aspirin until drainage stops.  Return to clinic next week.        Again, thank you for allowing me to participate in the care of your patient.        Sincerely,              Armond George MD

## 2017-05-01 NOTE — PROGRESS NOTES
Follow up left total knee arthroplasty on 4/26/17.   She complains of bloody dressing, hemovac seepage and wound discoloration.  Physical Therapy was concerned.  She has not been using compression.  Pain: moderate  Wound is covered with aquacel.  The lower 2/3 of aquacel was removed.  There is bloody drainage under the dermabond on lowest aspect of wound.  This was seen on Saturday when I changed dressing also.  There is serous seepage from hemovac sites.  Erythema: mild.  Appears to be mostly due to hemarthrosis in knee.  Increased warmth: mild   Tenderness: mild  Effusion: mild  Range of motion 7-90 degrees  Stability: good      Assessment/Plan: left total knee arthroplasty with bruising, hemovac seepage and bloody oozing from bottom of wound.  I think this is due to hemarthrosis and swelling.  Wound cleaned.  New aquacel applied.  2 ace wraps applied.  Hold aspirin until drainage stops.  Return to clinic next week.

## 2017-05-01 NOTE — TELEPHONE ENCOUNTER
Spoke with Ramin from home care regarding Melody. She stated that the dressing that Melody had placed at the hospital has fresh blood soaked on it as of today and the site of the hemovac drain is weeping fluid. I relayed this information to Dr. George and I let Nasimaevangelista know that we would like to see Melody today at the [facility]. Ramin also inquired about orders for a home health aid and I told her that I would discuss this with Dr. George. She relayed the instructions to Melody and Melody said that she is going to arrive at the [facility] around 4pm. There were no further questions at this time.    Talon [place] PA-C  Supervising physician: Armond George MD  Dept. of Orthopedics  [facility]

## 2017-05-02 ENCOUNTER — TELEPHONE (OUTPATIENT)
Dept: ORTHOPEDICS | Facility: CLINIC | Age: 57
End: 2017-05-02

## 2017-05-02 NOTE — TELEPHONE ENCOUNTER
Spoke with Sua letting her know that per Dr. George, she has tolerated this combination of medications in the past without incident. Nasimaa had no more questions at this time.    CRESCENCIO PadillaC  Supervising physician: Armond George MD  Dept. of Orthopedics  [facility]

## 2017-05-02 NOTE — TELEPHONE ENCOUNTER
Reason for Call: Request for an order or referral:    Order or referral being requested: Sua calling back for status of order requested yesterday for skilled nursing, PT, Home aide and OT as listed in Epic.    Date needed: as soon as possible    Has the patient been seen by the PCP for this problem? YES    Additional comments: Na    Phone number Patient can be reached at:  Other phone number:  [phone]    Best Time:  ASAP    Can we leave a detailed message on this number?  YES    Call taken on 5/2/2017 at 8:16 AM by Lesa Gilliland

## 2017-05-02 NOTE — TELEPHONE ENCOUNTER
Reason for Call:  Other prescription and     Detailed comments: Per Sua patient is presently taking percocet and flexeril, which could post a possible medication interaction. Suevangelista would like advise    Phone Number Patient can be reached at: Other phone number:  [phone]    Best Time: anytime    Can we leave a detailed message on this number? YES    Call taken on 5/2/2017 at 8:21 AM by Lesa Gilliland

## 2017-05-02 NOTE — TELEPHONE ENCOUNTER
Spoke with Ramin letting her know that Dr. George has given verbal approval for these orders. She had no further questions at this time.    CRESCENCIO PadillaC  Supervising physician: Armond George MD  Dept. of Orthopedics  [facility]

## 2017-05-03 ENCOUNTER — TELEPHONE (OUTPATIENT)
Dept: ORTHOPEDICS | Facility: CLINIC | Age: 57
End: 2017-05-03

## 2017-05-03 NOTE — TELEPHONE ENCOUNTER
Reason for Call:  Home Health Care    Anabela with [facility] called regarding (reason for call): Orders    Orders are needed for this patient. PT    PT: 2 a week for 2 weeks starting next week for therapeutic exercise, range of motion, gate and transfer training and home exercise program.     OT: FELICIA    Skilled Nursing: NA    Pt Provider:     Phone Number Homecare Nurse can be reached at: [phone]    Can we leave a detailed message on this number? YES    Phone number patient can be reached at: Home number on file [phone] (home)    Best Time: any    Call taken on 5/3/2017 at 9:36 AM by Johana Toro

## 2017-05-03 NOTE — TELEPHONE ENCOUNTER
Left Anabela with  Homecare a message regarding her request to have her return my call to discuss.    CRESCENCIO PadillaC  Supervising physician: Armond George MD  Dept. of Orthopedics  [facility]

## 2017-05-04 NOTE — TELEPHONE ENCOUNTER
Left a message for Anabela giving her verbal approval for the requested physical therapy for Melody. Left my callback number in case of further needs or questions.    Talon Fenton PA-C  Supervising physician: Armond George MD  Dept. of Orthopedics  [facility]

## 2017-05-08 ENCOUNTER — TELEPHONE (OUTPATIENT)
Dept: FAMILY MEDICINE | Facility: CLINIC | Age: 57
End: 2017-05-08

## 2017-05-08 DIAGNOSIS — M54.5 LOW BACK PAIN, UNSPECIFIED BACK PAIN LATERALITY, UNSPECIFIED CHRONICITY, WITH SCIATICA PRESENCE UNSPECIFIED: ICD-10-CM

## 2017-05-08 DIAGNOSIS — E11.9 TYPE 2 DIABETES MELLITUS WITHOUT COMPLICATION, WITHOUT LONG-TERM CURRENT USE OF INSULIN (H): Primary | ICD-10-CM

## 2017-05-08 NOTE — TELEPHONE ENCOUNTER
[facility] OT, [phone] called to request verbal orders:    2 x this month for OT.    Requesting orders to dispense a raised toilet seat with arms and shower transfer bench.    Ok to leave message.    Thank you,    Hanna Nolasco

## 2017-05-08 NOTE — TELEPHONE ENCOUNTER
diclofenac (VOLTAREN) 25 MG EC tablet      Last Written Prescription Date: 1/26/17  Last Quantity: 180, # refills: 0  Last Office Visit with [facility] or  Health prescribing provider: 4/18/17  Next 5 appointments (look out 90 days)     May 09, 2017  8:00 AM CDT   [facility] with [facility]ond [facility] MD   [facility]    [address] [address]   [phone]            May 10, 2017 10:10 AM CDT   Return Visit with Cristhian Dickens PA-C   [facility] [place] ([facility])    [address]  [place] [address] [phone]                   Creatinine   Date Value Ref Range Status   04/18/2017 0.47 (L) 0.52 - 1.04 mg/dL Final     Lab Results   Component Value Date    AST 11 11/01/2016     Lab Results   Component Value Date    ALT 27 11/01/2016     BP Readings from Last 3 Encounters:   04/24/17 148/88   04/24/17 134/80   04/21/17 124/60       pioglitazone (ACTOS) 30 MG tablet         Last Written Prescription Date: 3/9/17  Last Fill Quantity: 135, # refills: 1  Last Office Visit with [facility] or  Health prescribing provider:  4/18/17   Next 5 appointments (look out 90 days)     May 09, 2017  8:00 AM CDT   [facility] Visit with Armond George MD   [facility]    [address] [address]   [phone]            May 10, 2017 10:10 AM CDT   Return Visit with Cristhian Dickens PA-C   [facility] [place] ([facility])    [address]  [place] [address] [phone]                   BP Readings from Last 3 Encounters:   04/24/17 148/88   04/24/17 134/80   04/21/17 124/60     Lab Results   Component Value Date    MICROL 7 11/01/2016     Lab Results   Component Value Date    UMALCR 11.57 11/01/2016     Creatinine   Date Value Ref Range Status    04/18/2017 0.47 (L) 0.52 - 1.04 mg/dL Final   ]  GFR Estimate   Date Value Ref Range Status   04/18/2017 >90  Non  GFR Calc   >60 mL/min/1.7m2 Final   11/01/2016 >90  Non  GFR Calc   >60 mL/min/1.7m2 Final   03/01/2016 >90  Non  GFR Calc   >60 mL/min/1.7m2 Final     GFR Estimate If Black   Date Value Ref Range Status   04/18/2017 >90   GFR Calc   >60 mL/min/1.7m2 Final   11/01/2016 >90   GFR Calc   >60 mL/min/1.7m2 Final   03/01/2016 >90   GFR Calc   >60 mL/min/1.7m2 Final     Lab Results   Component Value Date    CHOL 168 11/01/2016     Lab Results   Component Value Date    HDL 61 11/01/2016     Lab Results   Component Value Date    LDL 75 11/01/2016     Lab Results   Component Value Date    TRIG 159 11/01/2016     No results found for: CHOLHDLRATIO  Lab Results   Component Value Date    AST 11 11/01/2016     Lab Results   Component Value Date    ALT 27 11/01/2016     Lab Results   Component Value Date    A1C 6.1 04/18/2017    A1C 6.7 11/01/2016    A1C 6.2 03/01/2016     Potassium   Date Value Ref Range Status   04/18/2017 3.9 3.4 - 5.3 mmol/L Final

## 2017-05-09 ENCOUNTER — RADIANT APPOINTMENT (OUTPATIENT)
Dept: GENERAL RADIOLOGY | Facility: CLINIC | Age: 57
End: 2017-05-09
Attending: ORTHOPAEDIC SURGERY
Payer: MEDICARE

## 2017-05-09 ENCOUNTER — OFFICE VISIT (OUTPATIENT)
Dept: ORTHOPEDICS | Facility: CLINIC | Age: 57
End: 2017-05-09
Payer: MEDICARE

## 2017-05-09 VITALS — TEMPERATURE: 97.4 F | BODY MASS INDEX: 45.99 KG/M2 | RESPIRATION RATE: 18 BRPM | HEIGHT: 67 IN | WEIGHT: 293 LBS

## 2017-05-09 DIAGNOSIS — Z96.652 STATUS POST TOTAL LEFT KNEE REPLACEMENT: ICD-10-CM

## 2017-05-09 DIAGNOSIS — Z96.652 STATUS POST TOTAL LEFT KNEE REPLACEMENT: Primary | ICD-10-CM

## 2017-05-09 PROCEDURE — 99024 POSTOP FOLLOW-UP VISIT: CPT | Performed by: ORTHOPAEDIC SURGERY

## 2017-05-09 PROCEDURE — 73562 X-RAY EXAM OF KNEE 3: CPT | Mod: LT

## 2017-05-09 RX ORDER — OXYCODONE AND ACETAMINOPHEN 5; 325 MG/1; MG/1
1-2 TABLET ORAL EVERY 4 HOURS PRN
Qty: 80 TABLET | Refills: 0 | Status: SHIPPED | OUTPATIENT
Start: 2017-05-09 | End: 2017-07-11

## 2017-05-09 NOTE — MR AVS SNAPSHOT
After Visit Summary   5/9/2017    Melody Ferguson    MRN: [id number]           Patient Information     Date Of Birth          1960        Visit Information        Provider Department      5/9/2017 9:30 AM Armond George MD [facility]        Today's Diagnoses     Status post total left knee replacement    -  1      Care Instructions    Continue physical therapy 2-3 x week for 4 weeks.   Use TEDS for 2 more weeks.  They may be off at night.  Use aspirin for 4 more weeks.  Start scar massage with vitamin-E cream.   Medication renewal:  Percocet # 80.  Return to clinic 4 weeks.        Follow-ups after your visit        Your next 10 appointments already scheduled     May 10, 2017 10:10 AM CDT   Return Visit with Cristhian Dickens PA-C   [facility] ([facility])    [address] [place] [address]   [phone]            May 15, 2017 11:00 AM CDT   REMEDIOS Extremity with Zandra Wilkins PT   [facility] ([facility]  )    [address] [facility] [address]   [phone]            May 17, 2017 10:10 AM CDT   REMEDIOS Extremity with Zandra Wilkins PT   [facility] ([facility]  )    [address] [facility] [address]   [phone]            May 19, 2017 10:10 AM CDT   REMEDIOS Extremity with Zandra Wilkins PT   [facility] ([facility]  )    [address] [facility] [address]   [phone]            May 22, 2017 10:10 AM CDT   REMEDIOS Extremity with Elizabeth Myers PTA   [facility] ([facility]  )    [address] [facility] [address]   [phone]            May 24, 2017 10:10 AM CDT   REMEDIOS Extremity with Elizabeth Myers PTA   [facility] ([facility]  )    [address]  [place] [address]   [phone]              Who to contact     If you have questions or need follow up information about today's clinic visit or your schedule please contact [facility] [place] directly at [phone].  Normal or non-critical lab and imaging results will be communicated to you by MyChart, letter or phone within 4 business days after the clinic has received the results. If you do not hear from us within 7 days, please contact the clinic through AudioCaseFileshart or phone. If you have a critical or abnormal lab result, we will notify you by phone as soon as possible.  Submit refill requests through Jingdong or call your pharmacy and they will forward the refill request to us. Please allow 3 business days for your refill to be completed.          Additional Information About Your Visit        AudioCaseFileshart Information     Jingdong gives you secure access to your electronic health record. If you see a primary care provider, you can also send messages to your care team and make appointments. If you have questions, please call your primary care clinic.  If you do not have a primary care provider, please call [phone] and they will assist you.        Care EveryWhere ID     This is your Care EveryWhere ID. This could be used by other organizations to access your [place] medical records  [id number]        Your Vitals Were     Temperature Respirations Height BMI (Body Mass Index)          97.4  F (36.3  C) 18 1.702 m (5' 7\") 50.12 kg/m2         Blood Pressure from Last 3 Encounters:   04/24/17 148/88   04/24/17 134/80   04/21/17 124/60    Weight from Last 3 Encounters:   05/09/17 (!) 145.2 kg (320 lb)   05/01/17 (!) 145.2 kg (320 lb)   04/24/17 (!) 144.6 kg (318 lb 12.8 oz)                 Today's Medication Changes          These changes are accurate as of: 5/9/17  9:46 AM.  If you have any questions, ask your nurse or doctor.               These medicines have changed or have updated " prescriptions.        Dose/Directions    oxyCODONE-acetaminophen 5-325 MG per tablet   Commonly known as:  PERCOCET   This may have changed:  how much to take   Used for:  Status post total left knee replacement   Changed by:  Armond George MD        Dose:  1-2 tablet   Take 1-2 tablets by mouth every 4 hours as needed for moderate to severe pain   Quantity:  80 tablet   Refills:  0            Where to get your medicines      Some of these will need a paper prescription and others can be bought over the counter.  Ask your nurse if you have questions.     Bring a paper prescription for each of these medications     oxyCODONE-acetaminophen 5-325 MG per tablet                Primary Care Provider Office Phone # Fax #    Elizabeth Lamas [phone]       [facility] [address] [facility] [address]        Thank you!     Thank you for choosing [facility]  for your care. Our goal is always to provide you with excellent care. Hearing back from our patients is one way we can continue to improve our services. Please take a few minutes to complete the written survey that you may receive in the mail after your visit with us. Thank you!             Your Updated Medication List - Protect others around you: Learn how to safely use, store and throw away your medicines at www.disposemymeds.org.          This list is accurate as of: 5/9/17  9:46 AM.  Always use your most recent med list.                   Brand Name Dispense Instructions for use    ACE NOT PRESCRIBED (INTENTIONAL)     0 each    Reported on 4/24/2017       albuterol 108 (90 BASE) MCG/ACT Inhaler    PROAIR HFA/PROVENTIL HFA/VENTOLIN HFA     Inhale 2 puffs into the lungs every 6 hours       ASPIRIN PO      Take 81 mg by mouth daily       ATORVASTATIN CALCIUM PO      Take 40 mg by mouth daily       BUPROPION HCL PO          citalopram 40 MG tablet    celeXA    90 tablet    TAKE 1 TABLET BY MOUTH EVERY  MORNING       cyclobenzaprine 5 MG tablet    FLEXERIL    60 tablet    TAKE 1 TABLET BY MOUTH TWICE DAILY AS NEEDED( GENERIC EQUIVALENT FOR FLEXERIL)       diclofenac 25 MG EC tablet    VOLTAREN    180 tablet    Take 1 tablet (25 mg) by mouth 3 times daily as needed for moderate pain       esomeprazole 40 MG CR capsule    nexIUM    30 capsule    TAKE 1 CAPSULE BY MOUTH EVERY DAY       fluticasone 27.5 MCG/SPRAY spray    VERAMYST     Spray 2 sprays into both nostrils daily       gabapentin 300 MG capsule    NEURONTIN    90 capsule    Take 1 capsule (300 mg) by mouth At Bedtime       glipiZIDE-metFORMIN 5-500 MG per tablet    METAGLIP    360 tablet    Take 2 tablets by mouth 2 times daily (before meals)       losartan 50 MG tablet    COZAAR    90 tablet    Take 1 tablet (50 mg) by mouth daily       melatonin 3 MG tablet     30 tablet    TAKE 1 TABLET BY MOUTH ONCE AT BEDTIME AS NEEDED FOR SLEEP       metoprolol 25 MG 24 hr tablet    TOPROL XL    30 tablet    Take 1 tablet (25 mg) by mouth daily       montelukast 10 MG tablet    SINGULAIR    90 tablet    TAKE 1 TABLET BY MOUTH AT BEDTIME       NYSTOP 466259 UNIT/GM Powd   Generic drug:  nystatin     60 g    APPLY TO THE AFFECTED AREA TWO TO THREE TIMES DAILY AS NEEDED       order for DME     3 Month    Diabetic test strips and lancets per insurance formulary Check blood sugar 2 times daily       oxyCODONE-acetaminophen 5-325 MG per tablet    PERCOCET    80 tablet    Take 1-2 tablets by mouth every 4 hours as needed for moderate to severe pain       pioglitazone 30 MG tablet    ACTOS    135 tablet    Take 0.5 tablet by mouth in the morning with a meal and 1 tablet by mouth in the evening with a meal.       polyethylene glycol powder    MIRALAX/GLYCOLAX    510 g    DISSOLVE 17 GRAMS IN LIQUID AND DRINK DAILY AS DIRECTED       SOLIFENACIN SUCCINATE PO      Take 10 mg by mouth daily       traMADol 50 MG tablet    ULTRAM    30 tablet    TAKE 1 TABLET BY MOUTH EVERY NIGHT AT  BEDTIME AS NEEDED

## 2017-05-09 NOTE — LETTER
5/9/2017       RE: Melody Ferguson  [address] [address]           Dear Colleague,    Thank you for referring your patient, Melody Ferguson, to the [facility]. Please see a copy of my visit note below.    Follow up left total knee arthroplasty on 4/26/17.   She has no complaints  Pain: moderate  Wound is healing well.   Erythema: mild   Increased warmth: mild   Tenderness: mild  Effusion: mild  Range of motion 7-110 degrees  Stability: good    Xray: today's images were reviewed with the patient.  This shows excellent position of total knee arthroplasty components    Assessment/Plan: left total knee arthroplasty doing well.  Continue physical therapy 2-3 x week for 4 weeks.   Use TEDS for 2 more weeks.  They may be off at night.  Use aspirin for 4 more weeks.  Start scar massage with vitamin-E cream.   Medication renewal:  Percocet # 80.  Return to clinic 4 weeks.          Again, thank you for allowing me to participate in the care of your patient.        Sincerely,              Armond George MD

## 2017-05-09 NOTE — PATIENT INSTRUCTIONS
Continue physical therapy 2-3 x week for 4 weeks.   Use TEDS for 2 more weeks.  They may be off at night.  Use aspirin for 4 more weeks.  Start scar massage with vitamin-E cream.   Medication renewal:  Percocet # 80.  Return to clinic 4 weeks.

## 2017-05-09 NOTE — NURSING NOTE
"Chief Complaint   Patient presents with     RECHECK     Left TKA on 4/26/17.       Initial Temp 97.4  F (36.3  C)  Resp 18  Ht 1.702 m (5' 7\")  Wt (!) 145.2 kg (320 lb)  BMI 50.12 kg/m2 Estimated body mass index is 50.12 kg/(m^2) as calculated from the following:    Height as of this encounter: 1.702 m (5' 7\").    Weight as of this encounter: 145.2 kg (320 lb).  Medication Reconciliation: complete   Stacey Freedman MA      "

## 2017-05-09 NOTE — PROGRESS NOTES
Follow up left total knee arthroplasty on 4/26/17.   She has no complaints  Pain: moderate  Wound is healing well.   Erythema: mild   Increased warmth: mild   Tenderness: mild  Effusion: mild  Range of motion 7-110 degrees  Stability: good    Xray: today's images were reviewed with the patient.  This shows excellent position of total knee arthroplasty components    Assessment/Plan: left total knee arthroplasty doing well.  Continue physical therapy 2-3 x week for 4 weeks.   Use TEDS for 2 more weeks.  They may be off at night.  Use aspirin for 4 more weeks.  Start scar massage with vitamin-E cream.   Medication renewal:  Percocet # 80.  Return to clinic 4 weeks.

## 2017-05-10 ENCOUNTER — DOCUMENTATION ONLY (OUTPATIENT)
Dept: CARDIOLOGY | Facility: CLINIC | Age: 57
End: 2017-05-10

## 2017-05-10 ENCOUNTER — OFFICE VISIT (OUTPATIENT)
Dept: CARDIOLOGY | Facility: CLINIC | Age: 57
End: 2017-05-10
Payer: MEDICARE

## 2017-05-10 VITALS
BODY MASS INDEX: 45.99 KG/M2 | HEIGHT: 67 IN | HEART RATE: 80 BPM | DIASTOLIC BLOOD PRESSURE: 74 MMHG | WEIGHT: 293 LBS | SYSTOLIC BLOOD PRESSURE: 130 MMHG

## 2017-05-10 DIAGNOSIS — I42.9 CARDIOMYOPATHY, UNSPECIFIED (H): Primary | ICD-10-CM

## 2017-05-10 PROCEDURE — 99213 OFFICE O/P EST LOW 20 MIN: CPT | Mod: 24 | Performed by: PHYSICIAN ASSISTANT

## 2017-05-10 RX ORDER — DICLOFENAC SODIUM 25 MG/1
TABLET, DELAYED RELEASE ORAL
Qty: 60 TABLET | Refills: 0 | OUTPATIENT
Start: 2017-05-10

## 2017-05-10 RX ORDER — PIOGLITAZONEHYDROCHLORIDE 15 MG/1
TABLET ORAL
Qty: 90 TABLET | Refills: 0 | Status: SHIPPED | OUTPATIENT
Start: 2017-05-10 | End: 2017-06-13

## 2017-05-10 NOTE — PROGRESS NOTES
Lexiscan stress study shows mildly reduced EF. Please set up patient for echocardiogram for baseline evaluation. Order is in.     Thanks,     Cristhian Rios PA-C   5/10/2017  Pager: [phone]

## 2017-05-10 NOTE — PROGRESS NOTES
HPI and Plan:   See dictation. Confirmation # [id number].    Orders this Visit:  No orders of the defined types were placed in this encounter.    No orders of the defined types were placed in this encounter.    There are no discontinued medications.      No diagnosis found.    CURRENT MEDICATIONS:  Current Outpatient Prescriptions   Medication Sig Dispense Refill     oxyCODONE-acetaminophen (PERCOCET) 5-325 MG per tablet Take 1-2 tablets by mouth every 4 hours as needed for moderate to severe pain 80 tablet 0     metoprolol (TOPROL XL) 25 MG 24 hr tablet Take 1 tablet (25 mg) by mouth daily 30 tablet 3     esomeprazole (NEXIUM) 40 MG CR capsule TAKE 1 CAPSULE BY MOUTH EVERY DAY 30 capsule 0     NYSTOP 676407 UNIT/GM POWD powder APPLY TO THE AFFECTED AREA TWO TO THREE TIMES DAILY AS NEEDED 60 g 0     traMADol (ULTRAM) 50 MG tablet TAKE 1 TABLET BY MOUTH EVERY NIGHT AT BEDTIME AS NEEDED 30 tablet 0     gabapentin (NEURONTIN) 300 MG capsule Take 1 capsule (300 mg) by mouth At Bedtime 90 capsule 1     citalopram (CELEXA) 40 MG tablet TAKE 1 TABLET BY MOUTH EVERY MORNING 90 tablet 0     polyethylene glycol (MIRALAX/GLYCOLAX) powder DISSOLVE 17 GRAMS IN LIQUID AND DRINK DAILY AS DIRECTED 510 g 1     cyclobenzaprine (FLEXERIL) 5 MG tablet TAKE 1 TABLET BY MOUTH TWICE DAILY AS NEEDED( GENERIC EQUIVALENT FOR FLEXERIL) 60 tablet 0     pioglitazone (ACTOS) 30 MG tablet Take 0.5 tablet by mouth in the morning with a meal and 1 tablet by mouth in the evening with a meal. 135 tablet 1     diclofenac (VOLTAREN) 25 MG EC tablet Take 1 tablet (25 mg) by mouth 3 times daily as needed for moderate pain 180 tablet 0     losartan (COZAAR) 50 MG tablet Take 1 tablet (50 mg) by mouth daily 90 tablet 1     glipiZIDE-metFORMIN (METAGLIP) 5-500 MG per tablet Take 2 tablets by mouth 2 times daily (before meals) 360 tablet 1     melatonin 3 MG tablet TAKE 1 TABLET BY MOUTH ONCE AT BEDTIME AS NEEDED FOR SLEEP 30 tablet 0     montelukast (SINGULAIR)  10 MG tablet TAKE 1 TABLET BY MOUTH AT BEDTIME 90 tablet 1     order for DME Diabetic test strips and lancets per insurance formulary Check blood sugar 2 times daily 3 Month 3     albuterol (PROAIR HFA, PROVENTIL HFA, VENTOLIN HFA) 108 (90 BASE) MCG/ACT inhaler Inhale 2 puffs into the lungs every 6 hours       ASPIRIN PO Take 81 mg by mouth daily       ATORVASTATIN CALCIUM PO Take 40 mg by mouth daily       BUPROPION HCL PO        fluticasone (VERAMYST) 27.5 MCG/SPRAY nasal spray Spray 2 sprays into both nostrils daily       SOLIFENACIN SUCCINATE PO Take 10 mg by mouth daily       ACE NOT PRESCRIBED, INTENTIONAL, Reported on 5/10/2017 0 each 0       ALLERGIES     Allergies   Allergen Reactions     Milk Protein Extract GI Disturbance     lactose intolerance.  Can tolerate milk products if uses lactaid     Bee Venom Swelling     Latex      Lisinopril Cough     Onion GI Disturbance     Aloe Rash     pain     Chlorine Rash       PAST MEDICAL HISTORY:  Past Medical History:   Diagnosis Date     DM type 2 (diabetes mellitus, type 2) (H)      History of TIA (transient ischemic attack)      HTN (hypertension)      Hyperlipidemia        PAST SURGICAL HISTORY:  Past Surgical History:   Procedure Laterality Date     C TOTAL KNEE ARTHROPLASTY Left 04/26/2017    [facility]       FAMILY HISTORY:  Family History   Problem Relation Age of Onset     Thyroid Disease Sister      CANCER Brother      CANCER Sister      breast cancer     CEREBROVASCULAR DISEASE Sister 62     Other - See Comments Sister      Angioplasty 8/2016     Hypertension Father      Glaucoma Maternal Grandmother      CANCER Maternal Grandfather      CANCER Paternal Grandmother      DIABETES No family hx of      Macular Degeneration No family hx of        SOCIAL HISTORY:  Social History     Social History     Marital status: Single     Spouse name: N/A     Number of children: N/A     Years of education: N/A     Occupational History      Disabled     Social History  "Main Topics     Smoking status: Never Smoker     Smokeless tobacco: Never Used     Alcohol use 0.0 oz/week     0 Standard drinks or equivalent per week      Comment: moderate     Drug use: No     Sexual activity: Yes     Partners: Female     Other Topics Concern     None     Social History Narrative       Review of Systems:  Skin:  Negative     Eyes:  Negative    ENT:  Negative    Respiratory:  Negative    Cardiovascular:    Positive for;edema  Gastroenterology: Positive for    Genitourinary:  Negative    Musculoskeletal:  Positive for back pain;arthritis;joint pain  Neurologic:  Negative    Psychiatric:  Positive for sleep disturbances;anxiety;depression;excessive stress  Heme/Lymph/Imm:  Negative    Endocrine:  Positive for diabetes (DM 2)    Physical Exam:  Vitals: /74  Pulse 80  Ht 1.702 m (5' 7\")  Wt (!) 147 kg (324 lb)  BMI 50.75 kg/m2   Please refer to dictation for physical exam    Recent Lab Results:  LIPID RESULTS:  Lab Results   Component Value Date    CHOL 168 11/01/2016    HDL 61 11/01/2016    LDL 75 11/01/2016    TRIG 159 (H) 11/01/2016       LIVER ENZYME RESULTS:  Lab Results   Component Value Date    AST 11 11/01/2016    ALT 27 11/01/2016       CBC RESULTS:  Lab Results   Component Value Date    WBC 8.0 04/18/2017    RBC 4.27 04/18/2017    HGB 12.8 04/18/2017    HCT 38.8 04/18/2017    MCV 91 04/18/2017    MCH 30.0 04/18/2017    MCHC 33.0 04/18/2017    RDW 14.5 04/18/2017     04/18/2017       BMP RESULTS:  Lab Results   Component Value Date     04/18/2017    POTASSIUM 3.9 04/18/2017    CHLORIDE 106 04/18/2017    CO2 25 04/18/2017    ANIONGAP 11 04/18/2017     (H) 04/18/2017    BUN 11 04/18/2017    CR 0.47 (L) 04/18/2017    GFRESTIMATED >90  Non  GFR Calc   04/18/2017    GFRESTBLACK >90   GFR Calc   04/18/2017    DHRUV 9.6 04/18/2017        A1C RESULTS:  Lab Results   Component Value Date    A1C 6.1 (H) 04/18/2017       INR RESULTS:  No " results found for: INR        CC  Elizabeth Lamas MD  [facility] [place] [facility]  [address] [place] [address]

## 2017-05-10 NOTE — PATIENT INSTRUCTIONS
Today's Plan:   1) Your heart procedure is completely normal. Continue with Metoprolol.     If you have questions or concerns please call my nurse at [phone].     Scheduling phone number: [phone]  Reminder: Please bring in all current medications, over the counter supplements and vitamin bottles to your next appointment.    It was a pleasure seeing you today!     Cristhian Dickens  5/10/2017

## 2017-05-10 NOTE — LETTER
5/10/2017    Elizabeth Lamas MD  [facility]  [address] [place] [address]    RE: Melody Ferguson       Dear Colleague,    I had the pleasure of seeing Melody Ferguson in the [facility].    PRIMARY CARDIOLOGIST:  Dr. Eduardo Benson.      REASON FOR CLINIC VISIT:  Post-angiogram followup.      Melody is a delightful 56-year-old female who was referred to Dr. Benson in 04/2017 after an abnormal EKG.  Her EKG found incomplete left bundle branch block.  This was ordered as part of preoperative evaluation for her knee surgery.  She was subsequently set up for nuclear stress study showing inferior scar with small to moderate merry-infarct ischemia.  Her ejection fraction was mildly decreased at 49%.  She was seen by Dr. Benson, at which time she was started on Toprol and was set up for coronary angiogram.  Her coronary angiogram was performed on 04/24 via left radial approach.  She demonstrated no significant coronary artery disease.  The patient was cleared to proceed with her orthopedic surgery.      Melody comes back today stating that she is feeling well.  She underwent her knee surgery, and she is recovering well from that.  She is currently attending physical therapy for it.  She denies any symptoms of chest discomfort, shortness of breath, peripheral edema, PND, orthopnea or significant weight gain.  She is not on diuretics.  She tells me that she has never had any of these symptoms in the past.  She does have remote history of myocardial infarction in the early 1990s.  She states that she was seen in clinic, at which time her EKG showed evidence of myocardial infarction.  On her EKG, she does have inferior T waves.      CURRENT CARDIAC MEDICATIONS:   1.  Metoprolol succinate 25 mg daily.   2.  Losartan 50 mg daily.   3.  Aspirin 81 mg daily.      The remainder of her allergies, social history and review of systems were reviewed and as documented separately.       VITAL SIGNS:  Blood pressure 130/74 mmHg, pulse 80 beats per minute.  Weight 324 pounds.      PHYSICAL EXAMINATION:   GENERAL:  NAD, obese.   NECK:  Normal JVP, no carotid bruits.   RESPIRATORY:  Clear to auscultation bilaterally.   CARDIAC:  Regular rate and rhythm without murmurs, clicks or gallop.   ABDOMEN:  Soft, nontender but obese.   EXTREMITIES:  No edema of lower extremities bilaterally.   SKIN:  Warm and dry to touch without obvious mass or lesions.  No ecchymosis or evidence of hematoma over the left radial site.   VASCULAR:  Good peripheral pulses bilaterally.      ASSESSMENT AND PLAN:     Melody is a pleasant 56-year-old female who was recently referred to Dr. Benson for further evaluation of abnormal EKG that was part of a preoperative evaluation.  She was documented to have incomplete left bundle branch block.  She was set up for a nuclear stress study showing inferior scar with small to moderate merry-infarct ischemia with mildly decreased ejection fraction at 49%.  She was set up for a coronary angiogram showing no significant coronary artery disease.  She was cleared for orthopedic surgery.  She did undergo her knee surgery, and she is recovering well from this.  She is tolerating her Toprol-XL.  She does tell me that she has no symptoms of shortness of breath, peripheral edema, abdominal distention, orthopnea or PND.  She is on ARB and beta blocker as well as a baby dose of aspirin.  We will continue with the current medication regimen.      She is not a smoker.  She does have diabetes type 2.  She has no family history of premature cardiac disease.      All of her questions were answered to her satisfaction.  The patient would like to follow up with our Cardiology office.  Therefore, I will set her up for a 1-year followup.      Given her borderline reduction in EF, I would like to obtain an echocardiogram to get a baseline evaluation of her heart structure as well as valvular anatomy.  If this  looks normal, we will have her come back next year for reevaluation of her cardiac status.      Thank you for allowing me to participate in the care of this delightful patient today.     Sincerely,    Cristhian Rios PA-C     [facility]

## 2017-05-10 NOTE — MR AVS SNAPSHOT
After Visit Summary   5/10/2017    Meldoy Ferguson    MRN: [id number]           Patient Information     Date Of Birth          1960        Visit Information        Provider Department      5/10/2017 10:10 AM Cristhian Dickens PA-C [facility] [place]        Care Instructions    Today's Plan:   1) Your heart procedure is completely normal. Continue with Metoprolol.     If you have questions or concerns please call my nurse at [phone].     Scheduling phone number: [phone]  Reminder: Please bring in all current medications, over the counter supplements and vitamin bottles to your next appointment.    It was a pleasure seeing you today!     Cristhian Dickens  5/10/2017            Follow-ups after your visit        Your next 10 appointments already scheduled     May 15, 2017 11:00 AM CDT   REMEDIOS Extremity with Zandra Wilkins PT   [facility] ([facility]  )    [address]  [facility] [address]   [phone]            May 17, 2017 10:10 AM CDT   REMEDIOS Extremity with Zandra Wilkins PT   [facility] ([facility]  )    [address]  [facility] [address]   [phone]            May 19, 2017 10:10 AM CDT   REMEDIOS Extremity with Zandra Wilkins PT   [facility] ([facility]  )    [address]  [facility] [address]   [phone]            May 22, 2017 10:10 AM CDT   REMEDIOS Extremity with Elizabeth Myers PTA   [facility] ([facility]  )    [address]  [facility] [address]   [phone]            May 24, 2017 10:10 AM CDT   REMEDIOS Extremity with Elizabeth Myers PTA   [facility] ([facility]  )    [address]  [facility] [address]   [phone]              Who to contact     If you have questions or need follow up  "information about today's clinic visit or your schedule please contact [facility] directly at [phone].  Normal or non-critical lab and imaging results will be communicated to you by MyChart, letter or phone within 4 business days after the clinic has received the results. If you do not hear from us within 7 days, please contact the clinic through [employer] or phone. If you have a critical or abnormal lab result, we will notify you by phone as soon as possible.  Submit refill requests through [email or web address] or call your pharmacy and they will forward the refill request to us. Please allow 3 business days for your refill to be completed.          Additional Information About Your Visit        [employer] on [employer]OUD Information     [email or web address] gives you secure access to your electronic health record. If you see a primary care provider, you can also send messages to your care team and make appointments. If you have questions, please call your primary care clinic.  If you do not have a primary care provider, please call [phone] and they will assist you.        Care EveryWhere ID     This is your Care EveryWhere ID. This could be used by other organizations to access your [place] medical records  [id number]        Your Vitals Were     Pulse Height BMI (Body Mass Index)             80 1.702 m (5' 7\") 50.75 kg/m2          Blood Pressure from Last 3 Encounters:   05/10/17 130/74   04/24/17 148/88   04/24/17 134/80    Weight from Last 3 Encounters:   05/10/17 (!) 147 kg (324 lb)   05/09/17 (!) 145.2 kg (320 lb)   05/01/17 (!) 145.2 kg (320 lb)              Today, you had the following     No orders found for display       Primary Care Provider Office Phone # Fax #    Elizabeth Lamas [phone]       [facility] [address]  [facility] [address]        Thank you!     Thank you for choosing [facility]  for your care. Our " goal is always to provide you with excellent care. Hearing back from our patients is one way we can continue to improve our services. Please take a few minutes to complete the written survey that you may receive in the mail after your visit with us. Thank you!             Your Updated Medication List - Protect others around you: Learn how to safely use, store and throw away your medicines at www.disposemymeds.org.          This list is accurate as of: 5/10/17 10:20 AM.  Always use your most recent med list.                   Brand Name Dispense Instructions for use    ACE NOT PRESCRIBED (INTENTIONAL)     0 each    Reported on 5/10/2017       albuterol 108 (90 BASE) MCG/ACT Inhaler    PROAIR HFA/PROVENTIL HFA/VENTOLIN HFA     Inhale 2 puffs into the lungs every 6 hours       ASPIRIN PO      Take 81 mg by mouth daily       ATORVASTATIN CALCIUM PO      Take 40 mg by mouth daily       BUPROPION HCL PO          citalopram 40 MG tablet    celeXA    90 tablet    TAKE 1 TABLET BY MOUTH EVERY MORNING       cyclobenzaprine 5 MG tablet    FLEXERIL    60 tablet    TAKE 1 TABLET BY MOUTH TWICE DAILY AS NEEDED( GENERIC EQUIVALENT FOR FLEXERIL)       diclofenac 25 MG EC tablet    VOLTAREN    180 tablet    Take 1 tablet (25 mg) by mouth 3 times daily as needed for moderate pain       esomeprazole 40 MG CR capsule    nexIUM    30 capsule    TAKE 1 CAPSULE BY MOUTH EVERY DAY       fluticasone 27.5 MCG/SPRAY spray    VERAMYST     Spray 2 sprays into both nostrils daily       gabapentin 300 MG capsule    NEURONTIN    90 capsule    Take 1 capsule (300 mg) by mouth At Bedtime       glipiZIDE-metFORMIN 5-500 MG per tablet    METAGLIP    360 tablet    Take 2 tablets by mouth 2 times daily (before meals)       losartan 50 MG tablet    COZAAR    90 tablet    Take 1 tablet (50 mg) by mouth daily       melatonin 3 MG tablet     30 tablet    TAKE 1 TABLET BY MOUTH ONCE AT BEDTIME AS NEEDED FOR SLEEP       metoprolol 25 MG 24 hr tablet    TOPROL  XL    30 tablet    Take 1 tablet (25 mg) by mouth daily       montelukast 10 MG tablet    SINGULAIR    90 tablet    TAKE 1 TABLET BY MOUTH AT BEDTIME       NYSTOP 326878 UNIT/GM Powd   Generic drug:  nystatin     60 g    APPLY TO THE AFFECTED AREA TWO TO THREE TIMES DAILY AS NEEDED       order for DME     3 Month    Diabetic test strips and lancets per insurance formulary Check blood sugar 2 times daily       oxyCODONE-acetaminophen 5-325 MG per tablet    PERCOCET    80 tablet    Take 1-2 tablets by mouth every 4 hours as needed for moderate to severe pain       pioglitazone 30 MG tablet    ACTOS    135 tablet    Take 0.5 tablet by mouth in the morning with a meal and 1 tablet by mouth in the evening with a meal.       polyethylene glycol powder    MIRALAX/GLYCOLAX    510 g    DISSOLVE 17 GRAMS IN LIQUID AND DRINK DAILY AS DIRECTED       SOLIFENACIN SUCCINATE PO      Take 10 mg by mouth daily       traMADol 50 MG tablet    ULTRAM    30 tablet    TAKE 1 TABLET BY MOUTH EVERY NIGHT AT BEDTIME AS NEEDED

## 2017-05-10 NOTE — TELEPHONE ENCOUNTER
Left message on voicemail should not take Diclofenac while on other pain medication but Actos was refilled.  Arianne ROBERTS

## 2017-05-11 NOTE — PROGRESS NOTES
Called pt with recommendations from Cristhian MCCARTHY. Pt transferred to scheduling to arrange. LPenfield RN

## 2017-05-11 NOTE — PROGRESS NOTES
PRIMARY CARDIOLOGIST:  Dr. Eduardo Benson.      REASON FOR CLINIC VISIT:  Post-angiogram followup.      HISTORY OF PRESENT ILLNESS:   Melody is a delightful 56-year-old female who was referred to Dr. Benson in 04/2017 after an abnormal EKG.  Her EKG found incomplete left bundle branch block.  This was ordered as part of preoperative evaluation for her knee surgery.  She was subsequently set up for nuclear stress study showing inferior scar with small to moderate merry-infarct ischemia.  Her ejection fraction was mildly decreased at 49%.  She was seen by Dr. Benson, at which time she was started on Toprol and was set up for coronary angiogram.  Her coronary angiogram was performed on 04/24 via left radial approach.  She demonstrated no significant coronary artery disease.  The patient was cleared to proceed with her orthopedic surgery.      Melody comes back today stating that she is feeling well.  She underwent her knee surgery, and she is recovering well from that.  She is currently attending physical therapy for it.  She denies any symptoms of chest discomfort, shortness of breath, peripheral edema, PND, orthopnea or significant weight gain.  She is not on diuretics.  She tells me that she has never had any of these symptoms in the past.  She does have remote history of myocardial infarction in the early 1990s.  She states that she was seen in clinic, at which time her EKG showed evidence of myocardial infarction.  On her EKG, she does have inferior T waves.      CURRENT CARDIAC MEDICATIONS:   1.  Metoprolol succinate 25 mg daily.   2.  Losartan 50 mg daily.   3.  Aspirin 81 mg daily.      The remainder of her allergies, social history and review of systems were reviewed and as documented separately.      VITAL SIGNS:  Blood pressure 130/74 mmHg, pulse 80 beats per minute.  Weight 324 pounds.      PHYSICAL EXAMINATION:   GENERAL:  NAD, obese.   NECK:  Normal JVP, no carotid bruits.   RESPIRATORY:  Clear to  auscultation bilaterally.   CARDIAC:  Regular rate and rhythm without murmurs, clicks or gallop.   ABDOMEN:  Soft, nontender but obese.   EXTREMITIES:  No edema of lower extremities bilaterally.   SKIN:  Warm and dry to touch without obvious mass or lesions.  No ecchymosis or evidence of hematoma over the left radial site.   VASCULAR:  Good peripheral pulses bilaterally.      ASSESSMENT AND PLAN:     Melody is a pleasant 56-year-old female who was recently referred to Dr. Benson for further evaluation of abnormal EKG that was part of a preoperative evaluation.  She was documented to have incomplete left bundle branch block.  She was set up for a nuclear stress study showing inferior scar with small to moderate merry-infarct ischemia with mildly decreased ejection fraction at 49%.  She was set up for a coronary angiogram showing no significant coronary artery disease.  She was cleared for orthopedic surgery.  She did undergo her knee surgery, and she is recovering well from this.  She is tolerating her Toprol-XL.  She does tell me that she has no symptoms of shortness of breath, peripheral edema, abdominal distention, orthopnea or PND.  She is on ARB and beta blocker as well as a baby dose of aspirin.  We will continue with the current medication regimen.      She is not a smoker.  She does have diabetes type 2.  She has no family history of premature cardiac disease.      All of her questions were answered to her satisfaction.  The patient would like to follow up with our Cardiology office.  Therefore, I will set her up for a 1-year followup.      Given her borderline reduction in EF, I would like to obtain an echocardiogram to get a baseline evaluation of her heart structure as well as valvular anatomy.  If this looks normal, we will have her come back next year for reevaluation of her cardiac status.      Thank you for allowing me to participate in the care of this delightful patient today.         CARA HA,  MARLIN             D: 05/10/2017 10:46   T: 05/10/2017 23:32   MT: JUAN DAVID      Name:     EDISON BORDEN   MRN:      [id number]        Account:      [id number]   :      1960           Service Date: 05/10/2017      Document: [id number]

## 2017-05-12 ENCOUNTER — DOCUMENTATION ONLY (OUTPATIENT)
Dept: CARE COORDINATION | Facility: CLINIC | Age: 57
End: 2017-05-12

## 2017-05-12 ENCOUNTER — HOSPITAL ENCOUNTER (OUTPATIENT)
Dept: CARDIOLOGY | Facility: CLINIC | Age: 57
Discharge: HOME | End: 2017-05-12
Attending: PHYSICIAN ASSISTANT | Admitting: PHYSICIAN ASSISTANT
Payer: MEDICARE

## 2017-05-12 DIAGNOSIS — E66.01 MORBID OBESITY WITH BODY MASS INDEX OF 45.0-49.9 IN ADULT (H): ICD-10-CM

## 2017-05-12 DIAGNOSIS — R10.13 DYSPEPSIA: ICD-10-CM

## 2017-05-12 DIAGNOSIS — I42.9 CARDIOMYOPATHY, UNSPECIFIED (H): ICD-10-CM

## 2017-05-12 DIAGNOSIS — G89.4 CHRONIC PAIN SYNDROME: ICD-10-CM

## 2017-05-12 DIAGNOSIS — M62.830 BACK MUSCLE SPASM: ICD-10-CM

## 2017-05-12 DIAGNOSIS — M54.32 LEFT SIDED SCIATICA: ICD-10-CM

## 2017-05-12 PROCEDURE — 93306 TTE W/DOPPLER COMPLETE: CPT | Mod: 26 | Performed by: INTERNAL MEDICINE

## 2017-05-12 PROCEDURE — 40000264 ECHO COMPLETE WITH LUMASON

## 2017-05-12 PROCEDURE — 25500064 ZZH RX 255 OP 636: Performed by: PHYSICIAN ASSISTANT

## 2017-05-12 RX ADMIN — SULFUR HEXAFLUORIDE 5 ML: KIT at 10:15

## 2017-05-12 NOTE — TELEPHONE ENCOUNTER
polyethylene glycol (MIRALAX/GLYCOLAX) powder      Last Written Prescription Date: 03/10/17  Last Fill Quantity: 510g,  # refills: 1   Last Office Visit with [facility], [facility] or [facility] prescribing provider: 04/18/17      [facility]

## 2017-05-12 NOTE — PROGRESS NOTES
[facility] now requests orders and shares plan of care/discharge summaries for some patients through [employer].  Please REPLY TO THIS MESSAGE in order to give authorization for orders when needed.  This is considered a verbal order, you will still receive a faxed copy of orders for signature.  Thank you for your assistance in improving collaboration for our patients.    DISCHARGE SUMMARY    Discharge Summary, Patient status improved and no longer home bound, Discharge instructions given to patient    Goals met    OT  1. Pt will demonstrate safety with toileting, clothing management, pericare using appropriate adaptive equipment with Mod I within 1 month. Goal Met.  2. Pt will demonstrate safety with tub/shower transfer using appropriate equipment with Mod I within 1 month. Goal Met.    PT  Goals and progress/To be met in 3 wks  During course of therapy visits, patient will verbalize/demonstrate pain control techniques to allow for full participation in the rehabilitation process/MET.  Patient will improve knee extension by at least 5 degrees to improve overall gait pattern/MET.  Patient will regain knee adequate ROM to demonstrate safe ability to negotiate stairs/Progressing.   Patient will demonstrate safe ambulation on all surfaces with or without assistive device as appropriate/ongoing  Patient will demonstrate independent car transfers on both  and passenger sides/ongoing.  At dc, patient will demonstrate correct performance of home exercise routine for continued functional strength and motion gains/progressing.  Due to DC early, goals not fully completed by PT.    SN Met  Patient/Caregiver will verbalize s/s to report to Home Care clinician or physician and will verbalize community resources available and how to contact them.  Patient will demonstrate ability to maintain safety in home environment without injury/falls and will remain free of S/S of infection.  Patient will demonstrate  ability to maintain condition in the home without hospitalization, ER visit, or unplanned physician's visit.  Patient will demonstrate knowledge of disease process, treatment goals and self-care management.  Patient/caregiver will demonstrate effective disease management practices.  Patient will achieve adequate symptom control through use of medications or other therapies/treatments.  Patient will demonstrate compliance with treatment plan, diet, meds, exercise and will verbalize appropriate measures for managing changes in body image/lifestyle.    Summary of care\outcomes achieved  Patient was admitted to home care following a knee replacement.  She was seen for a total of 7 visits by SN, PT and OT.  Their summaries of goals are above.  Patient was instructed on cares for her feet due to her diabetes, protien intake to aid in healing and strengthening, ADA diet for management of her BS, fluid consumption to prevent constipation and stool softeners if needed and how to use, Vit E cream to massage in to incision, elevation of leg to reduce swelling, and how to monitor her wounds for any changes.  She is no longer home bound and is starting out patient therapy on Monday, 5/15/17 so she is being discharged from home care today.  Her vitals today /70, P 68, RR 18, T 97.6, SPO2 94 percent on RA, no complaint of SOB, lungs clear.      Follow up with MD as scheduled  Physician notified of discharge

## 2017-05-12 NOTE — TELEPHONE ENCOUNTER
esomeprazole (NEXIUM) 40 MG CR capsule      Last Written Prescription Date: 04/19/17  Last Fill Quantity: 30,  # refills: 0   Last Office Visit with [facility] or [facility] prescribing provider: 04/18/17      NYSTOP [id number] UNIT/GM POWD powder      Last Written Prescription Date: 04/13/17  Last Fill Quantity: 60g,  # refills: 0   Last Office Visit with [facility], [facility] or [facility] prescribing provider: 04/18/17                                                 traMADol (ULTRAM) 50 MG tablet      Last Written Prescription Date:  04/13/17  Last Fill Quantity: 30,   # refills: 0  Last Office Visit with [facility], [facility] or [facility] prescribing provider: 04/18/17  Future Office visit:       Routing refill request to provider for review/approval because:  Drug not on the [facility], [facility] or [facility] refill protocol or controlled substance      cyclobenzaprine (FLEXERIL) 5 MG tablet      Last Written Prescription Date:  03/09/17  Last Fill Quantity: 30,   # refills: 0  Last Office Visit with [facility], [facility] or [facility] prescribing provider: 04/18/17  Future Office visit:       Routing refill request to provider for review/approval because:  Drug not on the [facility], [facility] or [facility] refill protocol or controlled substance                                               [facility]

## 2017-05-16 RX ORDER — NYSTATIN 100000 [USP'U]/G
POWDER TOPICAL
Qty: 60 G | Refills: 0 | Status: SHIPPED | OUTPATIENT
Start: 2017-05-16 | End: 2017-07-03

## 2017-05-16 RX ORDER — POLYETHYLENE GLYCOL 3350 17 G/17G
POWDER, FOR SOLUTION ORAL
Qty: 510 G | Refills: 1 | Status: SHIPPED | OUTPATIENT
Start: 2017-05-16 | End: 2017-07-03

## 2017-05-16 RX ORDER — TRAMADOL HYDROCHLORIDE 50 MG/1
TABLET ORAL
Qty: 30 TABLET | Refills: 0 | Status: SHIPPED | OUTPATIENT
Start: 2017-05-16 | End: 2017-06-13

## 2017-05-16 RX ORDER — CYCLOBENZAPRINE HCL 5 MG
TABLET ORAL
Qty: 60 TABLET | Refills: 0 | Status: SHIPPED | OUTPATIENT
Start: 2017-05-16 | End: 2017-06-12

## 2017-05-16 RX ORDER — ESOMEPRAZOLE MAGNESIUM 40 MG/1
CAPSULE, DELAYED RELEASE ORAL
Qty: 30 CAPSULE | Refills: 0 | Status: SHIPPED | OUTPATIENT
Start: 2017-05-16 | End: 2017-06-13

## 2017-05-16 NOTE — TELEPHONE ENCOUNTER
Faxed Rx for the Ultram, Celina Ibarra, right fax confirmed at  9:39 am today.  Yuli Giang MA/  [employer] [facility]

## 2017-05-16 NOTE — TELEPHONE ENCOUNTER
Prescription approved per [facility] Refill Protocol.  Cara Maloney RN

## 2017-05-17 ENCOUNTER — MYC MEDICAL ADVICE (OUTPATIENT)
Dept: FAMILY MEDICINE | Facility: CLINIC | Age: 57
End: 2017-05-17

## 2017-05-17 DIAGNOSIS — M51.369 DDD (DEGENERATIVE DISC DISEASE), LUMBAR: Primary | Chronic | ICD-10-CM

## 2017-05-18 ENCOUNTER — TELEPHONE (OUTPATIENT)
Dept: CARDIOLOGY | Facility: CLINIC | Age: 57
End: 2017-05-18

## 2017-05-18 DIAGNOSIS — I42.9 CARDIOMYOPATHY, UNSPECIFIED (H): Primary | ICD-10-CM

## 2017-05-18 NOTE — TELEPHONE ENCOUNTER
Discussed the echocardiogram results with the patient. Reviewed these with Dr. Benson as well who recommends obtaining cardiac MRI. Patient is updated. Scheduling contact information is provided.     Cristhian Rios PA-C   5/18/2017  Pager: [phone]

## 2017-06-12 ENCOUNTER — OFFICE VISIT (OUTPATIENT)
Dept: UROLOGY | Facility: CLINIC | Age: 57
End: 2017-06-12
Payer: MEDICARE

## 2017-06-12 DIAGNOSIS — N39.3 SUI (STRESS URINARY INCONTINENCE, FEMALE): Primary | ICD-10-CM

## 2017-06-12 LAB
ALBUMIN UR-MCNC: NEGATIVE MG/DL
APPEARANCE UR: CLEAR
BILIRUB UR QL STRIP: NEGATIVE
COLOR UR AUTO: YELLOW
GLUCOSE UR STRIP-MCNC: 500 MG/DL
HGB UR QL STRIP: NEGATIVE
KETONES UR STRIP-MCNC: ABNORMAL MG/DL
LEUKOCYTE ESTERASE UR QL STRIP: ABNORMAL
NITRATE UR QL: NEGATIVE
PH UR STRIP: 7.5 PH (ref 5–7)
SP GR UR STRIP: 1.01 (ref 1–1.03)
URN SPEC COLLECT METH UR: ABNORMAL
UROBILINOGEN UR STRIP-ACNC: 2 EU/DL (ref 0.2–1)

## 2017-06-12 PROCEDURE — 81003 URINALYSIS AUTO W/O SCOPE: CPT | Mod: QW | Performed by: UROLOGY

## 2017-06-12 PROCEDURE — 52000 CYSTOURETHROSCOPY: CPT | Performed by: UROLOGY

## 2017-06-12 PROCEDURE — 99205 OFFICE O/P NEW HI 60 MIN: CPT | Mod: 25 | Performed by: UROLOGY

## 2017-06-12 NOTE — MR AVS SNAPSHOT
After Visit Summary   6/12/2017    Melody Ferguson    MRN: [id number]           Patient Information     Date Of Birth          1960        Visit Information        Provider Department      6/12/2017 4:30 PM Talon Katz MD [facility]        Today's Diagnoses     SEAN (stress urinary incontinence, female)    -  1       Follow-ups after your visit        Your next 10 appointments already scheduled     Jun 13, 2017  2:30 PM CDT   MR MYOCARDIUM W CONTRAST with [id number]   [facility] ([facility])    [address] [place] [address]  [address]  [facility] [id number] [phone]           Take your medicines as usual, unless your doctor tells you not to. Bring a list of your current medicines to your exam (including vitamins, minerals and over-the-counter drugs).  You will be given intravenous contrast for this exam. To prepare:   The day before your exam, drink extra fluids at least six 8-ounce glasses (unless your doctor tells you to restrict your fluids).   Have a blood test (creatinine test) within 30 days of your exam. Go to your clinic or Diagnostic Imaging Department for this test.  The MRI machine uses a strong magnet. Please wear clothes without metal (snaps, zippers). A sweatsuit works well, or we may give you a hospital gown.  Please remove any body piercings and hair extensions before you arrive. You will also remove watches, jewelry, hairpins, wallets, dentures, partial dental plates and hearing aids. You may wear contact lenses, and you may be able to wear your rings. We have a safe place to keep your personal items, but it is safer to leave them at home.   **IMPORTANT** THE INSTRUCTIONS BELOW ARE ONLY FOR THOSE PATIENTS WHO HAVE BEEN TOLD THEY WILL RECEIVE SEDATION OR GENERAL ANESTHESIA DURING THEIR MRI PROCEDURE:  IF YOU WILL RECEIVE SEDATION (take medicine to help you relax during your exam):    You must get the medicine from your doctor before you arrive. Bring the medicine to the exam. Do not take it at home.   Arrive one hour early. Bring someone who can take you home after the test. Your medicine will make you sleepy. After the exam, you may not drive, take a bus or take a taxi by yourself.   No eating 8 hours before your exam. You may have clear liquids up until 4 hours before your exam. (Clear liquids include water, clear tea, black coffee and fruit juice without pulp.)  IF YOU WILL RECEIVE ANESTHESIA (be asleep for your exam):   Arrive 1 1/2 hours early. Bring someone who can take you home after the test. You may not drive, take a bus or take a taxi by yourself.   No eating 8 hours before your exam. You may have clear liquids up until 4 hours before your exam. (Clear liquids include water, clear tea, black coffee and fruit juice without pulp.)  Please call the Imaging Department at your exam site with any questions.            Jun 14, 2017 10:30 AM CDT   [facility] with Keren Longo OD   [facility] ([facility])    [address] [place] [id number] [phone]              Who to contact     If you have questions or need follow up information about today's clinic visit or your schedule please contact [facility] directly at [phone].  Normal or non-critical lab and imaging results will be communicated to you by MyChart, letter or phone within 4 business days after the clinic has received the results. If you do not hear from us within 7 days, please contact the clinic through [employer] or phone. If you have a critical or abnormal lab result, we will notify you by phone as soon as possible.  Submit refill requests through [employer] or call your pharmacy and they will forward the refill request to us. Please allow 3 business days for your refill to be completed.          Additional  Information About Your Visit        [employer] Information     [employer] gives you secure access to your electronic health record. If you see a primary care provider, you can also send messages to your care team and make appointments. If you have questions, please call your primary care clinic.  If you do not have a primary care provider, please call [phone] and they will assist you.        Care EveryWhere ID     This is your Care EveryWhere ID. This could be used by other organizations to access your [facility] medical records  [id number]         Blood Pressure from Last 3 Encounters:   05/10/17 130/74   04/24/17 148/88   04/24/17 134/80    Weight from Last 3 Encounters:   05/10/17 (!) 324 lb (147 kg)   05/09/17 (!) 320 lb (145.2 kg)   05/01/17 (!) 320 lb (145.2 kg)              We Performed the Following     CYSTOURETHROSCOPY     UA without Microscopic        Primary Care Provider Office Phone # Fax #    Elizabeth Lamas [phone]       [facility] [address] [facility] [facility] [address]        Thank you!     Thank you for choosing [facility]  for your care. Our goal is always to provide you with excellent care. Hearing back from our patients is one way we can continue to improve our services. Please take a few minutes to complete the written survey that you may receive in the mail after your visit with us. Thank you!             Your Updated Medication List - Protect others around you: Learn how to safely use, store and throw away your medicines at www.disposemymeds.org.          This list is accurate as of: 6/12/17  5:31 PM.  Always use your most recent med list.                   Brand Name Dispense Instructions for use    ACE NOT PRESCRIBED (INTENTIONAL)     0 each    Reported on 5/10/2017       albuterol 108 (90 BASE) MCG/ACT Inhaler    PROAIR HFA/PROVENTIL HFA/VENTOLIN HFA     Inhale 2 puffs into the lungs every 6 hours       ASPIRIN PO       Take 81 mg by mouth daily       ATORVASTATIN CALCIUM PO      Take 40 mg by mouth daily       BUPROPION HCL PO          citalopram 40 MG tablet    celeXA    90 tablet    TAKE 1 TABLET BY MOUTH EVERY MORNING       cyclobenzaprine 5 MG tablet    FLEXERIL    60 tablet    TAKE 1 TABLET BY MOUTH TWICE DAILY AS NEEDED( GENERIC EQUIVALENT FOR FLEXERIL)       diclofenac 25 MG EC tablet    VOLTAREN    180 tablet    Take 1 tablet (25 mg) by mouth 3 times daily as needed for moderate pain       esomeprazole 40 MG CR capsule    nexIUM    30 capsule    TAKE 1 CAPSULE BY MOUTH EVERY DAY       fluticasone 27.5 MCG/SPRAY spray    VERAMYST     Spray 2 sprays into both nostrils daily       gabapentin 300 MG capsule    NEURONTIN    90 capsule    Take 1 capsule (300 mg) by mouth At Bedtime       glipiZIDE-metFORMIN 5-500 MG per tablet    METAGLIP    360 tablet    Take 2 tablets by mouth 2 times daily (before meals)       losartan 50 MG tablet    COZAAR    90 tablet    Take 1 tablet (50 mg) by mouth daily       melatonin 3 MG tablet     30 tablet    TAKE 1 TABLET BY MOUTH ONCE AT BEDTIME AS NEEDED FOR SLEEP       metoprolol 25 MG 24 hr tablet    TOPROL XL    30 tablet    Take 1 tablet (25 mg) by mouth daily       montelukast 10 MG tablet    SINGULAIR    90 tablet    TAKE 1 TABLET BY MOUTH AT BEDTIME       NYSTOP 937551 UNIT/GM Powd   Generic drug:  nystatin     60 g    APPLY TO THE AFFECTED AREA TWO TO THREE TIMES DAILY AS NEEDED       order for DME     3 Month    Diabetic test strips and lancets per insurance formulary Check blood sugar 2 times daily       oxyCODONE-acetaminophen 5-325 MG per tablet    PERCOCET    80 tablet    Take 1-2 tablets by mouth every 4 hours as needed for moderate to severe pain       * pioglitazone 30 MG tablet    ACTOS    135 tablet    Take 0.5 tablet by mouth in the morning with a meal and 1 tablet by mouth in the evening with a meal.       * pioglitazone 15 MG tablet    ACTOS    90 tablet    TAKE 1 TABLET  BY MOUTH EVERY MORNING WITH A MEAL       polyethylene glycol powder    MIRALAX/GLYCOLAX    510 g    DISSOLVE 17 GRAMS IN LIQUID AND DRINK DAILY AS DIRECTED       SOLIFENACIN SUCCINATE PO      Take 10 mg by mouth daily       traMADol 50 MG tablet    ULTRAM    30 tablet    TAKE 1 TABLET BY MOUTH EVERY NIGHT AT BEDTIME AS NEEDED       * Notice:  This list has 2 medication(s) that are the same as other medications prescribed for you. Read the directions carefully, and ask your doctor or other care provider to review them with you.

## 2017-06-12 NOTE — PROGRESS NOTES
"Melody Ferguson is a 56 year old female seen in consultation for incont. Consult from Elizabeth Lamas.      Pt reports 10+ yr hx urinary frequency, voids q 2-3 hrs during the day and noc x 4.    Second control is urge incontinence, wears 4-5 med pads per day, 1-2 per nite; some stress component, some insensate loss.    Presently on Vesicare 10, has been on for many yrs, not sure if it is helping at all.    Denies dysuria, gross hematuria.    Underwent AP repair \"because everything was falling out\" in 2005; a few months after her hyster in 2005. Hx 4 vag deliveries. Not on HRT.     Some constipation and diverticulosis; takes daily Miralax with good results; no fecal incont.    Reviewed extensive medical hx in detail. Sugar control is \"the best ever\" with recent A1c of 6.5. Recent left knee replacement without incident. Had TIA in 2005, none since. Extensive remote tobacco hx, none x 30 yrs.     Reviewed voiding diary; reflects above.      Past Medical History:   Diagnosis Date     DM type 2 (diabetes mellitus, type 2) (H)      History of TIA (transient ischemic attack)      HTN (hypertension)      Hyperlipidemia        Past Surgical History:   Procedure Laterality Date     C TOTAL KNEE ARTHROPLASTY Left 04/26/2017    DML at [facility]       Social History     Social History     Marital status: Single     Spouse name: N/A     Number of children: N/A     Years of education: N/A     Occupational History      Disabled     Social History Main Topics     Smoking status: Never Smoker     Smokeless tobacco: Never Used     Alcohol use 0.0 oz/week     0 Standard drinks or equivalent per week      Comment: moderate     Drug use: No     Sexual activity: Yes     Partners: Female     Other Topics Concern     Not on file     Social History Narrative       Current Outpatient Prescriptions   Medication Sig Dispense Refill     esomeprazole (NEXIUM) 40 MG CR capsule TAKE 1 CAPSULE BY MOUTH EVERY DAY 30 capsule 0     cyclobenzaprine (FLEXERIL) 5 " MG tablet TAKE 1 TABLET BY MOUTH TWICE DAILY AS NEEDED 60 tablet 0     traMADol (ULTRAM) 50 MG tablet TAKE 1 TABLET BY MOUTH EVERY NIGHT AT BEDTIME AS NEEDED 30 tablet 0     NYSTOP 005759 UNIT/GM POWD powder APPLY TO THE AFFECTED AREA TWO TO THREE TIMES DAILY AS NEEDED 60 g 0     polyethylene glycol (MIRALAX/GLYCOLAX) powder DISSOLVE 17 GRAMS IN LIQUID AND DRINK DAILY AS DIRECTED 510 g 1     pioglitazone (ACTOS) 15 MG tablet TAKE 1 TABLET BY MOUTH EVERY MORNING WITH A MEAL 90 tablet 0     oxyCODONE-acetaminophen (PERCOCET) 5-325 MG per tablet Take 1-2 tablets by mouth every 4 hours as needed for moderate to severe pain 80 tablet 0     metoprolol (TOPROL XL) 25 MG 24 hr tablet Take 1 tablet (25 mg) by mouth daily 30 tablet 3     gabapentin (NEURONTIN) 300 MG capsule Take 1 capsule (300 mg) by mouth At Bedtime 90 capsule 1     citalopram (CELEXA) 40 MG tablet TAKE 1 TABLET BY MOUTH EVERY MORNING 90 tablet 0     cyclobenzaprine (FLEXERIL) 5 MG tablet TAKE 1 TABLET BY MOUTH TWICE DAILY AS NEEDED( GENERIC EQUIVALENT FOR FLEXERIL) 60 tablet 0     pioglitazone (ACTOS) 30 MG tablet Take 0.5 tablet by mouth in the morning with a meal and 1 tablet by mouth in the evening with a meal. 135 tablet 1     diclofenac (VOLTAREN) 25 MG EC tablet Take 1 tablet (25 mg) by mouth 3 times daily as needed for moderate pain 180 tablet 0     losartan (COZAAR) 50 MG tablet Take 1 tablet (50 mg) by mouth daily 90 tablet 1     glipiZIDE-metFORMIN (METAGLIP) 5-500 MG per tablet Take 2 tablets by mouth 2 times daily (before meals) 360 tablet 1     melatonin 3 MG tablet TAKE 1 TABLET BY MOUTH ONCE AT BEDTIME AS NEEDED FOR SLEEP 30 tablet 0     montelukast (SINGULAIR) 10 MG tablet TAKE 1 TABLET BY MOUTH AT BEDTIME 90 tablet 1     order for DME Diabetic test strips and lancets per insurance formulary Check blood sugar 2 times daily 3 Month 3     albuterol (PROAIR HFA, PROVENTIL HFA, VENTOLIN HFA) 108 (90 BASE) MCG/ACT inhaler Inhale 2 puffs into the  "lungs every 6 hours       ASPIRIN PO Take 81 mg by mouth daily       ATORVASTATIN CALCIUM PO Take 40 mg by mouth daily       BUPROPION HCL PO        fluticasone (VERAMYST) 27.5 MCG/SPRAY nasal spray Spray 2 sprays into both nostrils daily       SOLIFENACIN SUCCINATE PO Take 10 mg by mouth daily       ACE NOT PRESCRIBED, INTENTIONAL, Reported on 5/10/2017 0 each 0       Physical Exam:    GENL: NAD. 5'7\"   315#   ABD: Obese, soft, non-tender, no masses.    EG: Well-estrogenized, no masses.    VAGINA: Well-estrogenized, no masses.    BN HYPERMOBILITY: Moderate.    CYSTOCELE: Grade 1.    APICAL PROLAPSE: Mild.    RECTOCELE: Mild.    BIMANUAL: No mass or tenderness.    Cysto:    (Informed consent obtained. Pause for cause performed)   Sterile prep.    17 Fr scope inserted through urethra. Systematic examination w 70 degree lens.   PVR: 20   MUCOSA: Normal without lesion   ORIFICES: Normal location and morphology   CAPACITY: 400 cc; no pain with filling   Scope withdrawn without untoward effect.    Valsalva:   Moderate hypermobility, marked leakage noted.    (Pt tolerated procedure without difficulty).      Results for orders placed or performed in visit on 06/12/17   UA without Microscopic   Result Value Ref Range    Color Urine Yellow     Appearance Urine Clear     Glucose Urine 500 (A) NEG mg/dL    Bilirubin Urine Negative NEG    Ketones Urine Trace (A) NEG mg/dL    Specific Gravity Urine 1.015 1.003 - 1.035    Blood Urine Negative NEG    pH Urine 7.5 (H) 5.0 - 7.0 pH    Protein Albumin Urine Negative NEG mg/dL    Urobilinogen Urine 2.0 (H) 0.2 - 1.0 EU/dL    Nitrite Urine Negative NEG    Leukocyte Esterase Urine Trace (A) NEG    Source Midstream Urine          IMP:  1. SEAN with hypermobility, bothersome  2. 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 Obesity, hx TIA, DM, other medical issues      PLAN:  1. Discussed situation with patient in detail.    2. Could try different meds, Botox, etc but marked SEAN on exam suggests that may be a " better 'next step.' Discussed in detail, pt expresses understanding.    ALTIS SLING DISCUSSION: We discussed the patients situation in detail including her specific anatomy and conditions. Diagrams are drawn.    We discussed the surgical treatment including Altis pubovaginal sling utilizing mesh material. We addressed the technical aspects of the procedure as well as the potential risks and complications incuding, but not limited to bleeding, infection, damage to organs or other injury. We discussed the recovery process and the potential effect on sexual function in detail. She also understands the alternative forms of therapy (including no therapy and treatment without mesh), and the potential need for additional therapy. We discussed the FDA report on the use of surgical mesh. All questions are answered in detail. Informed consent is obtained. Consent form signed. Handout given.    Pt is eager to proceed.    3. Would like to consider moderation of caffeine and continue with tight sugar control    4. Consider weaning off Vesicare at some point    5. 60 minutes spent with patient, more than 50% in counseling and coordination of care for incont, which did not include time spent for the procedure.    6. Copy S Adams

## 2017-06-13 DIAGNOSIS — G89.4 CHRONIC PAIN SYNDROME: ICD-10-CM

## 2017-06-13 DIAGNOSIS — R10.13 DYSPEPSIA: ICD-10-CM

## 2017-06-13 DIAGNOSIS — E11.9 TYPE 2 DIABETES MELLITUS WITHOUT COMPLICATION, WITHOUT LONG-TERM CURRENT USE OF INSULIN (H): ICD-10-CM

## 2017-06-13 DIAGNOSIS — M62.830 BACK MUSCLE SPASM: ICD-10-CM

## 2017-06-13 DIAGNOSIS — M54.32 LEFT SIDED SCIATICA: ICD-10-CM

## 2017-06-14 ENCOUNTER — MYC MEDICAL ADVICE (OUTPATIENT)
Dept: FAMILY MEDICINE | Facility: CLINIC | Age: 57
End: 2017-06-14

## 2017-06-14 DIAGNOSIS — M51.369 DDD (DEGENERATIVE DISC DISEASE), LUMBAR: Chronic | ICD-10-CM

## 2017-06-14 DIAGNOSIS — G89.4 CHRONIC PAIN SYNDROME: Primary | Chronic | ICD-10-CM

## 2017-06-14 RX ORDER — ESOMEPRAZOLE MAGNESIUM 40 MG/1
CAPSULE, DELAYED RELEASE ORAL
Qty: 30 CAPSULE | Refills: 0 | Status: SHIPPED | OUTPATIENT
Start: 2017-06-14 | End: 2017-07-07

## 2017-06-14 RX ORDER — TRAMADOL HYDROCHLORIDE 50 MG/1
TABLET ORAL
Qty: 30 TABLET | Refills: 0 | Status: SHIPPED | OUTPATIENT
Start: 2017-06-14 | End: 2017-07-13

## 2017-06-14 RX ORDER — PIOGLITAZONEHYDROCHLORIDE 15 MG/1
TABLET ORAL
Qty: 90 TABLET | Refills: 0 | Status: SHIPPED | OUTPATIENT
Start: 2017-06-14 | End: 2017-07-03

## 2017-06-14 RX ORDER — CYCLOBENZAPRINE HCL 5 MG
TABLET ORAL
Qty: 60 TABLET | Refills: 0 | Status: SHIPPED | OUTPATIENT
Start: 2017-06-14 | End: 2017-07-13

## 2017-06-14 NOTE — TELEPHONE ENCOUNTER
cyclobenzaprine (FLEXERIL) 5 MG tablet      Last Written Prescription Date:  3/9/17  Last Fill Quantity: 60,   # refills: 0  Last Office Visit with [facility] or  Health prescribing provider: 4/18/17  Future Office visit:       Routing refill request to provider for review/approval because:  Drug not on the [facility], P or  Health refill protocol or controlled substance      traMADol (ULTRAM) 50 MG tablet      Last Written Prescription Date:  5/6/17  Last Fill Quantity: 30,   # refills: 0  Last Office Visit with [facility], [facility] or  Health prescribing provider: 4/18/17  Future Office visit:       Routing refill request to provider for review/approval because:  Drug not on the [facility], P or  Health refill protocol or controlled substance    esomeprazole (NEXIUM) 40 MG CR capsule      Last Written Prescription Date: 5/16/17  Last Fill Quantity: 30,  # refills: 0   Last Office Visit with [facility], [facility] or  Health prescribing provider: 4/18/17      pioglitazone (ACTOS) 15 MG tablet         Last Written Prescription Date: 5/10/17  Last Fill Quantity: 90, # refills: 0  Last Office Visit with [facility], [facility] or  Health prescribing provider:  4/18/17        BP Readings from Last 3 Encounters:   05/10/17 130/74   04/24/17 148/88   04/24/17 134/80     Lab Results   Component Value Date    MICROL 7 11/01/2016     Lab Results   Component Value Date    UMALCR 11.57 11/01/2016     Creatinine   Date Value Ref Range Status   04/18/2017 0.47 (L) 0.52 - 1.04 mg/dL Final   ]  GFR Estimate   Date Value Ref Range Status   04/18/2017 >90  Non  GFR Calc   >60 mL/min/1.7m2 Final   11/01/2016 >90  Non  GFR Calc   >60 mL/min/1.7m2 Final   03/01/2016 >90  Non  GFR Calc   >60 mL/min/1.7m2 Final     GFR Estimate If Black   Date Value Ref Range Status   04/18/2017 >90   GFR Calc   >60 mL/min/1.7m2 Final   11/01/2016 >90   GFR Calc   >60 mL/min/1.7m2 Final   03/01/2016 >90    GFR Calc   >60 mL/min/1.7m2 Final     Lab Results   Component Value Date    CHOL 168 11/01/2016     Lab Results   Component Value Date    HDL 61 11/01/2016     Lab Results   Component Value Date    LDL 75 11/01/2016     Lab Results   Component Value Date    TRIG 159 11/01/2016     No results found for: CHOLHDLRATIO  Lab Results   Component Value Date    AST 11 11/01/2016     Lab Results   Component Value Date    ALT 27 11/01/2016     Lab Results   Component Value Date    A1C 6.1 04/18/2017    A1C 6.7 11/01/2016    A1C 6.2 03/01/2016     Potassium   Date Value Ref Range Status   04/18/2017 3.9 3.4 - 5.3 mmol/L Final                                                      Jose Carlos Faarax  [facility]

## 2017-06-15 ENCOUNTER — TELEPHONE (OUTPATIENT)
Dept: PALLIATIVE MEDICINE | Facility: CLINIC | Age: 57
End: 2017-06-15

## 2017-06-15 NOTE — TELEPHONE ENCOUNTER
Pre-screening Questions for Radiology Injections:    Injection to be done at which interventional clinic site? [facility]    Procedure ordered by     Procedure ordered? Lumbar Epidural Steroid Injection    What insurance would patient like us to bill for this procedure? Medicare      Worker's comp-Any injection DO NOT SCHEDULE and route to Azalia Gonzales.      [employer] insurance - For SI joint injections, DO NOT SCHEDULE and route Azalia Gonzales.      [employer]- MBB's must be scheduled at LEAST two weeks apart      [employer] - Any injection besides hip/shoulder/knee joint DO NOT SCHEDULE and route to Azalia Gonzales. She will obtain PA and call pt back to schedule procedure or notify pt of denial.     HP [employer]-PA REQUIRED FOR NON-JESUS OR Joint injections    Any chance of pregnancy? Not Applicable   If YES, do NOT schedule and route to RN pool    Is an  needed? No     Patient has a drive home? (mandatory) YES:     Is patient taking any blood thinners (plavix, coumadin, jantoven, warfarin, heparin, pradaxa or dabigatran )? No   If hold needed, do NOT schedule, route to RN pool     Is patient taking any aspirin products? Yes - Pt takes 325mg daily; instructed to hold 0 day(s) prior to procedure.      If more than 325mg/day do NOT schedule; route to RN pool     For CERVICAL procedures, hold all aspirin products for 6 days.      Does the patient have a bleeding or clotting disorder? No     If yes, okay to schedule AND route to RN nurse pool    **For any patients with platelet count <100, must be forwarded to provider**    Is patient diabetic?  Yes  If YES, have them bring their glucometer.    Does patient have an active infection or treated for one within the past week? No     Is patient currently taking any antibiotics?  No     For patients on chronic, preventative, or prophylactic antibiotics, procedures may be scheduled.     For patients on antibiotics for active or recent  infection:    Jina Padilla [place], [place]-antibiotic course must have been completed for 4 days    Maura De-antibiotic course must have been completed for 7 days    Is patient currently taking any steroid medications? (i.e. Prednisone, Medrol)  No     For patients on steroid medications:    Jina Padilla [place], [place]-steroid course must have been completed for 4 days    Maura De-steroid course must have been completed for 7 days    Reviewed with patient:  If you are started on any steroids or antibiotics between now and your appointment, you must contact us because it may affect our ability to perform your procedure.  Yes    Is patient actively being treated for cancer or immunocompromised, including the spleen having been removed? No    If YES, do NOT schedule and route to RN pool     **For Dr. Sweet patients without spleens should have the chart sent to her**    Are you able to get on and off an exam table with minimal or no assistance? Yes  If NO, do NOT schedule and route to RN pool    Are you able to roll over and lay on your stomach with minimal or no assistance? Yes  If NO, do NOT schedule and route to RN pool     Any allergies to contrast dye, iodine, shellfish, or numbing and steroid medications? No  If YES, route to RN pool AND add allergy information to appointment notes    Allergies: Milk protein extract; Bee venom; Latex; Lisinopril; Onion; Aloe; and Chlorine        Has the patient had a flu shot or any other vaccinations within 7 days before or after the procedure.  No       Does patient have an MRI/CT?  YES:   (SI joint, hip injections, lumbar sympathetic blocks, and stellate ganglion blocks do not require an MRI)    Was the MRI done w/in the last 3 years?  Yes    Was MRI done at [place]? Yes      If not, where was it done? N/A       If MRI was not done at [facility], [facility] or [facility] do NOT schedule and route to nursing.  If pt has an imaging  disc, the injection may be scheduled but pt has to bring disc to appt. If they show up w/out disc the injection cannot be done    Reminders (please tell patient if applicable):       Instructed pt to arrive 30 minutes early for IV start if this is for a cervical procedure, ALL sympathetic (stellate ganglion, hypogastric, or lumbar sympathetic block) and all sedation procedures (RFA, spinal cord stimulation trials).  Not Applicable    -IVs are not routinely placed for [place] and [place] cervical case       If NPO for sedation, informed patient that it is okay to take medications with sips of water (except if they are to hold blood thinners).  Not Applicable   *DO take blood pressure medication if it is prescribed*      If this is for a cervical JESUS, informed patient that aspirin needs to be held for 6 days.   Not Applicable      For all patients not having spinal cord stimulator (SCS) trials or radiofrequency ablations (RFAs), informed patient:  IV sedation is not provided for this procedure.  If you feel that an oral anti-anxiety medication is needed, you can discuss this further with your referring provider or primary care provider.  The Pain Clinic provider will discuss specifics of what the procedure includes at your appointment.  Most procedures last 10-20 minutes.  We use numbing medications to help with any discomfort during the procedure.  Not Applicable      Do not schedule procedures requiring IV placement in the first appointment of the day or first appointment after lunch.       For patients 85 or older we recommend having an adult stay w/ them for the remainder of the day.       Does the patient have any questions?  NO  Kellie Hardy  [facility]

## 2017-06-19 ENCOUNTER — RADIANT APPOINTMENT (OUTPATIENT)
Dept: GENERAL RADIOLOGY | Facility: CLINIC | Age: 57
End: 2017-06-19
Attending: PAIN MEDICINE

## 2017-06-19 ENCOUNTER — RADIOLOGY INJECTION OFFICE VISIT (OUTPATIENT)
Dept: PALLIATIVE MEDICINE | Facility: CLINIC | Age: 57
End: 2017-06-19
Payer: MEDICARE

## 2017-06-19 VITALS — DIASTOLIC BLOOD PRESSURE: 58 MMHG | HEART RATE: 75 BPM | SYSTOLIC BLOOD PRESSURE: 130 MMHG | OXYGEN SATURATION: 96 %

## 2017-06-19 DIAGNOSIS — M48.061 SPINAL STENOSIS OF LUMBAR REGION: ICD-10-CM

## 2017-06-19 DIAGNOSIS — M51.369 DDD (DEGENERATIVE DISC DISEASE), LUMBAR: ICD-10-CM

## 2017-06-19 DIAGNOSIS — M47.817 LUMBOSACRAL SPONDYLOSIS WITHOUT MYELOPATHY: Primary | ICD-10-CM

## 2017-06-19 PROCEDURE — 62323 NJX INTERLAMINAR LMBR/SAC: CPT | Performed by: PAIN MEDICINE

## 2017-06-19 ASSESSMENT — PAIN SCALES - GENERAL: PAINLEVEL: SEVERE PAIN (7)

## 2017-06-19 NOTE — PATIENT INSTRUCTIONS
[facility] Procedure Discharge Instructions    Today you saw:   Dr. Neda Sweet         Your procedure:  Lumbar epidural steroid injection       Medications used:  Lidocaine (anesthetic)     Kenalog (steroid)     Omnipaque (contrast)               Be cautious when walking as numbness and/or weakness in the legs may occur up to 6-8 hours after the procedure due to effect of the local anesthetic    Do not drive for 6 hours. The effect of the local anesthetic could slow your reflexes.     Avoid strenuous activity for the first 24 hours. You may resume your regular activities after that.     You may shower, however avoid swimming, tub baths or hot tubs for 24 hours following your procedure    You may have a mild to moderate increase in pain for several days following the injection.      You may use ice packs for 10-15 minutes, 3 to 4 times a day at the injection site for comfort    Do not use heat to painful areas for 6 to 8 hours. This will give the local anesthetic time to wear off and prevent you from accidentally burning your skin.    You may use anti-inflammatory medications (such as Ibuprofen/Advil or Aleve) or Tylenol for pain control if necessary    With diabetes, check your blood sugar more frequently than usual as your blood sugar may be higher than normal for 10-14 days following a steroid injection. Contact your doctor who manages your diabetes if your blood sugar is higher than usual    It may take up to 14 days for the steroid medication to start working although you may feel the effect as early as a few days after the procedure.       If you experience any of the following, call the pain center line during work hours at [phone] or on-call physician after hours at [phone]:  -Fever over 100 degree F  -Swelling, bleeding, redness, drainage, warmth at the injection site  -Progressive weakness or numbness in your legs   -Loss of bowel or bladder function  -Unusual headache that is not  relieved by Tylenol or your regular headache medication  -Unusual new onset of pain that is not improving    Phone #s:  Nurse triage line for general questions: [phone]

## 2017-06-19 NOTE — MR AVS SNAPSHOT
After Visit Summary   6/19/2017    Melody Ferguson    MRN: [id number]           Patient Information     Date Of Birth          1960        Visit Information        Provider Department      6/19/2017 3:00 PM Neda Sweet MD [facility]        Care Instructions    [facility] Procedure Discharge Instructions    Today you saw:   Dr. Neda Sweet         Your procedure:  Lumbar epidural steroid injection       Medications used:  Lidocaine (anesthetic)     Kenalog (steroid)     Omnipaque (contrast)               Be cautious when walking as numbness and/or weakness in the legs may occur up to 6-8 hours after the procedure due to effect of the local anesthetic    Do not drive for 6 hours. The effect of the local anesthetic could slow your reflexes.     Avoid strenuous activity for the first 24 hours. You may resume your regular activities after that.     You may shower, however avoid swimming, tub baths or hot tubs for 24 hours following your procedure    You may have a mild to moderate increase in pain for several days following the injection.      You may use ice packs for 10-15 minutes, 3 to 4 times a day at the injection site for comfort    Do not use heat to painful areas for 6 to 8 hours. This will give the local anesthetic time to wear off and prevent you from accidentally burning your skin.    You may use anti-inflammatory medications (such as Ibuprofen/Advil or Aleve) or Tylenol for pain control if necessary    With diabetes, check your blood sugar more frequently than usual as your blood sugar may be higher than normal for 10-14 days following a steroid injection. Contact your doctor who manages your diabetes if your blood sugar is higher than usual    It may take up to 14 days for the steroid medication to start working although you may feel the effect as early as a few days after the procedure.       If you experience any of the following, call the pain center line  during work hours at [phone] or on-call physician after hours at [phone]:  -Fever over 100 degree F  -Swelling, bleeding, redness, drainage, warmth at the injection site  -Progressive weakness or numbness in your legs   -Loss of bowel or bladder function  -Unusual headache that is not relieved by Tylenol or your regular headache medication  -Unusual new onset of pain that is not improving    Phone #s:  Nurse triage line for general questions: [phone]              Follow-ups after your visit        Who to contact     If you have questions or need follow up information about today's clinic visit or your schedule please contact [facility] directly at [phone].  Normal or non-critical lab and imaging results will be communicated to you by [employer], letter or phone within 4 business days after the clinic has received the results. If you do not hear from us within 7 days, please contact the clinic through [employer] or phone. If you have a critical or abnormal lab result, we will notify you by phone as soon as possible.  Submit refill requests through [employer] or call your pharmacy and they will forward the refill request to us. Please allow 3 business days for your refill to be completed.          Additional Information About Your Visit        [employer] Information     [employer] gives you secure access to your electronic health record. If you see a primary care provider, you can also send messages to your care team and make appointments. If you have questions, please call your primary care clinic.  If you do not have a primary care provider, please call [phone] and they will assist you.        Care EveryWhere ID     This is your Care EveryWhere ID. This could be used by other organizations to access your [facility] medical records  [id number]        Your Vitals Were     Pulse Pulse Oximetry Breastfeeding?             83 96% No          Blood Pressure from Last 3 Encounters:   05/10/17  130/74   04/24/17 148/88   04/24/17 134/80    Weight from Last 3 Encounters:   05/10/17 (!) 147 kg (324 lb)   05/09/17 (!) 145.2 kg (320 lb)   05/01/17 (!) 145.2 kg (320 lb)              Today, you had the following     No orders found for display       Primary Care Provider Office Phone # Fax #    Elizabeth Lamas [phone]       [facility] [address] [place]  [facility] [address]        Thank you!     Thank you for choosing [facility]  for your care. Our goal is always to provide you with excellent care. Hearing back from our patients is one way we can continue to improve our services. Please take a few minutes to complete the written survey that you may receive in the mail after your visit with us. Thank you!             Your Updated Medication List - Protect others around you: Learn how to safely use, store and throw away your medicines at www.disposemymeds.org.          This list is accurate as of: 6/19/17  3:04 PM.  Always use your most recent med list.                   Brand Name Dispense Instructions for use    ACE NOT PRESCRIBED (INTENTIONAL)     0 each    Reported on 5/10/2017       albuterol 108 (90 BASE) MCG/ACT Inhaler    PROAIR HFA/PROVENTIL HFA/VENTOLIN HFA     Inhale 2 puffs into the lungs every 6 hours       ASPIRIN PO      Take 81 mg by mouth daily       ATORVASTATIN CALCIUM PO      Take 40 mg by mouth daily       BUPROPION HCL PO          citalopram 40 MG tablet    celeXA    90 tablet    TAKE 1 TABLET BY MOUTH EVERY MORNING       * cyclobenzaprine 5 MG tablet    FLEXERIL    60 tablet    TAKE 1 TABLET BY MOUTH TWICE DAILY AS NEEDED( GENERIC EQUIVALENT FOR FLEXERIL)       * cyclobenzaprine 5 MG tablet    FLEXERIL    60 tablet    TAKE 1 TABLET BY MOUTH TWICE DAILY AS NEEDED       diclofenac 25 MG EC tablet    VOLTAREN    180 tablet    Take 1 tablet (25 mg) by mouth 3 times daily as needed for moderate pain       esomeprazole 40 MG CR capsule    nexIUM     30 capsule    TAKE 1 CAPSULE BY MOUTH EVERY DAY       fluticasone 27.5 MCG/SPRAY spray    VERAMYST     Spray 2 sprays into both nostrils daily       gabapentin 300 MG capsule    NEURONTIN    90 capsule    Take 1 capsule (300 mg) by mouth At Bedtime       glipiZIDE-metFORMIN 5-500 MG per tablet    METAGLIP    360 tablet    Take 2 tablets by mouth 2 times daily (before meals)       losartan 50 MG tablet    COZAAR    90 tablet    Take 1 tablet (50 mg) by mouth daily       melatonin 3 MG tablet     30 tablet    TAKE 1 TABLET BY MOUTH ONCE AT BEDTIME AS NEEDED FOR SLEEP       metoprolol 25 MG 24 hr tablet    TOPROL XL    30 tablet    Take 1 tablet (25 mg) by mouth daily       montelukast 10 MG tablet    SINGULAIR    90 tablet    TAKE 1 TABLET BY MOUTH AT BEDTIME       NYSTOP 252890 UNIT/GM Powd   Generic drug:  nystatin     60 g    APPLY TO THE AFFECTED AREA TWO TO THREE TIMES DAILY AS NEEDED       order for DME     3 Month    Diabetic test strips and lancets per insurance formulary Check blood sugar 2 times daily       oxyCODONE-acetaminophen 5-325 MG per tablet    PERCOCET    80 tablet    Take 1-2 tablets by mouth every 4 hours as needed for moderate to severe pain       * pioglitazone 30 MG tablet    ACTOS    135 tablet    Take 0.5 tablet by mouth in the morning with a meal and 1 tablet by mouth in the evening with a meal.       * pioglitazone 15 MG tablet    ACTOS    90 tablet    TAKE 1 TABLET BY MOUTH EVERY MORNING WITH A MEAL       polyethylene glycol powder    MIRALAX/GLYCOLAX    510 g    DISSOLVE 17 GRAMS IN LIQUID AND DRINK DAILY AS DIRECTED       SOLIFENACIN SUCCINATE PO      Take 10 mg by mouth daily       traMADol 50 MG tablet    ULTRAM    30 tablet    TAKE 1 TABLET BY MOUTH EVERY NIGHT AT BEDTIME AS NEEDED       * Notice:  This list has 4 medication(s) that are the same as other medications prescribed for you. Read the directions carefully, and ask your doctor or other care provider to review them with  you.

## 2017-06-19 NOTE — PROGRESS NOTES
[facility] - Procedure Note    Date of Visit: 2017    Indications: eMlody Ferguson is a 56-year-old female who is seen at the referral of Dr. Elizabeth Lamas for repeat lumbar epidural steroid injection. She underwent L5-S1 interlaminar epidural steroid injection on 16 and 16, with excellent relief; her most recent injection lasted for approximately 4 months. She currently describes pain of the low back, particularly on the left side. She has had occasional radiation into the buttocks and posterior thighs in the past. She denies new sensorimotor deficits. In the past, she has reported occasional tripping and weakness, which has improved following left knee surgery in 2017. She has history of urinary urgency/leakage.    Electronic Chart Review: Patient history, pertinent diagnostic studies, vitals, allergies, and medications were reviewed.    Review of Systems: The patient denies recent fever, chills, illness, use of antibiotics or anticoagulants. No allergy to local anesthetic, contrast dye, or steroid.     Physical Examination:  There were no vitals taken for this visit.  GEN: Alert. Well developed, well nourished.   CV/Resp: Symmetric chest wall excursion. Non-labored breathing. No audible wheezing.  Skin: No rashes/lesions of posterior torso.   Extremities: Distal extremities warm, well perfused.  Neuro/MSK: Increased adiposity. Power 5/5 throughout bilateral hip flexors, hip abductors/adductors, knee flexors/extensors, ADF, APF.     Imagin16 MRI lumbar spine (CDI): Overview: There is 4 mm L4 and 2 mm L3 degenerative anterolisthesis (sagittal images 8). There is minimal broad left convex curvature maximal at L4. Vertebral body heights are normal. There is spondylosis which is moderate at L2-L3 and L4-L5, mild at L3-L4 and above T12, with minor marrow degenerative changes along the spondylotic endplates. Developmentally, spinal canal AP diameter is normal although  there is acquired canal and recess stenosis from spondylosis and facet arthrosis (see below). There is no spondylolysis. Cord: Lower thoracic spinal cord imaged has normal contour and signal. Conus medullaris terminates normally at the L1-L2 disc level. Discs: Disc dehydration (long TR) is marked between L2 and L5 and above T12, mild at remaining levels. Disc height loss is moderate/marked at L2-L3 and L4-L5, moderate above T12, mild at L3-L4. Posterior annular fissuring is present at L4-L5. Facet joints: Arthrosis is marked at L4-L5 (axial images 10), moderate/marked at L3-L4 and L2-L3, and moderate at L5-S1 and L1-L2 within the lumbar segment. There is also thoracic facet arthrosis, moderate/marked at T11-T12 and on the left at T12-L1. Foramina: Stenosis is moderate on the right at L4-L5 (sagittal images 4) and T11-T12, mild bilaterally at L3-L4 and L2-L3. Specific findings at each level are as follows- L5-S1: Minimal circumferential disc bulge. Moderate facet arthrosis and overgrowth. Normal foramina. L4-L5: Marked facet arthrosis and facet overgrowth resulting in grade 1 degenerative L4 anterolisthesis, with a shallow circumferential disc bulge resulting in moderate right and mild left foraminal and marked right and moderate left subarticular recess and mild canal stenosis, sac AP diameter 9 mm. L3-L4: Moderate/marked facet arthrosis resulting in 2 mm degenerative L3 anterolisthesis. Shallow chronic circumferential disc bulge together with facet and ligament overgrowth result in marked bilateral subarticular recess (axial images 16) and moderate/marked central canal stenosis, sac AP diameter 5 mm, and mild foraminal stenosis. L2-L3: Moderate/marked facet arthrosis with disc bulging, shallow posteriorly, resulting in marked bilateral subarticular recess and moderate/marked central canal stenosis, sac AP diameter 6 mm. Mild foraminal stenosis. L1-L2: Normal posterior disc margin. T12-L1: Normal posterior disc  "margin. Moderate/marked left facet arthrosis. T11-T12: Shallow chronic circumferential disc bulge. Moderate right foraminal stenosis. Moderate/marked facet arthrosis. Ancillary: There is generalized paraspinous muscle mild atrophy. There is a 1 cm simple cyst in the left kidney. There are minor degenerative changes at the lower SI joints. Conclusion: 1. Facet arthrosis, marked at L4-L5 and moderate/marked at L3-L4, L2-L3, and T11-T12, and spondylosis resulting in, at L2-L3 and L3-L4, marked bilateral recess and moderate/marked central canal stenosis. Also marked right and moderate left L4-5 recess stenosis. 2. Grade 1 L4 and L3 degenerative anterolisthesis. 3. No fracture, neoplasm, or infection.    Procedure Description:    Pre-procedure Diagnosis: Lumbar spondylosis; Lumbar spinal stenosis  Post-procedure Diagnosis: Lumbar spondylosis; Lumbar spinal stenosis  Procedure performed: Lumbar interlaminar epidural steroid injection  : Neda Sweet MD    Level: L5-S1  Local anesthetic: 7 mL 1% lidocaine (preservative free)  Medication solution (injectate): 2 mL of 40 mg/mL triamcinolone + 1 mL of 1% lidocaine (preservative free)  Contrast: 1.5 mL of Omnipaque 300 used, 8.5 mL discarded  Sterile prep/cleansing solution: ChloraPrep    The procedure and risks were explained, and informed written consent was obtained from the patient. Risks include but are not limited to: increased pain, infection, bleeding, damage to soft tissue, nerve, muscle, and vasculature structures, paralysis, and stroke. Risks of steroid include but are not limited to: flushing, mood/sleep abnormality, allergic reaction, affects on blood pressure, blood glucose, eye conditions such as glaucoma/cataracts, and bone dysregulation/osteoporosis. The procedure site was marked using a disposable pen. Immediately prior to the start of the procedure, a \"time out\" safety check was conducted to confirm correct patient, procedure, and laterality. The " patient's heart rate and oxygen saturation were monitored throughout the procedure.    The patient was positioned prone on the fluoroscopy table with a pillow under the abdomen to help reduce the lumbar lordosis. The target vertebral levels were identified with use of fluoroscopy in AP view. The skin was prepped and draped in aseptic fashion. A 25-gauge, 1.5 inch needle was used to anesthetize the skin and subcutaneous tissue entry site with local anesthetic. Under fluoroscopic visualization, a 20-gauge, 6 inch Tuohy epidural needle was slowly advanced towards the epidural space, just left of midline. The latter part of the needle advancement was guided with fluoroscopy in the lateral and oblique views. The epidural space was identified using loss of resistance technique to normal saline. After negative aspiration for heme and cerebrospinal fluid, non-ionic contrast was injected to confirm needle placement. Epidurogram confirmed appropriate spread of contrast agent within the posterior epidural space. Following negative aspiration for heme and cerebrospinal fluid, the medication solution was injected in increments. The stylet was reinserted, and the needle was removed.    The patient tolerated the procedure well, and there was no evidence of procedural complications. No new sensory or motor deficits were noted following the procedure. The patient was stable and able to ambulate on discharge home. Post-procedure instructions were provided.    Comment: Tuohy 4.5 inch likely adequate for future injection.     Pre-procedure pain score: 7/10  Post-procedure pain score: 0/10    Assessment/Plan: Melody Ferguson is a 56-year-old female s/p L5-S1 interlaminar epidural steroid injection today.     1. Following today's procedure, the patient was advised to contact the [facility] for any of the following:   Fever, chills, or night sweats   New onset of pain, numbness, or weakness   Any questions/concerns  regarding the procedure      If unable to contact the Pain Center, the patient was instructed to go to a local Emergency Room for any complications.   2. Lumbar epidural steroid injection may be repeated up to 4 times in 12 month period. If only partial relief with interlaminar injection, may consider transforaminal approach in the future.    Neda Sweet MD  [facility]

## 2017-06-19 NOTE — NURSING NOTE
Discharge Information    IV Discontiued Time:  NA    Amount of Fluid Infused:  NA    Discharge Criteria = When patient returns to baseline or as per MD order    Consciousness:  Pt is fully awake    Circulation:  BP +/- 20% of pre-procedure level    Respiration:  Patient is able to breathe deeply    O2 Sat:  Patient is able to maintain O2 Sat >92% on room air    Activity:  Moves 4 extremities on command    Ambulation:  Patient is able to stand and walk    Dressing:  Clean/dry or No Dressing    Notes:   Discharge instructions and AVS given to patient    Patient meets criteria for discharge?  YES    Admitted to PCU?  No    Responsible adult present to accompany patient home?  Yes    Signature/Title:    Nina Guerra RN Care Coordinator  [facility]

## 2017-06-22 ENCOUNTER — TELEPHONE (OUTPATIENT)
Dept: UROLOGY | Facility: CLINIC | Age: 57
End: 2017-06-22

## 2017-06-22 NOTE — LETTER
[address] [address]  [phone]          July 6, 2017    Melody Ferguson  [address] [place] [id number]        Dear Melody,     Enclosed is the sheet with your surgery information.   Please call our office if you have questions, [phone].     Sincerely,  [facility]

## 2017-06-22 NOTE — LETTER
SURGERYPLANNING/SCHEDULING WORKSHEET                              [address] [address] [phone]                          Melody Ferguson                :  1960  MRN:  [id number]  Phone: [phone] (home)    Same Day Surgery [phone]   Surgeon: Talon Katz MD  Diagnosis:   Stress incontinence  Allergies:  Milk protein extract; Bee venom; Latex; Lisinopril; Onion; Aloe; and Chlorine   A preoperative evaluation by the primary care provider has been requested to summarize and modify, where possible, medical risks to the proposed surgery.  Specific risks requiring evaluation include   Patient Active Problem List    Diagnosis     History of total knee arthroplasty, left     Diabetes mellitus, type 2 (H)     DDD (degenerative disc disease), lumbar     Hiatal hernia     DM type 2 without retinopathy (H)     Primary osteoarthritis of both knees     PTSD (post-traumatic stress disorder)     Chronic pain syndrome     Fibromyalgia     History of TIA (transient ischemic attack) and stroke     Major depressive disorder, recurrent episode, moderate (H)     Urge incontinence of urine     Prolapse of vaginal wall     Mild intermittent asthma without complication     HTN, goal below 140/90     Hyperlipidemia LDL goal <100     Type 2 diabetes mellitus with diabetic nephropathy (H)     Allergic rhinitis, unspecified allergic rhinitis type     Morbid obesity with BMI of 45.0-49.9, adult (H)     History of colonic polyps     Vaginal high risk HPV DNA test positive     ====================================================  Surgical Procedure:  Sling, ([place])  Length of Procedure:  10 min  Type of anesthesia:MAC The proposed surgical procedure is considered LOW risk.  Date of Procedure:____2017____________    Time: TBD _____________________       Informed Consent Obtained and Signed:   NO  ====================================================  Instructions to Same Day Surgery Staff  Pneumoboots  Special Equipment: None  Preop Antibiotic:  Ancef 2 gm IV  pre-op within one hour prior to incision For > 80 kg  Preop Pain Meds:  None  PreOp Orders:  Routine Standing Orders.  ====================================================  Instructions to the patient:  Preop physical exam scheduled (within 30 days or 7 days prior) with:   ______[place]______________  Clinic:  ____[facility]________________                                         Date______________Time_________________________  Come to the hospital at: ______________TBD__________________  HOME PREPARATION:   Shower with Hibiclens the night before and the morning of surgery, gently cleaning skin from neck to feet  Bathe and brush teeth the morning of surgery.  Take medications with a sip of water the morning of surgery:   Check with  if taking insulin.  May have  a light meal, toast and clear liquids, up to 6 hrs before surgery  May have clear liquids (liquids one can read through) up to 2 hrs before surgery  NOTHING after 2 hrs before surgery  Stop aspirin 7-10 days before surgery  Stop NSAIDS (Ibuproven, Naproxen, etc) 5 days before surgery  Stop Plavix 7-10 days before surgery      Talon Katz MD    7/5/2017

## 2017-06-26 NOTE — TELEPHONE ENCOUNTER
Patient advised  that I am waiting on confirmation of surgical dates for Dr. Katz and that I will call her once I can get surgery set up.  Bhakti Florez, CMA

## 2017-07-03 ENCOUNTER — MYC REFILL (OUTPATIENT)
Dept: FAMILY MEDICINE | Facility: CLINIC | Age: 57
End: 2017-07-03

## 2017-07-03 DIAGNOSIS — E11.21 TYPE 2 DIABETES MELLITUS WITH DIABETIC NEPHROPATHY, WITHOUT LONG-TERM CURRENT USE OF INSULIN (H): Chronic | ICD-10-CM

## 2017-07-03 DIAGNOSIS — F33.1 MAJOR DEPRESSIVE DISORDER, RECURRENT EPISODE, MODERATE (H): ICD-10-CM

## 2017-07-03 DIAGNOSIS — E11.9 TYPE 2 DIABETES MELLITUS WITHOUT COMPLICATION, WITHOUT LONG-TERM CURRENT USE OF INSULIN (H): ICD-10-CM

## 2017-07-03 DIAGNOSIS — E66.01 MORBID OBESITY WITH BODY MASS INDEX OF 45.0-49.9 IN ADULT (H): ICD-10-CM

## 2017-07-03 DIAGNOSIS — I10 HTN, GOAL BELOW 140/90: Chronic | ICD-10-CM

## 2017-07-03 DIAGNOSIS — G89.4 CHRONIC PAIN SYNDROME: ICD-10-CM

## 2017-07-03 DIAGNOSIS — M54.32 SCIATICA OF LEFT SIDE: ICD-10-CM

## 2017-07-03 DIAGNOSIS — M54.5 LOW BACK PAIN, UNSPECIFIED BACK PAIN LATERALITY, UNSPECIFIED CHRONICITY, WITH SCIATICA PRESENCE UNSPECIFIED: ICD-10-CM

## 2017-07-03 NOTE — TELEPHONE ENCOUNTER
Duplicate order for Losartan.     pioglitazone (ACTOS) 15 MG tablet         Last Written Prescription Date: 6/14/17  Last Fill Quantity: 90, # refills: 0  Last Office Visit with [facility], [facility] or [facility] prescribing provider:  4/18/17        BP Readings from Last 3 Encounters:   06/19/17 130/58   05/10/17 130/74   04/24/17 148/88     Lab Results   Component Value Date    MICROL 7 11/01/2016     Lab Results   Component Value Date    UMALCR 11.57 11/01/2016     Creatinine   Date Value Ref Range Status   04/18/2017 0.47 (L) 0.52 - 1.04 mg/dL Final   ]  GFR Estimate   Date Value Ref Range Status   04/18/2017 >90  Non  GFR Calc   >60 mL/min/1.7m2 Final   11/01/2016 >90  Non  GFR Calc   >60 mL/min/1.7m2 Final   03/01/2016 >90  Non  GFR Calc   >60 mL/min/1.7m2 Final     GFR Estimate If Black   Date Value Ref Range Status   04/18/2017 >90   GFR Calc   >60 mL/min/1.7m2 Final   11/01/2016 >90   GFR Calc   >60 mL/min/1.7m2 Final   03/01/2016 >90   GFR Calc   >60 mL/min/1.7m2 Final     Lab Results   Component Value Date    CHOL 168 11/01/2016     Lab Results   Component Value Date    HDL 61 11/01/2016     Lab Results   Component Value Date    LDL 75 11/01/2016     Lab Results   Component Value Date    TRIG 159 11/01/2016     No results found for: CHOLHDLRATIO  Lab Results   Component Value Date    AST 11 11/01/2016     Lab Results   Component Value Date    ALT 27 11/01/2016     Lab Results   Component Value Date    A1C 6.1 04/18/2017    A1C 6.7 11/01/2016    A1C 6.2 03/01/2016     Potassium   Date Value Ref Range Status   04/18/2017 3.9 3.4 - 5.3 mmol/L Final       NYSTOP [id number] UNIT/GM POWD powder      Last Written Prescription Date:  5/16/17  Last Fill Quantity: 60,   # refills: 0  Last Office Visit with [facility], [facility] or [facility] prescribing provider: 4/18/17  Future Office visit:       Routing refill request to provider for review/approval  because:  Drug not on the [facility], [facility] or [facility] refill protocol or controlled substance        citalopram (CELEXA) 40 MG tablet     Last Written Prescription Date: 3/25/17  Last Fill Quantity: 90, # refills: 0  Last Office Visit with [facility] primary care provider:  4/18/17        Last PHQ-9 score on record=   PHQ-9 SCORE 12/15/2016   Total Score MyChart -   Total Score 14

## 2017-07-03 NOTE — TELEPHONE ENCOUNTER
losartan (COZAAR) 50 MG tablet      Last Written Prescription Date: 1/5/17  Last Fill Quantity: 90, # refills: 1  Last Office Visit with [facility] or [facility] prescribing provider: 4/18/17       Potassium   Date Value Ref Range Status   04/18/2017 3.9 3.4 - 5.3 mmol/L Final     Creatinine   Date Value Ref Range Status   04/18/2017 0.47 (L) 0.52 - 1.04 mg/dL Final     BP Readings from Last 3 Encounters:   06/19/17 130/58   05/10/17 130/74   04/24/17 148/88         Kathie Mccormick  [facility]

## 2017-07-03 NOTE — TELEPHONE ENCOUNTER
diclofenac (VOLTAREN) 25 MG EC tablet      Last Written Prescription Date: 4/18/17  Last Quantity: 180, # refills: 0  Last Office Visit with [facility], [facility] or [facility] prescribing provider: 1/26/17       Creatinine   Date Value Ref Range Status   04/18/2017 0.47 (L) 0.52 - 1.04 mg/dL Final     Lab Results   Component Value Date    AST 11 11/01/2016     Lab Results   Component Value Date    ALT 27 11/01/2016     BP Readings from Last 3 Encounters:   06/19/17 130/58   05/10/17 130/74   04/24/17 148/88         polyethylene glycol (MIRALAX/GLYCOLAX) powder      Last Written Prescription Date: 5/16/17  Last Fill Quantity: 510,  # refills: 1   Last Office Visit with [facility], [facility] or [facility] prescribing provider: 1/26/17      [facility]

## 2017-07-03 NOTE — TELEPHONE ENCOUNTER
Message from Ayla:  Original authorizing provider: Elizabeth Lamas MD    Melody Ferguson would like a refill of the following medications:  losartan (COZAAR) 50 MG tablet [Elizabeth Lamas MD]  citalopram (CELEXA) 40 MG tablet [Elizabeth Lamas MD]  NYSTOP [id number] UNIT/GM POWD powder [Elizabeth Lamas MD]  pioglitazone (ACTOS) 15 MG tablet [Elizabeth Lamas MD]    Preferred pharmacy: [facility] [address] [place] [address] - [address] [facility] [address]    Comment:      Medication renewals requested in this message routed to other providers:  diclofenac (VOLTAREN) 25 MG EC tablet [CARMEN Burton CNP]  polyethylene glycol (MIRALAX/GLYCOLAX) powder [CARMEN Burton CNP]  metoprolol (TOPROL XL) 25 MG 24 hr tablet [TAO POP MD]

## 2017-07-03 NOTE — TELEPHONE ENCOUNTER
montelukast (SINGULAIR) 10 MG tablet       Last Written Prescription Date: 10/10/16  Last Fill Quantity: 90, # refills: 1    Last Office Visit with [facility], [facility] or [facility] prescribing provider:  4/18/17   Future Office Visit:       Date of Last Asthma Action Plan Letter:   Asthma Action Plan Q1 Year    Topic Date Due     Asthma Action Plan - yearly  04/18/2018      Asthma Control Test:   ACT Total Scores 4/18/2017   ACT TOTAL SCORE (Goal Greater than or Equal to 20) 25   In the past 12 months, how many times did you visit the emergency room for your asthma without being admitted to the hospital? 0   In the past 12 months, how many times were you hospitalized overnight because of your asthma? 0       Date of Last Spirometry Test:   No results found for this or any previous visit.      [facility]athie [facility]

## 2017-07-03 NOTE — TELEPHONE ENCOUNTER
Message from [employer]:  Original authorizing provider: CARMEN Burton CNP    Melody Ferguson would like a refill of the following medications:  diclofenac (VOLTAREN) 25 MG EC tablet [CARMEN Burton CNP]  polyethylene glycol (MIRALAX/GLYCOLAX) powder [CARMEN Burton CNP]    Preferred pharmacy: [facility] [address] [place] [address] - [address] [facility] [address]    Comment:      Medication renewals requested in this message routed to other providers:  losartan (COZAAR) 50 MG tablet [Elizabeth Lamas MD]  citalopram (CELEXA) 40 MG tablet [Elizabeth Lamas MD]  NYSTOP [id number] UNIT/GM POWD powder [Elizabeth Lamas MD]  pioglitazone (ACTOS) 15 MG tablet [Elizabeth Lamas MD]  metoprolol (TOPROL XL) 25 MG 24 hr tablet [TAO POP MD]

## 2017-07-05 NOTE — TELEPHONE ENCOUNTER
[place] [facility] will arrange pre-op early next week. I will contact patient with details once surgery is confirmed. Letter emailed to MD. Will precert once confirmed.    Bhakti Florez CMA

## 2017-07-06 NOTE — TELEPHONE ENCOUNTER
Surgical letter faxed to [facility] . precert sent to managed care.  Bhakti Florez, CMA

## 2017-07-06 NOTE — TELEPHONE ENCOUNTER
Patient contacted and advised of surgery date. [place] will call to advise on time. Patient given pre-op instructions. Surgical packet mailed to patient with written instructions.  Bhakti Florez CMA

## 2017-07-07 DIAGNOSIS — R10.13 DYSPEPSIA: ICD-10-CM

## 2017-07-07 RX ORDER — LOSARTAN POTASSIUM 50 MG/1
TABLET ORAL
Qty: 90 TABLET | Refills: 0 | OUTPATIENT
Start: 2017-07-07

## 2017-07-07 RX ORDER — POLYETHYLENE GLYCOL 3350 17 G/17G
17 POWDER, FOR SOLUTION ORAL DAILY
Qty: 510 G | Refills: 3 | Status: SHIPPED | OUTPATIENT
Start: 2017-07-07 | End: 2017-11-06

## 2017-07-07 RX ORDER — NYSTATIN 100000 [USP'U]/G
POWDER TOPICAL
Qty: 60 G | Refills: 3 | Status: SHIPPED | OUTPATIENT
Start: 2017-07-07 | End: 2017-11-06

## 2017-07-07 RX ORDER — PIOGLITAZONEHYDROCHLORIDE 15 MG/1
15 TABLET ORAL DAILY
Qty: 90 TABLET | Refills: 0 | Status: SHIPPED | OUTPATIENT
Start: 2017-07-07 | End: 2017-07-13

## 2017-07-07 NOTE — TELEPHONE ENCOUNTER
esomeprazole (NEXIUM) 40 MG CR capsule      Last Written Prescription Date: 06/14/17  Last Fill Quantity: 30,  # refills: 0   Last Office Visit with [facility], [facility] or [facility] prescribing provider: 04/18/17                                           Next 5 appointments (look out 90 days)     Jul 11, 2017  8:00 AM CDT   Pre-Op physical with Elizabeth Lamas MD   [facility]    [address] [facility] [id number] [phone]                      Kiki Pearson  [facility]

## 2017-07-07 NOTE — TELEPHONE ENCOUNTER
Prescription approved per [facility] Refill Protocol. - Miralax Sent.    Routing refill request to provider for review/approval because:  Labs out of range:  Creat - Diclofenac   Previous OV all pre-op visits in last year.    Linh Martinez RN

## 2017-07-07 NOTE — TELEPHONE ENCOUNTER
Prescription approved per [facility] Refill Protocol. -Nystatin Powder and Actos - Rx sent.    Routing refill request to provider for review/approval because:  Labs out of range:  Creat - Losartan.   Patient needs updated PHQ-9- Celexa.   Please indicate if OV needed or MA to call and obtain.    Linh Martinez RN

## 2017-07-07 NOTE — TELEPHONE ENCOUNTER
Routing refill request to provider for review/approval because:  Drug not on the [facility] refill protocol - associated diagnosis  Linh Martinez RN

## 2017-07-09 RX ORDER — MONTELUKAST SODIUM 10 MG/1
TABLET ORAL
Qty: 90 TABLET | Refills: 0 | Status: SHIPPED | OUTPATIENT
Start: 2017-07-09 | End: 2017-10-08

## 2017-07-11 ENCOUNTER — OFFICE VISIT (OUTPATIENT)
Dept: FAMILY MEDICINE | Facility: CLINIC | Age: 57
End: 2017-07-11
Payer: MEDICARE

## 2017-07-11 VITALS
WEIGHT: 293 LBS | TEMPERATURE: 97.2 F | OXYGEN SATURATION: 98 % | SYSTOLIC BLOOD PRESSURE: 118 MMHG | HEART RATE: 74 BPM | DIASTOLIC BLOOD PRESSURE: 80 MMHG | HEIGHT: 67 IN | BODY MASS INDEX: 45.99 KG/M2

## 2017-07-11 DIAGNOSIS — E66.01 MORBID OBESITY WITH BMI OF 45.0-49.9, ADULT (H): Chronic | ICD-10-CM

## 2017-07-11 DIAGNOSIS — F33.1 MAJOR DEPRESSIVE DISORDER, RECURRENT EPISODE, MODERATE (H): ICD-10-CM

## 2017-07-11 DIAGNOSIS — Z01.818 PREOP GENERAL PHYSICAL EXAM: Primary | ICD-10-CM

## 2017-07-11 DIAGNOSIS — E11.21 TYPE 2 DIABETES MELLITUS WITH DIABETIC NEPHROPATHY, WITHOUT LONG-TERM CURRENT USE OF INSULIN (H): ICD-10-CM

## 2017-07-11 DIAGNOSIS — Z96.652 HISTORY OF TOTAL KNEE ARTHROPLASTY, LEFT: ICD-10-CM

## 2017-07-11 DIAGNOSIS — N81.10 PROLAPSE OF VAGINAL WALL: ICD-10-CM

## 2017-07-11 LAB
ANION GAP SERPL CALCULATED.3IONS-SCNC: 6 MMOL/L (ref 3–14)
BASOPHILS # BLD AUTO: 0 10E9/L (ref 0–0.2)
BASOPHILS NFR BLD AUTO: 0.1 %
BUN SERPL-MCNC: 17 MG/DL (ref 7–30)
CALCIUM SERPL-MCNC: 9 MG/DL (ref 8.5–10.1)
CHLORIDE SERPL-SCNC: 105 MMOL/L (ref 94–109)
CO2 SERPL-SCNC: 31 MMOL/L (ref 20–32)
CREAT SERPL-MCNC: 0.43 MG/DL (ref 0.52–1.04)
DIFFERENTIAL METHOD BLD: ABNORMAL
EOSINOPHIL # BLD AUTO: 0 10E9/L (ref 0–0.7)
EOSINOPHIL NFR BLD AUTO: 0.5 %
ERYTHROCYTE [DISTWIDTH] IN BLOOD BY AUTOMATED COUNT: 15 % (ref 10–15)
GFR SERPL CREATININE-BSD FRML MDRD: ABNORMAL ML/MIN/1.7M2
GLUCOSE SERPL-MCNC: 135 MG/DL (ref 70–99)
HCT VFR BLD AUTO: 34.9 % (ref 35–47)
HGB BLD-MCNC: 10.9 G/DL (ref 11.7–15.7)
LYMPHOCYTES # BLD AUTO: 2.1 10E9/L (ref 0.8–5.3)
LYMPHOCYTES NFR BLD AUTO: 24.1 %
MCH RBC QN AUTO: 27.2 PG (ref 26.5–33)
MCHC RBC AUTO-ENTMCNC: 31.2 G/DL (ref 31.5–36.5)
MCV RBC AUTO: 87 FL (ref 78–100)
MONOCYTES # BLD AUTO: 0.6 10E9/L (ref 0–1.3)
MONOCYTES NFR BLD AUTO: 7.2 %
NEUTROPHILS # BLD AUTO: 5.9 10E9/L (ref 1.6–8.3)
NEUTROPHILS NFR BLD AUTO: 68.1 %
PLATELET # BLD AUTO: 290 10E9/L (ref 150–450)
POTASSIUM SERPL-SCNC: 3.8 MMOL/L (ref 3.4–5.3)
RBC # BLD AUTO: 4.01 10E12/L (ref 3.8–5.2)
SODIUM SERPL-SCNC: 142 MMOL/L (ref 133–144)
WBC # BLD AUTO: 8.6 10E9/L (ref 4–11)

## 2017-07-11 PROCEDURE — 36415 COLL VENOUS BLD VENIPUNCTURE: CPT | Performed by: PREVENTIVE MEDICINE

## 2017-07-11 PROCEDURE — 99215 OFFICE O/P EST HI 40 MIN: CPT | Performed by: PREVENTIVE MEDICINE

## 2017-07-11 PROCEDURE — 85025 COMPLETE CBC W/AUTO DIFF WBC: CPT | Performed by: PREVENTIVE MEDICINE

## 2017-07-11 PROCEDURE — 80048 BASIC METABOLIC PNL TOTAL CA: CPT | Performed by: PREVENTIVE MEDICINE

## 2017-07-11 RX ORDER — AMOXICILLIN 500 MG/1
2000 CAPSULE ORAL ONCE
Qty: 4 CAPSULE | Refills: 0 | Status: SHIPPED | OUTPATIENT
Start: 2017-07-11 | End: 2017-07-25

## 2017-07-11 RX ORDER — LOSARTAN POTASSIUM 50 MG/1
50 TABLET ORAL DAILY
Qty: 90 TABLET | Refills: 1 | Status: SHIPPED | OUTPATIENT
Start: 2017-07-11 | End: 2017-11-06

## 2017-07-11 RX ORDER — ESOMEPRAZOLE MAGNESIUM 40 MG/1
CAPSULE, DELAYED RELEASE ORAL
Qty: 30 CAPSULE | Refills: 0 | Status: SHIPPED | OUTPATIENT
Start: 2017-07-11 | End: 2017-08-04

## 2017-07-11 RX ORDER — CITALOPRAM HYDROBROMIDE 40 MG/1
40 TABLET ORAL EVERY MORNING
Qty: 90 TABLET | Refills: 0 | Status: SHIPPED | OUTPATIENT
Start: 2017-07-11 | End: 2017-11-06

## 2017-07-11 RX ORDER — DICLOFENAC SODIUM 25 MG/1
25 TABLET, DELAYED RELEASE ORAL 3 TIMES DAILY PRN
Qty: 180 TABLET | Refills: 0 | Status: SHIPPED | OUTPATIENT
Start: 2017-07-11 | End: 2017-07-12

## 2017-07-11 ASSESSMENT — PAIN SCALES - GENERAL: PAINLEVEL: NO PAIN (0)

## 2017-07-11 NOTE — MR AVS SNAPSHOT
After Visit Summary   7/11/2017    Melody Ferguson    MRN: [id number]           Patient Information     Date Of Birth          1960        Visit Information        Provider Department      7/11/2017 8:00 AM Elizabeth Lamas MD [facility]        Today's Diagnoses     Preop general physical exam    -  1    Type 2 diabetes mellitus with diabetic nephropathy, without long-term current use of insulin (H)        Major depressive disorder, recurrent episode, moderate (H)        Morbid obesity with BMI of 45.0-49.9, adult (H)        Prolapse of vaginal wall        History of TIA (transient ischemic attack) and stroke        History of total knee arthroplasty, left        Prophylactic antibiotic for dental procedure indicated due to prior joint replacement          Care Instructions    At [facility], we strive to deliver an exceptional experience to you, every time we see you.    If you receive a survey in the mail, please send us back your thoughts. We really do value your feedback.    Thank you for visiting [facility]    Normal or non-critical lab and imaging results will be communicated to you by MyChart, letter or phone within 7 days.  If you do not hear from us within 10 days, please call the clinic. If you have a critical or abnormal lab result, we will notify you by phone as soon as possible.     If you have any questions regarding your visit please contact:     Team Radha/Spirit  Clinic Hours Telephone Number   Dr. eBtzy Smith   7am-7pm  Monday through Thursday  7am-5pm Friday [phone]  Milly ALMAGUER RN   Pharmacy 8:30am-9pm Monday-Friday    9am-5pm Saturday-Sunday [phone]   Urgent Care 11am-9pm Monday-Friday        9am-5pm Saturday-Sunday [phone]     After hours, weekend or if you need to make an appointment with your  primary provider please call [phone].   After Hours nurse advise: call [facility]: [phone]    Use MedTera Solutionst (secure email communication and access to your chart) to send your primary care provider a message or make an appointment. Ask someone on your Team how to sign up for olook. To log on to Mettl or for more information in [employer] please visit the website at [email or web address].   As of October 8, 2013, all password changes, disabled accounts, or ID changes in olook/MyHealth will be done by our Access Services Department.   If you need help with your account or password, call: [phone]. Clinic staff no longer has the ability to change passwords.     Before Your Surgery      Call your surgeon if there is any change in your health. This includes signs of a cold or flu (such as a sore throat, runny nose, cough, rash or fever).    Do not smoke, drink alcohol or take over the counter medicine (unless your surgeon or primary care doctor tells you to) for the 24 hours before and after surgery.    If you take prescribed drugs: Follow your doctor s orders about which medicines to take and which to stop until after surgery.    Eating and drinking prior to surgery: follow the instructions from your surgeon    Take a shower or bath the night before surgery. Use the soap your surgeon gave you to gently clean your skin. If you do not have soap from your surgeon, use your regular soap. Do not shave or scrub the surgery site.  Wear clean pajamas and have clean sheets on your bed.           Follow-ups after your visit        Your next 10 appointments already scheduled     Jul 14, 2017   Procedure with Talon Katz MD   [facility] (--)    [address] [address] [phone]           The medical center is located at [address] (between [address] and [address] in [place], [place] north of [place]).            Jul 19, 2017  2:00 PM CDT  "  New Visit with Keren Longo OD   [facility] ([facility]    [address] [address] [phone]              Who to contact     If you have questions or need follow up information about today's clinic visit or your schedule please contact [facility] directly at [phone].  Normal or non-critical lab and imaging results will be communicated to you by [employer] Biotechhart, letter or phone within 4 business days after the clinic has received the results. If you do not hear from us within 7 days, please contact the clinic through [email or web address] or phone. If you have a critical or abnormal lab result, we will notify you by phone as soon as possible.  Submit refill requests through iPointer or call your pharmacy and they will forward the refill request to us. Please allow 3 business days for your refill to be completed.          Additional Information About Your Visit        [employer] Identity Information     iPointer gives you secure access to your electronic health record. If you see a primary care provider, you can also send messages to your care team and make appointments. If you have questions, please call your primary care clinic.  If you do not have a primary care provider, please call [phone] and they will assist you.        Care EveryWhere ID     This is your Care EveryWhere ID. This could be used by other organizations to access your [place] medical records  [id number]        Your Vitals Were     Pulse Temperature Height Pulse Oximetry Breastfeeding? BMI (Body Mass Index)    74 97.2  F (36.2  C) (Oral) 5' 7\" (1.702 m) 98% No 49.34 kg/m2       Blood Pressure from Last 3 Encounters:   07/11/17 118/80   06/19/17 130/58   05/10/17 130/74    Weight from Last 3 Encounters:   07/11/17 (!) 315 lb (142.9 kg)   05/10/17 (!) 324 lb (147 kg)   05/09/17 (!) 320 lb (145.2 kg)              We Performed the Following     Basic metabolic panel     CBC " with platelets differential          Today's Medication Changes          These changes are accurate as of: 7/11/17  8:31 AM.  If you have any questions, ask your nurse or doctor.               These medicines have changed or have updated prescriptions.        Dose/Directions    cyclobenzaprine 5 MG tablet   Commonly known as:  FLEXERIL   This may have changed:  Another medication with the same name was removed. Continue taking this medication, and follow the directions you see here.   Used for:  Left sided sciatica, Back muscle spasm   Changed by:  Elizabeth Lamas MD        TAKE 1 TABLET BY MOUTH TWICE DAILY AS NEEDED   Quantity:  60 tablet   Refills:  0         Stop taking these medicines if you haven't already. Please contact your care team if you have questions.     oxyCODONE-acetaminophen 5-325 MG per tablet   Commonly known as:  PERCOCET   Stopped by:  Elizabeth Lamas MD                    Primary Care Provider Office Phone # Fax #    Elizabeth Lamas [phone]       [facility] [address]  [facility] [address]        Equal Access to Services     [facility] AH: Hadii aad ku hadasho Soomaali, waaxda luqadaha, qaybta kaalmada adeegyada, waxay idiin hayaan adeeg scarlet claire . So [facility] [phone].    ATENCIÓN: Si habla español, tiene a turner disposición servicios gratuitos de asistencia lingüística. LlSum[facility] [phone].    We comply with applicable federal civil rights laws and [place] laws. We do not discriminate on the basis of race, color, national origin, age, disability sex, sexual orientation or gender identity.            Thank you!     Thank you for choosing [facility]  for your care. Our goal is always to provide you with excellent care. Hearing back from our patients is one way we can continue to improve our services. Please take a few minutes to complete the written survey that you may receive in the mail after your visit with us. Thank  you!             Your Updated Medication List - Protect others around you: Learn how to safely use, store and throw away your medicines at www.disposemymeds.org.          This list is accurate as of: 7/11/17  8:31 AM.  Always use your most recent med list.                   Brand Name Dispense Instructions for use Diagnosis    ACE NOT PRESCRIBED (INTENTIONAL)     0 each    Reported on 5/10/2017    Type 2 diabetes mellitus without complication (H)       albuterol 108 (90 BASE) MCG/ACT Inhaler    PROAIR HFA/PROVENTIL HFA/VENTOLIN HFA     Inhale 2 puffs into the lungs every 6 hours        ASPIRIN PO      Take 81 mg by mouth daily        ATORVASTATIN CALCIUM PO      Take 40 mg by mouth daily        BUPROPION HCL PO           citalopram 40 MG tablet    celeXA    90 tablet    TAKE 1 TABLET BY MOUTH EVERY MORNING    Major depressive disorder, recurrent episode, moderate (H)       cyclobenzaprine 5 MG tablet    FLEXERIL    60 tablet    TAKE 1 TABLET BY MOUTH TWICE DAILY AS NEEDED    Left sided sciatica, Back muscle spasm       diclofenac 25 MG EC tablet    VOLTAREN    180 tablet    Take 1 tablet (25 mg) by mouth 3 times daily as needed for moderate pain    Low back pain, unspecified back pain laterality, unspecified chronicity, with sciatica presence unspecified       esomeprazole 40 MG CR capsule    nexIUM    30 capsule    TAKE 1 CAPSULE BY MOUTH EVERY DAY    Dyspepsia       fluticasone 27.5 MCG/SPRAY spray    VERAMYST     Spray 2 sprays into both nostrils daily        gabapentin 300 MG capsule    NEURONTIN    90 capsule    Take 1 capsule (300 mg) by mouth At Bedtime    Acute left-sided low back pain with left-sided sciatica       glipiZIDE-metFORMIN 5-500 MG per tablet    METAGLIP    360 tablet    Take 2 tablets by mouth 2 times daily (before meals)    Type 2 diabetes mellitus with diabetic nephropathy, without long-term current use of insulin (H)       losartan 50 MG tablet    COZAAR    90 tablet    Take 1 tablet (50 mg)  by mouth daily    HTN, goal below 140/90, Type 2 diabetes mellitus with diabetic nephropathy, without long-term current use of insulin (H)       melatonin 3 MG tablet     30 tablet    TAKE 1 TABLET BY MOUTH ONCE AT BEDTIME AS NEEDED FOR SLEEP    Major depressive disorder, recurrent episode, moderate (H)       metoprolol 25 MG 24 hr tablet    TOPROL XL    30 tablet    Take 1 tablet (25 mg) by mouth daily    Old myocardial infarction       montelukast 10 MG tablet    SINGULAIR    90 tablet    TAKE 1 TABLET BY MOUTH EVERY NIGHT AT BEDTIME    Sciatica of left side       nystatin 155008 UNIT/GM Powd    NYSTOP    60 g    APPLY TO THE AFFECTED AREA TWO TO THREE TIMES DAILY AS NEEDED    Morbid obesity with body mass index of 45.0-49.9 in adult (H)       order for DME     3 Month    Diabetic test strips and lancets per insurance formulary Check blood sugar 2 times daily    Type 2 diabetes mellitus with diabetic nephropathy (H)       * pioglitazone 30 MG tablet    ACTOS    135 tablet    Take 0.5 tablet by mouth in the morning with a meal and 1 tablet by mouth in the evening with a meal.    Type 2 diabetes mellitus with diabetic nephropathy, without long-term current use of insulin (H)       * pioglitazone 15 MG tablet    ACTOS    90 tablet    Take 1 tablet (15 mg) by mouth daily TAKE 1 TABLET BY MOUTH EVERY MORNING WITH A MEAL    Type 2 diabetes mellitus without complication, without long-term current use of insulin (H)       polyethylene glycol powder    MIRALAX/GLYCOLAX    510 g    Take 17 g by mouth daily DISSOLVE 17 GRAMS IN LIQUID AND DRINK DAILY AS DIRECTED    Chronic pain syndrome       SOLIFENACIN SUCCINATE PO      Take 10 mg by mouth daily        traMADol 50 MG tablet    ULTRAM    30 tablet    TAKE 1 TABLET BY MOUTH EVERY NIGHT AT BEDTIME AS NEEDED    Chronic pain syndrome       * Notice:  This list has 2 medication(s) that are the same as other medications prescribed for you. Read the directions carefully, and ask  your doctor or other care provider to review them with you.

## 2017-07-11 NOTE — PATIENT INSTRUCTIONS
At [facility], we strive to deliver an exceptional experience to you, every time we see you.    If you receive a survey in the mail, please send us back your thoughts. We really do value your feedback.    Thank you for visiting [facility]    Normal or non-critical lab and imaging results will be communicated to you by MyChart, letter or phone within 7 days.  If you do not hear from us within 10 days, please call the clinic. If you have a critical or abnormal lab result, we will notify you by phone as soon as possible.     If you have any questions regarding your visit please contact:     Team Radha/Spirit  Clinic Hours Telephone Number   Dr. Betzy Smith   7am-7pm  Monday through Thursday  7am-5pm Friday [phone]  Milly ALMAGUER RN   Pharmacy 8:30am-9pm Monday-Friday    9am-5pm Saturday-Sunday [phone]   Urgent Care 11am-9pm Monday-Friday        9am-5pm Saturday-Sunday [phone]     After hours, weekend or if you need to make an appointment with your primary provider please call [phone].   After Hours nurse advise: call [facility]: [phone]    Use Babyagehart (secure email communication and access to your chart) to send your primary care provider a message or make an appointment. Ask someone on your Team how to sign up for thesweetlink. To log on to Iotera or for more information in [employer] please visit the website at [email or web address].   As of October 8, 2013, all password changes, disabled accounts, or ID changes in [email or web address]/MyHealth will be done by our Access Services Department.   If you need help with your account or password, call: [phone]. Clinic staff no longer has the ability to change passwords.     Before Your Surgery      Call your surgeon if there is any change in your health. This includes signs of a cold or flu (such  as a sore throat, runny nose, cough, rash or fever).    Do not smoke, drink alcohol or take over the counter medicine (unless your surgeon or primary care doctor tells you to) for the 24 hours before and after surgery.    If you take prescribed drugs: Follow your doctor s orders about which medicines to take and which to stop until after surgery.    Eating and drinking prior to surgery: follow the instructions from your surgeon    Take a shower or bath the night before surgery. Use the soap your surgeon gave you to gently clean your skin. If you do not have soap from your surgeon, use your regular soap. Do not shave or scrub the surgery site.  Wear clean pajamas and have clean sheets on your bed.

## 2017-07-11 NOTE — PROGRESS NOTES
[place],     Electrolytes, non fasting glucose and kidney function are normal.  You are a little anemic.  Pre op clearance will be sent to your surgeon.     Please do not hesitate to call us at [phone] if you have any questions or concerns.    Thank you,    Elizabeth Lamas MD MPH

## 2017-07-11 NOTE — NURSING NOTE
"Chief Complaint   Patient presents with     Pre-Op Exam       Initial /80  Pulse 74  Temp 97.2  F (36.2  C) (Oral)  Ht 5' 7\" (1.702 m)  Wt (!) 315 lb (142.9 kg)  SpO2 98%  Breastfeeding? No  BMI 49.34 kg/m2 Estimated body mass index is 49.34 kg/(m^2) as calculated from the following:    Height as of this encounter: 5' 7\" (1.702 m).    Weight as of this encounter: 315 lb (142.9 kg).  Medication Reconciliation: complete   Arianne ROBERTS          "

## 2017-07-11 NOTE — PROGRESS NOTES
[address] [address]  [phone]  Dept: [phone]    PRE-OP EVALUATION:  Today's date: 2017    Melody Ferguson (: 1960) presents for pre-operative evaluation assessment as requested by Dr. Coleman.  She requires evaluation and anesthesia risk assessment prior to undergoing surgery/procedure for treatment of SEAN .  Proposed procedure: Sling Transvaginal    Date of Surgery/ Procedure: 17  Time of Surgery/ Procedure: 6:00 am  Hospital/Surgical Facility: [facility]  Fax number for surgical facility: not needed [place]  Primary Physician: Elizabeth Lamas  Type of Anesthesia Anticipated: General    Patient has a Health Care Directive or Living Will:  NO    1. Yes - Do you have a history of heart attack, stroke, stent, bypass or surgery on an artery in the head, neck, heart or legs? History of abnormal EKG and nuclear stress test, underwent coronary angiogram in  that did not show any cardiac disease, has been started on a beta blocker per cardiology, TIA in .  2. NO - Do you ever have any pain or discomfort in your chest?  3. NO - Do you have a history of  Heart Failure?  4. NO - Are you troubled by shortness of breath when: walking on the level, up a slight hill or at night?  5. NO - Do you currently have a cold, bronchitis or other respiratory infection?  6. NO - Do you have a cough, shortness of breath or wheezing?  7. NO - Do you sometimes get pains in the calves of your legs when you walk?  8. YES - Do you or anyone in your family have previous history of blood clots? Blood clot in her father at age 83 years   9. NO - Do you or does anyone in your family have a serious bleeding problem such as prolonged bleeding following surgeries or cuts?  10. NO - Have you ever had problems with anemia or been told to take iron pills?  11. NO - Have you had any abnormal blood loss such as black, tarry or bloody stools, or abnormal  vaginal bleeding?  12. NO - Have you ever had a blood transfusion?  13. NO - Have you or any of your relatives ever had problems with anesthesia?  14. NO - Do you have sleep apnea, excessive snoring or daytime drowsiness?  15. NO - Do you have any prosthetic heart valves?  16. NO - Do you have prosthetic joints?  17. NO - Is there any chance that you may be pregnant?      HPI:                                                      Brief HPI related to upcoming procedure: Over a 10 year history of urinary frequency and urge incontinence. Planned for surgical treatment with Altis pubovaginal sling utilizing mesh material.      See problem list for active medical problems.  Problems all longstanding and stable, except as noted/documented.  See ROS for pertinent symptoms related to these conditions.                                                                                                  .    MEDICAL HISTORY:                                                      Patient Active Problem List    Diagnosis Date Noted     History of total knee arthroplasty, left 05/01/2017     Priority: Medium     Diabetes mellitus, type 2 (H) 01/16/2017     Priority: Medium     DDD (degenerative disc disease), lumbar 11/02/2016     Priority: Medium     Hiatal hernia 07/27/2016     Priority: Medium     DM type 2 without retinopathy (H) 04/20/2016     Priority: Medium     Primary osteoarthritis of both knees 03/01/2016     Priority: Medium     PTSD (post-traumatic stress disorder) 03/01/2016     Priority: Medium     Chronic pain syndrome 03/01/2016     Priority: Medium       Patient is followed by Elizabeth Lamas MD for ongoing prescription of pain medication.  All refills should be approved by this provider, or covering partner.    Medication(s): Tramadol.   Maximum quantity per month: 30  Clinic visit frequency required: Q 3 months     Controlled substance agreement:  Encounter-Level CSA - 3/1/16:               Controlled Substance  Agreement - Scan on 3/7/2016  9:41 AM : CONTROLLED SUBSTANCE AGREEMENT (below)            Pain Clinic evaluation in the past: Yes       Date/Location:  [place] several years ago    DIRE Total Score(s):  No flowsheet data found.    Last [facility] website verification:  done on 12/15/16   [email or web address]         Fibromyalgia 03/01/2016     Priority: Medium     Major depressive disorder, recurrent episode, moderate (H) 03/01/2016     Priority: Medium     Urge incontinence of urine 03/01/2016     Priority: Medium     Prolapse of vaginal wall 03/01/2016     Priority: Medium     Mild intermittent asthma without complication 03/01/2016     Priority: Medium     HTN, goal below 140/90 03/01/2016     Priority: Medium     Hyperlipidemia LDL goal <100 03/01/2016     Priority: Medium     Type 2 diabetes mellitus with diabetic nephropathy (H) 03/01/2016     Priority: Medium     Allergic rhinitis, unspecified allergic rhinitis type 03/01/2016     Priority: Medium     Morbid obesity with BMI of 45.0-49.9, adult (H) 03/01/2016     Priority: Medium     Vaginal high risk HPV DNA test positive 03/01/2016     Priority: Medium     Last Pap smear 12/2015 in [place]       History of TIA (transient ischemic attack) and stroke 03/01/2016     In 2005        History of colonic polyps 03/01/2016     Colonoscopy in 2015 in [place]        Past Medical History:   Diagnosis Date     DM type 2 (diabetes mellitus, type 2) (H)      History of TIA (transient ischemic attack)      HTN (hypertension)      Hyperlipidemia      Past Surgical History:   Procedure Laterality Date     C TOTAL KNEE ARTHROPLASTY Left 04/26/2017    [place] [facility] [place]     Current [place]patient Prescriptions   Medication Sig Dispense Refill     montelukast (SINGULAIR) 10 MG tablet TAKE 1 TABLET BY MOUTH EVERY NIGHT AT BEDTIME 90 tablet 0     polyethylene glycol (MIRALAX/GLYCOLAX) powder Take 17 g by mouth daily DISSOLVE 17 GRAMS IN LIQUID AND DRINK DAILY AS DIRECTED 510 g 3     nystatin  (NYSTOP) 577036 UNIT/GM POWD APPLY TO THE AFFECTED AREA TWO TO THREE TIMES DAILY AS NEEDED 60 g 3     pioglitazone (ACTOS) 15 MG tablet Take 1 tablet (15 mg) by mouth daily TAKE 1 TABLET BY MOUTH EVERY MORNING WITH A MEAL 90 tablet 0     cyclobenzaprine (FLEXERIL) 5 MG tablet TAKE 1 TABLET BY MOUTH TWICE DAILY AS NEEDED 60 tablet 0     traMADol (ULTRAM) 50 MG tablet TAKE 1 TABLET BY MOUTH EVERY NIGHT AT BEDTIME AS NEEDED 30 tablet 0     esomeprazole (NEXIUM) 40 MG CR capsule TAKE 1 CAPSULE BY MOUTH EVERY DAY 30 capsule 0     oxyCODONE-acetaminophen (PERCOCET) 5-325 MG per tablet Take 1-2 tablets by mouth every 4 hours as needed for moderate to severe pain 80 tablet 0     metoprolol (TOPROL XL) 25 MG 24 hr tablet Take 1 tablet (25 mg) by mouth daily 30 tablet 3     gabapentin (NEURONTIN) 300 MG capsule Take 1 capsule (300 mg) by mouth At Bedtime 90 capsule 1     citalopram (CELEXA) 40 MG tablet TAKE 1 TABLET BY MOUTH EVERY MORNING 90 tablet 0     cyclobenzaprine (FLEXERIL) 5 MG tablet TAKE 1 TABLET BY MOUTH TWICE DAILY AS NEEDED( GENERIC EQUIVALENT FOR FLEXERIL) 60 tablet 0     pioglitazone (ACTOS) 30 MG tablet Take 0.5 tablet by mouth in the morning with a meal and 1 tablet by mouth in the evening with a meal. 135 tablet 1     diclofenac (VOLTAREN) 25 MG EC tablet Take 1 tablet (25 mg) by mouth 3 times daily as needed for moderate pain 180 tablet 0     losartan (COZAAR) 50 MG tablet Take 1 tablet (50 mg) by mouth daily 90 tablet 1     glipiZIDE-metFORMIN (METAGLIP) 5-500 MG per tablet Take 2 tablets by mouth 2 times daily (before meals) 360 tablet 1     melatonin 3 MG tablet TAKE 1 TABLET BY MOUTH ONCE AT BEDTIME AS NEEDED FOR SLEEP 30 tablet 0     order for DME Diabetic test strips and lancets per insurance formulary Check blood sugar 2 times daily 3 Month 3     albuterol (PROAIR HFA, PROVENTIL HFA, VENTOLIN HFA) 108 (90 BASE) MCG/ACT inhaler Inhale 2 puffs into the lungs every 6 hours       ASPIRIN PO Take 81 mg  "by mouth daily       ATORVASTATIN CALCIUM PO Take 40 mg by mouth daily       BUPROPION HCL PO        fluticasone (VERAMYST) 27.5 MCG/SPRAY nasal spray Spray 2 sprays into both nostrils daily       SOLIFENACIN SUCCINATE PO Take 10 mg by mouth daily       ACE NOT PRESCRIBED, INTENTIONAL, Reported on 5/10/2017 0 each 0     OTC products: Aspirin and NSAIDS    Allergies   Allergen Reactions     Milk Protein Extract GI Disturbance     lactose intolerance.  Can tolerate milk products if uses lactaid     Bee Venom Swelling     Latex      Lisinopril Cough     Onion GI Disturbance     Aloe Rash     pain     Chlorine Rash      Latex Allergy: YES: Precautions to take: Avoid use of Latex products     Social History   Substance Use Topics     Smoking status: Never Smoker     Smokeless tobacco: Never Used     Alcohol use 0.0 oz/week     0 Standard drinks or equivalent per week      Comment: moderate     History   Drug Use No       REVIEW OF SYSTEMS:                                                    Constitutional, neuro, ENT, endocrine, pulmonary, cardiac, gastrointestinal, genitourinary, musculoskeletal, integument and psychiatric systems are negative, except as otherwise noted.    EXAM:                                                    /80  Pulse 74  Temp 97.2  F (36.2  C) (Oral)  Ht 5' 7\" (1.702 m)  Wt (!) 315 lb (142.9 kg)  SpO2 98%  Breastfeeding? No  BMI 49.34 kg/m2    GENERAL APPEARANCE: healthy, alert and no distress     EYES: EOMI, PERRL     HENT: ear canals and TM's normal and nose and mouth without ulcers or lesions     NECK: no adenopathy, no asymmetry, masses, or scars     RESP: lungs clear to auscultation - no rales, rhonchi or wheezes     CV: regular rates and rhythm, normal S1 S2, no S3 or S4 and no murmur, click or rub     ABDOMEN:  soft, nontender, no HSM or masses      MS: extremities normal- no gross deformities noted, no evidence of inflammation in joints, FROM in all extremities.     SKIN: no " suspicious lesions or rashes     NEURO: Normal strength and tone, sensory exam grossly normal, mentation intact and speech normal     PSYCH: mentation appears normal. and affect normal/bright     LYMPHATICS: No axillary, cervical, or supraclavicular nodes    DIAGNOSTICS:                                                      Labs Resulted Today:   Results for orders placed or performed in visit on 07/11/17   CBC with platelets differential   Result Value Ref Range    WBC 8.6 4.0 - 11.0 10e9/L    RBC Count 4.01 3.8 - 5.2 10e12/L    Hemoglobin 10.9 (L) 11.7 - 15.7 g/dL    Hematocrit 34.9 (L) 35.0 - 47.0 %    MCV 87 78 - 100 fl    MCH 27.2 26.5 - 33.0 pg    MCHC 31.2 (L) 31.5 - 36.5 g/dL    RDW 15.0 10.0 - 15.0 %    Platelet Count 290 150 - 450 10e9/L    Diff Method Automated Method     % Neutrophils 68.1 %    % Lymphocytes 24.1 %    % Monocytes 7.2 %    % Eosinophils 0.5 %    % Basophils 0.1 %    Absolute Neutrophil 5.9 1.6 - 8.3 10e9/L    Absolute Lymphocytes 2.1 0.8 - 5.3 10e9/L    Absolute Monocytes 0.6 0.0 - 1.3 10e9/L    Absolute Eosinophils 0.0 0.0 - 0.7 10e9/L    Absolute Basophils 0.0 0.0 - 0.2 10e9/L       Recent Labs   Lab Test  04/18/17   0900  11/01/16   0752   HGB  12.8   --    PLT  259   --    NA  142  140   POTASSIUM  3.9  3.8   CR  0.47*  0.47*   A1C  6.1*  6.7*        Had a coronary angiogram 4/24/17 which did not show any obstructive coronary artery disease.    ECHO 5/1/17:     Left ventricular systolic function is low normal. The visual ejection fraction  is estimated at 55%. There is mild to moderate concentric left ventricular  hypertrophy. The left ventricle is mildly dilated.  The right ventricular systolic function is normal. There is mild right  ventricular hypertrophy. Right ventricular systolic pressure could not be  approximated due to inadequate tricuspid regurgitation.  There is trace to mild mitral regurgitation.  Contrast was used without apparent complications. There is no comparison  study  Available.      IMPRESSION:                                                    Reason for surgery/procedure: Incontinence   Diagnosis/reason for consult: Pre operative evaluation     The proposed surgical procedure is considered INTERMEDIATE risk.    REVISED CARDIAC RISK INDEX  The patient has the following serious cardiovascular risks for perioperative complications such as (MI, PE, VFib and 3  AV Block):  No serious cardiac risks  INTERPRETATION: 0 risks: Class I (very low risk - 0.4% complication rate)    The patient has the following additional risks for perioperative complications:  No identified additional risks  The 10-year ASCVD risk score (Carrolltonmacey DUMONT Jr, et al., 2013) is: 4.7%    Values used to calculate the score:      Age: 56 years      Sex: Female      Is Non- : No      Diabetic: Yes      Tobacco smoker: No      Systolic Blood Pressure: 118 mmHg      Is BP treated: Yes      HDL Cholesterol: 61 mg/dL      Total Cholesterol: 168 mg/dL    Encounter Diagnoses   Name Primary?     Preop general physical exam Yes     Type 2 diabetes mellitus with diabetic nephropathy, without long-term current use of insulin (H)      Major depressive disorder, recurrent episode, moderate (H)      Morbid obesity with BMI of 45.0-49.9, adult (H)      Prolapse of vaginal wall      History of total knee arthroplasty, left      Prophylactic antibiotic for dental procedure indicated due to prior joint replacement Requesting antibiotics prior to work on her dentures, states was required by her dentist, I provided a script, however, guidelines do not indicate need for this.          RECOMMENDATIONS:                                                        Cardiovascular Risk  Performs 4 METs exercise without symptoms (Light housework (dusting, washing dishes) and Climb a flight of stairs) .   Patient is already on a Beta Blocker. Continue Betablocker therapy after surgery, using Beta blocker order set as  necessary for NPO status.      Pulmonary Risk  Incentive spirometry post op      Obstructive Sleep Apnea (or suspected sleep apnea)  Hospital staff are advised to monitor for sleep related oxygen desaturations due to suspicion of ELIER      Anemia  Anemia and does not require treatment prior to surgery.  Monitor Hemoglobin postoperatively.      --Patient is to take all scheduled medications on the day of surgery EXCEPT for modifications listed below.    Diabetes Medication Use  -----Hold usual  oral diabetic meds (e.g. Metformin, Actos, Glipizide) while NPO.       Anticoagulant or Antiplatelet Medication Use  ASPIRIN: Discontinue ASA 7-10 days prior to procedure to reduce bleeding risk.  It should be resumed post-operatively.  NSAIDS: Diclofenac (Voltaren):    Stop one day prior to surgery        ACE Inhibitor or Angiotensin Receptor Blocker (ARB) Use  Ace inhibitor or Angiotensin Receptor Blocker (ARB) and should HOLD this medication for the 24 hours prior to surgery.      APPROVAL GIVEN to proceed with proposed procedure, without further diagnostic evaluation       Signed Electronically by: Elizabeth Lamas MD MPH    Copy of this evaluation report is provided to requesting physician.    [place] Preop Guidelines

## 2017-07-12 ENCOUNTER — CARE COORDINATION (OUTPATIENT)
Dept: CARDIOLOGY | Facility: CLINIC | Age: 57
End: 2017-07-12

## 2017-07-13 ENCOUNTER — ANESTHESIA EVENT (OUTPATIENT)
Dept: SURGERY | Facility: CLINIC | Age: 57
End: 2017-07-13
Payer: MEDICARE

## 2017-07-13 ENCOUNTER — MYC REFILL (OUTPATIENT)
Dept: FAMILY MEDICINE | Facility: CLINIC | Age: 57
End: 2017-07-13

## 2017-07-13 DIAGNOSIS — M54.32 LEFT SIDED SCIATICA: ICD-10-CM

## 2017-07-13 DIAGNOSIS — G89.4 CHRONIC PAIN SYNDROME: ICD-10-CM

## 2017-07-13 DIAGNOSIS — E11.21 TYPE 2 DIABETES MELLITUS WITH DIABETIC NEPHROPATHY, WITHOUT LONG-TERM CURRENT USE OF INSULIN (H): Chronic | ICD-10-CM

## 2017-07-13 DIAGNOSIS — M54.42 ACUTE LEFT-SIDED LOW BACK PAIN WITH LEFT-SIDED SCIATICA: ICD-10-CM

## 2017-07-13 DIAGNOSIS — M62.830 BACK MUSCLE SPASM: ICD-10-CM

## 2017-07-13 DIAGNOSIS — E11.9 TYPE 2 DIABETES MELLITUS WITHOUT COMPLICATION, WITHOUT LONG-TERM CURRENT USE OF INSULIN (H): ICD-10-CM

## 2017-07-13 RX ORDER — GABAPENTIN 300 MG/1
300 CAPSULE ORAL AT BEDTIME
Qty: 90 CAPSULE | Refills: 1 | Status: SHIPPED | OUTPATIENT
Start: 2017-07-13 | End: 2017-11-06

## 2017-07-13 RX ORDER — CYCLOBENZAPRINE HCL 5 MG
5 TABLET ORAL 2 TIMES DAILY PRN
Qty: 60 TABLET | Refills: 0 | Status: SHIPPED | OUTPATIENT
Start: 2017-07-13 | End: 2017-08-05

## 2017-07-13 RX ORDER — TRAMADOL HYDROCHLORIDE 50 MG/1
50 TABLET ORAL EVERY 8 HOURS PRN
Qty: 30 TABLET | Refills: 0 | Status: SHIPPED | OUTPATIENT
Start: 2017-07-13 | End: 2017-08-07

## 2017-07-13 RX ORDER — PIOGLITAZONEHYDROCHLORIDE 15 MG/1
15 TABLET ORAL DAILY
Qty: 90 TABLET | Refills: 0 | Status: SHIPPED | OUTPATIENT
Start: 2017-07-13 | End: 2017-07-25

## 2017-07-13 RX ORDER — GLIPIZIDE AND METFORMIN HCL 5; 500 MG/1; MG/1
2 TABLET, FILM COATED ORAL
Qty: 360 TABLET | Refills: 1 | Status: SHIPPED | OUTPATIENT
Start: 2017-07-13 | End: 2017-11-06

## 2017-07-13 RX ORDER — PIOGLITAZONEHYDROCHLORIDE 30 MG/1
TABLET ORAL
Qty: 135 TABLET | Refills: 1 | Status: SHIPPED | OUTPATIENT
Start: 2017-07-13 | End: 2017-07-25

## 2017-07-13 NOTE — TELEPHONE ENCOUNTER
Faxed Rx for the Ultram to [facility],  Right fax confirmed at 3:00 pm today.  Yuli Giang MA/  For [employer] and [employer]

## 2017-07-13 NOTE — TELEPHONE ENCOUNTER
Routed to provider to advise as Tramadol is also being requested and patient was just seen.  Arianne ROBERTS

## 2017-07-13 NOTE — TELEPHONE ENCOUNTER
Message from Geet:  Original authorizing provider: Elizabeth Lamas MD    Melody Ferguson would like a refill of the following medications:  glipiZIDE-metFORMIN (METAGLIP) 5-500 MG per tablet [Elizabeth Lamas MD]  pioglitazone (ACTOS) 30 MG tablet [Elizabeth Lamas MD]  gabapentin (NEURONTIN) 300 MG capsule [Elizabeth Lamas MD]  cyclobenzaprine (FLEXERIL) 5 MG tablet [Elizabeth Lamas MD]  traMADol (ULTRAM) 50 MG tablet [Elizabeth Lamas MD]  pioglitazone (ACTOS) 15 MG tablet [Elizabeth Lamas MD]    Preferred pharmacy: [facility] [address] [place] [address], [place] - [address] AT [facility] [address]    Comment:  I have NOT received any of my Actos Also my vesicare which Im not finding on my list

## 2017-07-14 ENCOUNTER — ANESTHESIA (OUTPATIENT)
Dept: SURGERY | Facility: CLINIC | Age: 57
End: 2017-07-14
Payer: MEDICARE

## 2017-07-14 ENCOUNTER — HOSPITAL ENCOUNTER (OUTPATIENT)
Facility: CLINIC | Age: 57
Discharge: HOME | End: 2017-07-14
Attending: UROLOGY | Admitting: UROLOGY
Payer: MEDICARE

## 2017-07-14 VITALS
RESPIRATION RATE: 18 BRPM | WEIGHT: 293 LBS | OXYGEN SATURATION: 98 % | TEMPERATURE: 97.9 F | HEIGHT: 67 IN | DIASTOLIC BLOOD PRESSURE: 73 MMHG | HEART RATE: 62 BPM | SYSTOLIC BLOOD PRESSURE: 137 MMHG | BODY MASS INDEX: 45.99 KG/M2

## 2017-07-14 DIAGNOSIS — N39.3 SUI (STRESS URINARY INCONTINENCE, FEMALE): Primary | ICD-10-CM

## 2017-07-14 LAB — GLUCOSE BLDC GLUCOMTR-MCNC: 130 MG/DL (ref 70–99)

## 2017-07-14 PROCEDURE — 36000058 ZZH SURGERY LEVEL 3 EA 15 ADDTL MIN: Performed by: UROLOGY

## 2017-07-14 PROCEDURE — 25000125 ZZHC RX 250: Performed by: NURSE ANESTHETIST, CERTIFIED REGISTERED

## 2017-07-14 PROCEDURE — 25000125 ZZHC RX 250: Performed by: UROLOGY

## 2017-07-14 PROCEDURE — 27110028 ZZH OR GENERAL SUPPLY NON-STERILE: Performed by: UROLOGY

## 2017-07-14 PROCEDURE — C1771 REP DEV, URINARY, W/SLING: HCPCS | Performed by: UROLOGY

## 2017-07-14 PROCEDURE — 25800025 ZZH RX 258: Performed by: UROLOGY

## 2017-07-14 PROCEDURE — 27210794 ZZH OR GENERAL SUPPLY STERILE: Performed by: UROLOGY

## 2017-07-14 PROCEDURE — 37000008 ZZH ANESTHESIA TECHNICAL FEE, 1ST 30 MIN: Performed by: UROLOGY

## 2017-07-14 PROCEDURE — 57288 REPAIR BLADDER DEFECT: CPT | Performed by: UROLOGY

## 2017-07-14 PROCEDURE — 71000027 ZZH RECOVERY PHASE 2 EACH 15 MINS: Performed by: UROLOGY

## 2017-07-14 PROCEDURE — 25000128 H RX IP 250 OP 636: Performed by: UROLOGY

## 2017-07-14 PROCEDURE — 37000009 ZZH ANESTHESIA TECHNICAL FEE, EACH ADDTL 15 MIN: Performed by: UROLOGY

## 2017-07-14 PROCEDURE — 82962 GLUCOSE BLOOD TEST: CPT

## 2017-07-14 PROCEDURE — 25000128 H RX IP 250 OP 636: Performed by: NURSE ANESTHETIST, CERTIFIED REGISTERED

## 2017-07-14 PROCEDURE — 40000305 ZZH STATISTIC PRE PROC ASSESS I: Performed by: UROLOGY

## 2017-07-14 PROCEDURE — 36000056 ZZH SURGERY LEVEL 3 1ST 30 MIN: Performed by: UROLOGY

## 2017-07-14 DEVICE — MESH SLING SINGLE INCISION ALTIS 519650: Type: IMPLANTABLE DEVICE | Site: VAGINA | Status: FUNCTIONAL

## 2017-07-14 RX ORDER — HYDROCODONE BITARTRATE AND ACETAMINOPHEN 5; 325 MG/1; MG/1
1-2 TABLET ORAL EVERY 4 HOURS PRN
Qty: 10 TABLET | Refills: 0 | Status: SHIPPED | OUTPATIENT
Start: 2017-07-14 | End: 2017-09-12

## 2017-07-14 RX ORDER — CEFAZOLIN SODIUM 1 G/50ML
3 SOLUTION INTRAVENOUS
Status: COMPLETED | OUTPATIENT
Start: 2017-07-14 | End: 2017-07-14

## 2017-07-14 RX ORDER — KETOROLAC TROMETHAMINE 30 MG/ML
INJECTION, SOLUTION INTRAMUSCULAR; INTRAVENOUS PRN
Status: DISCONTINUED | OUTPATIENT
Start: 2017-07-14 | End: 2017-07-14

## 2017-07-14 RX ORDER — SODIUM CHLORIDE, SODIUM LACTATE, POTASSIUM CHLORIDE, CALCIUM CHLORIDE 600; 310; 30; 20 MG/100ML; MG/100ML; MG/100ML; MG/100ML
INJECTION, SOLUTION INTRAVENOUS CONTINUOUS
Status: DISCONTINUED | OUTPATIENT
Start: 2017-07-14 | End: 2017-07-14 | Stop reason: HOSPADM

## 2017-07-14 RX ORDER — FENTANYL CITRATE 50 UG/ML
INJECTION, SOLUTION INTRAMUSCULAR; INTRAVENOUS PRN
Status: DISCONTINUED | OUTPATIENT
Start: 2017-07-14 | End: 2017-07-14

## 2017-07-14 RX ORDER — HYDROCODONE BITARTRATE AND ACETAMINOPHEN 5; 325 MG/1; MG/1
1-2 TABLET ORAL ONCE
Status: DISCONTINUED | OUTPATIENT
Start: 2017-07-14 | End: 2017-07-14 | Stop reason: HOSPADM

## 2017-07-14 RX ORDER — DEXAMETHASONE SODIUM PHOSPHATE 4 MG/ML
INJECTION, SOLUTION INTRA-ARTICULAR; INTRALESIONAL; INTRAMUSCULAR; INTRAVENOUS; SOFT TISSUE PRN
Status: DISCONTINUED | OUTPATIENT
Start: 2017-07-14 | End: 2017-07-14

## 2017-07-14 RX ORDER — PROPOFOL 10 MG/ML
INJECTION, EMULSION INTRAVENOUS PRN
Status: DISCONTINUED | OUTPATIENT
Start: 2017-07-14 | End: 2017-07-14

## 2017-07-14 RX ORDER — NALOXONE HYDROCHLORIDE 0.4 MG/ML
.1-.4 INJECTION, SOLUTION INTRAMUSCULAR; INTRAVENOUS; SUBCUTANEOUS
Status: DISCONTINUED | OUTPATIENT
Start: 2017-07-14 | End: 2017-07-14 | Stop reason: HOSPADM

## 2017-07-14 RX ORDER — LIDOCAINE 40 MG/G
CREAM TOPICAL
Status: DISCONTINUED | OUTPATIENT
Start: 2017-07-14 | End: 2017-07-14 | Stop reason: HOSPADM

## 2017-07-14 RX ORDER — ONDANSETRON 2 MG/ML
INJECTION INTRAMUSCULAR; INTRAVENOUS PRN
Status: DISCONTINUED | OUTPATIENT
Start: 2017-07-14 | End: 2017-07-14

## 2017-07-14 RX ADMIN — PROPOFOL 100 MG: 10 INJECTION, EMULSION INTRAVENOUS at 08:45

## 2017-07-14 RX ADMIN — PROPOFOL 100 MG: 10 INJECTION, EMULSION INTRAVENOUS at 09:05

## 2017-07-14 RX ADMIN — MIDAZOLAM HYDROCHLORIDE 2 MG: 1 INJECTION, SOLUTION INTRAMUSCULAR; INTRAVENOUS at 08:37

## 2017-07-14 RX ADMIN — FENTANYL CITRATE 100 MCG: 50 INJECTION, SOLUTION INTRAMUSCULAR; INTRAVENOUS at 09:11

## 2017-07-14 RX ADMIN — PROPOFOL 50 MG: 10 INJECTION, EMULSION INTRAVENOUS at 08:40

## 2017-07-14 RX ADMIN — ONDANSETRON 4 MG: 2 INJECTION INTRAMUSCULAR; INTRAVENOUS at 08:40

## 2017-07-14 RX ADMIN — LIDOCAINE HYDROCHLORIDE 1 ML: 10 INJECTION, SOLUTION EPIDURAL; INFILTRATION; INTRACAUDAL; PERINEURAL at 07:45

## 2017-07-14 RX ADMIN — CEFAZOLIN SODIUM 3 G: 1 INJECTION, POWDER, FOR SOLUTION INTRAMUSCULAR; INTRAVENOUS at 08:36

## 2017-07-14 RX ADMIN — FENTANYL CITRATE 100 MCG: 50 INJECTION, SOLUTION INTRAMUSCULAR; INTRAVENOUS at 08:38

## 2017-07-14 RX ADMIN — PROPOFOL 100 MG: 10 INJECTION, EMULSION INTRAVENOUS at 08:55

## 2017-07-14 RX ADMIN — MIDAZOLAM HYDROCHLORIDE 2 MG: 1 INJECTION, SOLUTION INTRAMUSCULAR; INTRAVENOUS at 08:42

## 2017-07-14 RX ADMIN — SODIUM CHLORIDE, POTASSIUM CHLORIDE, SODIUM LACTATE AND CALCIUM CHLORIDE: 600; 310; 30; 20 INJECTION, SOLUTION INTRAVENOUS at 07:45

## 2017-07-14 RX ADMIN — PROPOFOL 100 MG: 10 INJECTION, EMULSION INTRAVENOUS at 09:00

## 2017-07-14 RX ADMIN — PROPOFOL 100 MG: 10 INJECTION, EMULSION INTRAVENOUS at 08:50

## 2017-07-14 RX ADMIN — KETOROLAC TROMETHAMINE 30 MG: 30 INJECTION, SOLUTION INTRAMUSCULAR at 08:40

## 2017-07-14 RX ADMIN — DEXAMETHASONE SODIUM PHOSPHATE 4 MG: 4 INJECTION, SOLUTION INTRA-ARTICULAR; INTRALESIONAL; INTRAMUSCULAR; INTRAVENOUS; SOFT TISSUE at 08:40

## 2017-07-14 NOTE — ANESTHESIA PREPROCEDURE EVALUATION
Anesthesia Evaluation     . Pt has had prior anesthetic.     No history of anesthetic complications          ROS/MED HX    ENT/Pulmonary:     (+)asthma , . .    Neurologic:     (+)TIA     Cardiovascular:     (+) hypertension----. : . . . :. .       METS/Exercise Tolerance:     Hematologic:         Musculoskeletal:   (+) arthritis, , , -       GI/Hepatic:     (+) hiatal hernia,       Renal/Genitourinary:     (+) chronic renal disease,       Endo:     (+) type II DM Obesity, .      Psychiatric:  - neg psychiatric ROS       Infectious Disease:         Malignancy:         Other:                     Physical Exam  Normal systems: cardiovascular, pulmonary and dental    Airway   Mallampati: III  TM distance: >3 FB  Neck ROM: full    Dental     Cardiovascular   Rhythm and rate: regular and normal      Pulmonary    breath sounds clear to auscultation                    Anesthesia Plan      History & Physical Review  History and physical reviewed and following examination; no interval change.    ASA Status:  3 .    NPO Status:  > 6 hours    Plan for MAC   PONV prophylaxis:  Ondansetron (or other 5HT-3) and Dexamethasone or Solumedrol       Postoperative Care      Consents  Anesthetic plan, risks, benefits and alternatives discussed with:  Patient..                          .

## 2017-07-14 NOTE — ANESTHESIA POSTPROCEDURE EVALUATION
Patient: Melody [facility]    Procedure(s):  Sling (Altis) - Wound Class: II-Clean Contaminated    Diagnosis:Stress incontinence  Diagnosis Additional Information: No value filed.    Anesthesia Type:  No value filed.    Note:  Anesthesia Post Evaluation    Patient location during evaluation: Bedside  Patient participation: Able to fully participate in evaluation  Pain management: adequate  Airway patency: patent  Cardiovascular status: acceptable  Respiratory status: acceptable  PONV: none     Anesthetic complications: None          Last vitals:  Vitals:    07/14/17 0712 07/14/17 0921   BP: 151/77 122/83   Pulse: 60 62   Resp: 16 19   Temp: 36.6  C (97.9  F)    SpO2: 100% 98%         Electronically Signed By: CARMEN Ridley CRNA  July 14, 2017  9:22 AM

## 2017-07-14 NOTE — IP AVS SNAPSHOT
MRN:[id number]                      After Visit Summary   7/14/2017    Melody Fergusno    MRN: [id number]           Thank you!     Thank you for choosing [facility] for your care. Our goal is always to provide you with excellent care. Hearing back from our patients is one way we can continue to improve our services. Please take a few minutes to complete the written survey that you may receive in the mail after you visit with us. Thank you!        Patient Information     Date Of Birth          1960        About your hospital stay     You were admitted on:  July 14, 2017 You last received care in the:  [facility] PreOP/Phase II    You were discharged on:  July 14, 2017       Who to Call     For medical emergencies, please call 911.  For non-urgent questions about your medical care, please call your primary care provider or clinic, [phone]  For questions related to your surgery, please call your surgery clinic        Attending Provider     Provider Specialty    Talon Katz MD Urology       Primary Care Provider Office Phone # Fax #    Elizabeth Lamas [phone]      Your next 10 appointments already scheduled     Jul 19, 2017  2:00 PM CDT   New Visit with Keren Longo OD   [facility]    [address] [place] [id number] [phone]              Further instructions from your care team                           Same Day Surgery Discharge Instructions  Special Precautions After Surgery - Adult    1. It is not unusual to feel lightheaded or faint, up to 24 hours after surgery or while taking pain medication.  If you have these symptoms; sit for a few minutes before standing and have someone assist you when getting up.  2. You should rest and relax for the next 24 hours and must have someone stay with you for at least 24 hours after your discharge.  3. DO NOT DRIVE any vehicle or operate  mechanical equipment for 24 hours following the end of your surgery.  DO NOT DRIVE while taking narcotic pain medications that have been prescribed by your physician.  If you had a limb operated on, you must be able to use it fully to drive.  4. DO NOT drink alcoholic beverages for 24 hours following surgery or while taking prescription pain medication.  5. Drink clear liquids (apple juice, ginger ale, broth, 7-Up, etc.).  Progress to your regular diet as you feel able.  6. Any questions call your physician and do not make important decisions for 24 hours.    ACTIVITY  ? As tolerated     INCISIONAL CARE  ? As instructed by Dr. Coleman     Call for an appointment to return to the clinic as instructed in your packet by Dr. Katz    Medications:  ? Norco as instructed on bottle next dose when needed   May take Tylenol for less severe pain     Additional discharge instructions:   __________________________________________________________________________________________________________________________________  IMPORTANT NUMBERS:    [facility] [place]  [phone], [phone]  Pharmacy:  [phone]  Same Day Surgery:  [phone], Monday - Friday until 8:30 p.m.  Urgent Care:  [phone]  Emergency Room:  [phone]      [facility]:  [phone]                                                                             [facility]:  [phone] option 1  [facility]:  [phone]     OB Clinic:  [phone]   Surgery Specialty Clinic:  [phone]   Home Medical Equipment: [phone]  [facility]:  [phone]        Pending Results     No orders found from 7/12/2017 to 7/15/2017.            Admission Information     Date & Time Provider Department Dept. Phone    7/14/2017 Talon Katz MD [facility] PreOP/Phase [phone]      Your Vitals Were     Blood Pressure Pulse Temperature Respirations Height Weight    137/73 62 97.9  F  "(36.6  C) (Oral) 18 1.702 m (5' 7\") 138.3 kg (305 lb)    Pulse Oximetry BMI (Body Mass Index)                98% 47.77 kg/m2          [employer] Information     [employer] gives you secure access to your electronic health record. If you see a primary care provider, you can also send messages to your care team and make appointments. If you have questions, please call your primary care clinic.  If you do not have a primary care provider, please call [phone] and they will assist you.        Care EveryWhere ID     This is your Care EveryWhere ID. This could be used by other organizations to access your [facility] medical records  [id number]        Equal Access to Services     EVY KIRAN : Ashley Miranda, mahesh dejesus, ml yi, issac reed. So [facility] [phone].    ATENCIÓN: Si habla español, tiene a turner disposición servicios gratuitos de asistencia lingüística. Llame al [phone].    We comply with applicable federal civil rights laws and [place] laws. We do not discriminate on the basis of race, color, national origin, age, disability sex, sexual orientation or gender identity.               Review of your medicines      UNREVIEWED medicines. Ask your doctor about these medicines        Dose / Directions    ACE NOT PRESCRIBED (INTENTIONAL)   Used for:  Type 2 diabetes mellitus without complication (H)        Reported on 5/10/2017   Quantity:  0 each   Refills:  0       albuterol 108 (90 BASE) MCG/ACT Inhaler   Commonly known as:  PROAIR HFA/PROVENTIL HFA/VENTOLIN HFA        Dose:  2 puff   Inhale 2 puffs into the lungs every 6 hours   Refills:  0       ASPIRIN PO        Dose:  81 mg   Take 81 mg by mouth daily   Refills:  0       ATORVASTATIN CALCIUM PO        Dose:  40 mg   Take 40 mg by mouth daily   Refills:  0       BUPROPION HCL PO        Dose:  50 mg   Take 50 mg by mouth 2 times daily   Refills:  0       citalopram 40 MG tablet   Commonly " known as:  celeXA   Used for:  Major depressive disorder, recurrent episode, moderate (H)        Dose:  40 mg   Take 1 tablet (40 mg) by mouth every morning   Quantity:  90 tablet   Refills:  0       cyclobenzaprine 5 MG tablet   Commonly known as:  FLEXERIL   Used for:  Left sided sciatica, Back muscle spasm        Dose:  5 mg   Take 1 tablet (5 mg) by mouth 2 times daily as needed for muscle spasms   Quantity:  60 tablet   Refills:  0       esomeprazole 40 MG CR capsule   Commonly known as:  nexIUM   Used for:  Dyspepsia        TAKE 1 CAPSULE BY MOUTH EVERY DAY   Quantity:  30 capsule   Refills:  0       fluticasone 27.5 MCG/SPRAY spray   Commonly known as:  VERAMYST        Dose:  2 spray   Spray 2 sprays into both nostrils daily   Refills:  0       gabapentin 300 MG capsule   Commonly known as:  NEURONTIN   Used for:  Acute left-sided low back pain with left-sided sciatica        Dose:  300 mg   Take 1 capsule (300 mg) by mouth At Bedtime   Quantity:  90 capsule   Refills:  1       glipiZIDE-metFORMIN 5-500 MG per tablet   Commonly known as:  METAGLIP   Used for:  Type 2 diabetes mellitus with diabetic nephropathy, without long-term current use of insulin (H)        Dose:  2 tablet   Take 2 tablets by mouth 2 times daily (before meals)   Quantity:  360 tablet   Refills:  1       losartan 50 MG tablet   Commonly known as:  COZAAR   Used for:  HTN, goal below 140/90, Type 2 diabetes mellitus with diabetic nephropathy, without long-term current use of insulin (H)        Dose:  50 mg   Take 1 tablet (50 mg) by mouth daily   Quantity:  90 tablet   Refills:  1       melatonin 3 MG tablet   Used for:  Major depressive disorder, recurrent episode, moderate (H)        TAKE 1 TABLET BY MOUTH ONCE AT BEDTIME AS NEEDED FOR SLEEP   Quantity:  30 tablet   Refills:  0       metoprolol 25 MG 24 hr tablet   Commonly known as:  TOPROL XL   Used for:  Old myocardial infarction        Dose:  25 mg   Take 1 tablet (25 mg) by mouth  daily   Quantity:  30 tablet   Refills:  3       montelukast 10 MG tablet   Commonly known as:  SINGULAIR   Used for:  Sciatica of left side        TAKE 1 TABLET BY MOUTH EVERY NIGHT AT BEDTIME   Quantity:  90 tablet   Refills:  0       nystatin 247918 UNIT/GM Powd   Commonly known as:  NYSTOP   Used for:  Morbid obesity with body mass index of 45.0-49.9 in adult (H)        APPLY TO THE AFFECTED AREA TWO TO THREE TIMES DAILY AS NEEDED   Quantity:  60 g   Refills:  3       * pioglitazone 30 MG tablet   Commonly known as:  ACTOS   Used for:  Type 2 diabetes mellitus with diabetic nephropathy, without long-term current use of insulin (H)        Take 0.5 tablet by mouth in the morning with a meal and 1 tablet by mouth in the evening with a meal.   Quantity:  135 tablet   Refills:  1       * pioglitazone 15 MG tablet   Commonly known as:  ACTOS   Used for:  Type 2 diabetes mellitus without complication, without long-term current use of insulin (H)        Dose:  15 mg   Take 1 tablet (15 mg) by mouth daily TAKE 1 TABLET BY MOUTH EVERY MORNING WITH A MEAL   Quantity:  90 tablet   Refills:  0       polyethylene glycol powder   Commonly known as:  MIRALAX/GLYCOLAX   Used for:  Chronic pain syndrome        Dose:  17 g   Take 17 g by mouth daily DISSOLVE 17 GRAMS IN LIQUID AND DRINK DAILY AS DIRECTED   Quantity:  510 g   Refills:  3       traMADol 50 MG tablet   Commonly known as:  ULTRAM   Used for:  Chronic pain syndrome        Dose:  50 mg   Take 1 tablet (50 mg) by mouth every 8 hours as needed for moderate pain   Quantity:  30 tablet   Refills:  0       VESICARE 10 MG tablet   Used for:  Mixed incontinence   Generic drug:  solifenacin        TAKE 1 TABLET BY MOUTH EVERY DAY   Quantity:  90 tablet   Refills:  0       * Notice:  This list has 2 medication(s) that are the same as other medications prescribed for you. Read the directions carefully, and ask your doctor or other care provider to review them with you.      START  taking        Dose / Directions    HYDROcodone-acetaminophen 5-325 MG per tablet   Commonly known as:  NORCO   Used for:  SEAN (stress urinary incontinence, female)        Dose:  1-2 tablet   Take 1-2 tablets by mouth every 4 hours as needed for moderate to severe pain (Moderate to Severe Pain)   Quantity:  10 tablet   Refills:  0         CONTINUE these medicines which have NOT CHANGED        Dose / Directions    order for DME   Used for:  Type 2 diabetes mellitus with diabetic nephropathy (H)        Diabetic test strips and lancets per insurance formulary Check blood sugar 2 times daily   Quantity:  3 Month   Refills:  3            Where to get your medicines      Some of these will need a paper prescription and others can be bought over the counter. Ask your nurse if you have questions.     Bring a paper prescription for each of these medications     HYDROcodone-acetaminophen 5-325 MG per tablet                Protect others around you: Learn how to safely use, store and throw away your medicines at www.disposemymeds.org.             Medication List: This is a list of all your medications and when to take them. Check marks below indicate your daily home schedule. Keep this list as a reference.      Medications           Morning Afternoon Evening Bedtime As Needed    ACE NOT PRESCRIBED (INTENTIONAL)   Reported on 5/10/2017                                albuterol 108 (90 BASE) MCG/ACT Inhaler   Commonly known as:  PROAIR HFA/PROVENTIL HFA/VENTOLIN HFA   Inhale 2 puffs into the lungs every 6 hours                                ASPIRIN PO   Take 81 mg by mouth daily                                ATORVASTATIN CALCIUM PO   Take 40 mg by mouth daily                                BUPROPION HCL PO   Take 50 mg by mouth 2 times daily                                citalopram 40 MG tablet   Commonly known as:  celeXA   Take 1 tablet (40 mg) by mouth every morning                                cyclobenzaprine 5 MG tablet    Commonly known as:  FLEXERIL   Take 1 tablet (5 mg) by mouth 2 times daily as needed for muscle spasms                                esomeprazole 40 MG CR capsule   Commonly known as:  nexIUM   TAKE 1 CAPSULE BY MOUTH EVERY DAY                                fluticasone 27.5 MCG/SPRAY spray   Commonly known as:  VERAMYST   Spray 2 sprays into both nostrils daily                                gabapentin 300 MG capsule   Commonly known as:  NEURONTIN   Take 1 capsule (300 mg) by mouth At Bedtime                                glipiZIDE-metFORMIN 5-500 MG per tablet   Commonly known as:  METAGLIP   Take 2 tablets by mouth 2 times daily (before meals)                                HYDROcodone-acetaminophen 5-325 MG per tablet   Commonly known as:  NORCO   Take 1-2 tablets by mouth every 4 hours as needed for moderate to severe pain (Moderate to Severe Pain)                                losartan 50 MG tablet   Commonly known as:  COZAAR   Take 1 tablet (50 mg) by mouth daily                                melatonin 3 MG tablet   TAKE 1 TABLET BY MOUTH ONCE AT BEDTIME AS NEEDED FOR SLEEP                                metoprolol 25 MG 24 hr tablet   Commonly known as:  TOPROL XL   Take 1 tablet (25 mg) by mouth daily                                montelukast 10 MG tablet   Commonly known as:  SINGULAIR   TAKE 1 TABLET BY MOUTH EVERY NIGHT AT BEDTIME                                nystatin [id number] UNIT/GM Powd   Commonly known as:  NYSTOP   APPLY TO THE AFFECTED AREA TWO TO THREE TIMES DAILY AS NEEDED                                order for DME   Diabetic test strips and lancets per insurance formulary Check blood sugar 2 times daily                                * pioglitazone 30 MG tablet   Commonly known as:  ACTOS   Take 0.5 tablet by mouth in the morning with a meal and 1 tablet by mouth in the evening with a meal.                                * pioglitazone 15 MG tablet   Commonly known as:  ACTOS   Take 1  tablet (15 mg) by mouth daily TAKE 1 TABLET BY MOUTH EVERY MORNING WITH A MEAL                                polyethylene glycol powder   Commonly known as:  MIRALAX/GLYCOLAX   Take 17 g by mouth daily DISSOLVE 17 GRAMS IN LIQUID AND DRINK DAILY AS DIRECTED                                traMADol 50 MG tablet   Commonly known as:  ULTRAM   Take 1 tablet (50 mg) by mouth every 8 hours as needed for moderate pain                                VESICARE 10 MG tablet   TAKE 1 TABLET BY MOUTH EVERY DAY   Generic drug:  solifenacin                                * Notice:  This list has 2 medication(s) that are the same as other medications prescribed for you. Read the directions carefully, and ask your doctor or other care provider to review them with you.

## 2017-07-14 NOTE — DISCHARGE INSTRUCTIONS
Same Day Surgery Discharge Instructions  Special Precautions After Surgery - Adult    1. It is not unusual to feel lightheaded or faint, up to 24 hours after surgery or while taking pain medication.  If you have these symptoms; sit for a few minutes before standing and have someone assist you when getting up.  2. You should rest and relax for the next 24 hours and must have someone stay with you for at least 24 hours after your discharge.  3. DO NOT DRIVE any vehicle or operate mechanical equipment for 24 hours following the end of your surgery.  DO NOT DRIVE while taking narcotic pain medications that have been prescribed by your physician.  If you had a limb operated on, you must be able to use it fully to drive.  4. DO NOT drink alcoholic beverages for 24 hours following surgery or while taking prescription pain medication.  5. Drink clear liquids (apple juice, ginger ale, broth, 7-Up, etc.).  Progress to your regular diet as you feel able.  6. Any questions call your physician and do not make important decisions for 24 hours.    ACTIVITY  ? As tolerated     INCISIONAL CARE  ? As instructed by Dr. Coleman     Call for an appointment to return to the clinic as instructed in your packet by Dr. Katz    Medications:  ? Norco as instructed on bottle next dose when needed   May take Tylenol for less severe pain     Additional discharge instructions:   __________________________________________________________________________________________________________________________________  IMPORTANT NUMBERS:    [facility] Main Number:  [phone], [phone]  Pharmacy:  [phone]  Same Day Surgery:  [phone], Monday - Friday until 8:30 p.m.  Urgent Care:  [phone]  Emergency Room:  [phone]      [facility]:  [phone]                                                                             [facility]:  [phone] option 1  [facility]:   [phone]     OB Clinic:  [phone]   [facility]ty Clinic:  [phone]   Home Medical Equipment: [phone]  [facility]:  [phone]

## 2017-07-14 NOTE — IP AVS SNAPSHOT
[facility] PreOP/Phase II    [address] [address]    Phone:  [phone]    Fax:  [phone]                                       After Visit Summary   7/14/2017    Melody Ferguson    MRN: [id number]           After Visit Summary Signature Page     I have received my discharge instructions, and my questions have been answered. I have discussed any challenges I see with this plan with the nurse or doctor.    ..........................................................................................................................................  Patient/Patient Representative Signature      ..........................................................................................................................................  Patient Representative Print Name and Relationship to Patient    ..................................................               ................................................  Date                                            Time    ..........................................................................................................................................  Reviewed by Signature/Title    ...................................................              ..............................................  Date                                                            Time

## 2017-07-19 ENCOUNTER — OFFICE VISIT (OUTPATIENT)
Dept: OPTOMETRY | Facility: CLINIC | Age: 57
End: 2017-07-19
Payer: MEDICARE

## 2017-07-19 ENCOUNTER — APPOINTMENT (OUTPATIENT)
Dept: OPTOMETRY | Facility: CLINIC | Age: 57
End: 2017-07-19
Payer: MEDICARE

## 2017-07-19 DIAGNOSIS — H52.4 ASTIGMATISM WITH PRESBYOPIA, RIGHT: ICD-10-CM

## 2017-07-19 DIAGNOSIS — E11.9 TYPE 2 DIABETES MELLITUS WITHOUT RETINOPATHY (H): ICD-10-CM

## 2017-07-19 DIAGNOSIS — H52.4 MYOPIA WITH PRESBYOPIA, LEFT: Primary | ICD-10-CM

## 2017-07-19 DIAGNOSIS — H52.201 ASTIGMATISM WITH PRESBYOPIA, RIGHT: ICD-10-CM

## 2017-07-19 DIAGNOSIS — H52.12 MYOPIA WITH PRESBYOPIA, LEFT: Primary | ICD-10-CM

## 2017-07-19 PROCEDURE — 92015 DETERMINE REFRACTIVE STATE: CPT | Mod: GY | Performed by: OPTOMETRIST

## 2017-07-19 PROCEDURE — 92014 COMPRE OPH EXAM EST PT 1/>: CPT | Performed by: OPTOMETRIST

## 2017-07-19 PROCEDURE — 92341 FIT SPECTACLES BIFOCAL: CPT | Performed by: OPTOMETRIST

## 2017-07-19 ASSESSMENT — VISUAL ACUITY
OS_SC: J3
OD_SC+: -2
OS_SC: 20/30
OD_SC: 20/30
METHOD: SNELLEN - LINEAR
OD_SC: J5

## 2017-07-19 ASSESSMENT — EXTERNAL EXAM - RIGHT EYE: OD_EXAM: NORMAL

## 2017-07-19 ASSESSMENT — REFRACTION_WEARINGRX
OD_AXIS: 30
SPECS_TYPE: LAST RX
OD_CYLINDER: +1.50
OD_SPHERE: -1.75
OS_ADD: +2.00
OD_ADD: +2.00
OS_SPHERE: -0.50

## 2017-07-19 ASSESSMENT — REFRACTION_MANIFEST
OS_SPHERE: -0.50
OD_CYLINDER: +1.00
OD_AXIS: 050
METHOD_AUTOREFRACTION: 1
OD_SPHERE: -1.00
OD_CYLINDER: +1.00
OS_SPHERE: -0.50
OS_ADD: +2.00
OD_ADD: +2.00
OD_AXIS: 037
OD_SPHERE: -1.00

## 2017-07-19 ASSESSMENT — EXTERNAL EXAM - LEFT EYE: OS_EXAM: NORMAL

## 2017-07-19 ASSESSMENT — TONOMETRY
IOP_METHOD: TONOPEN
OD_IOP_MMHG: 5
OS_IOP_MMHG: 12

## 2017-07-19 ASSESSMENT — CUP TO DISC RATIO
OS_RATIO: 0.2
OD_RATIO: 0.2

## 2017-07-19 ASSESSMENT — SLIT LAMP EXAM - LIDS
COMMENTS: NORMAL
COMMENTS: NORMAL

## 2017-07-19 ASSESSMENT — CONF VISUAL FIELD
METHOD: COUNTING FINGERS
OD_NORMAL: 1
OS_NORMAL: 1

## 2017-07-19 NOTE — PROGRESS NOTES
Chief Complaint   Patient presents with     Eye Exam For Diabetes        Lab Results   Component Value Date    A1C 6.1 04/18/2017    A1C 6.7 11/01/2016    A1C 6.2 03/01/2016       Last Eye Exam: 4/2016  Dilated Previously: Yes    What are you currently using to see?  readers    Distance Vision Acuity: Satisfied with vision    Near Vision Acuity: Satisfied with vision while reading  with readers    Eye Comfort: dry - left eye mostly  Do you use eye drops? : Yes: On occasion Pt will use [place]  Occupation or Hobbies: [employer] - reading - [employer]  [employer]       Medical, surgical and family histories reviewed and updated 7/19/2017.       OBJECTIVE: See Ophthalmology exam    ASSESSMENT:    ICD-10-CM    1. Myopia with presbyopia, left H52.12 EYE EXAM (SIMPLE-NONBILLABLE)    H52.4 REFRACTION   2. Astigmatism with presbyopia, right H52.201 EYE EXAM (SIMPLE-NONBILLABLE)    H52.4 REFRACTION   3. Type 2 diabetes mellitus without retinopathy (H) E11.9 EYE EXAM (SIMPLE-NONBILLABLE)      PLAN:    Melody Ferguson aware  eye exam results will be sent to Elizabeth Lamas.  Patient Instructions   There was no diabetic eye disease in either eye today. Keep blood sugar levels under good control and return yearly for eye exams.    Rx given for glasses.

## 2017-07-19 NOTE — PATIENT INSTRUCTIONS
There was no diabetic eye disease in either eye today. Keep blood sugar levels under good control and return yearly for eye exams.    Rx given for glasses.

## 2017-07-19 NOTE — MR AVS SNAPSHOT
After Visit Summary   7/19/2017    Melody Ferguson    MRN: [id number]           Patient Information     Date Of Birth          1960        Visit Information        Provider Department      7/19/2017 2:00 PM Keren Longo OD [facility]        Today's Diagnoses     Myopia with presbyopia, left    -  1    Astigmatism with presbyopia, right        Type 2 diabetes mellitus without retinopathy (H)          Care Instructions    There was no diabetic eye disease in either eye today. Keep blood sugar levels under good control and return yearly for eye exams.    Rx given for glasses.              Follow-ups after your visit        Follow-up notes from your care team     Return in about 1 year (around 7/19/2018) for comprehensive eye exam.      Who to contact     If you have questions or need follow up information about today's clinic visit or your schedule please contact [facility] directly at [phone].  Normal or non-critical lab and imaging results will be communicated to you by MyChart, letter or phone within 4 business days after the clinic has received the results. If you do not hear from us within 7 days, please contact the clinic through [employer] or phone. If you have a critical or abnormal lab result, we will notify you by phone as soon as possible.  Submit refill requests through [employer] or call your pharmacy and they will forward the refill request to us. Please allow 3 business days for your refill to be completed.          Additional Information About Your Visit        MyChart Information     [employer] gives you secure access to your electronic health record. If you see a primary care provider, you can also send messages to your care team and make appointments. If you have questions, please call your primary care clinic.  If you do not have a primary care provider, please call [phone] and they will assist you.        Care EveryWhere ID     This is  your Care EveryWhere ID. This could be used by other organizations to access your [place] medical records  [id number]         Blood Pressure from Last 3 Encounters:   07/14/17 137/73   07/11/17 118/80   06/19/17 130/58    Weight from Last 3 Encounters:   07/14/17 (!) 138.3 kg (305 lb)   07/11/17 (!) 142.9 kg (315 lb)   05/10/17 (!) 147 kg (324 lb)              We Performed the Following     EYE EXAM (SIMPLE-NONBILLABLE)     REFRACTION        Primary Care Provider Office Phone # Fax #    Elizabeth Lamas [phone]       [facility] [address]  [facility] [address]        Equal Access to Services     EVY KIRAN : Hadii norberto ku hadasho Soomaali, waaxda luqadaha, qaybta kaalmada adeegyada, waxay idiin haybarbara claire . So [facility] [phone].    ATENCIÓN: Si habla español, tiene a turner disposición servicios gratuitos de asistencia lingüística. Llame al [phone].    We comply with applicable federal civil rights laws and [place] laws. We do not discriminate on the basis of race, color, national origin, age, disability sex, sexual orientation or gender identity.            Thank you!     Thank you for choosing [facility]  for your care. Our goal is always to provide you with excellent care. Hearing back from our patients is one way we can continue to improve our services. Please take a few minutes to complete the written survey that you may receive in the mail after your visit with us. Thank you!             Your Updated Medication List - Protect others around you: Learn how to safely use, store and throw away your medicines at www.disposemymeds.org.          This list is accurate as of: 7/19/17  2:49 PM.  Always use your most recent med list.                   Brand Name Dispense Instructions for use Diagnosis    ACE NOT PRESCRIBED (INTENTIONAL)     0 each    Reported on 5/10/2017    Type 2 diabetes mellitus without complication (H)        albuterol 108 (90 BASE) MCG/ACT Inhaler    PROAIR HFA/PROVENTIL HFA/VENTOLIN HFA     Inhale 2 puffs into the lungs every 6 hours        ASPIRIN PO      Take 81 mg by mouth daily        ATORVASTATIN CALCIUM PO      Take 40 mg by mouth daily        BUPROPION HCL PO      Take 50 mg by mouth 2 times daily        citalopram 40 MG tablet    celeXA    90 tablet    Take 1 tablet (40 mg) by mouth every morning    Major depressive disorder, recurrent episode, moderate (H)       cyclobenzaprine 5 MG tablet    FLEXERIL    60 tablet    Take 1 tablet (5 mg) by mouth 2 times daily as needed for muscle spasms    Left sided sciatica, Back muscle spasm       esomeprazole 40 MG CR capsule    nexIUM    30 capsule    TAKE 1 CAPSULE BY MOUTH EVERY DAY    Dyspepsia       fluticasone 27.5 MCG/SPRAY spray    VERAMYST     Spray 2 sprays into both nostrils daily        gabapentin 300 MG capsule    NEURONTIN    90 capsule    Take 1 capsule (300 mg) by mouth At Bedtime    Acute left-sided low back pain with left-sided sciatica       glipiZIDE-metFORMIN 5-500 MG per tablet    METAGLIP    360 tablet    Take 2 tablets by mouth 2 times daily (before meals)    Type 2 diabetes mellitus with diabetic nephropathy, without long-term current use of insulin (H)       HYDROcodone-acetaminophen 5-325 MG per tablet    NORCO    10 tablet    Take 1-2 tablets by mouth every 4 hours as needed for moderate to severe pain (Moderate to Severe Pain)    SEAN (stress urinary incontinence, female)       losartan 50 MG tablet    COZAAR    90 tablet    Take 1 tablet (50 mg) by mouth daily    HTN, goal below 140/90, Type 2 diabetes mellitus with diabetic nephropathy, without long-term current use of insulin (H)       melatonin 3 MG tablet     30 tablet    TAKE 1 TABLET BY MOUTH ONCE AT BEDTIME AS NEEDED FOR SLEEP    Major depressive disorder, recurrent episode, moderate (H)       metoprolol 25 MG 24 hr tablet    TOPROL XL    30 tablet    Take 1 tablet (25 mg) by mouth  daily    Old myocardial infarction       montelukast 10 MG tablet    SINGULAIR    90 tablet    TAKE 1 TABLET BY MOUTH EVERY NIGHT AT BEDTIME    Sciatica of left side       nystatin 535691 UNIT/GM Powd    NYSTOP    60 g    APPLY TO THE AFFECTED AREA TWO TO THREE TIMES DAILY AS NEEDED    Morbid obesity with body mass index of 45.0-49.9 in adult (H)       order for DME     3 Month    Diabetic test strips and lancets per insurance formulary Check blood sugar 2 times daily    Type 2 diabetes mellitus with diabetic nephropathy (H)       * pioglitazone 30 MG tablet    ACTOS    135 tablet    Take 0.5 tablet by mouth in the morning with a meal and 1 tablet by mouth in the evening with a meal.    Type 2 diabetes mellitus with diabetic nephropathy, without long-term current use of insulin (H)       * pioglitazone 15 MG tablet    ACTOS    90 tablet    Take 1 tablet (15 mg) by mouth daily TAKE 1 TABLET BY MOUTH EVERY MORNING WITH A MEAL    Type 2 diabetes mellitus without complication, without long-term current use of insulin (H)       polyethylene glycol powder    MIRALAX/GLYCOLAX    510 g    Take 17 g by mouth daily DISSOLVE 17 GRAMS IN LIQUID AND DRINK DAILY AS DIRECTED    Chronic pain syndrome       traMADol 50 MG tablet    ULTRAM    30 tablet    Take 1 tablet (50 mg) by mouth every 8 hours as needed for moderate pain    Chronic pain syndrome       VESICARE 10 MG tablet   Generic drug:  solifenacin     90 tablet    TAKE 1 TABLET BY MOUTH EVERY DAY    Mixed incontinence       * Notice:  This list has 2 medication(s) that are the same as other medications prescribed for you. Read the directions carefully, and ask your doctor or other care provider to review them with you.

## 2017-07-20 ENCOUNTER — TELEPHONE (OUTPATIENT)
Dept: FAMILY MEDICINE | Facility: CLINIC | Age: 57
End: 2017-07-20

## 2017-07-20 DIAGNOSIS — E11.9 TYPE 2 DIABETES MELLITUS WITHOUT RETINOPATHY (H): Primary | ICD-10-CM

## 2017-07-20 NOTE — TELEPHONE ENCOUNTER
Stacey MCCARTHY request for :    Plan does not cover pioglitazone (ACTOS) 30 MG tablet.  Please call [phone] to initiate Prior Auth or change med.    ID [id number]    PA needed for more than 1 per day      [facility]

## 2017-07-20 NOTE — OP NOTE
Operative Note  17  Marielle Ferguson  : 56  MR: [id number]    Procedure performed: Pubovaginal sling (Altis)  Sterling PATRICIO  EBL: 100 cc  Drain: none  Compl: none    Indications for surgery:  This is a 40 yo female who has undergone extensive outpatient evaluation for stress urinary incontinence with hypermobility. She has been found to have moderate hypermobility of the bladder neck and urethra as well as grade 1 anterior defect. Cystoscopy reveals normal bladder mucosa, capacity of 400 cc with moderate hypermobility and marked leak on Valsalva. In light of these findings she has been offered pubovaginal sling in with the Altis device. Informed consent is obtained and documented in the outpatient record.    Surgery in detail:  The patient was brought to the operating room and carefully positioned supine.  General endotracheal anesthesia was administered without incident.  Prophylactic antibiotics were administered.  Pneumatic stockings were placed.  She was then carefully positioned in low lithotomy and a sterile prep and drape was performed.  Surgical timeout was performed per protocol.       The vagina was prepared for surgery with a 20-[place]ong[place]ian Rangel catheter and [place] retractor.  Laxity of support at the bladder neck and urethra were again appreciated.  A marking pen was used to delineate the line of the proposed incision over the mid urethra.  The subepithelial tissue was infiltrated with sterile saline.  A #15 blade was used to make a longitudinal incision.  A combination of sharp and blunt dissection was carried back to the ventral portion of the anterior ramus.  This was directed at approximately 2 and 10 o'clock.       At this point, the Altis sling was carefully loaded onto the right side passing instrument.  The static anchor was then passed into the right obturator membrane at approximately the 10 o'clock position in an external rotational manner.  The introducer was carefully withdrawn in  a reverse helical fashion.  Attention was performed to ensure adequate anchoring.  The left side instrument was then carefully secured to the Dynamic anchor, which was carefully freed from the suture with gentle motion.  This anchor was then carefully secured into the patient's left obturator membrane at approximately 2 o'clock, again using external rotation.  At this point, tensioning was performed to ensure that the Altis sling lay appropriately on the urethra with appropriate tension.  Once this was ascertained, the redundant portion of the tensioning suture was transected and removed.  Copious antibiotic irrigation was used.  Meticulous hemostasis was documented at the closure of the case, although there was a small amount of continuous oozing during the brief procedure.  Closure was performed with a running, locked stitch of 2-0 Vicryl.  A vaginal packing soaked in antibiotic solution was temporarily placed.  The Rangel catheter and retractor were withdrawn.       Cystoscopy was performed under real time video control with the 70-degree optic.  There was no evidence of mesh or suture in the lower urinary tract.  The bladder was drained and the scope was withdrawn.  The patient tolerated the procedure in a satisfactory manner and was brought to the recovery in stable condition.  There were no operative complications.     SMB

## 2017-07-20 NOTE — TELEPHONE ENCOUNTER
Does the plan cover any dosage forms for Actos, example 15 mg, or 45 mg.    Thanks,    Elizabeth Lamas MD MPH

## 2017-07-21 ENCOUNTER — MYC MEDICAL ADVICE (OUTPATIENT)
Dept: FAMILY MEDICINE | Facility: CLINIC | Age: 57
End: 2017-07-21

## 2017-07-21 DIAGNOSIS — Z96.652 HISTORY OF TOTAL KNEE ARTHROPLASTY, LEFT: ICD-10-CM

## 2017-07-21 NOTE — TELEPHONE ENCOUNTER
The medication has a quantity limit exception, they will only allow 1 tab a day.  PA form placed on providers desk.  Marcie Kim MA

## 2017-07-21 NOTE — TELEPHONE ENCOUNTER
Correction- insurance will cover the 45mg if only it is 1 tab daily.  Routing to provider to advise.  Marcie Kim MA

## 2017-07-21 NOTE — OP NOTE
Surgeon / Clinician: Talon Katz MD    Date of surgery:  07/14/2017    Procedure performed:  Pubovaginal sling (Altis).    Preoperative diagnosis:  Stress urinary incontinence with hypermobility.    Postoperative diagnosis:  Stress urinary incontinence with hypermobility.    Anesthesia:  MAC.    Surgeon:  Talon Katz MD    Estimated blood loss:  100 mL.     Drains:  None.     Complications:  None.      Indications for surgery:  Melody Ferguson is a 56-year-old white female who has undergone extensive outpatient evaluation for progressive stress urinary incontinence in conjunction with overactive bladder and a history of previous anterior repair.  Pertinent findings on evaluation include significant truncal obesity, moderate hypermobility of the bladder neck and urethra and grade 1 anterior compartment defect.  Cystoscopic findings include normal bladder mucosa, capacity on the order of 400 mL with moderate hypermobility and marked demonstrable leakage on Valsalva maneuver.  In light of these findings, Ms. Ferguson was offered pubovaginal sling using the Altis technique.  Informed consent was carefully obtained and documented in the outpatient record.      Surgery in detail:  On the morning of 07/14/2017 Ms. Ferguson was brought to the operating room.  She was placed in the supine position.  Intravenous sedation and prophylactic antibiotics were administered.  Pneumatic stockings were placed.  She was carefully positioned in low lithotomy.  A sterile prep and drape was performed.  Surgical timeout was performed per protocol.      The vagina was prepared for surgery with the 20-[place] Rangel catheter and [place] retractor.  A marking pen was used to delineate the site of proposed incision over the mid urethra.  Subepithelial tissue was infiltrated with sterile saline.  A #15 blade was used to make a longitudinal incision.  A combination of sharp and blunt dissection was used to dissect back to the descending  rami bilaterally.  The Altis anchors were then carefully placed on the introducer for the right side.  The right anchor was placed into the right obturator membrane without difficulty.  The left side was freed in an analogous manner.  Tension was then taken off the Rangel catheter.  Slight Trendelenburg was used to optimize the exposure.  The sling was brought to the level of the mid urethra with gentle coaptation.  Redundant tensioning sutures transected and removed.  Copious antibiotic irrigation was used.  It should be noted that there was a small amount of oozing through the entire portion of the proc.  Closure was then performed with a running locked stitch of 2-0 Vicryl.  A vaginal packing, soaked in antibiotic solution, was carefully placed.      The patient was placed back in the flat position.  Cystoscopy was performed under real time video control with a 70-degree optic.  Clear efflux was seen and there was no evidence of abnormality in the lower urinary tract.  The bladder was drained the scope was withdrawn.  The patient tolerated the procedure in a satisfactory manner and was brought to the recovery room in satisfactory condition.  There were no operative complications.        Talon Katz MD    D:  07/17/2017 14:52 T:  07/20/2017 17:00  Document:  [id number] SB\CD

## 2017-07-25 RX ORDER — PIOGLITAZONEHYDROCHLORIDE 45 MG/1
45 TABLET ORAL DAILY
Qty: 90 TABLET | Refills: 1 | Status: SHIPPED | OUTPATIENT
Start: 2017-07-25 | End: 2017-09-12 | Stop reason: DRUGHIGH

## 2017-07-25 RX ORDER — AMOXICILLIN 500 MG/1
2000 CAPSULE ORAL ONCE
Qty: 4 CAPSULE | Refills: 0 | Status: SHIPPED | OUTPATIENT
Start: 2017-07-25 | End: 2017-08-07

## 2017-07-25 NOTE — TELEPHONE ENCOUNTER
Please let patient know that her insurance only covers the 45 mg daily dosage of Actos. Hence, I sent in a new script. Take Actos 45 MG ONCE DAILY.    Thanks,    Elizabeth Lamas MD MPH

## 2017-08-04 DIAGNOSIS — R10.13 DYSPEPSIA: ICD-10-CM

## 2017-08-04 NOTE — TELEPHONE ENCOUNTER
esomeprazole (NEXIUM) 40 MG CR capsule      Last Written Prescription Date: 07/11/17  Last Fill Quantity: 30,  # refills: 0   Last Office Visit with [facility], [facility] or [facility] prescribing provider: 07/11/17      [facility]

## 2017-08-05 DIAGNOSIS — M54.32 LEFT SIDED SCIATICA: ICD-10-CM

## 2017-08-05 DIAGNOSIS — M62.830 BACK MUSCLE SPASM: ICD-10-CM

## 2017-08-05 DIAGNOSIS — N39.46 MIXED INCONTINENCE: ICD-10-CM

## 2017-08-05 NOTE — TELEPHONE ENCOUNTER
VESICARE 10 MG tablet      Last Written Prescription Date: 7/13/17  Last Fill Quantity: 90,  # refills: 0   Last Office Visit with [facility] or [facility] prescribing provider: 7/11/17    cyclobenzaprine (FLEXERIL) 5 MG tablet      Last Written Prescription Date:  7/13/17  Last Fill Quantity: 60,   # refills: 0  Last Office Visit with [facility], [facility] or [facility] prescribing provider: 7/11/17  Future Office visit:       Routing refill request to provider for review/approval because:  Drug not on the [facility], [facility] or [facility] refill protocol or controlled substance          Jose Carlos Faarax  [facility]

## 2017-08-06 RX ORDER — CYCLOBENZAPRINE HCL 5 MG
TABLET ORAL
Qty: 60 TABLET | Refills: 0 | Status: SHIPPED | OUTPATIENT
Start: 2017-08-06 | End: 2017-11-06

## 2017-08-06 RX ORDER — SOLIFENACIN SUCCINATE 10 MG/1
TABLET, FILM COATED ORAL
Qty: 90 TABLET | Refills: 0 | Status: SHIPPED | OUTPATIENT
Start: 2017-08-06 | End: 2017-11-06

## 2017-08-07 ENCOUNTER — MYC MEDICAL ADVICE (OUTPATIENT)
Dept: FAMILY MEDICINE | Facility: CLINIC | Age: 57
End: 2017-08-07

## 2017-08-07 ENCOUNTER — MYC REFILL (OUTPATIENT)
Dept: FAMILY MEDICINE | Facility: CLINIC | Age: 57
End: 2017-08-07

## 2017-08-07 DIAGNOSIS — E66.01 MORBID OBESITY WITH BODY MASS INDEX OF 45.0-49.9 IN ADULT (H): ICD-10-CM

## 2017-08-07 DIAGNOSIS — F33.1 MAJOR DEPRESSIVE DISORDER, RECURRENT EPISODE, MODERATE (H): ICD-10-CM

## 2017-08-07 DIAGNOSIS — I10 HTN, GOAL BELOW 140/90: Chronic | ICD-10-CM

## 2017-08-07 DIAGNOSIS — R10.13 DYSPEPSIA: ICD-10-CM

## 2017-08-07 DIAGNOSIS — Z96.652 HISTORY OF TOTAL KNEE ARTHROPLASTY, LEFT: ICD-10-CM

## 2017-08-07 DIAGNOSIS — G89.4 CHRONIC PAIN SYNDROME: ICD-10-CM

## 2017-08-07 DIAGNOSIS — M54.32 SCIATICA OF LEFT SIDE: ICD-10-CM

## 2017-08-07 DIAGNOSIS — E11.21 TYPE 2 DIABETES MELLITUS WITH DIABETIC NEPHROPATHY, WITHOUT LONG-TERM CURRENT USE OF INSULIN (H): Chronic | ICD-10-CM

## 2017-08-07 RX ORDER — ESOMEPRAZOLE MAGNESIUM 40 MG/1
CAPSULE, DELAYED RELEASE ORAL
Qty: 30 CAPSULE | Refills: 0 | Status: CANCELLED | OUTPATIENT
Start: 2017-08-07

## 2017-08-07 RX ORDER — CITALOPRAM HYDROBROMIDE 40 MG/1
40 TABLET ORAL EVERY MORNING
Qty: 90 TABLET | Refills: 0 | Status: CANCELLED | OUTPATIENT
Start: 2017-08-07

## 2017-08-07 NOTE — TELEPHONE ENCOUNTER
The rest of the medications requested should have refill or still have some left at the requested pharmacy.        esomeprazole (NEXIUM) 40 MG CR capsule    -duplicate request from 08/04/17  Last Written Prescription Date: 07/11/17  Last Fill Quantity: 30,  # refills: 0   Last Office Visit with [facility], [facility] or [facility] prescribing provider: 07/11/17      traMADol (ULTRAM) 50 MG tablet      Last Written Prescription Date:  07/13/17  Last Fill Quantity: 30,   # refills: 0  Last Office Visit with [facility], [facility] or [facility] prescribing provider: 07/11/17  Future Office visit:       Routing refill request to provider for review/approval because:  Drug not on the [facility], [facility] or [facility] refill protocol or controlled substance                                               [facility]

## 2017-08-07 NOTE — TELEPHONE ENCOUNTER
[facility]  Original authorizing provider: Elizabeth Lamas MD    Melody Ferguson would like a refill of the following medications:  polyethylene glycol (MIRALAX/GLYCOLAX) powder [Elizabeth Lamas MD]  losartan (COZAAR) 50 MG tablet [Elizabeth Lamas MD]  citalopram (CELEXA) 40 MG tablet [Elizabeth Lamas MD]  nystatin (NYSTOP) 971116 UNIT/GM POWD [Elizabeth Lamas MD]  montelukast (SINGULAIR) 10 MG tablet [Elizabeth Lamas MD]  esomeprazole (NEXIUM) 40 MG CR capsule [Elizabeth Lamas MD]  traMADol (ULTRAM) 50 MG tablet [Elizabeth Lamas MD]    Preferred pharmacy: [facility] [id number] [place] [address] - [address] AT [facility] [address]    Comment:      Medication renewals requested in this message routed to other providers:  metoprolol (TOPROL XL) 25 MG 24 hr tablet [TAO POP MD]

## 2017-08-08 RX ORDER — ESOMEPRAZOLE MAGNESIUM 40 MG/1
CAPSULE, DELAYED RELEASE ORAL
Qty: 30 CAPSULE | Refills: 11 | Status: SHIPPED | OUTPATIENT
Start: 2017-08-08 | End: 2017-09-12

## 2017-08-08 RX ORDER — AMOXICILLIN 500 MG/1
2000 CAPSULE ORAL ONCE
Qty: 4 CAPSULE | Refills: 0 | Status: SHIPPED | OUTPATIENT
Start: 2017-08-08 | End: 2018-12-23

## 2017-08-08 NOTE — TELEPHONE ENCOUNTER
Please assist patient in setting up a My Chart visit to I can address her concerns, and provide a new referral.    Thanks,    Elizabeth Lamas MD MPH

## 2017-08-09 ENCOUNTER — MYC MEDICAL ADVICE (OUTPATIENT)
Dept: FAMILY MEDICINE | Facility: CLINIC | Age: 57
End: 2017-08-09

## 2017-08-09 DIAGNOSIS — Z96.652 HISTORY OF TOTAL KNEE ARTHROPLASTY, LEFT: ICD-10-CM

## 2017-08-10 RX ORDER — AMOXICILLIN 500 MG/1
2000 CAPSULE ORAL ONCE
Qty: 4 CAPSULE | Refills: 3 | Status: SHIPPED | OUTPATIENT
Start: 2017-08-10 | End: 2017-08-10

## 2017-08-10 ASSESSMENT — ANXIETY QUESTIONNAIRES
1. FEELING NERVOUS, ANXIOUS, OR ON EDGE: SEVERAL DAYS
7. FEELING AFRAID AS IF SOMETHING AWFUL MIGHT HAPPEN: SEVERAL DAYS
GAD7 TOTAL SCORE: 10
4. TROUBLE RELAXING: MORE THAN HALF THE DAYS
GAD7 TOTAL SCORE: 10
3. WORRYING TOO MUCH ABOUT DIFFERENT THINGS: MORE THAN HALF THE DAYS
GAD7 TOTAL SCORE: 10
6. BECOMING EASILY ANNOYED OR IRRITABLE: SEVERAL DAYS
2. NOT BEING ABLE TO STOP OR CONTROL WORRYING: MORE THAN HALF THE DAYS
7. FEELING AFRAID AS IF SOMETHING AWFUL MIGHT HAPPEN: SEVERAL DAYS
5. BEING SO RESTLESS THAT IT IS HARD TO SIT STILL: SEVERAL DAYS

## 2017-08-10 ASSESSMENT — PATIENT HEALTH QUESTIONNAIRE - PHQ9
SUM OF ALL RESPONSES TO PHQ QUESTIONS 1-9: 10
SUM OF ALL RESPONSES TO PHQ QUESTIONS 1-9: 10
10. IF YOU CHECKED OFF ANY PROBLEMS, HOW DIFFICULT HAVE THESE PROBLEMS MADE IT FOR YOU TO DO YOUR WORK, TAKE CARE OF THINGS AT HOME, OR GET ALONG WITH OTHER PEOPLE: SOMEWHAT DIFFICULT

## 2017-08-11 ASSESSMENT — ANXIETY QUESTIONNAIRES: GAD7 TOTAL SCORE: 10

## 2017-08-11 ASSESSMENT — PATIENT HEALTH QUESTIONNAIRE - PHQ9: SUM OF ALL RESPONSES TO PHQ QUESTIONS 1-9: 10

## 2017-08-12 RX ORDER — LOSARTAN POTASSIUM 50 MG/1
50 TABLET ORAL DAILY
Qty: 90 TABLET | Refills: 1 | OUTPATIENT
Start: 2017-08-12

## 2017-08-12 RX ORDER — NYSTATIN 100000 [USP'U]/G
POWDER TOPICAL
Qty: 60 G | Refills: 3 | OUTPATIENT
Start: 2017-08-12

## 2017-08-12 RX ORDER — POLYETHYLENE GLYCOL 3350 17 G/17G
17 POWDER, FOR SOLUTION ORAL DAILY
Qty: 510 G | Refills: 3 | Status: CANCELLED | OUTPATIENT
Start: 2017-08-12

## 2017-08-12 RX ORDER — MONTELUKAST SODIUM 10 MG/1
1 TABLET ORAL AT BEDTIME
Qty: 90 TABLET | Refills: 0 | OUTPATIENT
Start: 2017-08-12

## 2017-08-12 NOTE — TELEPHONE ENCOUNTER
Nexium - sent by provider on 08/08/2017 for #30 with 11 refills.    Singulair - Medication is being denied due to: RX was sent 07/09/2017 for #90 with 0 refills - no additional refills should be needed at this time.     Nystatin - Medication is being denied due to: RX was sent 07/07/2017 for #60 with 3 refills.  No additional refills should be needed at this time.     Losartan - Medication is being denied due to: Rx was sent 07/11/2017 for #90 with 1 refill.  No additional refills should be needed at this time.'    Tramadol - Routing refill request to provider for review/approval because:  Drug not on the [facility] refill protocol   Cara Maloney RN

## 2017-08-15 RX ORDER — TRAMADOL HYDROCHLORIDE 50 MG/1
50 TABLET ORAL EVERY 8 HOURS PRN
Qty: 30 TABLET | Refills: 0 | Status: SHIPPED | OUTPATIENT
Start: 2017-08-15 | End: 2017-09-12

## 2017-08-15 NOTE — TELEPHONE ENCOUNTER
Faxed Rx for the Ultram to [facility], [phone],  Right fax confirmed at 9:11 am today.  Yuli Giang MA/  For [employer] and [employer]

## 2017-08-22 ENCOUNTER — OFFICE VISIT (OUTPATIENT)
Dept: ORTHOPEDICS | Facility: CLINIC | Age: 57
End: 2017-08-22
Payer: MEDICARE

## 2017-08-22 VITALS — BODY MASS INDEX: 45.99 KG/M2 | HEIGHT: 67 IN | WEIGHT: 293 LBS | RESPIRATION RATE: 12 BRPM

## 2017-08-22 DIAGNOSIS — M17.11 PRIMARY OSTEOARTHRITIS OF RIGHT KNEE: Primary | ICD-10-CM

## 2017-08-22 DIAGNOSIS — Z96.652 HISTORY OF TOTAL KNEE ARTHROPLASTY, LEFT: ICD-10-CM

## 2017-08-22 PROCEDURE — 20610 DRAIN/INJ JOINT/BURSA W/O US: CPT | Mod: RT | Performed by: ORTHOPAEDIC SURGERY

## 2017-08-22 RX ORDER — METHYLPREDNISOLONE ACETATE 80 MG/ML
80 INJECTION, SUSPENSION INTRA-ARTICULAR; INTRALESIONAL; INTRAMUSCULAR; SOFT TISSUE ONCE
Qty: 1 ML | Refills: 0 | OUTPATIENT
Start: 2017-08-22 | End: 2017-08-22

## 2017-08-22 NOTE — MR AVS SNAPSHOT
After Visit Summary   8/22/2017    Melody Ferguson    MRN: [id number]           Patient Information     Date Of Birth          1960        Visit Information        Provider Department      8/22/2017 8:15 AM Armond George MD [facility]        Care Instructions    You have had a steroid injection today.  For the first 2 hours there will likely be some numbing in the joint from the lidocaine.  This is a good sign, indicating that the injection is in the right place.  In 2 hours the lidocaine will wear off, and the joint will hurt like you had a shot.  Each day the cortisone makes it feel better.  It reaches peak effect in 2 weeks.  We expect it to last for 3 months.  You may resume regular activity when you feel ready.  If you are diabetic, your glucoses will be quite high for several days.            Follow-ups after your visit        Your next 10 appointments already scheduled     Aug 22, 2017  8:15 AM CDT   Return Visit with Armond George MD   [facility] ([facility])    [address] [place] [id number] [phone]              Who to contact     If you have questions or need follow up information about today's clinic visit or your schedule please contact [facility] directly at [phone].  Normal or non-critical lab and imaging results will be communicated to you by MyChart, letter or phone within 4 business days after the clinic has received the results. If you do not hear from us within 7 days, please contact the clinic through MyChart or phone. If you have a critical or abnormal lab result, we will notify you by phone as soon as possible.  Submit refill requests through [employer] or call your pharmacy and they will forward the refill request to us. Please allow 3 business days for your refill to be completed.          Additional Information About Your Visit        [facility]  "Information     [employer] gives you secure access to your electronic health record. If you see a primary care provider, you can also send messages to your care team and make appointments. If you have questions, please call your primary care clinic.  If you do not have a primary care provider, please call [phone] and they will assist you.        Care EveryWhere ID     This is your Care EveryWhere ID. This could be used by other organizations to access your [facility] medical records  [id number]        Your Vitals Were     Respirations Height BMI (Body Mass Index)             12 1.702 m (5' 7\") 49.02 kg/m2          Blood Pressure from Last 3 Encounters:   07/14/17 137/73   07/11/17 118/80   06/19/17 130/58    Weight from Last 3 Encounters:   08/22/17 (!) 142 kg (313 lb)   07/14/17 (!) 138.3 kg (305 lb)   07/11/17 (!) 142.9 kg (315 lb)              Today, you had the following     No orders found for display       Primary Care Provider Office Phone # Fax #    Elizabeth Lamas [phone]       [address]  [facility] [address]        Equal Access to Services     ADELA KIRAN AH: Hadii norberto ku hadasho Soomaali, waaxda luqadaha, qaybta kaalmada adeegyada, issac reed. So [facility] [phone].    ATENCIÓN: Si habla español, tiene a turner disposición servicios gratuitos de asistencia lingüística. Llame al [phone].    We comply with applicable federal civil rights laws and [place] laws. We do not discriminate on the basis of race, color, national origin, age, disability sex, sexual orientation or gender identity.            Thank you!     Thank you for choosing [facility]  for your care. Our goal is always to provide you with excellent care. Hearing back from our patients is one way we can continue to improve our services. Please take a few minutes to complete the written survey that you may receive in the mail after your visit with us. Thank you!      "        Your Updated Medication List - Protect others around you: Learn how to safely use, store and throw away your medicines at www.disposemymeds.org.          This list is accurate as of: 8/22/17  8:13 AM.  Always use your most recent med list.                   Brand Name Dispense Instructions for use Diagnosis    ACE NOT PRESCRIBED (INTENTIONAL)     0 each    Reported on 5/10/2017    Type 2 diabetes mellitus without complication (H)       albuterol 108 (90 BASE) MCG/ACT Inhaler    PROAIR HFA/PROVENTIL HFA/VENTOLIN HFA     Inhale 2 puffs into the lungs every 6 hours        ASPIRIN PO      Take 81 mg by mouth daily        ATORVASTATIN CALCIUM PO      Take 40 mg by mouth daily        BUPROPION HCL PO      Take 50 mg by mouth 2 times daily        citalopram 40 MG tablet    celeXA    90 tablet    Take 1 tablet (40 mg) by mouth every morning    Major depressive disorder, recurrent episode, moderate (H)       cyclobenzaprine 5 MG tablet    FLEXERIL    60 tablet    TAKE 1 TABLET(5 MG) BY MOUTH TWICE DAILY AS NEEDED FOR MUSCLE SPASMS    Left sided sciatica, Back muscle spasm       esomeprazole 40 MG CR capsule    nexIUM    30 capsule    TAKE 1 CAPSULE BY MOUTH EVERY DAY    Dyspepsia       fluticasone 27.5 MCG/SPRAY spray    VERAMYST     Spray 2 sprays into both nostrils daily        gabapentin 300 MG capsule    NEURONTIN    90 capsule    Take 1 capsule (300 mg) by mouth At Bedtime    Acute left-sided low back pain with left-sided sciatica       glipiZIDE-metFORMIN 5-500 MG per tablet    METAGLIP    360 tablet    Take 2 tablets by mouth 2 times daily (before meals)    Type 2 diabetes mellitus with diabetic nephropathy, without long-term current use of insulin (H)       HYDROcodone-acetaminophen 5-325 MG per tablet    NORCO    10 tablet    Take 1-2 tablets by mouth every 4 hours as needed for moderate to severe pain (Moderate to Severe Pain)    SEAN (stress urinary incontinence, female)       losartan 50 MG tablet    COZAAR     90 tablet    Take 1 tablet (50 mg) by mouth daily    HTN, goal below 140/90, Type 2 diabetes mellitus with diabetic nephropathy, without long-term current use of insulin (H)       melatonin 3 MG tablet     30 tablet    TAKE 1 TABLET BY MOUTH ONCE AT BEDTIME AS NEEDED FOR SLEEP    Major depressive disorder, recurrent episode, moderate (H)       metoprolol 25 MG 24 hr tablet    TOPROL XL    30 tablet    Take 1 tablet (25 mg) by mouth daily    Old myocardial infarction       montelukast 10 MG tablet    SINGULAIR    90 tablet    TAKE 1 TABLET BY MOUTH EVERY NIGHT AT BEDTIME    Sciatica of left side       nystatin 315252 UNIT/GM Powd    NYSTOP    60 g    APPLY TO THE AFFECTED AREA TWO TO THREE TIMES DAILY AS NEEDED    Morbid obesity with body mass index of 45.0-49.9 in adult (H)       order for DME     3 Month    Diabetic test strips and lancets per insurance formulary Check blood sugar 2 times daily    Type 2 diabetes mellitus with diabetic nephropathy (H)       pioglitazone 45 MG tablet    ACTOS    90 tablet    Take 1 tablet (45 mg) by mouth daily    Type 2 diabetes mellitus without retinopathy (H)       polyethylene glycol powder    MIRALAX/GLYCOLAX    510 g    Take 17 g by mouth daily DISSOLVE 17 GRAMS IN LIQUID AND DRINK DAILY AS DIRECTED    Chronic pain syndrome       traMADol 50 MG tablet    ULTRAM    30 tablet    Take 1 tablet (50 mg) by mouth every 8 hours as needed for moderate pain    Chronic pain syndrome       VESICARE 10 MG tablet   Generic drug:  solifenacin     90 tablet    TAKE 1 TABLET BY MOUTH EVERY DAY    Mixed incontinence

## 2017-08-22 NOTE — LETTER
8/22/2017         RE: Melody Ferguson  [address]  [facility] [address]        Dear Colleague,    Thank you for referring your patient, Melody Ferguson, to the [facility]. Please see a copy of my visit note below.    The patient's right knee was prepped with betadine solution after verification of allergies. Area approximately 10 cm x 10 cm prepped in a sterile fashion. After injection, betadine removed with soap and water and band-aids applied.    1ml depo medrol with 1% lidocaine plain injected into patient's right knee by Dr. Armond George  LOT# [id number]  Exp. 8/19    Follow up right knee primary osteoarthritis.  Last injection 3/14/17.  Range of motion 5-115.    She  desires injection today of right knee(s).  Risks, benefits, potential complications and alternatives were discussed.   With the patient's consent, sterile prep was performed of right knee(s).  Right knee was injected with Depo Medrol 80 mg and lidocaine at anterolateral site.  Return to clinic as needed.    Left total knee arthroplasty from 4/26/17 is doing well.  She did fall about 3 weeks ago.  Good stability.  Range of motion 0-120 degrees.    Continue activities as tolerated.  Return to clinic 1 year with x-ray left knee, [place].         Again, thank you for allowing me to participate in the care of your patient.        Sincerely,        Armond George MD

## 2017-08-22 NOTE — NURSING NOTE
"Chief Complaint   Patient presents with     RECHECK     Follow-up for right knee osteoarthritis, last injection 3/14/17. Interested in another depo-medrol injection.       Initial Resp 12  Ht 1.702 m (5' 7\")  Wt (!) 142 kg (313 lb)  BMI 49.02 kg/m2 Estimated body mass index is 49.02 kg/(m^2) as calculated from the following:    Height as of this encounter: 1.702 m (5' 7\").    Weight as of this encounter: 142 kg (313 lb).  Medication Reconciliation: complete     CRESCENCIO PadillaC  Supervising physician: Armond George MD  Dept. of Orthopedics  [facility]          "

## 2017-08-22 NOTE — PROGRESS NOTES
The patient's right knee was prepped with betadine solution after verification of allergies. Area approximately 10 cm x 10 cm prepped in a sterile fashion. After injection, betadine removed with soap and water and band-aids applied.    1ml depo medrol with 1% lidocaine plain injected into patient's right knee by Dr. Armond George  LOT# [id number]  Exp. 8/19

## 2017-08-22 NOTE — PROGRESS NOTES
Follow up right knee primary osteoarthritis.  Last injection 3/14/17.  Range of motion 5-115.    She  desires injection today of right knee(s).  Risks, benefits, potential complications and alternatives were discussed.   With the patient's consent, sterile prep was performed of right knee(s).  Right knee was injected with Depo Medrol 80 mg and lidocaine at anterolateral site.  Return to clinic as needed.    Left total knee arthroplasty from 4/26/17 is doing well.  She did fall about 3 weeks ago.  Good stability.  Range of motion 0-120 degrees.    Continue activities as tolerated.  Return to clinic 1 year with x-ray left knee, [place].

## 2017-09-12 ENCOUNTER — OFFICE VISIT (OUTPATIENT)
Dept: FAMILY MEDICINE | Facility: CLINIC | Age: 57
End: 2017-09-12
Payer: MEDICARE

## 2017-09-12 VITALS
TEMPERATURE: 98.4 F | WEIGHT: 293 LBS | SYSTOLIC BLOOD PRESSURE: 131 MMHG | HEART RATE: 100 BPM | DIASTOLIC BLOOD PRESSURE: 72 MMHG | HEIGHT: 67 IN | OXYGEN SATURATION: 95 % | BODY MASS INDEX: 45.99 KG/M2

## 2017-09-12 DIAGNOSIS — M17.0 PRIMARY OSTEOARTHRITIS OF BOTH KNEES: ICD-10-CM

## 2017-09-12 DIAGNOSIS — E11.21 TYPE 2 DIABETES MELLITUS WITH DIABETIC NEPHROPATHY, WITHOUT LONG-TERM CURRENT USE OF INSULIN (H): Primary | ICD-10-CM

## 2017-09-12 DIAGNOSIS — Z23 NEED FOR PROPHYLACTIC VACCINATION AND INOCULATION AGAINST INFLUENZA: ICD-10-CM

## 2017-09-12 DIAGNOSIS — I25.2 OLD MYOCARDIAL INFARCTION: ICD-10-CM

## 2017-09-12 DIAGNOSIS — G89.4 CHRONIC PAIN SYNDROME: ICD-10-CM

## 2017-09-12 DIAGNOSIS — F33.1 MAJOR DEPRESSIVE DISORDER, RECURRENT EPISODE, MODERATE (H): ICD-10-CM

## 2017-09-12 DIAGNOSIS — E66.01 MORBID OBESITY WITH BMI OF 45.0-49.9, ADULT (H): Chronic | ICD-10-CM

## 2017-09-12 DIAGNOSIS — N39.3 FEMALE STRESS INCONTINENCE: ICD-10-CM

## 2017-09-12 DIAGNOSIS — R10.13 DYSPEPSIA: ICD-10-CM

## 2017-09-12 DIAGNOSIS — I10 HTN, GOAL BELOW 140/90: Chronic | ICD-10-CM

## 2017-09-12 PROCEDURE — 90686 IIV4 VACC NO PRSV 0.5 ML IM: CPT | Performed by: PREVENTIVE MEDICINE

## 2017-09-12 PROCEDURE — G0008 ADMIN INFLUENZA VIRUS VAC: HCPCS | Performed by: PREVENTIVE MEDICINE

## 2017-09-12 PROCEDURE — 99214 OFFICE O/P EST MOD 30 MIN: CPT | Mod: 25 | Performed by: PREVENTIVE MEDICINE

## 2017-09-12 RX ORDER — METOPROLOL SUCCINATE 25 MG/1
25 TABLET, EXTENDED RELEASE ORAL DAILY
Qty: 90 TABLET | Refills: 1 | Status: SHIPPED | OUTPATIENT
Start: 2017-09-12 | End: 2017-11-06

## 2017-09-12 RX ORDER — PIOGLITAZONEHYDROCHLORIDE 30 MG/1
30 TABLET ORAL DAILY
Qty: 90 TABLET | Refills: 1 | Status: SHIPPED | OUTPATIENT
Start: 2017-09-12 | End: 2017-11-06

## 2017-09-12 ASSESSMENT — PAIN SCALES - GENERAL: PAINLEVEL: NO PAIN (0)

## 2017-09-12 NOTE — NURSING NOTE
"Chief Complaint   Patient presents with     Forms     Flu Shot       Initial /72  Pulse 100  Temp 98.4  F (36.9  C) (Oral)  Ht 5' 7\" (1.702 m)  Wt (!) 313 lb (142 kg)  SpO2 95%  Breastfeeding? No  BMI 49.02 kg/m2 Estimated body mass index is 49.02 kg/(m^2) as calculated from the following:    Height as of this encounter: 5' 7\" (1.702 m).    Weight as of this encounter: 313 lb (142 kg).  Medication Reconciliation: complete   Arianne ROBERTS        "

## 2017-09-12 NOTE — MR AVS SNAPSHOT
After Visit Summary   9/12/2017    Melody Ferguson    MRN: [id number]           Patient Information     Date Of Birth          1960        Visit Information        Provider Department      9/12/2017 10:40 AM Elizabeth Lamas MD [facility]        Today's Diagnoses     Type 2 diabetes mellitus with diabetic nephropathy, without long-term current use of insulin (H)    -  1    Morbid obesity with BMI of 45.0-49.9, adult (H)        Major depressive disorder, recurrent episode, moderate (H)        HTN, goal below 140/90        Primary osteoarthritis of both knees        Need for prophylactic vaccination and inoculation against influenza        Old myocardial infarction        Female stress incontinence           Follow-ups after your visit        Additional Services     MENTAL HEALTH REFERRAL       Your provider has referred you to: FM[facility]: [facility] - Counseling (Individual/Couples/Family) - [facility] [phone]   [email or web address]   *Patient will be contacted by [facility] scheduling partner, [facility]), to schedule an appointment.  Patients may also call [facility] to schedule.    All scheduling is subject to the client's specific insurance plan & benefits, provider/location availability, and provider clinical specialities.  Please arrive 15 minutes early for your first appointment and bring your completed paperwork.    Please be aware that coverage of these services is subject to the terms and limitations of your health insurance plan.  Call member services at your health plan with any benefit or coverage questions.            UROLOGY ADULT REFERRAL       Your provider has referred you to: [facility]: [facility] [phone]   [email or web address]    Please be aware that coverage of these  "services is subject to the terms and limitations of your health insurance plan.  Call member services at your health plan with any benefit or coverage questions.      Please bring the following with you to your appointment:    (1) Any X-Rays, CTs or MRIs which have been performed.  Contact the facility where they were done to arrange for  prior to your scheduled appointment.    (2) List of current medications  (3) This referral request   (4) Any documents/labs given to you for this referral                  Who to contact     If you have questions or need follow up information about today's clinic visit or your schedule [facility] [phone].  Normal or non-critical lab and imaging results will be communicated to you by MyChart, letter or phone within 4 business days after the clinic has received the results. If you do not hear from us within 7 days, please contact the clinic through [employer] or phone. If you have a critical or abnormal lab result, we will notify you by phone as soon as possible.  Submit refill requests through [employer] or call your pharmacy and they will forward the refill request to us. Please allow 3 business days for your refill to be completed.          Additional Information About Your Visit        [employer] Information     [employer] gives you secure access to your electronic health record. If you see a primary care provider, you can also send messages to your care team and make appointments. If you have questions, please call your primary care clinic.  If you do not have a primary care provider, please call [phone] and they will assist you.        Care EveryWhere ID     This is your Care EveryWhere ID. This could be used by other organizations to access your [facility] medical records  [id number]        Your Vitals Were     Pulse Temperature Height Pulse Oximetry Breastfeeding? BMI (Body Mass Index)    100 98.4  F (36.9  C) (Oral) 5' 7\" " (1.702 m) 95% No 49.02 kg/m2       Blood Pressure from Last 3 Encounters:   09/12/17 131/72   07/14/17 137/73   07/11/17 118/80    Weight from Last 3 Encounters:   09/12/17 (!) 313 lb (142 kg)   08/22/17 (!) 313 lb (142 kg)   07/14/17 (!) 305 lb (138.3 kg)              We Performed the Following     FLU VAC, SPLIT VIRUS IM > 3 YO (QUADRIVALENT) [17975]     MENTAL HEALTH REFERRAL     UROLOGY ADULT REFERRAL     Vaccine Administration, Initial [93834]          Today's Medication Changes          These changes are accurate as of: 9/12/17 10:59 AM.  If you have any questions, ask your nurse or doctor.               These medicines have changed or have updated prescriptions.        Dose/Directions    pioglitazone 30 MG tablet   Commonly known as:  ACTOS   This may have changed:    - medication strength  - how much to take   Used for:  Type 2 diabetes mellitus with diabetic nephropathy, without long-term current use of insulin (H)   Changed by:  Elizabeth Lamas MD        Dose:  30 mg   Take 1 tablet (30 mg) by mouth daily   Quantity:  90 tablet   Refills:  1            Where to get your medicines      These medications were sent to [facility] - [place], [place] - [address] AT [facility] [address], [facility] [address]     Phone:  [phone]     metoprolol 25 MG 24 hr tablet    pioglitazone 30 MG tablet                Primary Care Provider Office Phone # Fax #    Elizabeth Lamas [phone]       [address]  [facility] [address]        Equal Access to Services     ADELA KIRAN AH: Hadhoward Miranda, rose maryda lulusi, qaybta kaalissac sandoval. So [facility] [phone].    ATENCIÓN: Si habla español, tiene a turner disposición servicios gratuitos de asistencia lingüística. Emilie al [phone].    We comply with applicable federal civil rights laws and [place] laws. We do not discriminate on the basis of  race, color, national origin, age, disability sex, sexual orientation or gender identity.            Thank you!     Thank you for choosing [facility]  for your care. Our goal is always to provide you with excellent care. Hearing back from our patients is one way we can continue to improve our services. Please take a few minutes to complete the written survey that you may receive in the mail after your visit with us. Thank you!             Your Updated Medication List - Protect others around you: Learn how to safely use, store and throw away your medicines at www.disposemymeds.org.          This list is accurate as of: 9/12/17 10:59 AM.  Always use your most recent med list.                   Brand Name Dispense Instructions for use Diagnosis    ACE NOT PRESCRIBED (INTENTIONAL)     0 each    Reported on 5/10/2017    Type 2 diabetes mellitus without complication (H)       albuterol 108 (90 BASE) MCG/ACT Inhaler    PROAIR HFA/PROVENTIL HFA/VENTOLIN HFA     Inhale 2 puffs into the lungs every 6 hours        ASPIRIN PO      Take 81 mg by mouth daily        ATORVASTATIN CALCIUM PO      Take 40 mg by mouth daily        BUPROPION HCL PO      Take 50 mg by mouth 2 times daily        citalopram 40 MG tablet    celeXA    90 tablet    Take 1 tablet (40 mg) by mouth every morning    Major depressive disorder, recurrent episode, moderate (H)       cyclobenzaprine 5 MG tablet    FLEXERIL    60 tablet    TAKE 1 TABLET(5 MG) BY MOUTH TWICE DAILY AS NEEDED FOR MUSCLE SPASMS    Left sided sciatica, Back muscle spasm       esomeprazole 40 MG CR capsule    nexIUM    30 capsule    TAKE 1 CAPSULE BY MOUTH EVERY DAY    Dyspepsia       fluticasone 27.5 MCG/SPRAY spray    VERAMYST     Spray 2 sprays into both nostrils daily        gabapentin 300 MG capsule    NEURONTIN    90 capsule    Take 1 capsule (300 mg) by mouth At Bedtime    Acute left-sided low back pain with left-sided sciatica       glipiZIDE-metFORMIN 5-500 MG  per tablet    METAGLIP    360 tablet    Take 2 tablets by mouth 2 times daily (before meals)    Type 2 diabetes mellitus with diabetic nephropathy, without long-term current use of insulin (H)       losartan 50 MG tablet    COZAAR    90 tablet    Take 1 tablet (50 mg) by mouth daily    HTN, goal below 140/90, Type 2 diabetes mellitus with diabetic nephropathy, without long-term current use of insulin (H)       melatonin 3 MG tablet     30 tablet    TAKE 1 TABLET BY MOUTH ONCE AT BEDTIME AS NEEDED FOR SLEEP    Major depressive disorder, recurrent episode, moderate (H)       metoprolol 25 MG 24 hr tablet    TOPROL XL    90 tablet    Take 1 tablet (25 mg) by mouth daily    Old myocardial infarction       montelukast 10 MG tablet    SINGULAIR    90 tablet    TAKE 1 TABLET BY MOUTH EVERY NIGHT AT BEDTIME    Sciatica of left side       nystatin 374376 UNIT/GM Powd    NYSTOP    60 g    APPLY TO THE AFFECTED AREA TWO TO THREE TIMES DAILY AS NEEDED    Morbid obesity with body mass index of 45.0-49.9 in adult (H)       order for DME     3 Month    Diabetic test strips and lancets per insurance formulary Check blood sugar 2 times daily    Type 2 diabetes mellitus with diabetic nephropathy (H)       pioglitazone 30 MG tablet    ACTOS    90 tablet    Take 1 tablet (30 mg) by mouth daily    Type 2 diabetes mellitus with diabetic nephropathy, without long-term current use of insulin (H)       polyethylene glycol powder    MIRALAX/GLYCOLAX    510 g    Take 17 g by mouth daily DISSOLVE 17 GRAMS IN LIQUID AND DRINK DAILY AS DIRECTED    Chronic pain syndrome       traMADol 50 MG tablet    ULTRAM    30 tablet    Take 1 tablet (50 mg) by mouth every 8 hours as needed for moderate pain    Chronic pain syndrome       VESICARE 10 MG tablet   Generic drug:  solifenacin     90 tablet    TAKE 1 TABLET BY MOUTH EVERY DAY    Mixed incontinence

## 2017-09-12 NOTE — PROGRESS NOTES
SUBJECTIVE:   Melody Ferguson is a 56 year old female who presents to clinic today for the following health issues:      Medication Followup of Vesicare    Taking Medication as prescribed: yes    Side Effects:  None    Medication Helping Symptoms:  NO    Continued symptoms of stress incontinence    Has recently undergone pubovaginal sling for stress incontinence with hypermobility    Discussed Urology referral for further evaluation of symptoms      Forms  -Needs completion of forms for handicapped parking  -Has a permanent one from [place], needs one for [place]       Diabetes Follow-up    Patient is checking blood sugars: once daily.  Results are as follows:       am - less than 120 mg/dl        Diabetic concerns: low blood sugar several less than 70 in the past few weeks, tends to have low glucose later at night.      Symptoms of hypoglycemia (low blood sugar): shaky, weak     Paresthesias (numbness or burning in feet) or sores: No   Date of last diabetic eye exam: up to date    Hypertension Follow-up      Outpatient blood pressures are being checked at store.  Results are less than 140/90.    Low Salt Diet: low salt            Problem list and histories reviewed & adjusted, as indicated.  Additional history: as documented    Patient Active Problem List   Diagnosis     Primary osteoarthritis of both knees     PTSD (post-traumatic stress disorder)     Chronic pain syndrome     Fibromyalgia     History of TIA (transient ischemic attack) and stroke     Major depressive disorder, recurrent episode, moderate (H)     Urge incontinence of urine     Prolapse of vaginal wall     Mild intermittent asthma without complication     HTN, goal below 140/90     Hyperlipidemia LDL goal <100     Type 2 diabetes mellitus with diabetic nephropathy (H)     Allergic rhinitis, unspecified allergic rhinitis type     Morbid obesity with BMI of 45.0-49.9, adult (H)     History of colonic polyps     Vaginal high risk HPV DNA test positive      DM type 2 without retinopathy (H)     Hiatal hernia     DDD (degenerative disc disease), lumbar     Diabetes mellitus, type 2 (H)     History of total knee arthroplasty, left     Type 2 diabetes mellitus without retinopathy (H)     Primary osteoarthritis of right knee     Past Surgical History:   Procedure Laterality Date     C TOTAL KNEE ARTHROPLASTY Left 04/26/2017    DML at [facility]     REPAIR PTOSIS       SLING TRANSVAGINAL N/A 7/14/2017    Procedure: SLING TRANSVAGINAL;  Sling ([place]);  Surgeon: Talon Katz MD;  Location: [facility] OR       Social History   Substance Use Topics     Smoking status: Never Smoker     Smokeless tobacco: Never Used     Alcohol use 0.0 oz/week     0 Standard drinks or equivalent per week      Comment: moderate     Family History   Problem Relation Age of Onset     Thyroid Disease Sister      CANCER Brother      CANCER Sister      breast cancer     CEREBROVASCULAR DISEASE Sister 62     Other - See Comments Sister      Angioplasty 8/2016     Hypertension Father      Glaucoma Maternal Grandmother      CANCER Maternal Grandfather      CANCER Paternal Grandmother      DIABETES No family hx of      Macular Degeneration No family hx of          Current Outpatient Prescriptions   Medication Sig Dispense Refill     metoprolol (TOPROL XL) 25 MG 24 hr tablet Take 1 tablet (25 mg) by mouth daily 90 tablet 1     pioglitazone (ACTOS) 30 MG tablet Take 1 tablet (30 mg) by mouth daily 90 tablet 1     traMADol (ULTRAM) 50 MG tablet Take 1 tablet (50 mg) by mouth every 8 hours as needed for moderate pain 30 tablet 0     esomeprazole (NEXIUM) 40 MG CR capsule TAKE 1 CAPSULE BY MOUTH EVERY DAY 30 capsule 11     VESICARE 10 MG tablet TAKE 1 TABLET BY MOUTH EVERY DAY 90 tablet 0     cyclobenzaprine (FLEXERIL) 5 MG tablet TAKE 1 TABLET(5 MG) BY MOUTH TWICE DAILY AS NEEDED FOR MUSCLE SPASMS 60 tablet 0     glipiZIDE-metFORMIN (METAGLIP) 5-500 MG per tablet Take 2 tablets by mouth 2 times daily (before  meals) 360 tablet 1     gabapentin (NEURONTIN) 300 MG capsule Take 1 capsule (300 mg) by mouth At Bedtime 90 capsule 1     losartan (COZAAR) 50 MG tablet Take 1 tablet (50 mg) by mouth daily 90 tablet 1     citalopram (CELEXA) 40 MG tablet Take 1 tablet (40 mg) by mouth every morning 90 tablet 0     montelukast (SINGULAIR) 10 MG tablet TAKE 1 TABLET BY MOUTH EVERY NIGHT AT BEDTIME 90 tablet 0     polyethylene glycol (MIRALAX/GLYCOLAX) powder Take 17 g by mouth daily DISSOLVE 17 GRAMS IN LIQUID AND DRINK DAILY AS DIRECTED 510 g 3     nystatin (NYSTOP) 284893 UNIT/GM POWD APPLY TO THE AFFECTED AREA TWO TO THREE TIMES DAILY AS NEEDED 60 g 3     melatonin 3 MG tablet TAKE 1 TABLET BY MOUTH ONCE AT BEDTIME AS NEEDED FOR SLEEP 30 tablet 0     order for DME Diabetic test strips and lancets per insurance formulary Check blood sugar 2 times daily 3 Month 3     albuterol (PROAIR HFA, PROVENTIL HFA, VENTOLIN HFA) 108 (90 BASE) MCG/ACT inhaler Inhale 2 puffs into the lungs every 6 hours       ASPIRIN PO Take 81 mg by mouth daily       ATORVASTATIN CALCIUM PO Take 40 mg by mouth daily       BUPROPION HCL PO Take 50 mg by mouth 2 times daily        fluticasone (VERAMYST) 27.5 MCG/SPRAY nasal spray Spray 2 sprays into both nostrils daily       ACE NOT PRESCRIBED, INTENTIONAL, Reported on 5/10/2017 0 each 0     [DISCONTINUED] pioglitazone (ACTOS) 45 MG tablet Take 1 tablet (45 mg) by mouth daily 90 tablet 1     [DISCONTINUED] metoprolol (TOPROL XL) 25 MG 24 hr tablet Take 1 tablet (25 mg) by mouth daily 30 tablet 3     Allergies   Allergen Reactions     Milk Protein Extract GI Disturbance     lactose intolerance.  Can tolerate milk products if uses lactaid     Bee Venom Swelling     Latex      Lisinopril Cough     Onion GI Disturbance     Aloe Rash     pain     Chlorine Rash     Recent Labs   Lab Test  07/11/17   0841  04/18/17   0900  11/01/16   0752  03/01/16   0806   A1C   --   6.1*  6.7*  6.2*   LDL   --    --   75  66   HDL    "--    --   61  53   TRIG   --    --   159*  109   ALT   --    --   27   --    CR  0.43*  0.47*  0.47*  0.56   GFRESTIMATED  >90  Non  GFR Calc    >90  Non  GFR Calc    >90  Non  GFR Calc    >90  Non  GFR Calc     GFRESTBLACK  >90   GFR Calc    >90   GFR Calc    >90   GFR Calc    >90   GFR Calc     POTASSIUM  3.8  3.9  3.8  3.4   TSH   --    --   1.20   --       BP Readings from Last 3 Encounters:   09/12/17 131/72   07/14/17 137/73   07/11/17 118/80    Wt Readings from Last 3 Encounters:   09/12/17 (!) 313 lb (142 kg)   08/22/17 (!) 313 lb (142 kg)   07/14/17 (!) 305 lb (138.3 kg)                          Reviewed and updated as needed this visit by clinical staffTobacco  Allergies  Meds       Reviewed and updated as needed this visit by Provider         ROS:  Constitutional, HEENT, cardiovascular, pulmonary, gi and gu systems are negative, except as otherwise noted.      OBJECTIVE:                                                    /72  Pulse 100  Temp 98.4  F (36.9  C) (Oral)  Ht 5' 7\" (1.702 m)  Wt (!) 313 lb (142 kg)  SpO2 95%  Breastfeeding? No  BMI 49.02 kg/m2  Body mass index is 49.02 kg/(m^2).  GENERAL APPEARANCE: healthy, alert and no distress  EYES: Eyes grossly normal to inspection and conjunctivae and sclerae normal  NECK: no adenopathy  RESP: lungs clear to auscultation - no rales, rhonchi or wheezes  CV: regular rates and rhythm and normal S1 S2, no S3 or S4  ABDOMEN: soft, non-tender  MS: Trace bilateral pedal edema  SKIN: no suspicious lesions or rashes  NEURO: Normal strength and tone, mentation intact and speech normal  PSYCH: mentation appears normal and affect normal/bright    Diagnostic test results:  Diagnostic Test Results:  No results found for this or any previous visit (from the past 24 hour(s)).       ASSESSMENT/PLAN:                                "                     1. Type 2 diabetes mellitus with diabetic nephropathy, without long-term current use of insulin (H)  -Last HbA1C was 6.1  -Decreased dose of Actos from 45 to 30 mg daily  -Has follow up in one month for diabetes  - pioglitazone (ACTOS) 30 MG tablet; Take 1 tablet (30 mg) by mouth daily  Dispense: 90 tablet; Refill: 1    2. Morbid obesity with BMI of 45.0-49.9, adult (H)  -Has lost weight over the last few months  -Has been trying to increase exercise    3. Major depressive disorder, recurrent episode, moderate (H)  -Continue medication   -Feels pain issues worsen mood  -Had found therapy useful in the past  - MENTAL HEALTH REFERRAL    4. HTN, goal below 140/90  -At goal  -Continue current medication     5. Primary osteoarthritis of both knees  -Left knee arthroplasty  -Plan for right knee in the next few months     6. Need for prophylactic vaccination and inoculation against influenza  - FLU VAC, SPLIT VIRUS IM > 3 YO (QUADRIVALENT) [31875]  - Vaccine Administration, Initial [10075]    7. Old myocardial infarction  -Refill on medication provided  - metoprolol (TOPROL XL) 25 MG 24 hr tablet; Take 1 tablet (25 mg) by mouth daily  Dispense: 90 tablet; Refill: 1    8. Female stress incontinence  - UROLOGY ADULT REFERRAL      Follow up with Provider - As scheduled    Forms for Handicap parking completed.       Elizbaeth Lamas MD MPH    [facility]  Injectable Influenza Immunization Documentation    1.  Are you sick today? (Fever of 100.5 or higher on the day of the clinic)   No    2.  Have you ever had Guillain-Winooski Syndrome within 6 weeks of an influenza vaccionation?  No    3. Do you have a life-threatening allergy to eggs?  No    4. Do you have a life-threatening allergy to a component of the vaccine? May include antibiotics, gelatin or latex.  No     5. Have you ever had a reaction to a dose of flu vaccine that needed immediate medical attention?  No     Form completed by  Arianne ROBERTS

## 2017-09-13 RX ORDER — ESOMEPRAZOLE MAGNESIUM 40 MG/1
CAPSULE, DELAYED RELEASE ORAL
Qty: 90 CAPSULE | Refills: 0 | Status: SHIPPED | OUTPATIENT
Start: 2017-09-13 | End: 2017-11-06

## 2017-09-13 RX ORDER — TRAMADOL HYDROCHLORIDE 50 MG/1
TABLET ORAL
Qty: 30 TABLET | Refills: 0 | Status: SHIPPED | OUTPATIENT
Start: 2017-09-13 | End: 2017-11-06

## 2017-09-13 NOTE — TELEPHONE ENCOUNTER
Faxed Rx for the Ultram to Celina Ibarra, right fax confirmed at 2:07 pm today.  Yuli Giang MA/  For [employer] and Radha

## 2017-09-13 NOTE — TELEPHONE ENCOUNTER
Patient would like a 90 day supply for nexium.  Last filled 08/08/17 #30 with 11 refills.      traMADol (ULTRAM) 50 MG tablet      Last Written Prescription Date:  08/15/17  Last Fill Quantity: 30,   # refills: 0  Last Office Visit with [facility] or [facility] prescribing provider: 09/12/17  Future Office visit:       Routing refill request to provider for review/approval because:  Drug not on the [facility], [facility] or [facility] refill protocol or controlled substance      Kiki Pearson  [facility]

## 2017-10-08 DIAGNOSIS — J45.20 MILD INTERMITTENT ASTHMA WITHOUT COMPLICATION: Primary | ICD-10-CM

## 2017-10-08 NOTE — TELEPHONE ENCOUNTER
montelukast (SINGULAIR) 10 MG tablet       Last Written Prescription Date: 7/9/17  Last Fill Quantity: 90, # refills: 0    Last Office Visit with [facility], [facility] or [facility] prescribing provider:  9/12/17   Future Office Visit:    Next 5 appointments (look out 90 days)     Oct 16, 2017 11:00 AM CDT   Return Visit with Rody Park, PhD   [facility] ([facility])    [address] [place] [id number] [phone]                   Date of Last Asthma Action Plan Letter:   Asthma Action Plan Q1 Year    Topic Date Due     Asthma Action Plan - yearly  04/18/2018      Asthma Control Test:   ACT Total Scores 4/18/2017   ACT TOTAL SCORE (Goal Greater than or Equal to 20) 25   In the past 12 months, how many times did you visit the emergency room for your asthma without being admitted to the hospital? 0   In the past 12 months, how many times were you hospitalized overnight because of your asthma? 0       Date of Last Spirometry Test:   No results found for this or any previous visit.        [facility]

## 2017-10-11 RX ORDER — MONTELUKAST SODIUM 10 MG/1
TABLET ORAL
Qty: 90 TABLET | Refills: 0 | Status: SHIPPED | OUTPATIENT
Start: 2017-10-11 | End: 2017-11-06

## 2017-10-30 ENCOUNTER — MYC MEDICAL ADVICE (OUTPATIENT)
Dept: FAMILY MEDICINE | Facility: CLINIC | Age: 57
End: 2017-10-30

## 2017-10-30 DIAGNOSIS — M51.369 DDD (DEGENERATIVE DISC DISEASE), LUMBAR: Primary | Chronic | ICD-10-CM

## 2017-10-31 ENCOUNTER — TELEPHONE (OUTPATIENT)
Dept: PALLIATIVE MEDICINE | Facility: CLINIC | Age: 57
End: 2017-10-31

## 2017-10-31 NOTE — TELEPHONE ENCOUNTER
Pre-screening Questions for Radiology Injections:    Injection to be done at which interventional clinic site? [facility]    Procedure ordered by MARCY    Procedure ordered? Lumbar Epidural Steroid Injection    What insurance would patient like us to bill for this procedure? MEDICARE/[employer]      Worker's comp-Any injection DO NOT SCHEDULE and route to Alyce Bryson.      [employer] insurance - For SI joint injections, DO NOT SCHEDULE and route Azalia Gonzales.      [employer]- MBB's must be scheduled at LEAST two weeks apart      [employer] - Any injection besides hip/shoulder/knee joint DO NOT SCHEDULE and route to Azalia Gonzales. She will obtain PA and call pt back to schedule procedure or notify pt of denial.     HP C[employer]-PA REQUIRED FOR NON-JESUS OR Joint injections    Any chance of pregnancy? NO   If YES, do NOT schedule and route to RN pool    Is an  needed? No     Patient has a drive home? (mandatory) YES:     Is patient taking any blood thinners (plavix, coumadin, jantoven, warfarin, heparin, pradaxa or dabigatran )? No   If hold needed, do NOT schedule, route to RN pool     Is patient taking any aspirin products? Yes - Pt takes 81mg daily; instructed to hold 0 day(s) prior to procedure.      If more than 325mg/day do NOT schedule; route to RN pool     For CERVICAL procedures, hold all aspirin products for 6 days.      Does the patient have a bleeding or clotting disorder? No     If YES, okay to schedule AND route to RN nurse pool    **For any patients with platelet count <100, must be forwarded to provider**    Is patient diabetic?  Yes  If YES, have them bring their glucometer.    Does patient have an active infection or treated for one within the past week? No     Is patient currently taking any antibiotics?  No     For patients on chronic, preventative, or prophylactic antibiotics, procedures may be scheduled.     For patients on antibiotics for active or recent  infection:    Stanislav Padilla Burton, [place]itzer-antibiotic course must have been completed for 4 days    Dr. Richardson-antibiotic course must have been completed for 7 days    Is patient currently taking any steroid medications? (i.e. Prednisone, Medrol)  No     For patients on steroid medications:    Stanislav Padilla Burton, Snitzer-steroid course must have been completed for 4 days    -steroid course must have been completed for 7 days    Reviewed with patient:  If you are started on any steroids or antibiotics between now and your appointment, you must contact us because it may affect our ability to perform your procedure.  Yes    Is patient actively being treated for cancer or immunocompromised? No  If YES, do NOT schedule and route to RN pool     Are you able to get on and off an exam table with minimal or no assistance? Yes  If NO, do NOT schedule and route to RN pool    Are you able to roll over and lay on your stomach with minimal or no assistance? Yes  If NO, do NOT schedule and route to RN pool     Any allergies to contrast dye, iodine, shellfish, or numbing and steroid medications? No  If YES, route to RN pool AND add allergy information to appointment notes    Allergies: Milk protein extract; Bee venom; Latex; Lisinopril; Onion; Aloe; and Chlorine      Has the patient had a flu shot or any other vaccinations within 7 days before or after the procedure.  No     Does patient have an MRI/CT?  YES:   (SI joint, hip injections, lumbar sympathetic blocks, and stellate ganglion blocks do not require an MRI)    Was the MRI done w/in the last 3 years?  Yes    Was MRI done at [facility]? Yes      If not, where was it done? N/A       If MRI was not done at [facility], [facility] or [facility] do NOT schedule and route to nursing.  If pt has an imaging disc, the injection may be scheduled but pt has to bring disc to appt. If they show up w/out disc the injection cannot be done    Reminders (please tell  patient if applicable):       Instructed pt to arrive 30 minutes early for IV start if this is for a cervical procedure, ALL sympathetic (stellate ganglion, hypogastric, or lumbar sympathetic block) and all sedation procedures (RFA, spinal cord stimulation trials).  Not Applicable   -IVs are not routinely placed for Dr. Mehta cervical cases   -Dr. Roche: IVs for cervical ESIs and cervical TBDs (not CMBBs/facet inj)      If NPO for sedation, informed patient that it is okay to take medications with sips of water (except if they are to hold blood thinners).  Not Applicable   *DO take blood pressure medication if it is prescribed*      If this is for a cervical JESUS, informed patient that aspirin needs to be held for 6 days.   Not Applicable      For all patients not having spinal cord stimulator (SCS) trials or radiofrequency ablations (RFAs), informed patient:    IV sedation is not provided for this procedure.  If you feel that an oral anti-anxiety medication is needed, you can discuss this further with your referring provider or primary care provider.  The Pain Clinic provider will discuss specifics of what the procedure includes at your appointment.  Most procedures last 10-20 minutes.  We use numbing medications to help with any discomfort during the procedure.  Not Applicable      Do not schedule procedures requiring IV placement in the first appointment of the day or first appointment after lunch.       For patients 85 or older we recommend having an adult stay w/ them for the remainder of the day.       Does the patient have any questions?    Azalia Gonzales  [facility]

## 2017-11-06 ENCOUNTER — MYC REFILL (OUTPATIENT)
Dept: FAMILY MEDICINE | Facility: CLINIC | Age: 57
End: 2017-11-06

## 2017-11-06 DIAGNOSIS — I25.2 OLD MYOCARDIAL INFARCTION: ICD-10-CM

## 2017-11-06 DIAGNOSIS — G89.4 CHRONIC PAIN SYNDROME: ICD-10-CM

## 2017-11-06 DIAGNOSIS — M62.830 BACK MUSCLE SPASM: ICD-10-CM

## 2017-11-06 DIAGNOSIS — M54.32 LEFT SIDED SCIATICA: ICD-10-CM

## 2017-11-06 DIAGNOSIS — M54.42 ACUTE LEFT-SIDED LOW BACK PAIN WITH LEFT-SIDED SCIATICA: ICD-10-CM

## 2017-11-06 DIAGNOSIS — R10.13 DYSPEPSIA: ICD-10-CM

## 2017-11-06 DIAGNOSIS — J45.20 MILD INTERMITTENT ASTHMA WITHOUT COMPLICATION: ICD-10-CM

## 2017-11-06 DIAGNOSIS — I10 HTN, GOAL BELOW 140/90: Chronic | ICD-10-CM

## 2017-11-06 DIAGNOSIS — E11.21 TYPE 2 DIABETES MELLITUS WITH DIABETIC NEPHROPATHY, WITHOUT LONG-TERM CURRENT USE OF INSULIN (H): ICD-10-CM

## 2017-11-06 DIAGNOSIS — E66.01 MORBID OBESITY WITH BODY MASS INDEX OF 45.0-49.9 IN ADULT (H): ICD-10-CM

## 2017-11-06 DIAGNOSIS — N39.46 MIXED INCONTINENCE: ICD-10-CM

## 2017-11-06 DIAGNOSIS — F33.1 MAJOR DEPRESSIVE DISORDER, RECURRENT EPISODE, MODERATE (H): ICD-10-CM

## 2017-11-06 NOTE — TELEPHONE ENCOUNTER
Message from Geet:  Original authorizing provider: Elizabeth Lamas MD    Melody Ferguson would like a refill of the following medications:  polyethylene glycol (MIRALAX/GLYCOLAX) powder [Elizabeth Lamas MD]  losartan (COZAAR) 50 MG tablet [Elizabeth Lamas MD]  citalopram (CELEXA) 40 MG tablet [Elizabeth Lamas MD]  nystatin (NYSTOP) 673027 UNIT/GM POWD [Elizabeth Lamas MD]  glipiZIDE-metFORMIN (METAGLIP) 5-500 MG per tablet [Elizabeth Lamas MD]  gabapentin (NEURONTIN) 300 MG capsule [Elizabeth Lamas MD]  VESICARE 10 MG tablet [Elizabeth Lamas MD]  cyclobenzaprine (FLEXERIL) 5 MG tablet [Elizabeth Lamas MD]  metoprolol (TOPROL XL) 25 MG 24 hr tablet [Elizabeth Lamas MD]  pioglitazone (ACTOS) 30 MG tablet [Elizabeth Lamas MD]  esomeprazole (NEXIUM) 40 MG CR capsule [Elizabeth Lamas MD]  traMADol (ULTRAM) 50 MG tablet [Elizabeth Lamas MD]  montelukast (SINGULAIR) 10 MG tablet [Elizabeth Lamas MD]    Preferred pharmacy: [id number] - [place], [place] - [address] AT [facility] [address]    Comment:

## 2017-11-07 DIAGNOSIS — E11.21 TYPE 2 DIABETES MELLITUS WITH DIABETIC NEPHROPATHY, WITHOUT LONG-TERM CURRENT USE OF INSULIN (H): Primary | ICD-10-CM

## 2017-11-07 RX ORDER — ESOMEPRAZOLE MAGNESIUM 40 MG/1
40 CAPSULE, DELAYED RELEASE ORAL
Qty: 90 CAPSULE | Refills: 0 | Status: SHIPPED | OUTPATIENT
Start: 2017-11-07 | End: 2017-12-23

## 2017-11-07 RX ORDER — SOLIFENACIN SUCCINATE 10 MG/1
10 TABLET, FILM COATED ORAL DAILY
Qty: 90 TABLET | Refills: 0 | Status: SHIPPED | OUTPATIENT
Start: 2017-11-07 | End: 2017-12-01

## 2017-11-07 RX ORDER — TRAMADOL HYDROCHLORIDE 50 MG/1
50 TABLET ORAL EVERY 8 HOURS PRN
Qty: 30 TABLET | Refills: 0 | Status: SHIPPED | OUTPATIENT
Start: 2017-11-07 | End: 2017-12-01

## 2017-11-07 RX ORDER — POLYETHYLENE GLYCOL 3350 17 G/17G
17 POWDER, FOR SOLUTION ORAL DAILY
Qty: 510 G | Refills: 3 | Status: SHIPPED | OUTPATIENT
Start: 2017-11-07 | End: 2018-02-04

## 2017-11-07 RX ORDER — LOSARTAN POTASSIUM 50 MG/1
50 TABLET ORAL DAILY
Qty: 90 TABLET | Refills: 0 | Status: SHIPPED | OUTPATIENT
Start: 2017-11-07 | End: 2018-02-04

## 2017-11-07 RX ORDER — GLIPIZIDE AND METFORMIN HCL 5; 500 MG/1; MG/1
2 TABLET, FILM COATED ORAL
Qty: 360 TABLET | Refills: 0 | Status: SHIPPED | OUTPATIENT
Start: 2017-11-07 | End: 2018-02-04

## 2017-11-07 RX ORDER — METOPROLOL SUCCINATE 25 MG/1
25 TABLET, EXTENDED RELEASE ORAL DAILY
Qty: 90 TABLET | Refills: 0 | Status: SHIPPED | OUTPATIENT
Start: 2017-11-07 | End: 2017-12-23

## 2017-11-07 RX ORDER — MONTELUKAST SODIUM 10 MG/1
1 TABLET ORAL AT BEDTIME
Qty: 90 TABLET | Refills: 0 | Status: SHIPPED | OUTPATIENT
Start: 2017-11-07 | End: 2018-02-04

## 2017-11-07 RX ORDER — GABAPENTIN 300 MG/1
300 CAPSULE ORAL AT BEDTIME
Qty: 90 CAPSULE | Refills: 1 | Status: SHIPPED | OUTPATIENT
Start: 2017-11-07 | End: 2018-05-05

## 2017-11-07 RX ORDER — CITALOPRAM HYDROBROMIDE 40 MG/1
40 TABLET ORAL EVERY MORNING
Qty: 90 TABLET | Refills: 0 | Status: SHIPPED | OUTPATIENT
Start: 2017-11-07 | End: 2017-12-23

## 2017-11-07 RX ORDER — PIOGLITAZONEHYDROCHLORIDE 30 MG/1
30 TABLET ORAL DAILY
Qty: 90 TABLET | Refills: 0 | Status: SHIPPED | OUTPATIENT
Start: 2017-11-07 | End: 2017-12-23

## 2017-11-07 RX ORDER — CYCLOBENZAPRINE HCL 5 MG
5 TABLET ORAL 2 TIMES DAILY PRN
Qty: 60 TABLET | Refills: 0 | Status: SHIPPED | OUTPATIENT
Start: 2017-11-07 | End: 2018-02-04

## 2017-11-07 RX ORDER — NYSTATIN 100000 [USP'U]/G
POWDER TOPICAL
Qty: 60 G | Refills: 1 | Status: SHIPPED | OUTPATIENT
Start: 2017-11-07 | End: 2017-12-23

## 2017-11-07 NOTE — TELEPHONE ENCOUNTER
traMADol (ULTRAM) 50 MG tablet      Last Written Prescription Date:  9/13/17  Last Fill Quantity: 90,   # refills: 0  Last OV: 9/12/2017    Future Office visit:    Next 5 appointments (look out 90 days)     Nov 08, 2017  9:15 AM CST   Return Visit with Tlaon Katz MD   [facility] ([address] [address]   [phone]            Raghavendra 10, 2018  9:00 AM CST   MyChart Physical Adult with Elizabeth Lamas MD   [facility] ([facility])    [address] [facility] [address]   [phone]                   Routing refill request to provider for review/approval because:  Drug not on the [facility], [facility] or [facility] refill protocol or controlled substance      cyclobenzaprine (FLEXERIL) 5 MG tablet      Last Written Prescription Date:  8/6/17  Last Fill Quantity: 60,   # refills: 0  Future Office visit:    Next 5 appointments (look out 90 days)     Nov 08, 2017  9:15 AM CST   Return Visit with Talon Katz MD   [facility]    [address] [place] [address]   [phone]            Raghavendra 10, 2018  9:00 AM CST   [facility] Physical Adult with Elizabeth Lamas MD   [facility] ([facility])    [address] [facility] [address]   [phone]                   Routing refill request to provider for review/approval because:  Drug not on the [facility], [facility] or [facility] refill protocol or controlled substance          gabapentin (NEURONTIN) 300 MG capsule      Last Written Prescription Date:  7/13/17  Last Fill Quantity: 90,   # refills: 1  Future Office visit:    Next 5 appointments (look out 90 days)     Nov 08, 2017  9:15 AM CST   Return Visit [facility] Talon Katz MD   [facility] ([facility])    [address] [place] [address]   [phone]            Raghavendra 10,  2018  9:00 AM CST   [facility] Physical Adult with Elizabeth Lamas MD   [facility]    [address] [facility] [address] [phone]                   Routing refill request to provider for review/approval because:  Drug not on the [facility], [facility] or  Health refill protocol or controlled substance

## 2017-11-08 NOTE — TELEPHONE ENCOUNTER
Patient returned call    Best number to reach them: Home number on file [phone] (home)    Is it ok to leave a detailed message: YES     *pt given message, will be scheduling in January/has the appointment with Dr Lamas w/c to schedule her labs

## 2017-11-08 NOTE — TELEPHONE ENCOUNTER
Elizabeth Lamas MD   Bk [employer] 14 hours ago (6:42 PM)                 I provided refills on all the medications. DUE FOR FOLLOW UP, needs to be scheduled with pre visit labs. Elizabeth Lamas MD MPH   (Routing comment)        Faxed Rx for the Ultram to Celina Ibarra, right fax confirmed at 9:27 am today.  Yuli Giang MA/  For [employer] and [facility]

## 2017-11-08 NOTE — TELEPHONE ENCOUNTER
This writer attempted to contact Melody on 11/08/17      Reason for call Patient to return call and schedule 2 appointments and left message to return call.      If patient calls back:   Schedule 1st a fasting lab only appointment and a few days later an office visit appointment within 2 weeks with PCP .        Yuli Giang MA

## 2017-11-13 ENCOUNTER — RADIANT APPOINTMENT (OUTPATIENT)
Dept: GENERAL RADIOLOGY | Facility: CLINIC | Age: 57
End: 2017-11-13
Attending: PAIN MEDICINE

## 2017-11-13 ENCOUNTER — RADIOLOGY INJECTION OFFICE VISIT (OUTPATIENT)
Dept: PALLIATIVE MEDICINE | Facility: CLINIC | Age: 57
End: 2017-11-13
Payer: MEDICARE

## 2017-11-13 VITALS — DIASTOLIC BLOOD PRESSURE: 79 MMHG | SYSTOLIC BLOOD PRESSURE: 156 MMHG | HEART RATE: 83 BPM | OXYGEN SATURATION: 96 %

## 2017-11-13 DIAGNOSIS — M51.369 DDD (DEGENERATIVE DISC DISEASE), LUMBAR: ICD-10-CM

## 2017-11-13 DIAGNOSIS — M54.16 LUMBAR RADICULOPATHY: Primary | ICD-10-CM

## 2017-11-13 PROCEDURE — 62323 NJX INTERLAMINAR LMBR/SAC: CPT | Performed by: PAIN MEDICINE

## 2017-11-13 NOTE — PROGRESS NOTES
Pre procedure Diagnosis: Lumbar Radiculopathy   Post procedure Diagnosis: Same  Procedure performed: L5-S1 interlaminar epidural steroid injection   Anesthesia: none  Complications: none  Operators: Carlos Roche MD     Indications:   Melody Ferguson is a 56-year-old female who is seen at the referral of Dr. Elizabeth Lamas for repeat lumbar epidural steroid injection. She underwent L5-S1 interlaminar epidural steroid injection on 6/17, with excellent relief; her most recent injection lasted for approximately 5 months. She currently has     MRI    08/08/16 MRI lumbar spine (CDI): Overview: There is 4 mm L4 and 2 mm L3 degenerative anterolisthesis (sagittal images 8). There is minimal broad left convex curvature maximal at L4. Vertebral body heights are normal. There is spondylosis which is moderate at L2-L3 and L4-L5, mild at L3-L4 and above T12, with minor marrow degenerative changes along the spondylotic endplates. Developmentally, spinal canal AP diameter is normal although there is acquired canal and recess stenosis from spondylosis and facet arthrosis (see below). There is no spondylolysis. Cord: Lower thoracic spinal cord imaged has normal contour and signal. Conus medullaris terminates normally at the L1-L2 disc level. Discs: Disc dehydration (long TR) is marked between L2 and L5 and above T12, mild at remaining levels. Disc height loss is moderate/marked at L2-L3 and L4-L5, moderate above T12, mild at L3-L4. Posterior annular fissuring is present at L4-L5. Facet joints: Arthrosis is marked at L4-L5 (axial images 10), moderate/marked at L3-L4 and L2-L3, and moderate at L5-S1 and L1-L2 within the lumbar segment. There is also thoracic facet arthrosis, moderate/marked at T11-T12 and on the left at T12-L1. Foramina: Stenosis is moderate on the right at L4-L5 (sagittal images 4) and T11-T12, mild bilaterally at L3-L4 and L2-L3. Specific findings at each level are as follows- L5-S1: Minimal circumferential disc  bulge. Moderate facet arthrosis and overgrowth. Normal foramina. L4-L5: Marked facet arthrosis and facet overgrowth resulting in grade 1 degenerative L4 anterolisthesis, with a shallow circumferential disc bulge resulting in moderate right and mild left foraminal and marked right and moderate left subarticular recess and mild canal stenosis, sac AP diameter 9 mm. L3-L4: Moderate/marked facet arthrosis resulting in 2 mm degenerative L3 anterolisthesis. Shallow chronic circumferential disc bulge together with facet and ligament overgrowth result in marked bilateral subarticular recess (axial images 16) and moderate/marked central canal stenosis, sac AP diameter 5 mm, and mild foraminal stenosis. L2-L3: Moderate/marked facet arthrosis with disc bulging, shallow posteriorly, resulting in marked bilateral subarticular recess and moderate/marked central canal stenosis, sac AP diameter 6 mm. Mild foraminal stenosis. L1-L2: Normal posterior disc margin. T12-L1: Normal posterior disc margin. Moderate/marked left facet arthrosis. T11-T12: Shallow chronic circumferential disc bulge. Moderate right foraminal stenosis. Moderate/marked facet arthrosis. Ancillary: There is generalized paraspinous muscle mild atrophy. There is a 1 cm simple cyst in the left kidney. There are minor degenerative changes at the lower SI joints. Conclusion: 1. Facet arthrosis, marked at L4-L5 and moderate/marked at L3-L4, L2-L3, and T11-T12, and spondylosis resulting in, at L2-L3 and L3-L4, marked bilateral recess and moderate/marked central canal stenosis. Also marked right and moderate left L4-5 recess stenosis. 2. Grade 1 L4 and L3 degenerative anterolisthesis. 3. No fracture, neoplasm, or infection.  Options/alternatives, benefits and risks were discussed with the patient including but not limited to bleeding, infection, no pain relief, tissue trauma, exposure to radiation, reaction to medications, spinal cord injury, dural puncture, weakness,  numbness and headache.  Questions were answered to her satisfaction and she wishes to proceed. Voluntary informed consent was obtained and signed.     Vitals were reviewed: Yes  Allergies were reviewed:  Yes   Medications were reviewed:  Yes   Pre-procedure pain score: 8/10    Procedure:  The patient's medical history, medications, and allergies were reviewed and reconciled.  After obtaining signed informed consent, the patient was brought into the procedure suite and was placed in a prone position on the procedure table.   A Pause for the Cause was performed.  Patient was prepped and draped in the usual sterile fashion.     The L5-S1 interspace was identified with AP fluoroscopy.  A total of 6ml of 1% lidocaine was used to anesthetize the skin and subcutaneous tissues for a  midline approach.    A 20gauge 3.5inch Touhy needle was advanced utilizing intermittent AP and Lateral fluoroscopy and air for loss of resistance.  The epidural space was encountered on the first pass without difficulties.  Aspiration for blood and CSF was negative.  Needle position was verified by injecting 1 ml of Omnipaque utilizing real-time fluoroscopy that showed good needle placement and epidural spread without signs of intravascular or intrathecal uptake.  Omnipaque wasted:  9 ml.    Then, after repeated negative aspiration for blood or CSF, a combination of Kenalog 40 mg, Bupivicaine 0.25% 2 ml, diluted with 3 ml of normal saline to a total injectate volume of 6 ml was injected into the epidural space in a slow and incremental fashion and the needle was restyletted and withdrawn.  All injected medications were preservative free.    The injection site was cleaned and a sterile dressing was applied.    The patient tolerated the procedure well without complications and was taken to the recovery room for continued observation.    Images were saved to PACS.    Post-procedure pain score: 2/10  Follow-up includes:   -f/u phone call in one  week  -f/u with referring provider     Carlos Roche MD  [facility]

## 2017-11-13 NOTE — NURSING NOTE
Discharge Information    IV Discontiued Time:  NA    Amount of Fluid Infused:  NA    Discharge Criteria = When patient returns to baseline or as per MD order    Consciousness:  Pt is fully awake    Circulation:  BP +/- 20% of pre-procedure level    Respiration:  Patient is able to breathe deeply    O2 Sat:  Patient is able to maintain O2 Sat >92% on room air    Activity:  Moves 4 extremities on command    Ambulation:  Patient is able to stand and walk or stand and pivot into wheelchair    Dressing:  Clean/dry or No Dressing    Notes:   Discharge instructions and AVS given to patient    Patient meets criteria for discharge?  YES    Admitted to PCU?  No    Responsible adult present to accompany patient home?  Yes    Signature/Title:    Tiana Kahn  RN Care Coordinator  [facility]

## 2017-11-13 NOTE — PATIENT INSTRUCTIONS
[facility] Procedure Discharge Instructions    Today you saw:   Dr. Carlos Roche    Your procedure:  Epidural steroid injection      Medications used:  Lidocaine (anesthetic)  Bupivacaine (anesthetic) Kenalog (steroid) Normal Saline Omnipaque (contrast)             Be cautious when walking as numbness and/or weakness in the legs may occur up to 6-8 hours after the procedure due to effect of the local anesthetic    Do not drive for 6 hours. The effect of the local anesthetic could slow your reflexes.     Avoid strenuous activity for the first 24 hours. You may resume your regular activities after that.     You may shower, however avoid swimming, tub baths or hot tubs for 24 hours following your procedure    You may have a mild to moderate increase in pain for several days following the injection.      You may use ice packs for 10-15 minutes, 3 to 4 times a day at the injection site for comfort    Do not use heat to painful areas for 6 to 8 hours. This will give the local anesthetic time to wear off and prevent you from accidentally burning your skin.    You may use anti-inflammatory medications (such as Ibuprofen/Advil or Aleve) or Tylenol for pain control if necessary    With diabetes, check your blood sugar more frequently than usual as your blood sugar may be higher than normal for 10-14 days following a steroid injection. Contact your doctor who manages your diabetes if your blood sugar is higher than usual    It may take up to 14 days for the steroid medication to start working although you may feel the effect as early as a few days after the procedure.     Follow up with your referring provider in 2-3 weeks      If you experience any of the following, call the pain center line during work hours at [phone] or on-call physician after hours at [phone]:  -Fever over 100 degree F  -Swelling, bleeding, redness, drainage, warmth at the injection site  -Progressive weakness or numbness in your  legs or arms  -Loss of bowel or bladder function  -Unusual headache that is not relieved by Tylenol or your regular headache medication  -Unusual new onset of pain that is not improving    Phone #s:  Nurse triage line for general questions: [phone]

## 2017-11-13 NOTE — MR AVS SNAPSHOT
After Visit Summary   11/13/2017    Melody Ferguson    MRN: [id number]           Patient Information     Date Of Birth          1960        Visit Information        Provider Department      11/13/2017 10:45 AM Carlos Roche MD [facility]        Care Instructions      [facility] Procedure Discharge Instructions    Today you saw:   Dr. Carlos Roche    Your procedure:  Epidural steroid injection      Medications used:  Lidocaine (anesthetic)  Bupivacaine (anesthetic) Kenalog (steroid) Normal Saline Omnipaque (contrast)             Be cautious when walking as numbness and/or weakness in the legs may occur up to 6-8 hours after the procedure due to effect of the local anesthetic    Do not drive for 6 hours. The effect of the local anesthetic could slow your reflexes.     Avoid strenuous activity for the first 24 hours. You may resume your regular activities after that.     You may shower, however avoid swimming, tub baths or hot tubs for 24 hours following your procedure    You may have a mild to moderate increase in pain for several days following the injection.      You may use ice packs for 10-15 minutes, 3 to 4 times a day at the injection site for comfort    Do not use heat to painful areas for 6 to 8 hours. This will give the local anesthetic time to wear off and prevent you from accidentally burning your skin.    You may use anti-inflammatory medications (such as Ibuprofen/Advil or Aleve) or Tylenol for pain control if necessary    With diabetes, check your blood sugar more frequently than usual as your blood sugar may be higher than normal for 10-14 days following a steroid injection. Contact your doctor who manages your diabetes if your blood sugar is higher than usual    It may take up to 14 days for the steroid medication to start working although you may feel the effect as early as a few days after the procedure.     Follow up with your referring  provider in 2-3 weeks      If you experience any of the following, call the pain center line during work hours at [phone] or on-call physician after hours at [phone]:  -Fever over 100 degree F  -Swelling, bleeding, redness, drainage, warmth at the injection site  -Progressive weakness or numbness in your legs or arms  -Loss of bowel or bladder function  -Unusual headache that is not relieved by Tylenol or your regular headache medication  -Unusual new onset of pain that is not improving    Phone #s:  Nurse triage line for general questions: [phone]              Follow-ups after your visit        Your next 10 appointments already scheduled     Nov 13, 2017 10:45 AM CST   Radiology Injections with Carlos Roche MD   [facility] ([facility])    [address]  [address]  [place] [address] [phone]            Nov 13, 2017 10:45 AM CST   XR LUMBAR EPIDURAL INJECTION with [email or web address]   [facility] ([facility])    [address]  [address]  [place] [address]   [phone]           For nerve root injection, please send or bring copies of any MRIs or other scans you have had.  Bring a list of your current medicines to your exam. (Include vitamins, minerals and over-the-counter medicines.) Leave your valuables at home.  Plan to have someone drive you home afterward.  Stop taking the following medicines (but talk to your doctor first):   If you take blood thinners, you may need to stop taking them a few days before treatment. Talk to your doctor before stopping these medicines.Stop taking Coumadin (warfarin) 3 days before treatment. Restart the day after treatment.   If you take Plavix, Ticlid, Pletal or Persantine, please ask your doctor if you should stop these medicines. You may need extra tests on the morning of your scan.   If you take Xarelto (Rivaroxaban), you may need to stop taking  it 24 hours before treatment. Talk to your doctor before stopping this medicine. Restart the day after treatment.  You may take your other medicines as normal.  Stop all food and drink (including water) 3 hours before your test or treatment.  Please tell the doctor:   If you are allergic to X-ray dye (contrast fluid).   If you may be pregnant.  Injections take about 30 to 45 minutes. Most people spend up to 2 hours in the clinic or hospital.  Please call the Imaging Department at your exam site with any questions.            Nov 14, 2017  8:15 AM CST   Return Visit with Armond George MD   [facility] ([facility]    [address] [id number] [phone]            Raghavendra 10, 2018  9:00 AM CST   MyChart Physical Adult with Elizabeth Lamas MD   [facility] ([facility])    [address] [id number] [phone]              Who to contact     If you have questions or need follow up information about today's clinic visit or your schedule please contact [facility] directly at [phone].  Normal or non-critical lab and imaging results will be communicated to you by MyChart, letter or phone within 4 business days after the clinic has received the results. If you do not hear from us within 7 days, please contact the clinic through zoiduhart or phone. If you have a critical or abnormal lab result, we will notify you by phone as soon as possible.  Submit refill requests through [employer] or call your pharmacy and they will forward the refill request to us. Please allow 3 business days for your refill to be completed.          Additional Information About Your Visit        [employer] Information     [employer] gives you secure access to your electronic health record. If you see a primary care provider, you can also send messages to your care team and make appointments. If you have  questions, please call your primary care clinic.  If you do not have a primary care provider, please call [phone] and they will assist you.        Care EveryWhere ID     This is your Care EveryWhere ID. This could be used by other organizations to access your [place] medical records  [id number]        Your Vitals Were     Pulse Pulse Oximetry                75 95%           Blood Pressure from Last 3 Encounters:   11/13/17 151/73   09/12/17 131/72   07/14/17 137/73    Weight from Last 3 Encounters:   09/12/17 (!) 142 kg (313 lb)   08/22/17 (!) 142 kg (313 lb)   07/14/17 (!) 138.3 kg (305 lb)              Today, you had the following     No orders found for display       Primary Care Provider Office Phone # Fax #    Elizabeth Lamas [phone]       [address]  [place] [address]        Equal Access to Services     ADELA [facility] Center[facility]OLDEN : Hadii aad ku hadasho Soomaali, waaxda luqadaha, qaybta kaalmada adeegyada, waxay shagufta haybarbara claire . So [facility] [phone].    ATENCIÓN: Si bravo ledezma, tiene a turner disposición servicios gratuitos de asistencia lingüística. Llame al [phone].    We comply with applicable federal civil rights laws and [place] laws. We do not discriminate on the basis of race, color, national origin, age, disability, sex, sexual orientation, or gender identity.            Thank you!     Thank you for choosing [facility]  for your care. Our goal is always to provide you with excellent care. Hearing back from our patients is one way we can continue to improve our services. Please take a few minutes to complete the written survey that you may receive in the mail after your visit with us. Thank you!             Your Updated Medication List - Protect others around you: Learn how to safely use, store and throw away your medicines at www.disposemymeds.org.          This list is accurate as of: 11/13/17 10:27 AM.  Always use your most recent med  list.                   Brand Name Dispense Instructions for use Diagnosis    ACE NOT PRESCRIBED (INTENTIONAL)     0 each    Reported on 5/10/2017    Type 2 diabetes mellitus without complication (H)       albuterol 108 (90 BASE) MCG/ACT Inhaler    PROAIR HFA/PROVENTIL HFA/VENTOLIN HFA     Inhale 2 puffs into the lungs every 6 hours        ASPIRIN PO      Take 81 mg by mouth daily        ATORVASTATIN CALCIUM PO      Take 40 mg by mouth daily        BUPROPION HCL PO      Take 50 mg by mouth 2 times daily        citalopram 40 MG tablet    celeXA    90 tablet    Take 1 tablet (40 mg) by mouth every morning    Major depressive disorder, recurrent episode, moderate (H)       cyclobenzaprine 5 MG tablet    FLEXERIL    60 tablet    Take 1 tablet (5 mg) by mouth 2 times daily as needed for muscle spasms    Left sided sciatica, Back muscle spasm       esomeprazole 40 MG CR capsule    nexIUM    90 capsule    Take 1 capsule (40 mg) by mouth every morning (before breakfast) Take 30-60 minutes before eating.    Dyspepsia       fluticasone 27.5 MCG/SPRAY spray    VERAMYST     Spray 2 sprays into both nostrils daily        gabapentin 300 MG capsule    NEURONTIN    90 capsule    Take 1 capsule (300 mg) by mouth At Bedtime    Acute left-sided low back pain with left-sided sciatica       glipiZIDE-metFORMIN 5-500 MG per tablet    METAGLIP    360 tablet    Take 2 tablets by mouth 2 times daily (before meals)    Type 2 diabetes mellitus with diabetic nephropathy, without long-term current use of insulin (H)       losartan 50 MG tablet    COZAAR    90 tablet    Take 1 tablet (50 mg) by mouth daily    HTN, goal below 140/90, Type 2 diabetes mellitus with diabetic nephropathy, without long-term current use of insulin (H)       melatonin 3 MG tablet     30 tablet    TAKE 1 TABLET BY MOUTH ONCE AT BEDTIME AS NEEDED FOR SLEEP    Major depressive disorder, recurrent episode, moderate (H)       metoprolol 25 MG 24 hr tablet    TOPROL XL    90  tablet    Take 1 tablet (25 mg) by mouth daily    Old myocardial infarction       montelukast 10 MG tablet    SINGULAIR    90 tablet    Take 1 tablet (10 mg) by mouth At Bedtime    Mild intermittent asthma without complication       nystatin 218008 UNIT/GM Powd    NYSTOP    60 g    APPLY TO THE AFFECTED AREA TWO TO THREE TIMES DAILY AS NEEDED    Morbid obesity with body mass index of 45.0-49.9 in adult (H)       order for DME     3 Month    Diabetic test strips and lancets per insurance formulary Check blood sugar 2 times daily    Type 2 diabetes mellitus with diabetic nephropathy (H)       pioglitazone 30 MG tablet    ACTOS    90 tablet    Take 1 tablet (30 mg) by mouth daily    Type 2 diabetes mellitus with diabetic nephropathy, without long-term current use of insulin (H)       polyethylene glycol powder    MIRALAX/GLYCOLAX    510 g    Take 17 g by mouth daily DISSOLVE 17 GRAMS IN LIQUID AND DRINK DAILY AS DIRECTED    Chronic pain syndrome       solifenacin 10 MG tablet    VESICARE    90 tablet    Take 1 tablet (10 mg) by mouth daily    Mixed incontinence       traMADol 50 MG tablet    ULTRAM    30 tablet    Take 1 tablet (50 mg) by mouth every 8 hours as needed for moderate pain    Chronic pain syndrome

## 2017-11-14 ENCOUNTER — OFFICE VISIT (OUTPATIENT)
Dept: ORTHOPEDICS | Facility: CLINIC | Age: 57
End: 2017-11-14
Payer: MEDICARE

## 2017-11-14 VITALS — HEIGHT: 67 IN | TEMPERATURE: 98.6 F | RESPIRATION RATE: 18 BRPM

## 2017-11-14 DIAGNOSIS — M17.11 PRIMARY OSTEOARTHRITIS OF RIGHT KNEE: Primary | ICD-10-CM

## 2017-11-14 PROCEDURE — 20610 DRAIN/INJ JOINT/BURSA W/O US: CPT | Mod: RT | Performed by: ORTHOPAEDIC SURGERY

## 2017-11-14 RX ORDER — METHYLPREDNISOLONE ACETATE 80 MG/ML
80 INJECTION, SUSPENSION INTRA-ARTICULAR; INTRALESIONAL; INTRAMUSCULAR; SOFT TISSUE ONCE
Qty: 1 ML | Refills: 0 | OUTPATIENT
Start: 2017-11-14 | End: 2017-11-14

## 2017-11-14 NOTE — LETTER
11/14/2017         RE: Melody Ferguson  [address]  [place] [address]        Dear Colleague,    Thank you for referring your patient, Melody Ferguson, to the [facility]. Please see a copy of my visit note below.    The patient's right knee was prepped with betadine solution after verification of allergies. Area approximately 10 cm x 10 cm prepped in a sterile fashion. After injection, betadine removed with soap and water and band-aids applied.    1ml depo-medrol with 1% lidocaine plain injected into patient's right knee by Dr. Armond George  LOT# [id number]  Exp. 03/2020      Talon Fenton PA-C  Supervising physician: Armond George MD  Dept. of Orthopedics  [facility]          Follow up right knee primary osteoarthritis.  Last injection 8/2/17.  Range of motion 5-115.    She  desires injection today of right knee(s).  Risks, benefits, potential complications and alternatives were discussed.   With the patient's consent, sterile prep was performed of right knee(s).  Right knee was injected with Depo Medrol 80 mg and lidocaine at anterolateral site.  Return to clinic as needed.    Left total knee arthroplasty from 4/26/17 is doing well.  Good stability.  Range of motion 0-120 degrees.    Continue activities as tolerated.  Return to clinic April with x-ray left knee, [place].         Again, thank you for allowing me to participate in the care of your patient.        Sincerely,        Armond George MD

## 2017-11-14 NOTE — PROGRESS NOTES
The patient's right knee was prepped with betadine solution after verification of allergies. Area approximately 10 cm x 10 cm prepped in a sterile fashion. After injection, betadine removed with soap and water and band-aids applied.    1ml depo-medrol with 1% lidocaine plain injected into patient's right knee by Dr. Armond George  LOT# [id number]  Exp. 03/2020      Talon Fenton PA-C  Supervising physician: Armond George MD  Dept. of Orthopedics  [facility]

## 2017-11-14 NOTE — MR AVS SNAPSHOT
After Visit Summary   11/14/2017    Melody Ferguson    MRN: [id number]           Patient Information     Date Of Birth          1960        Visit Information        Provider Department      11/14/2017 8:15 AM Armond George MD [facility]        Today's Diagnoses     Primary osteoarthritis of right knee    -  1      Care Instructions    You have had a steroid injection today.  For the first 2 hours there will likely be some numbing in the joint from the lidocaine.  This is a good sign, indicating that the injection is in the right place.  In 2 hours the lidocaine will wear off, and the joint will hurt like you had a shot.  Each day the cortisone makes it feel better.  It reaches peak effect in 2 weeks.  We expect it to last for 3 months.  You may resume regular activity when you feel ready.  If you are diabetic, your glucoses will be quite high for several days.            Follow-ups after your visit        Your next 10 appointments already scheduled     Raghavendra 10, 2018  9:00 AM CST   [facility] Physical Adult with [facility] Lam[facility]   [facility]    [address] [place] [address] [phone]              Who to contact     If you have questions or need follow up information about today's clinic visit or your schedule please contact [facility] directly at [phone].  Normal or non-critical lab and imaging results will be communicated to you by MyChart, letter or phone within 4 business days after the clinic has received the results. If you do not hear from us within 7 days, please contact the clinic through [employer] or phone. If you have a critical or abnormal lab result, we will notify you by phone as soon as possible.  Submit refill requests through [employer]idel[employer] or call your pharmacy and they will forward the refill request to us. Please allow 3 business days for your refill to be  "completed.          Additional Information About Your Visit        Inporiahart Information     [employer] gives you secure access to your electronic health record. If you see a primary care provider, you can also send messages to your care team and make appointments. If you have questions, please call your primary care clinic.  If you do not have a primary care provider, please call [phone] and they will assist you.        Care EveryWhere ID     This is your Care EveryWhere ID. This could be used by other organizations to access your [facility] medical records  [id number]        Your Vitals Were     Temperature Respirations Height             98.6  F (37  C) 18 1.702 m (5' 7\")          Blood Pressure from Last 3 Encounters:   11/13/17 156/79   09/12/17 131/72   07/14/17 137/73    Weight from Last 3 Encounters:   09/12/17 (!) 142 kg (313 lb)   08/22/17 (!) 142 kg (313 lb)   07/14/17 (!) 138.3 kg (305 lb)              We Performed the Following     DRAIN/INJECT LARGE JOINT/BURSA     METHYLPREDNISOLONE 80 MG INJ          Today's Medication Changes          These changes are accurate as of: 11/14/17  9:14 AM.  If you have any questions, ask your nurse or doctor.               Start taking these medicines.        Dose/Directions    methylPREDNISolone acetate 80 MG/ML injection   Commonly known as:  DEPO-MEDROL   Used for:  Primary osteoarthritis of right knee   Started by:  Armond George MD        Dose:  80 mg   1 mL (80 mg) by INTRA-ARTICULAR route once for 1 dose   Quantity:  1 mL   Refills:  0            Where to get your medicines      Some of these will need a paper prescription and others can be bought over the counter.  Ask your nurse if you have questions.     You don't need a prescription for these medications     methylPREDNISolone acetate 80 MG/ML injection                Primary Care Provider Office Phone # Fax #    Elizabeth Lamas [phone]       [address]  [facility] " [address]        Equal Access to Services     : Hadii norberto espinoza alva Miranda, waeliotda luqboo, qaybta kakaylapeggy yi, issac reed. So [facility] [phone].    ATENCIÓN: Si habla español, tiene a turner disposición servicios gratuitos de asistencia lingüística. Llame al [phone].    We comply with applicable federal civil rights laws and [place] laws. We do not discriminate on the basis of race, color, national origin, age, disability, sex, sexual orientation, or gender identity.            Thank you!     Thank you for choosing [facility]  for your care. Our goal is always to provide you with excellent care. Hearing back from our patients is one way we can continue to improve our services. Please take a few minutes to complete the written survey that you may receive in the mail after your visit with us. Thank you!             Your Updated Medication List - Protect others around you: Learn how to safely use, store and throw away your medicines at www.disposemymeds.org.          This list is accurate as of: 11/14/17  9:14 AM.  Always use your most recent med list.                   Brand Name Dispense Instructions for use Diagnosis    ACE NOT PRESCRIBED (INTENTIONAL)     0 each    Reported on 5/10/2017    Type 2 diabetes mellitus without complication (H)       albuterol 108 (90 BASE) MCG/ACT Inhaler    PROAIR HFA/PROVENTIL HFA/VENTOLIN HFA     Inhale 2 puffs into the lungs every 6 hours        ASPIRIN PO      Take 81 mg by mouth daily        ATORVASTATIN CALCIUM PO      Take 40 mg by mouth daily        BUPROPION HCL PO      Take 50 mg by mouth 2 times daily        citalopram 40 MG tablet    celeXA    90 tablet    Take 1 tablet (40 mg) by mouth every morning    Major depressive disorder, recurrent episode, moderate (H)       cyclobenzaprine 5 MG tablet    FLEXERIL    60 tablet    Take 1 tablet (5 mg) by mouth 2 times daily as needed for muscle spasms    Left  sided sciatica, Back muscle spasm       esomeprazole 40 MG CR capsule    nexIUM    90 capsule    Take 1 capsule (40 mg) by mouth every morning (before breakfast) Take 30-60 minutes before eating.    Dyspepsia       fluticasone 27.5 MCG/SPRAY spray    VERAMYST     Spray 2 sprays into both nostrils daily        gabapentin 300 MG capsule    NEURONTIN    90 capsule    Take 1 capsule (300 mg) by mouth At Bedtime    Acute left-sided low back pain with left-sided sciatica       glipiZIDE-metFORMIN 5-500 MG per tablet    METAGLIP    360 tablet    Take 2 tablets by mouth 2 times daily (before meals)    Type 2 diabetes mellitus with diabetic nephropathy, without long-term current use of insulin (H)       losartan 50 MG tablet    COZAAR    90 tablet    Take 1 tablet (50 mg) by mouth daily    HTN, goal below 140/90, Type 2 diabetes mellitus with diabetic nephropathy, without long-term current use of insulin (H)       melatonin 3 MG tablet     30 tablet    TAKE 1 TABLET BY MOUTH ONCE AT BEDTIME AS NEEDED FOR SLEEP    Major depressive disorder, recurrent episode, moderate (H)       methylPREDNISolone acetate 80 MG/ML injection    DEPO-MEDROL    1 mL    1 mL (80 mg) by INTRA-ARTICULAR route once for 1 dose    Primary osteoarthritis of right knee       metoprolol 25 MG 24 hr tablet    TOPROL XL    90 tablet    Take 1 tablet (25 mg) by mouth daily    Old myocardial infarction       montelukast 10 MG tablet    SINGULAIR    90 tablet    Take 1 tablet (10 mg) by mouth At Bedtime    Mild intermittent asthma without complication       nystatin 519605 UNIT/GM Powd    NYSTOP    60 g    APPLY TO THE AFFECTED AREA TWO TO THREE TIMES DAILY AS NEEDED    Morbid obesity with body mass index of 45.0-49.9 in adult (H)       order for DME     3 Month    Diabetic test strips and lancets per insurance formulary Check blood sugar 2 times daily    Type 2 diabetes mellitus with diabetic nephropathy (H)       pioglitazone 30 MG tablet    ACTOS    90  tablet    Take 1 tablet (30 mg) by mouth daily    Type 2 diabetes mellitus with diabetic nephropathy, without long-term current use of insulin (H)       polyethylene glycol powder    MIRALAX/GLYCOLAX    510 g    Take 17 g by mouth daily DISSOLVE 17 GRAMS IN LIQUID AND DRINK DAILY AS DIRECTED    Chronic pain syndrome       solifenacin 10 MG tablet    VESICARE    90 tablet    Take 1 tablet (10 mg) by mouth daily    Mixed incontinence       traMADol 50 MG tablet    ULTRAM    30 tablet    Take 1 tablet (50 mg) by mouth every 8 hours as needed for moderate pain    Chronic pain syndrome

## 2017-11-14 NOTE — NURSING NOTE
"Chief Complaint   Patient presents with     RECHECK     Right knee OA last injection 8/22/17.       Initial Temp 98.6  F (37  C)  Resp 18  Ht 1.702 m (5' 7\") Estimated body mass index is 49.02 kg/(m^2) as calculated from the following:    Height as of 9/12/17: 1.702 m (5' 7\").    Weight as of 9/12/17: 142 kg (313 lb).  Medication Reconciliation: complete   Stacey ARMANDO Freedman      "

## 2017-11-14 NOTE — PROGRESS NOTES
Follow up right knee primary osteoarthritis.  Last injection 8/2/17.  Range of motion 5-115.    She  desires injection today of right knee(s).  Risks, benefits, potential complications and alternatives were discussed.   With the patient's consent, sterile prep was performed of right knee(s).  Right knee was injected with Depo Medrol 80 mg and lidocaine at anterolateral site.  Return to clinic as needed.    Left total knee arthroplasty from 4/26/17 is doing well.  Good stability.  Range of motion 0-120 degrees.    Continue activities as tolerated.  Return to clinic April with x-ray left knee, [place].

## 2017-11-22 ENCOUNTER — TELEPHONE (OUTPATIENT)
Dept: PALLIATIVE MEDICINE | Facility: CLINIC | Age: 57
End: 2017-11-22

## 2017-11-22 NOTE — TELEPHONE ENCOUNTER
Patient had a  L5-S1 interlaminar epidural steroid injection on 11/13/17.  Called patient for an update.      Left message that we were calling for an update about how she was doing after the injection.  LM that if she has any problems or questions to call the nurse line at [phone].

## 2017-12-04 DIAGNOSIS — E11.21 TYPE 2 DIABETES MELLITUS WITH DIABETIC NEPHROPATHY (H): Primary | ICD-10-CM

## 2017-12-04 DIAGNOSIS — M54.32 LEFT SIDED SCIATICA: ICD-10-CM

## 2017-12-04 DIAGNOSIS — M62.830 BACK MUSCLE SPASM: ICD-10-CM

## 2017-12-05 ENCOUNTER — TELEPHONE (OUTPATIENT)
Dept: FAMILY MEDICINE | Facility: CLINIC | Age: 57
End: 2017-12-05

## 2017-12-05 RX ORDER — CYCLOBENZAPRINE HCL 5 MG
TABLET ORAL
Qty: 60 TABLET | Refills: 0 | Status: SHIPPED | OUTPATIENT
Start: 2017-12-05 | End: 2017-12-23

## 2017-12-05 NOTE — TELEPHONE ENCOUNTER
Requested Prescriptions   Pending Prescriptions Disp Refills     ACCU-CHEK DAMIEN PLUS test strip [Pharmacy Med Name: ACCU-CHEK DAMIEN PLUS STRIPS 100'S] 200 strip 0    Last Written Prescription Date:  9/22/16  Last Fill Quantity: 3,  # refills: 3   Last Office Visit with [facility], [facility] or [facility] prescribing provider:  9/12/2017     Future Office Visit:    Next 5 appointments (look out 90 days)     Raghavendra 10, 2018  9:00 AM CST   [facility] Physical Adult with Elizabeth Lamas MD   [facility] ([facility]    [address] [place] [id number] [phone]                  Sig: TEST BLOOD SUGARS TWICE DAILY    Diabetic Supplies Protocol Passed    12/4/2017  4:23 PM       Passed - Patient is 18 years of age or older       Passed - Patient has had appt within past 6 mos    IV to PO - Antibiotics     None

## 2017-12-06 NOTE — TELEPHONE ENCOUNTER
This writer attempted to contact patient  on 12/06/17      Reason for call inform patient script for tramadol is at the  for patient to pickup and remember to bring a photo ID and unable to leave message the mail box is full.      If patient calls back:   Relay message above, (read verbatim), document that pt called and close encounter.        Mike Prado

## 2017-12-07 ENCOUNTER — MYC MEDICAL ADVICE (OUTPATIENT)
Dept: FAMILY MEDICINE | Facility: CLINIC | Age: 57
End: 2017-12-07

## 2017-12-07 RX ORDER — BLOOD SUGAR DIAGNOSTIC
STRIP MISCELLANEOUS
Qty: 200 STRIP | Refills: 0 | Status: SHIPPED | OUTPATIENT
Start: 2017-12-07 | End: 2019-10-06

## 2017-12-07 NOTE — TELEPHONE ENCOUNTER
Prescription approved per [facility] Refill Protocol.    Jeanette Rabago RN

## 2017-12-07 NOTE — TELEPHONE ENCOUNTER
Called but the mail box is still full, unable to leave a VM, my chart message sent to patient.  Mike Prado,  For [facility]

## 2017-12-08 NOTE — TELEPHONE ENCOUNTER
Patient has read the message and is notified to pickup the script at [facility] .  Mike Prado,  For [facility]

## 2017-12-23 ENCOUNTER — MYC REFILL (OUTPATIENT)
Dept: FAMILY MEDICINE | Facility: CLINIC | Age: 57
End: 2017-12-23

## 2017-12-23 DIAGNOSIS — I25.2 OLD MYOCARDIAL INFARCTION: ICD-10-CM

## 2017-12-23 DIAGNOSIS — E11.21 TYPE 2 DIABETES MELLITUS WITH DIABETIC NEPHROPATHY, WITHOUT LONG-TERM CURRENT USE OF INSULIN (H): ICD-10-CM

## 2017-12-23 DIAGNOSIS — E66.01 MORBID OBESITY WITH BODY MASS INDEX OF 45.0-49.9 IN ADULT (H): ICD-10-CM

## 2017-12-23 DIAGNOSIS — R10.13 DYSPEPSIA: ICD-10-CM

## 2017-12-23 DIAGNOSIS — G89.4 CHRONIC PAIN SYNDROME: ICD-10-CM

## 2017-12-23 DIAGNOSIS — F33.1 MAJOR DEPRESSIVE DISORDER, RECURRENT EPISODE, MODERATE (H): ICD-10-CM

## 2017-12-23 DIAGNOSIS — M54.32 LEFT SIDED SCIATICA: ICD-10-CM

## 2017-12-23 DIAGNOSIS — M62.830 BACK MUSCLE SPASM: ICD-10-CM

## 2017-12-23 NOTE — TELEPHONE ENCOUNTER
Requested Prescriptions   Pending Prescriptions Disp Refills     citalopram (CELEXA) 40 MG tablet [Pharmacy Med Name: CITALOPRAM 40MG TABLETS]    Last Written Prescription Date:  11/7/17  Last Fill Quantity: 90,  # refills: 0   Last Office Visit with [facility], [facility] or [facility] prescribing provider:  9/12/17   Future Office Visit:    Next 5 appointments (look out 90 days)     Raghavendra 10, 2018  9:00 AM [facility]   Ayla Physical Adult with Elizabeth Lamas MD   [facility])    [address] [address]   [phone]                  90 tablet 0     Sig: TAKE 1 TABLET(40 MG) BY MOUTH EVERY MORNING    SSRIs Protocol Failed    12/23/2017 10:36 AM       Failed - PHQ-9 score less than 5 in past 6 months    The PHQ-9 criteria is meant to fail. It requires a PHQ-9 score review         Passed - Patient is age 18 or older       Passed - No active pregnancy on record       Passed - No positive pregnancy test in last 12 months       Passed - Recent (6 mo) or future visit with authorizing provider's specialty    Patient had office visit in the last 6 months or has a visit in the next 30 days with authorizing provider.  See chart review.             traMADol (ULTRAM) 50 MG tablet [Pharmacy Med Name: TRAMADOL 50MG TABLETS]    Last Written Prescription Date:  12/5/17  Last Fill Quantity: 30,  # refills: 0   Last Office Visit with [facility], [facility] or [facility] prescribing provider:  9/12/17   Future Office Visit:    Next 5 appointments (look out 90 days)     Raghavendra 10, 2018  9:00 AM [facility]IM[facility] Physical Adult with Elizabeth Lamas MD   [facility] ([facility])    [address] [address]   [phone]                  30 tablet 0     Sig: TAKE ONE TABLET BY MOUTH EVERY 8 HOURS AS NEEDED FOR MODERATE PAIN    There is no refill protocol information for this order        esomeprazole (NEXIUM) 40 MG CR capsule [Pharmacy  Med Name: ESOMEPRAZOLE MAGNESIUM 40MG DR CAPS]    Last Written Prescription Date:  11/7/17  Last Fill Quantity: 90,  # refills: 0   Last Office Visit with [facility], [facility] or [facility] prescribing provider:  9/12/17   Future Office Visit:    Next 5 appointments (look out 90 days)     Raghavendra 10, 2018  9:00 AM NIMA Aguila Physical Adult with Elizabeth Lamas MD   [facility] ([facility])    [address] [place] [address]   [phone]                  90 capsule 0     Sig: TAKE 1 CAPSULE(40 MG) BY MOUTH EVERY MORNING 30 TO 60 MINUTES BEFORE EATING BREAKFAST    PPI Protocol Passed    12/23/2017 10:36 AM       Passed - Not on Clopidogrel (unless Pantoprazole ordered)       Passed - No diagnosis of osteoporosis on record       Passed - Recent or future visit with authorizing provider's specialty    Patient had office visit in the last year or has a visit in the next 30 days with authorizing provider.  See chart review.              Passed - Patient is age 18 or older       Passed - No active pregnacy on record       Passed - No positive pregnancy test in past 12 months        nystatin (MYCOSTATIN) 562225 UNIT/GM POWD [Pharmacy Med Name: NYSTATIN 100,000 U/GM TOP POW 60GM]    Last Written Prescription Date:  11/7/17  Last Fill Quantity: 60g,  # refills: 1   Last Office Visit with [facility], [facility] or [facility] prescribing provider:  9/12/17   Future Office Visit:    Next 5 appointments (look out 90 days)     Raghavendra 10, 2018  9:00 AM NIMA Aguila Physical Adult with Elizabeth Lamas MD   [facility] ([facility])    [address] [place] [address]   [phone]                  60 g 0     Sig: APPLY TO THE AFFECTED AREA TWO TO THREE TIMES DAILY AS NEEDED    Antifungal Agents Passed    12/23/2017 10:36 AM       Passed - Recent or future visit with authorizing provider's specialty    Patient had office visit in the last year or  has a visit in the next 30 days with authorizing provider.  See chart review.              Passed - Not Fluconazole or Terconazole     If oral Fluconazole or Terconazole, may refill if indicated in progress notes.           cyclobenzaprine (FLEXERIL) 5 MG tablet [Pharmacy Med Name: CYCLOBENZAPRINE 5MG TABLETS]    Last Written Prescription Date:  12/5/17  Last Fill Quantity: 60,  # refills: 0   Last Office Visit with [facility] or [facility] prescribing provider:  9/12/17   Future Office Visit:    Next 5 appointments (look out 90 days)     Raghavendra 10, 2018  9:00 AM CST   [facility]   [facility] ([facility])    [address] [facility] [address] [phone]                  60 tablet 0     Sig: TAKE 1 TABLET(5 MG) BY MOUTH TWICE DAILY AS NEEDED FOR MUSCLE SPASMS    There is no refill protocol information for this order            Jose Carlos Faarax  [facility]

## 2017-12-26 NOTE — TELEPHONE ENCOUNTER
Message from Geet:  Original authorizing provider: Elizabeth Lamas MD    Melody Ferguson would like a refill of the following medications:  metoprolol (TOPROL XL) 25 MG 24 hr tablet [Elizabeth Lamas MD]  pioglitazone (ACTOS) 30 MG tablet [Elizabeth Lamas MD]  esomeprazole (NEXIUM) 40 MG CR capsule [Elizabeth Lamas MD]    Preferred pharmacy: [facility] [id number] [place] [address] - [address] [facility] [address]    Comment:  I dropped my meds container and my meds ended up wet and un usable

## 2017-12-28 RX ORDER — METOPROLOL SUCCINATE 25 MG/1
25 TABLET, EXTENDED RELEASE ORAL DAILY
Qty: 90 TABLET | Refills: 1 | Status: SHIPPED | OUTPATIENT
Start: 2017-12-28 | End: 2018-02-04

## 2017-12-28 RX ORDER — ESOMEPRAZOLE MAGNESIUM 40 MG/1
CAPSULE, DELAYED RELEASE ORAL
Qty: 90 CAPSULE | Refills: 0 | OUTPATIENT
Start: 2017-12-28

## 2017-12-28 RX ORDER — ESOMEPRAZOLE MAGNESIUM 40 MG/1
40 CAPSULE, DELAYED RELEASE ORAL
Qty: 90 CAPSULE | Refills: 1 | Status: SHIPPED | OUTPATIENT
Start: 2017-12-28 | End: 2018-09-11

## 2017-12-28 RX ORDER — NYSTATIN 100000 [USP'U]/G
POWDER TOPICAL
Qty: 60 G | Refills: 1 | Status: SHIPPED | OUTPATIENT
Start: 2017-12-28 | End: 2018-02-04

## 2017-12-28 RX ORDER — PIOGLITAZONEHYDROCHLORIDE 30 MG/1
30 TABLET ORAL DAILY
Qty: 90 TABLET | Refills: 0 | Status: SHIPPED | OUTPATIENT
Start: 2017-12-28 | End: 2018-01-10

## 2017-12-28 NOTE — TELEPHONE ENCOUNTER
Duplicate request for Nexium-already filled earlier today. Request denied in this encounter.  Nystatin powder refilled per [facility] policy.    Routing refill request to provider for review/approval because:  Drug not on the G refill protocol: Tramadol and Cyclobenzaprine    Citalopram last filled 11/7/17, #90 with 0 refills-pt good till February.  Patient has appointment scheduled with PEDRO Lamas on 1/10/18.  Provider to review remaining refill requests    Susan Heath RN  [facility] Triage

## 2017-12-28 NOTE — TELEPHONE ENCOUNTER
Medication is being filled for 1 time refill only due to:  Patient needs to be seen because Needs recheck.   Pt has upcoming annual exam scheduled.  Samina North RN

## 2017-12-29 RX ORDER — CITALOPRAM HYDROBROMIDE 40 MG/1
TABLET ORAL
Qty: 90 TABLET | Refills: 0 | Status: SHIPPED | OUTPATIENT
Start: 2017-12-29 | End: 2018-06-18

## 2017-12-29 RX ORDER — TRAMADOL HYDROCHLORIDE 50 MG/1
TABLET ORAL
Qty: 30 TABLET | Refills: 0 | Status: SHIPPED | OUTPATIENT
Start: 2017-12-29 | End: 2018-02-04

## 2017-12-29 RX ORDER — CYCLOBENZAPRINE HCL 5 MG
TABLET ORAL
Qty: 60 TABLET | Refills: 0 | Status: SHIPPED | OUTPATIENT
Start: 2017-12-29 | End: 2018-05-11

## 2017-12-29 NOTE — TELEPHONE ENCOUNTER
Faxed Rx for the Ultram to Celina Ibarra,  [phone], right fax confirmed at 8:57 am today.  Yuli Giang MA/  For Teams [employer] and [facility]

## 2018-01-02 ENCOUNTER — TELEPHONE (OUTPATIENT)
Dept: FAMILY MEDICINE | Facility: CLINIC | Age: 58
End: 2018-01-02

## 2018-01-02 DIAGNOSIS — E11.21 TYPE 2 DIABETES MELLITUS WITH DIABETIC NEPHROPATHY, WITHOUT LONG-TERM CURRENT USE OF INSULIN (H): Primary | ICD-10-CM

## 2018-01-02 NOTE — TELEPHONE ENCOUNTER
Plan does not cover ACCU-CHEK DAMIEN PLUS test strip.  Please call [phone] to initiate Prior Auth or change med.    Insurance type and ID number: ID# [id number]      Additional Information:     Kiki Pearson

## 2018-01-05 DIAGNOSIS — E11.21 TYPE 2 DIABETES MELLITUS WITH DIABETIC NEPHROPATHY, WITHOUT LONG-TERM CURRENT USE OF INSULIN (H): ICD-10-CM

## 2018-01-05 LAB
ALBUMIN SERPL-MCNC: 3.6 G/DL (ref 3.4–5)
ALP SERPL-CCNC: 95 U/L (ref 40–150)
ALT SERPL W P-5'-P-CCNC: 22 U/L (ref 0–50)
ANION GAP SERPL CALCULATED.3IONS-SCNC: 7 MMOL/L (ref 3–14)
AST SERPL W P-5'-P-CCNC: 10 U/L (ref 0–45)
BILIRUB SERPL-MCNC: 0.3 MG/DL (ref 0.2–1.3)
BUN SERPL-MCNC: 18 MG/DL (ref 7–30)
CALCIUM SERPL-MCNC: 8.9 MG/DL (ref 8.5–10.1)
CHLORIDE SERPL-SCNC: 101 MMOL/L (ref 94–109)
CHOLEST SERPL-MCNC: 193 MG/DL
CO2 SERPL-SCNC: 33 MMOL/L (ref 20–32)
CREAT SERPL-MCNC: 0.51 MG/DL (ref 0.52–1.04)
CREAT UR-MCNC: 125 MG/DL
GFR SERPL CREATININE-BSD FRML MDRD: >90 ML/MIN/1.7M2
GLUCOSE SERPL-MCNC: 178 MG/DL (ref 70–99)
HBA1C MFR BLD: 6.9 % (ref 4.3–6)
HDLC SERPL-MCNC: 59 MG/DL
LDLC SERPL CALC-MCNC: 109 MG/DL
MICROALBUMIN UR-MCNC: 10 MG/L
MICROALBUMIN/CREAT UR: 7.97 MG/G CR (ref 0–25)
NONHDLC SERPL-MCNC: 134 MG/DL
POTASSIUM SERPL-SCNC: 3.9 MMOL/L (ref 3.4–5.3)
PROT SERPL-MCNC: 6.7 G/DL (ref 6.8–8.8)
SODIUM SERPL-SCNC: 141 MMOL/L (ref 133–144)
TRIGL SERPL-MCNC: 126 MG/DL

## 2018-01-05 PROCEDURE — 83036 HEMOGLOBIN GLYCOSYLATED A1C: CPT | Performed by: PREVENTIVE MEDICINE

## 2018-01-05 PROCEDURE — 36415 COLL VENOUS BLD VENIPUNCTURE: CPT | Performed by: PREVENTIVE MEDICINE

## 2018-01-05 PROCEDURE — 82043 UR ALBUMIN QUANTITATIVE: CPT | Performed by: PREVENTIVE MEDICINE

## 2018-01-05 PROCEDURE — 80061 LIPID PANEL: CPT | Performed by: PREVENTIVE MEDICINE

## 2018-01-05 PROCEDURE — 80053 COMPREHEN METABOLIC PANEL: CPT | Performed by: PREVENTIVE MEDICINE

## 2018-01-10 ENCOUNTER — OFFICE VISIT (OUTPATIENT)
Dept: FAMILY MEDICINE | Facility: CLINIC | Age: 58
End: 2018-01-10
Payer: MEDICARE

## 2018-01-10 ENCOUNTER — TELEPHONE (OUTPATIENT)
Dept: PALLIATIVE MEDICINE | Facility: CLINIC | Age: 58
End: 2018-01-10

## 2018-01-10 VITALS
HEART RATE: 68 BPM | SYSTOLIC BLOOD PRESSURE: 138 MMHG | OXYGEN SATURATION: 97 % | DIASTOLIC BLOOD PRESSURE: 78 MMHG | HEIGHT: 67 IN | WEIGHT: 293 LBS | TEMPERATURE: 97.8 F | BODY MASS INDEX: 45.99 KG/M2

## 2018-01-10 DIAGNOSIS — G89.4 CHRONIC PAIN SYNDROME: Chronic | ICD-10-CM

## 2018-01-10 DIAGNOSIS — E66.01 MORBID OBESITY WITH BMI OF 45.0-49.9, ADULT (H): Chronic | ICD-10-CM

## 2018-01-10 DIAGNOSIS — E11.21 TYPE 2 DIABETES MELLITUS WITH DIABETIC NEPHROPATHY, WITHOUT LONG-TERM CURRENT USE OF INSULIN (H): ICD-10-CM

## 2018-01-10 DIAGNOSIS — M51.369 DDD (DEGENERATIVE DISC DISEASE), LUMBAR: Chronic | ICD-10-CM

## 2018-01-10 DIAGNOSIS — Z12.31 ENCOUNTER FOR SCREENING MAMMOGRAM FOR BREAST CANCER: ICD-10-CM

## 2018-01-10 DIAGNOSIS — Z00.00 ROUTINE GENERAL MEDICAL EXAMINATION AT A HEALTH CARE FACILITY: Primary | ICD-10-CM

## 2018-01-10 PROCEDURE — 99213 OFFICE O/P EST LOW 20 MIN: CPT | Mod: 25 | Performed by: PREVENTIVE MEDICINE

## 2018-01-10 PROCEDURE — 99396 PREV VISIT EST AGE 40-64: CPT | Performed by: PREVENTIVE MEDICINE

## 2018-01-10 RX ORDER — LIRAGLUTIDE 6 MG/ML
0.6 INJECTION SUBCUTANEOUS DAILY
Qty: 6 ML | Refills: 1 | Status: SHIPPED | OUTPATIENT
Start: 2018-01-10 | End: 2018-03-06

## 2018-01-10 NOTE — PATIENT INSTRUCTIONS
Preventive Health Recommendations  Female Ages 50 - 64    Yearly exam: See your health care provider every year in order to  o Review health changes.   o Discuss preventive care.    o Review your medicines if your doctor has prescribed any.      Get a Pap test every three years (unless you have an abnormal result and your provider advises testing more often).    If you get Pap tests with HPV test, you only need to test every 5 years, unless you have an abnormal result.     You do not need a Pap test if your uterus was removed (hysterectomy) and you have not had cancer.    You should be tested each year for STDs (sexually transmitted diseases) if you're at risk.     Have a mammogram every 1 to 2 years.    Have a colonoscopy at age 50, or have a yearly FIT test (stool test). These exams screen for colon cancer.      Have a cholesterol test every 5 years, or more often if advised.    Have a diabetes test (fasting glucose) every three years. If you are at risk for diabetes, you should have this test more often.     If you are at risk for osteoporosis (brittle bone disease), think about having a bone density scan (DEXA).    Shots: Get a flu shot each year. Get a tetanus shot every 10 years.    Nutrition:     Eat at least 5 servings of fruits and vegetables each day.    Eat whole-grain bread, whole-wheat pasta and brown rice instead of white grains and rice.    Talk to your provider about Calcium and Vitamin D.     Lifestyle    Exercise at least 150 minutes a week (30 minutes a day, 5 days a week). This will help you control your weight and prevent disease.    Limit alcohol to one drink per day.    No smoking.     Wear sunscreen to prevent skin cancer.     See your dentist every six months for an exam and cleaning.    See your eye doctor every 1 to 2 years.            At [facility], we strive to deliver an exceptional experience to you, every time we see you.  If you receive a survey in the mail,  please send us back your thoughts. We really do value your feedback.    Based on your medical history, these are the current health maintenance/preventive care services that you are due for (some may have been done at this visit.)  Health Maintenance Due   Topic Date Due     ADVANCE DIRECTIVE PLANNING Q5 YRS  09/25/2013     FOOT EXAM Q1 YEAR  03/01/2017     ASTHMA CONTROL TEST Q6 MOS  10/18/2017     URINE DRUG SCREEN Q1 YR  11/01/2017     MAMMO SCREEN Q2 YR (SYSTEM ASSIGNED)  12/01/2017         Suggested websites for health information:  Www.Infinio.org : Up to date and easily searchable information on multiple topics.  Www.Songtradr.gov : medication info, interactive tutorials, watch real surgeries online  Www.familydoctor.org : good info from the Academy of Family Physicians  Www.cdc.gov : public health info, travel advisories, epidemics (H1N1)  Www.aap.org : children's health info, normal development, vaccinations  Www.health.[facility].[place].us : [place] dept of health, public health issues in [place], N1N1    Your care team:                            Family Medicine Internal Medicine   MD Kip Estrella MD Shantel Branch-Fleming, MD Katya Georgiev PA-C Nam Ho, MD Pediatrics   Dipak Khanna PAFARAZ Bach, JELANI MORALES CNP   MD Monica Hu MD Deborah Mielke, MD Kim Thein, APRN CNP      Clinic hours: Monday - Thursday 7 am-7 pm; Fridays 7 am-5 pm.   Urgent care: Monday - Friday 11 am-9 pm; Saturday and Sunday 9 am-5 pm.  Pharmacy : Monday -Thursday 8 am-8 pm; Friday 8 am-6 pm; Saturday and Sunday 9 am-5 pm.     Clinic: [phone]   Pharmacy: [phone]

## 2018-01-10 NOTE — NURSING NOTE
"Chief Complaint   Patient presents with     Physical       Initial Pulse 68  Temp 97.8  F (36.6  C) (Oral)  Ht 5' 7\" (1.702 m)  Wt (!) 326 lb (147.9 kg)  SpO2 97%  BMI 51.06 kg/m2 Estimated body mass index is 51.06 kg/(m^2) as calculated from the following:    Height as of this encounter: 5' 7\" (1.702 m).    Weight as of this encounter: 326 lb (147.9 kg).  Medication Reconciliation: complete       Misty Oliveira MA  9:11 AM 1/10/2018    "

## 2018-01-10 NOTE — PROGRESS NOTES
SUBJECTIVE:   CC: Melody Ferguson is an 57 year old woman who presents for preventive health visit.     Physical   Annual:     Getting at least 3 servings of Calcium per day::  NO    Bi-annual eye exam::  Yes    Dental care twice a year::  NO    Sleep apnea or symptoms of sleep apnea::  Daytime drowsiness    Diet::  Low salt, Diabetic and Carbohydrate counting    Frequency of exercise::  1 day/week    Duration of exercise::  Less than 15 minutes    Taking medications regularly::  Yes    Medication side effects::  None    Additional concerns today::  YES                Chronic Pain Follow-Up       Type / Location of Pain: headache, back pain, knee pain  Analgesia/pain control:       Recent changes:  worse      Overall control: Tolerable with discomfort; sometimes no  Activity level/function:      Daily activities:  Can do most things most days, with some rest; bad 2 days every other week    Work:  Unable to work  Adverse effects:  No  Adherance    Taking medication as directed?  Yes    Participating in other treatments: injections  Risk Factors:    Sleep:  Good    Mood/anxiety:  Controlled unless in pain    Recent family or social stressors:  none noted and dad in remission    Other aggravating factors: none  PHQ-9 SCORE 10/10/2016 12/15/2016 8/10/2017   Total Score MyChart - - 10 (Moderate depression)   Total Score 16 14 10     DANY-7 SCORE 7/7/2016 10/10/2016 8/10/2017   Total Score - - 10 (moderate anxiety)   Total Score 6 11 10     Encounter-Level CSA - 03/01/2016:          Controlled Substance Agreement - Scan on 3/7/2016  9:41 AM : CONTROLLED SUBSTANCE AGREEMENT (below)                    Today's PHQ-2 Score:   PHQ-2 ( 1999 [employer]) 1/10/2018   Q1: Little interest or pleasure in doing things 0   Q2: Feeling down, depressed or hopeless 1   PHQ-2 Score 1   Q1: Little interest or pleasure in doing things Not at all   Q2: Feeling down, depressed or hopeless Several days   PHQ-2 Score 1       Abuse: Current or  Past(Physical, Sexual or Emotional)- No  Do you feel safe in your environment - Yes    Social History   Substance Use Topics     Smoking status: Former Smoker     Packs/day: 0.50     Years: 25.00     Types: Cigarettes     Quit date: 10/15/1991     Smokeless tobacco: Never Used     Alcohol use 0.0 oz/week      Comment: occassionally     Alcohol Use 1/10/2018   If you drink alcohol, do you typically have greater than 3 drinks per day OR greater than 7 drinks per week?   No   No flowsheet data found.      Reviewed orders with patient.  Reviewed health maintenance and updated orders accordingly - Yes  Labs reviewed in EPIC  BP Readings from Last 3 Encounters:   01/10/18 138/78   11/13/17 156/79   09/12/17 131/72    Wt Readings from Last 3 Encounters:   01/10/18 (!) 326 lb (147.9 kg)   09/12/17 (!) 313 lb (142 kg)   08/22/17 (!) 313 lb (142 kg)                  Patient Active Problem List   Diagnosis     Primary osteoarthritis of both knees     PTSD (post-traumatic stress disorder)     Chronic pain syndrome     Fibromyalgia     History of TIA (transient ischemic attack) and stroke     Major depressive disorder, recurrent episode, moderate (H)     Urge incontinence of urine     Prolapse of vaginal wall     Mild intermittent asthma without complication     HTN, goal below 140/90     Hyperlipidemia LDL goal <100     Type 2 diabetes mellitus with diabetic nephropathy (H)     Allergic rhinitis, unspecified allergic rhinitis type     Morbid obesity with BMI of 45.0-49.9, adult (H)     History of colonic polyps     Vaginal high risk HPV DNA test positive     DM type 2 without retinopathy (H)     Hiatal hernia     DDD (degenerative disc disease), lumbar     Diabetes mellitus, type 2 (H)     History of total knee arthroplasty, left     Type 2 diabetes mellitus without retinopathy (H)     Primary osteoarthritis of right knee     Past Surgical History:   Procedure Laterality Date     ABDOMEN SURGERY  05/20/2005     BIOPSY  1984      BREAST SURGERY  1984     C TOTAL KNEE ARTHROPLASTY Left 04/26/2017    [place] [facility] [place]     CHOLECYSTECTOMY  2000     COLONOSCOPY       ENT SURGERY  2008    tonsils removed     GENITOURINARY SURGERY  2005    a & p repair     REPAIR PTOSIS       SLING TRANSVAGINAL N/A 7/14/2017    Procedure: SLING TRANSVAGINAL;  Sling ([facility]);  Surgeon: Talon Katz MD;  Location: [facility] OR       Social History   Substance Use Topics     Smoking status: Former Smoker     Packs/day: 0.50     Years: 25.00     Types: Cigarettes     Quit date: 10/15/1991     Smokeless tobacco: Never Used     Alcohol use 0.0 oz/week      Comment: occassionally     Family History   Problem Relation Age of Onset     Thyroid Disease Sister      CANCER Brother      CANCER Sister      breast cancer     CEREBROVASCULAR DISEASE Sister 62     Other - See Comments Sister      Angioplasty 8/2016     Hypertension Father      Hyperlipidemia Father      Prostate Cancer Father      Glaucoma Maternal Grandmother      CANCER Maternal Grandfather      CANCER Paternal Grandmother      Breast Cancer Paternal Grandmother      CEREBROVASCULAR DISEASE Paternal Grandfather      CEREBROVASCULAR DISEASE Maternal Half-Sister      4/08/2016     Breast Cancer Maternal Half-Sister      Asthma Maternal Half-Sister      Other Cancer Brother      Passed from cancer unsure what type     Anesthesia Reaction Daughter      DIABETES No family hx of      Macular Degeneration No family hx of          Current Outpatient Prescriptions   Medication Sig Dispense Refill     liraglutide (VICTOZA) 18 MG/3ML soln Inject 0.6 mg Subcutaneous daily Increase to 1.2 mg daily after 1 week 6 mL 1     blood glucose monitoring (NO BRAND SPECIFIED) test strip Use to test blood sugars 2 times daily or as directed 180 strip 3     citalopram (CELEXA) 40 MG tablet TAKE 1 TABLET(40 MG) BY MOUTH EVERY MORNING 90 tablet 0     traMADol (ULTRAM) 50 MG tablet TAKE ONE TABLET BY MOUTH EVERY 8 HOURS AS NEEDED FOR MODERATE  PAIN 30 tablet 0     cyclobenzaprine (FLEXERIL) 5 MG tablet TAKE 1 TABLET(5 MG) BY MOUTH TWICE DAILY AS NEEDED FOR MUSCLE SPASMS 60 tablet 0     nystatin (MYCOSTATIN) 173596 UNIT/GM POWD APPLY TO THE AFFECTED AREA TWO TO THREE TIMES DAILY AS NEEDED 60 g 1     metoprolol (TOPROL XL) 25 MG 24 hr tablet Take 1 tablet (25 mg) by mouth daily 90 tablet 1     esomeprazole (NEXIUM) 40 MG CR capsule Take 1 capsule (40 mg) by mouth every morning (before breakfast) Take 30-60 minutes before eating. 90 capsule 1     ACCU-CHEK DAMIEN PLUS test strip TEST BLOOD SUGARS TWICE DAILY 200 strip 0     solifenacin (VESICARE) 10 MG tablet Take 1 tablet (10 mg) by mouth daily 90 tablet 0     polyethylene glycol (MIRALAX/GLYCOLAX) powder Take 17 g by mouth daily DISSOLVE 17 GRAMS IN LIQUID AND DRINK DAILY AS DIRECTED 510 g 3     losartan (COZAAR) 50 MG tablet Take 1 tablet (50 mg) by mouth daily 90 tablet 0     glipiZIDE-metFORMIN (METAGLIP) 5-500 MG per tablet Take 2 tablets by mouth 2 times daily (before meals) 360 tablet 0     gabapentin (NEURONTIN) 300 MG capsule Take 1 capsule (300 mg) by mouth At Bedtime 90 capsule 1     montelukast (SINGULAIR) 10 MG tablet Take 1 tablet (10 mg) by mouth At Bedtime 90 tablet 0     melatonin 3 MG tablet TAKE 1 TABLET BY MOUTH ONCE AT BEDTIME AS NEEDED FOR SLEEP 30 tablet 0     order for DME Diabetic test strips and lancets per insurance formulary Check blood sugar 2 times daily 3 Month 3     albuterol (PROAIR HFA, PROVENTIL HFA, VENTOLIN HFA) 108 (90 BASE) MCG/ACT inhaler Inhale 2 puffs into the lungs every 6 hours       ASPIRIN PO Take 81 mg by mouth daily       ATORVASTATIN CALCIUM PO Take 40 mg by mouth daily       BUPROPION HCL PO Take 50 mg by mouth 2 times daily        fluticasone (VERAMYST) 27.5 MCG/SPRAY nasal spray Spray 2 sprays into both nostrils daily       ACE NOT PRESCRIBED, INTENTIONAL, Reported on 5/10/2017 0 each 0     cyclobenzaprine (FLEXERIL) 5 MG tablet Take 1 tablet (5 mg) by  mouth 2 times daily as needed for muscle spasms (Patient not taking: Reported on 1/10/2018) 60 tablet 0     Allergies   Allergen Reactions     Milk Protein Extract GI Disturbance     lactose intolerance.  Can tolerate milk products if uses lactaid     Bee Venom Swelling     Latex      Lisinopril Cough     Onion GI Disturbance     Vomiting, feels like throat is clogged     Aloe Rash     pain     Chlorine Rash     Recent Labs   Lab Test  01/05/18   0822  07/11/17   0841  04/18/17   0900  11/01/16   0752  03/01/16   0806   A1C  6.9*   --   6.1*  6.7*  6.2*   LDL  109*   --    --   75  66   HDL  59   --    --   61  53   TRIG  126   --    --   159*  109   ALT  22   --    --   27   --    CR  0.51*  0.43*  0.47*  0.47*  0.56   GFRESTIMATED  >90  >90  Non African American GFR Calc    >90  Non  GFR Calc    >90  Non  GFR Calc    >90  Non  GFR Calc     GFRESTBLACK  >90  >90  African American GFR Calc    >90   GFR Calc    >90   GFR Calc    >90   GFR Calc     POTASSIUM  3.9  3.8  3.9  3.8  3.4   TSH   --    --    --   1.20   --               Patient over age 50, mutual decision to screen reflected in health maintenance.      Pertinent mammograms are reviewed under the imaging tab.  History of abnormal Pap smear: NO - age 30-65 PAP every 5 years with negative HPV co-testing recommended    Reviewed and updated as needed this visit by clinical staff  Tobacco  Allergies  Problems         Reviewed and updated as needed this visit by Provider  Problems        Past Medical History:   Diagnosis Date     Arthritis 2000     Cancer (H) 1983     Cerebral infarction (H) 2005     COPD (chronic obstructive pulmonary disease) (H) 1990     Depressive disorder 1990     DM type 2 (diabetes mellitus, type 2) (H)      History of TIA (transient ischemic attack)      HTN (hypertension)      Hyperlipidemia      Uncomplicated asthma 1990      Past  "Surgical History:   Procedure Laterality Date     ABDOMEN SURGERY  05/20/2005     BIOPSY  1984     BREAST SURGERY  1984     C TOTAL KNEE ARTHROPLASTY Left 04/26/2017    [place] [facility] [place]     CHOLECYSTECTOMY  2000     COLONOSCOPY       ENT SURGERY  2008    tonsils removed     GENITOURINARY SURGERY  2005    a & p repair     REPAIR PTOSIS       SLING TRANSVAGINAL N/A 7/14/2017    Procedure: SLING TRANSVAGINAL;  Sling ([facility]);  Surgeon: Talon Katz MD;  Location: [facility] OR   Review of Systems  C: NEGATIVE for fever, chills, change in weight  I: NEGATIVE for worrisome rashes, moles or lesions  E: NEGATIVE for vision changes or irritation  ENT: NEGATIVE for ear, mouth and throat problems  R: NEGATIVE for significant cough or SOB  B: NEGATIVE for masses, tenderness or discharge  CV: NEGATIVE for chest pain, palpitations or peripheral edema  GI: NEGATIVE for nausea, abdominal pain, heartburn, or change in bowel habits  : NEGATIVE for unusual urinary or vaginal symptoms. No vaginal bleeding.  N: NEGATIVE for weakness, dizziness or paresthesias      OBJECTIVE:   /78 (BP Location: Left arm, Patient Position: Chair, Cuff Size: Adult Large)  Pulse 68  Temp 97.8  F (36.6  C) (Oral)  Ht 5' 7\" (1.702 m)  Wt (!) 326 lb (147.9 kg)  SpO2 97%  BMI 51.06 kg/m2  Physical Exam  GENERAL APPEARANCE: healthy, alert and no distress, obese+  EYES: Eyes grossly normal to inspection, PERRL and conjunctivae and sclerae normal  HENT: ear canals and TM's normal, nose without ulcers or lesions, has upper dentures, lower teeth in poor condition   NECK: no adenopathy, no asymmetry  RESP: lungs clear to auscultation - no rales, rhonchi or wheezes  BREAST: normal without masses, tenderness or nipple discharge and no palpable axillary masses or adenopathy  CV: regular rate and rhythm, normal S1 S2, no S3 or S4, no murmur, click or rub, peripheral edema+ and peripheral pulses strong  ABDOMEN: soft, nontender, no rebound or guarding   MS: no " musculoskeletal defects are noted and gait is age appropriate without ataxia  SKIN: no suspicious lesions or rashes  NEURO: Normal strength and tone, sensory exam grossly normal, mentation intact and speech normal  PSYCH: mentation appears normal and affect normal/bright    Orders Only on 01/05/2018   Component Date Value Ref Range Status     Hemoglobin A1C 01/05/2018 6.9* 4.3 - 6.0 % Final     Sodium 01/05/2018 141  133 - 144 mmol/L Final     Potassium 01/05/2018 3.9  3.4 - 5.3 mmol/L Final     Chloride 01/05/2018 101  94 - 109 mmol/L Final     Carbon Dioxide 01/05/2018 33* 20 - 32 mmol/L Final     Anion Gap 01/05/2018 7  3 - 14 mmol/L Final     Glucose 01/05/2018 178* 70 - 99 mg/dL Final    Fasting specimen     Urea Nitrogen 01/05/2018 18  7 - 30 mg/dL Final     Creatinine 01/05/2018 0.51* 0.52 - 1.04 mg/dL Final     GFR Estimate 01/05/2018 >90  >60 mL/min/1.7m2 Final    Non  GFR Calc     GFR Estimate If Black 01/05/2018 >90  >60 mL/min/1.7m2 Final    African American GFR Calc     Calcium 01/05/2018 8.9  8.5 - 10.1 mg/dL Final     Bilirubin Total 01/05/2018 0.3  0.2 - 1.3 mg/dL Final     Albumin 01/05/2018 3.6  3.4 - 5.0 g/dL Final     Protein Total 01/05/2018 6.7* 6.8 - 8.8 g/dL Final     Alkaline Phosphatase 01/05/2018 95  40 - 150 U/L Final     ALT 01/05/2018 22  0 - 50 U/L Final     AST 01/05/2018 10  0 - 45 U/L Final     Cholesterol 01/05/2018 193  <200 mg/dL Final     Triglycerides 01/05/2018 126  <150 mg/dL Final    Fasting specimen     HDL Cholesterol 01/05/2018 59  >49 mg/dL Final     LDL Cholesterol Calculated 01/05/2018 109* <100 mg/dL Final    Comment: Above desirable:  100-129 mg/dl  Borderline High:  130-159 mg/dL  High:             160-189 mg/dL  Very high:       >189 mg/dl       Non HDL Cholesterol 01/05/2018 134* <130 mg/dL Final    Comment: Above Desirable:  130-159 mg/dl  Borderline high:  160-189 mg/dl  High:             190-219 mg/dl  Very high:       >219 mg/dl        "Creatinine Urine 01/05/2018 125  mg/dL Final     Albumin Urine mg/L 01/05/2018 10  mg/L Final     Albumin Urine mg/g Cr 01/05/2018 7.97  0 - 25 mg/g Cr Final         ASSESSMENT/PLAN:   Melody was seen today for physical.    Diagnoses and all orders for this visit:    Routine general medical examination at a health care facility  -Labs previously completed were discussed in detail    Type 2 diabetes mellitus with diabetic nephropathy, without long-term current use of insulin (H)  -     liraglutide (VICTOZA) 18 MG/3ML soln; Inject 0.6 mg Subcutaneous daily Increase to 1.2 mg daily after 1 week  -Interested in weight loss  -Hence discontinued Actos 30 mg and started Victoza.     Morbid obesity with BMI of 45.0-49.9, adult (H)  -Started Victoza  -Has gained weight in the last few months  -We briefly talked about Bariatric surgery as an option for her  -If Victoza does not help much with weight loss then would recommend Bariatric evaluation     Encounter for screening mammogram for breast cancer  -     *MA Screening Digital Bilateral; Future    DDD (degenerative disc disease), lumbar  -     PAIN MANAGEMENT REFERRAL    Chronic pain syndrome  -Is on Tramadol every month.         COUNSELING:  Reviewed preventive health counseling, as reflected in patient instructions       Regular exercise       Healthy diet/nutrition       Colon cancer screening         reports that she quit smoking about 26 years ago. Her smoking use included Cigarettes. She has a 12.50 pack-year smoking history. She has never used smokeless tobacco.    Estimated body mass index is 51.06 kg/(m^2) as calculated from the following:    Height as of this encounter: 5' 7\" (1.702 m).    Weight as of this encounter: 326 lb (147.9 kg).   Weight management plan: Discussed healthy diet and exercise guidelines and patient will follow up in 6 months in clinic to re-evaluate.    Counseling Resources:  ATP IV Guidelines  Pooled Cohorts Equation Calculator  Breast " Cancer Risk Calculator  FRAX Risk Assessment  ICSI Preventive Guidelines  Dietary Guidelines for Americans, 2010  USDA's MyPlate  ASA Prophylaxis  Lung CA Screening    Elizabeth Lamas MD MPH    [facility]

## 2018-01-10 NOTE — MR AVS SNAPSHOT
"                  MRN:[id number]                      After Visit Summary   1/10/2018    Melody Ferguson    MRN: [id number]           Visit Information        Provider Department      1/10/2018 9:00 AM Elizabeth Lamas MD [facility]        Your next 10 appointments already scheduled     Jan 19, 2018  2:30 PM CST   (Arrive by 2:15 PM)   MA SCREENING DIGITAL BILATERAL with [id number]   [facility] ([facility]    [address] [place] [id number] [phone]           Do not use any powder, lotion or deodorant under your arms or on your breast. If you do, we will ask you to remove it before your exam.  Wear comfortable, two-piece clothing.  If you have any allergies, tell your care team.  Bring any previous mammograms from other facilities or have them mailed to the breast center. Three-dimensional (3D) mammograms are available at [facility] locations in [facility], [facility][1], [facility], and [place]. [facility] locations include [place] and [facility] in [place]. Benefits of 3D mammograms include: - Improved rate of cancer detection - Decreases your chance of having to go back for more tests, which means fewer: - \"False-positive\" results (This means that there is an abnormal area but it isn't cancer.) - Invasive testing procedures, such as a biopsy or surgery - Can provide clearer images of the breast if you have dense breast tissue. 3D mammography is an optional exam that anyone can have with a 2D mammogram. It doesn't replace or take the place of a 2D mammogram. 2D mammograms remain an effective screening test for all women.  Not all insurance companies cover the cost of a 3D mammogram. Check with your insurance.              Care Instructions      Preventive Health Recommendations  Female Ages 50 - 64    Yearly exam: See your health care provider every year in order to  o Review health " changes.   o Discuss preventive care.    o Review your medicines if your doctor has prescribed any.      Get a Pap test every three years (unless you have an abnormal result and your provider advises testing more often).    If you get Pap tests with HPV test, you only need to test every 5 years, unless you have an abnormal result.     You do not need a Pap test if your uterus was removed (hysterectomy) and you have not had cancer.    You should be tested each year for STDs (sexually transmitted diseases) if you're at risk.     Have a mammogram every 1 to 2 years.    Have a colonoscopy at age 50, or have a yearly FIT test (stool test). These exams screen for colon cancer.      Have a cholesterol test every 5 years, or more often if advised.    Have a diabetes test (fasting glucose) every three years. If you are at risk for diabetes, you should have this test more often.     If you are at risk for osteoporosis (brittle bone disease), think about having a bone density scan (DEXA).    Shots: Get a flu shot each year. Get a tetanus shot every 10 years.    Nutrition:     Eat at least 5 servings of fruits and vegetables each day.    Eat whole-grain bread, whole-wheat pasta and brown rice instead of white grains and rice.    Talk to your provider about Calcium and Vitamin D.     Lifestyle    Exercise at least 150 minutes a week (30 minutes a day, 5 days a week). This will help you control your weight and prevent disease.    Limit alcohol to one drink per day.    No smoking.     Wear sunscreen to prevent skin cancer.     See your dentist every six months for an exam and cleaning.    See your eye doctor every 1 to 2 years.            At [facility], we strive to deliver an exceptional experience to you, every time we see you.  If you receive a survey in the mail, please send us back your thoughts. We really do value your feedback.    Based on your medical history, these are the current health  maintenance/preventive care services that you are due for (some may have been done at this visit.)  Health Maintenance Due   Topic Date Due     ADVANCE DIRECTIVE PLANNING Q5 YRS  09/25/2013     FOOT EXAM Q1 YEAR  03/01/2017     ASTHMA CONTROL TEST Q6 MOS  10/18/2017     URINE DRUG SCREEN Q1 YR  11/01/2017     MAMMO SCREEN Q2 YR (SYSTEM ASSIGNED)  12/01/2017         Suggested websites for health information:  Www.[employer].org : Up to date and easily searchable information on multiple topics.  Www.[place].gov : medication info, interactive tutorials, watch real surgeries online  Www.familydoctor.org : good info from the Academy of Family Physicians  Www.cdc.gov : public health info, travel advisories, epidemics (H1N1)  Www.aap.org : children's health info, normal development, vaccinations  Www.health.state.[place].us : [place] dept of health, public health issues in [place], N1N1    Your care team:                            Family Medicine Internal Medicine   MD Kip Estrella MD Shantel Branch-Fleming, MD Katya Georgiev PA-C Nam Ho, MD Pediatrics   MARLIN De La Garza, JELANI Smith APRN CNP   MD Monica Hu MD Deborah Mielke, MD Kim Thein, APRN [facility]      Clinic hours: Monday - Thursday 7 am-7 pm; Fridays 7 am-5 pm.   Urgent care: Monday - Friday 11 am-9 pm; Saturday and Sunday 9 am-5 pm.  Pharmacy : Monday -Thursday 8 am-8 pm; Friday 8 am-6 pm; Saturday and Sunday 9 am-5 pm.     Clinic: [phone]   Pharmacy: [phone]             MyChart Information     [employer] gives you secure access to your electronic health record. If you see a primary care provider, you can also send messages to your care team and make appointments. If you have questions, please call your primary care clinic.  If you do not have a primary care provider, please call [phone] and they will assist you.        Care EveryWhere ID     This is your Care EveryWhere ID. This could be  used by other organizations to access your [place] medical records  [id number]        Equal Access to Services     EVY KIRAN : Ashley Miranda, mahesh dejesus, issac sharif. [facility] [phone].    ATENCIÓN: Si habla español, tiene a turner disposición servicios gratuitos de asistencia lingüística. Llame al [phone].    We comply with applicable federal civil rights laws and [place] laws. We do not discriminate on the basis of race, color, national origin, age, disability, sex, sexual orientation, or gender identity.

## 2018-01-11 ENCOUNTER — TELEPHONE (OUTPATIENT)
Dept: FAMILY MEDICINE | Facility: CLINIC | Age: 58
End: 2018-01-11

## 2018-01-11 DIAGNOSIS — E11.21 TYPE 2 DIABETES MELLITUS WITH DIABETIC NEPHROPATHY, WITHOUT LONG-TERM CURRENT USE OF INSULIN (H): Primary | ICD-10-CM

## 2018-01-12 NOTE — TELEPHONE ENCOUNTER
Pre-screening Questions for Radiology Injections:    Injection to be done at which interventional clinic site? [facility]    Procedure ordered by Dr. Lamas    Procedure ordered? Lumbar Epidural Steroid Injection    What insurance would patient like us to bill for this procedure? [place], [place]      Worker's comp or MVA (motor vehicle accident) -Any injection DO NOT SCHEDULE and route to Alyce Bryson.      [employer] insurance - For SI joint injections, DO NOT SCHEDULE and route Azalia Gonzales.      [employer]- MBB's must be scheduled at LEAST two weeks apart      [employer] - Any injection besides hip/shoulder/knee joint DO NOT SCHEDULE and route to Azalia Gonzales. She will obtain PA and call pt back to schedule procedure or notify pt of denial.       HP [employer]-PA REQUIRED FOR NON-JESUS OR Joint injections    Any chance of pregnancy? NO   If YES, do NOT schedule and route to RN pool    Is an  needed? No     Patient has a drive home? (mandatory) YES: INFORMED    Is patient taking any blood thinners (plavix, coumadin, jantoven, warfarin, heparin, pradaxa or dabigatran )? No   If hold needed, do NOT schedule, route to RN pool     Is patient taking any aspirin products? Yes - Pt takes 81mg daily; instructed to hold 0 day(s) prior to procedure.      If more than 325mg/day do NOT schedule; route to RN pool     For CERVICAL procedures, hold all aspirin products for 6 days.      Does the patient have a bleeding or clotting disorder? No     If YES, okay to schedule AND route to RN nurse pool    **For any patients with platelet count <100, must be forwarded to provider**    Is patient diabetic?  Yes  If YES, have them bring their glucometer.    Does patient have an active infection or treated for one within the past week? No     Is patient currently taking any antibiotics?  No     For patients on chronic, preventative, or prophylactic antibiotics, procedures may be scheduled.     For patients on  antibiotics for active or recent infection:    Stanislav Padilla Burton, Snitzer-antibiotic course must have been completed for 4 days    Dr. Richardson-antibiotic course must have been completed for 7 days    Is patient currently taking any steroid medications? (i.e. Prednisone, Medrol)  No     For patients on steroid medications:    Stanislav Padilla Burton, Snitzer-steroid course must have been completed for 4 days    -steroid course must have been completed for 7 days    Reviewed with patient:  If you are started on any steroids or antibiotics between now and your appointment, you must contact us because it may affect our ability to perform your procedure.  Yes    Is patient actively being treated for cancer or immunocompromised? No  If YES, do NOT schedule and route to RN pool     Are you able to get on and off an exam table with minimal or no assistance? Yes  If NO, do NOT schedule and route to RN pool    Are you able to roll over and lay on your stomach with minimal or no assistance? Yes  If NO, do NOT schedule and route to RN pool     Any allergies to contrast dye, iodine, shellfish, or numbing and steroid medications? No  If YES, route to RN pool AND add allergy information to appointment notes    Allergies: Milk protein extract; Bee venom; Latex; Lisinopril; Onion; Aloe; and Chlorine      Has the patient had a flu shot or any other vaccinations within 7 days before or after the procedure.  No     Does patient have an MRI/CT?  YES: MRI  (SI joint, hip injections, lumbar sympathetic blocks, and stellate ganglion blocks do not require an MRI)    Was the MRI done w/in the last 3 years?  Yes    Was MRI done at [place]? No      If not, where was it done? [facility]       If MRI was not done at [place], [facility] or [facility] do NOT schedule and route to nursing.  If pt has an imaging disc, the injection may be scheduled but pt has to bring disc to appt. If they show up w/out disc the injection cannot  be done    Reminders (please tell patient if applicable):       Instructed pt to arrive 30 minutes early for IV start if this is for a cervical procedure, ALL sympathetic (stellate ganglion, hypogastric, or lumbar sympathetic block) and all sedation procedures (RFA, spinal cord stimulation trials).  Not Applicable   -IVs are not routinely placed for Dr. Mehta cervical cases   -Dr. Roche: IVs for cervical ESIs and cervical TBDs (not CMBBs/facet inj)      If NPO for sedation, informed patient that it is okay to take medications with sips of water (except if they are to hold blood thinners).  Not Applicable   *DO take blood pressure medication if it is prescribed*      If this is for a cervical JESUS, informed patient that aspirin needs to be held for 6 days.   Not Applicable      For all patients not having spinal cord stimulator (SCS) trials or radiofrequency ablations (RFAs), informed patient:    IV sedation is not provided for this procedure.  If you feel that an oral anti-anxiety medication is needed, you can discuss this further with your referring provider or primary care provider.  The Pain Clinic provider will discuss specifics of what the procedure includes at your appointment.  Most procedures last 10-20 minutes.  We use numbing medications to help with any discomfort during the procedure.  Not Applicable      Do not schedule procedures requiring IV placement in the first appointment of the day or first appointment after lunch. N/A      For patients 85 or older we recommend having an adult stay w/ them for the remainder of the day.   N/A    Does the patient have any questions?  NO  Latricia Fernando  [facility]

## 2018-01-15 ENCOUNTER — RADIANT APPOINTMENT (OUTPATIENT)
Dept: RADIOLOGY | Facility: CLINIC | Age: 58
End: 2018-01-15
Attending: PSYCHIATRY & NEUROLOGY
Payer: MEDICARE

## 2018-01-15 ENCOUNTER — RADIOLOGY INJECTION OFFICE VISIT (OUTPATIENT)
Dept: PALLIATIVE MEDICINE | Facility: CLINIC | Age: 58
End: 2018-01-15
Payer: MEDICARE

## 2018-01-15 VITALS — DIASTOLIC BLOOD PRESSURE: 67 MMHG | SYSTOLIC BLOOD PRESSURE: 121 MMHG | HEART RATE: 63 BPM | OXYGEN SATURATION: 100 %

## 2018-01-15 DIAGNOSIS — M51.369 DDD (DEGENERATIVE DISC DISEASE), LUMBAR: ICD-10-CM

## 2018-01-15 DIAGNOSIS — M54.16 LUMBAR RADICULOPATHY: Primary | ICD-10-CM

## 2018-01-15 PROCEDURE — 62323 NJX INTERLAMINAR LMBR/SAC: CPT | Performed by: PSYCHIATRY & NEUROLOGY

## 2018-01-15 ASSESSMENT — PAIN SCALES - GENERAL: PAINLEVEL: EXTREME PAIN (9)

## 2018-01-15 NOTE — MR AVS SNAPSHOT
After Visit Summary   1/15/2018    Melody Ferguson    MRN: [id number]           Patient Information     Date Of Birth          1960        Visit Information        Provider Department      1/15/2018 9:00 AM Sheri Colorado MD [facility]        Care Instructions    [facility]   Procedure Discharge Instructions    Today you saw: Dr. Sheri Colorado     You had an:  Lumbar Epidural steroid injection     Medications used:  Lidocaine   Bupivacaine   Dexamethasone Omnipaque         Be cautious when walking. Numbness and/or weakness in the lower extremities may occur for up to 6-8 hours after the procedure due to effect of the local anesthetic    Do not drive for 6 hours. The effect of the local anesthetic could slow your reflexes.     You may resume your regular activities after 24 hours    Avoid strenuous activity for the first 24 hours    You may shower, however avoid swimming, tub baths or hot tubs for 24 hours following your procedure    You may have a mild to moderate increase in pain for several days following the injection.    It may take up to 14 days for the steroid medication to start working although you may feel the effect as early as a few days after the procedure.       You may use ice packs for 10-15 minutes, 3 to 4 times a day at the injection site for comfort    Do not use heat to painful areas for 6 to 8 hours. This will give the local anesthetic time to wear off and prevent you from accidentally burning your skin.     You may use anti-inflammatory medications (such as Ibuprofen or Aleve or Advil) or Tylenol for pain control if necessary    If you were fasting, you may resume your normal diet and medications after the procedure    If you have diabetes, check your blood sugar more frequently than usual as your blood sugar may be higher than normal for 10-14 days following a steroid injection. Contact your doctor who manages your diabetes if your  "blood sugar is higher than usual    If you experience any of the following, call the pain center nursing line during work hours at [phone] or the after hours provider line at [phone]:  -Fever over 100 degree F  -Swelling, bleeding, redness, drainage, warmth at the injection site  -Progressive weakness or numbness in your legs  -Loss of bowel or bladder function  -Unusual new onset of pain that is not improving      Phone #s:  Appointment line: [phone];  Nurse line: [phone]              Follow-ups after your visit        Your next 10 appointments already scheduled     Jan 16, 2018  9:45 AM CST   Return Visit with Armond George MD   [facility] ([facility])    [address] [address] [phone]            Jan 19, 2018  2:30 PM CST   (Arrive by 2:15 PM)   MA SCREENING DIGITAL BILATERAL with [id number]   [facility] ([facility])    [address] [address] [phone]           Do not use any powder, lotion or deodorant under your arms or on your breast. If you do, we will ask you to remove it before your exam.  Wear comfortable, two-piece clothing.  If you have any allergies, tell your care team.  Bring any previous mammograms from other facilities or have them mailed to the breast center. Three-dimensional (3D) mammograms are available at [place] locations in [facility], [facility], [place], [place], and [place]. -Health locations include [place] [facility] in [place]. Benefits of 3D mammograms include: - Improved rate of cancer detection - Decreases your chance of having to go back for more tests, which means fewer: - \"False-positive\" results (This means that there is an abnormal area but it isn't cancer.) - Invasive testing procedures, such as a biopsy or surgery - Can provide clearer images of " the breast if you have dense breast tissue. 3D mammography is an optional exam that anyone can have with a 2D mammogram. It doesn't replace or take the place of a 2D mammogram. 2D mammograms remain an effective screening test for all women.  Not all insurance companies cover the cost of a 3D mammogram. Check with your insurance.              Who to contact     If you have questions or need follow up information about today's clinic visit or your schedule please contact [facility] directly at [phone].  Normal or non-critical lab and imaging results will be communicated to you by [employer], letter or phone within 4 business days after the clinic has received the results. If you do not hear from us within 7 days, please contact the clinic through [employer] or phone. If you have a critical or abnormal lab result, we will notify you by phone as soon as possible.  Submit refill requests through [employer] or call your pharmacy and they will forward the refill request to us. Please allow 3 business days for your refill to be completed.          Additional Information About Your Visit        [employer] Information     [employer] gives you secure access to your electronic health record. If you see a primary care provider, you can also send messages to your care team and make appointments. If you have questions, please call your primary care clinic.  If you do not have a primary care provider, please call [phone] and they will assist you.        Care EveryWhere ID     This is your Care EveryWhere ID. This could be used by other organizations to access your [place] medical records  [id number]        Your Vitals Were     Pulse                   69            Blood Pressure from Last 3 Encounters:   01/15/18 161/80   01/10/18 138/78   11/13/17 156/79    Weight from Last 3 Encounters:   01/10/18 (!) 147.9 kg (326 lb)   09/12/17 (!) 142 kg (313 lb)   08/22/17 (!) 142 kg (313 lb)              Today, you had the  following     No orders found for display       Primary Care Provider Office Phone # Fax #    Elizabeth Lamas [phone] [address]  [place] [facility] [address]        Equal Access to Services     EVY KIRAN : Hadhoward thornton ku lulúo Somitchellali, waaxda luqadaha, qaybta kaalmada adeegyada, issac earlyn brittney novak layusuf reed. So [facility] [phone].    ATENCIÓN: Si habla español, tiene a turner disposición servicios gratuitos de asistencia lingüística. Llame al [phone].    We comply with applicable federal civil rights laws and [place] laws. We do not discriminate on the basis of race, color, national origin, age, disability, sex, sexual orientation, or gender identity.            Thank you!     Thank you for choosing [facility]  for your care. Our goal is always to provide you with excellent care. Hearing back from our patients is one way we can continue to improve our services. Please take a few minutes to complete the written survey that you may receive in the mail after your visit with us. Thank you!             Your Updated Medication List - Protect others around you: Learn how to safely use, store and throw away your medicines at www.disposemymeds.org.          This list is accurate as of: 1/15/18  9:01 AM.  Always use your most recent med list.                   Brand Name Dispense Instructions for use Diagnosis    * ACCU-CHEK DAIMEN PLUS test strip   Generic drug:  blood glucose monitoring     200 strip    TEST BLOOD SUGARS TWICE DAILY    Type 2 diabetes mellitus with diabetic nephropathy (H)       * blood glucose monitoring test strip    no brand specified    180 strip    Use to test blood sugars 2 times daily or as directed    Type 2 diabetes mellitus with diabetic nephropathy, without long-term current use of insulin (H)       ACE NOT PRESCRIBED (INTENTIONAL)     0 each    Reported on 5/10/2017    Type 2 diabetes mellitus without complication (H)       albuterol 108  (90 BASE) MCG/ACT Inhaler    PROAIR HFA/PROVENTIL HFA/VENTOLIN HFA     Inhale 2 puffs into the lungs every 6 hours        ASPIRIN PO      Take 81 mg by mouth daily        ATORVASTATIN CALCIUM PO      Take 40 mg by mouth daily        BUPROPION HCL PO      Take 50 mg by mouth 2 times daily        citalopram 40 MG tablet    celeXA    90 tablet    TAKE 1 TABLET(40 MG) BY MOUTH EVERY MORNING    Major depressive disorder, recurrent episode, moderate (H)       * cyclobenzaprine 5 MG tablet    FLEXERIL    60 tablet    Take 1 tablet (5 mg) by mouth 2 times daily as needed for muscle spasms    Left sided sciatica, Back muscle spasm       * cyclobenzaprine 5 MG tablet    FLEXERIL    60 tablet    TAKE 1 TABLET(5 MG) BY MOUTH TWICE DAILY AS NEEDED FOR MUSCLE SPASMS    Left sided sciatica, Back muscle spasm       esomeprazole 40 MG CR capsule    nexIUM    90 capsule    Take 1 capsule (40 mg) by mouth every morning (before breakfast) Take 30-60 minutes before eating.    Dyspepsia       fluticasone 27.5 MCG/SPRAY spray    VERAMYST     Spray 2 sprays into both nostrils daily        gabapentin 300 MG capsule    NEURONTIN    90 capsule    Take 1 capsule (300 mg) by mouth At Bedtime    Acute left-sided low back pain with left-sided sciatica       glipiZIDE-metFORMIN 5-500 MG per tablet    METAGLIP    360 tablet    Take 2 tablets by mouth 2 times daily (before meals)    Type 2 diabetes mellitus with diabetic nephropathy, without long-term current use of insulin (H)       insulin pen needle 32G X 6 MM    NOVOFINE    100 each    Use once daily or as directed.    Type 2 diabetes mellitus with diabetic nephropathy, without long-term current use of insulin (H)       liraglutide 18 MG/3ML soln    VICTOZA    6 mL    Inject 0.6 mg Subcutaneous daily Increase to 1.2 mg daily after 1 week    Type 2 diabetes mellitus with diabetic nephropathy, without long-term current use of insulin (H)       losartan 50 MG tablet    COZAAR    90 tablet    Take 1  tablet (50 mg) by mouth daily    HTN, goal below 140/90, Type 2 diabetes mellitus with diabetic nephropathy, without long-term current use of insulin (H)       melatonin 3 MG tablet     30 tablet    TAKE 1 TABLET BY MOUTH ONCE AT BEDTIME AS NEEDED FOR SLEEP    Major depressive disorder, recurrent episode, moderate (H)       metoprolol succinate 25 MG 24 hr tablet    TOPROL XL    90 tablet    Take 1 tablet (25 mg) by mouth daily    Old myocardial infarction       montelukast 10 MG tablet    SINGULAIR    90 tablet    Take 1 tablet (10 mg) by mouth At Bedtime    Mild intermittent asthma without complication       nystatin 218123 UNIT/GM Powd    MYCOSTATIN    60 g    APPLY TO THE AFFECTED AREA TWO TO THREE TIMES DAILY AS NEEDED    Morbid obesity with body mass index of 45.0-49.9 in adult (H)       order for DME     3 Month    Diabetic test strips and lancets per insurance formulary Check blood sugar 2 times daily    Type 2 diabetes mellitus with diabetic nephropathy (H)       polyethylene glycol powder    MIRALAX/GLYCOLAX    510 g    Take 17 g by mouth daily DISSOLVE 17 GRAMS IN LIQUID AND DRINK DAILY AS DIRECTED    Chronic pain syndrome       solifenacin 10 MG tablet    VESICARE    90 tablet    Take 1 tablet (10 mg) by mouth daily    Mixed incontinence       traMADol 50 MG tablet    ULTRAM    30 tablet    TAKE ONE TABLET BY MOUTH EVERY 8 HOURS AS NEEDED FOR MODERATE PAIN    Chronic pain syndrome       * Notice:  This list has 4 medication(s) that are the same as other medications prescribed for you. Read the directions carefully, and ask your doctor or other care provider to review them with you.

## 2018-01-15 NOTE — NURSING NOTE
Discharge Information    IV Discontiued Time:  NA    Amount of Fluid Infused:  NA    Discharge Criteria = When patient returns to baseline or as per MD order    Consciousness:  Pt is fully awake    Circulation:  BP +/- 20% of pre-procedure level    Respiration:  Patient is able to breathe deeply    O2 Sat:  Patient is able to maintain O2 Sat >92% on room air    Activity:  Moves 4 extremities on command    Ambulation:  Patient is able to stand and walk or stand and pivot into wheelchair    Dressing:  Clean/dry or No Dressing    Notes:   Discharge instructions and AVS given to patient    Patient meets criteria for discharge?  YES    Admitted to PCU?  No    Responsible adult present to accompany patient home?  Yes    Signature/Title:    Juanita Marroquin RN Care Coordinator  [facility]

## 2018-01-15 NOTE — PATIENT INSTRUCTIONS
[facility]   Procedure Discharge Instructions    Today you saw: Dr. Sheri Colorado     You had an:  Lumbar Epidural steroid injection     Medications used:  Lidocaine   Bupivacaine   Dexamethasone Omnipaque         Be cautious when walking. Numbness and/or weakness in the lower extremities may occur for up to 6-8 hours after the procedure due to effect of the local anesthetic    Do not drive for 6 hours. The effect of the local anesthetic could slow your reflexes.     You may resume your regular activities after 24 hours    Avoid strenuous activity for the first 24 hours    You may shower, however avoid swimming, tub baths or hot tubs for 24 hours following your procedure    You may have a mild to moderate increase in pain for several days following the injection.    It may take up to 14 days for the steroid medication to start working although you may feel the effect as early as a few days after the procedure.       You may use ice packs for 10-15 minutes, 3 to 4 times a day at the injection site for comfort    Do not use heat to painful areas for 6 to 8 hours. This will give the local anesthetic time to wear off and prevent you from accidentally burning your skin.     You may use anti-inflammatory medications (such as Ibuprofen or Aleve or Advil) or Tylenol for pain control if necessary    If you were fasting, you may resume your normal diet and medications after the procedure    If you have diabetes, check your blood sugar more frequently than usual as your blood sugar may be higher than normal for 10-14 days following a steroid injection. Contact your doctor who manages your diabetes if your blood sugar is higher than usual    If you experience any of the following, call the pain center nursing line during work hours at [phone] or the after hours provider line at [phone]:  -Fever over 100 degree F  -Swelling, bleeding, redness, drainage, warmth at the injection site  -Progressive  weakness or numbness in your legs  -Loss of bowel or bladder function  -Unusual new onset of pain that is not improving      Phone #s:  Appointment line: [phone];  Nurse line: [phone]

## 2018-01-15 NOTE — NURSING NOTE
"Chief Complaint   Patient presents with     Pain     Low back pain        Initial /80  Pulse 69 Estimated body mass index is 51.06 kg/(m^2) as calculated from the following:    Height as of 1/10/18: 1.702 m (5' 7\").    Weight as of 1/10/18: 147.9 kg (326 lb).  Medication Reconciliation: complete     Pre-procedure Intake    Have you been fasting? NA    If yes, for how long? No     Are you taking a prescribed blood thinner such as coumadin, Plavix, Xarelto?    No    If yes, when did you take your last dose? No     Do you take aspirin?  Yes     If cervical procedure, have you held aspirin for 6 days?  Held it for 3 days     Do you have any allergies to contrast dye, iodine, steroid and/or numbing medications?  NO    Are you currently taking antibiotics or have an active infection?  NO    Have you had a fever/elevated temperature within the past week? Not Applicable    Are you currently taking oral steroids? NO    Do you have a ? Yes       Are you pregnant or breastfeeding?  NO    Are the vital signs normal?  Yes    Aroldo Howell MA            "

## 2018-01-15 NOTE — PROGRESS NOTES
Pre procedure Diagnosis: Lumbar radiculopathy   Post procedure Diagnosis: Same  Procedure performed: L5-SI Interlaminar epidural steroid injection  Anesthesia: none  Complications: none  Operators: Cassy Colorado MD, Juli Rivera MD     Indications:   Melody Ferguson is a 57 year old female was sent by Dr. Elizabeth Lamas for a repeat epidural steroid injection.  They have a history of lumbar radiculopathy. She reports left sided back pain that radiates to midline and occasionally crosses over to the right side. She denies any shooting pain in her buttock or posterior thigh. She underwent a L5-SI interlaminar epidural steroid injection on 11/13/117 that provided 3 months of relief.  Exam shows pain with extension and tenderness at lumbosacral paraspinals. She has tried conservative treatment including PT, medications, and injections.    MRI was done on 8/8/16 which showed   08/08/16 MRI lumbar spine (CDI): Overview: There is 4 mm L4 and 2 mm L3 degenerative anterolisthesis (sagittal images 8). There is minimal broad left convex curvature maximal at L4. Vertebral body heights are normal. There is spondylosis which is moderate at L2-L3 and L4-L5, mild at L3-L4 and above T12, with minor marrow degenerative changes along the spondylotic endplates. Developmentally, spinal canal AP diameter is normal although there is acquired canal and recess stenosis from spondylosis and facet arthrosis (see below). There is no spondylolysis. Cord: Lower thoracic spinal cord imaged has normal contour and signal. Conus medullaris terminates normally at the L1-L2 disc level. Discs: Disc dehydration (long TR) is marked between L2 and L5 and above T12, mild at remaining levels. Disc height loss is moderate/marked at L2-L3 and L4-L5, moderate above T12, mild at L3-L4. Posterior annular fissuring is present at L4-L5. Facet joints: Arthrosis is marked at L4-L5 (axial images 10), moderate/marked at L3-L4 and L2-L3, and moderate at L5-S1 and L1-L2  within the lumbar segment. There is also thoracic facet arthrosis, moderate/marked at T11-T12 and on the left at T12-L1. Foramina: Stenosis is moderate on the right at L4-L5 (sagittal images 4) and T11-T12, mild bilaterally at L3-L4 and L2-L3. Specific findings at each level are as follows- L5-S1: Minimal circumferential disc bulge. Moderate facet arthrosis and overgrowth. Normal foramina. L4-L5: Marked facet arthrosis and facet overgrowth resulting in grade 1 degenerative L4 anterolisthesis, with a shallow circumferential disc bulge resulting in moderate right and mild left foraminal and marked right and moderate left subarticular recess and mild canal stenosis, sac AP diameter 9 mm. L3-L4: Moderate/marked facet arthrosis resulting in 2 mm degenerative L3 anterolisthesis. Shallow chronic circumferential disc bulge together with facet and ligament overgrowth result in marked bilateral subarticular recess (axial images 16) and moderate/marked central canal stenosis, sac AP diameter 5 mm, and mild foraminal stenosis. L2-L3: Moderate/marked facet arthrosis with disc bulging, shallow posteriorly, resulting in marked bilateral subarticular recess and moderate/marked central canal stenosis, sac AP diameter 6 mm. Mild foraminal stenosis. L1-L2: Normal posterior disc margin. T12-L1: Normal posterior disc margin. Moderate/marked left facet arthrosis. T11-T12: Shallow chronic circumferential disc bulge. Moderate right foraminal stenosis. Moderate/marked facet arthrosis. Ancillary: There is generalized paraspinous muscle mild atrophy. There is a 1 cm simple cyst in the left kidney. There are minor degenerative changes at the lower SI joints. Conclusion: 1. Facet arthrosis, marked at L4-L5 and moderate/marked at L3-L4, L2-L3, and T11-T12, and spondylosis resulting in, at L2-L3 and L3-L4, marked bilateral recess and moderate/marked central canal stenosis. Also marked right and moderate left L4-5 recess stenosis. 2. Grade 1 L4  and L3 degenerative anterolisthesis. 3. No fracture, neoplasm, or infection.    Options/alternatives, benefits and risks were discussed with the patient including infection, bleeding, and nerve injury. Questions were answered to her satisfaction and she agrees to proceed. Voluntary informed consent was obtained and signed.     Vitals were reviewed: Yes  Allergies were reviewed:  Yes   Medications were reviewed:  Yes   Pre-procedure pain score: 9/10    Allergies:      Allergies   Allergen Reactions     Milk Protein Extract GI Disturbance     lactose intolerance.  Can tolerate milk products if uses lactaid     Bee Venom Swelling     Latex      Lisinopril Cough     Onion GI Disturbance     Vomiting, feels like throat is clogged     Aloe Rash     pain     Chlorine Rash        Vitals:  /67  Pulse 63  SpO2 100%    Review of Systems: The patient denies recent fever, chills, illness, use of antibiotics or anticoagulants. All other 10-point review of systems negative.     Procedure: The procedure and risks were explained, and informed written consent was obtained from the patient. Risks include but are not limited to: infection, bleeding, increased pain, and damage to soft tissue, nerve, muscle, and vasculature structures. After getting informed consent, patient was brought into the procedure suite and was placed in a prone position on the procedure table. A Pause for the Cause was performed. Patient was prepped and draped in sterile fashion.     The L5-SI  interspace was identified with use of fluoroscopy in AP view. A 25-gauge, 1.5 inch needle was used to anesthetize the skin and subcutaneous tissue entry site with a total of 2 ml of 1% lidocaine. Under fluoroscopic visualization, a 20-gauge, 4.5 inch Tuohy epidural needle was slowly advanced towards the epidural space a few millimeters left of midline. The latter part of the needle advancement was guided with fluoroscopy in the lateral view. The epidural space was  identified using loss of resistance technique. After negative aspiration for heme and cerebrospinal fluid, a total of 4 mL of non-ionic contrast was injected to confirm needle placement with 6 mL of contrast wasted. Epidurogram confirmed spread within the posterior epidural space. 1 ml of 10mg dexamethasone, 1 ml of 0.5% bupivicaine, and 3  ml of preservative free saline was injected. The needle was removed.  Images were saved to PACS.    The patient tolerated the procedure well, and there was no evidence of procedural complications. No new sensory or motor deficits were noted following the procedure. The patient was stable and able to ambulate on discharge home. Post-procedure instructions were provided.     Post-procedure pain score: 3/10  Follow-up includes:   -f/u phone call in one week  -f/u with referring provider    Cassy Colorado MD  [facility]

## 2018-01-16 ENCOUNTER — OFFICE VISIT (OUTPATIENT)
Dept: ORTHOPEDICS | Facility: CLINIC | Age: 58
End: 2018-01-16
Payer: MEDICARE

## 2018-01-16 VITALS — BODY MASS INDEX: 45.99 KG/M2 | RESPIRATION RATE: 18 BRPM | TEMPERATURE: 97.6 F | HEIGHT: 67 IN | WEIGHT: 293 LBS

## 2018-01-16 DIAGNOSIS — M17.11 PRIMARY OSTEOARTHRITIS OF RIGHT KNEE: Primary | ICD-10-CM

## 2018-01-16 PROCEDURE — 20610 DRAIN/INJ JOINT/BURSA W/O US: CPT | Mod: RT | Performed by: ORTHOPAEDIC SURGERY

## 2018-01-16 RX ORDER — METHYLPREDNISOLONE ACETATE 80 MG/ML
80 INJECTION, SUSPENSION INTRA-ARTICULAR; INTRALESIONAL; INTRAMUSCULAR; SOFT TISSUE ONCE
Qty: 1 ML | Refills: 0 | OUTPATIENT
Start: 2018-01-16 | End: 2018-01-16

## 2018-01-16 NOTE — PROGRESS NOTES
The patient's right knee was prepped with betadine solution after verification of allergies. Area approximately 10 cm x 10 cm prepped in a sterile fashion. After injection, betadine removed with soap and water and band-aids applied.    1ml depo-medrol with 1% lidocaine plain injected into patient's right knee by Dr. Armond George  LOT# [id number]  Exp. 03/2020    Talon Fenton PA-C  Supervising physician: Armond George MD  Dept. of Orthopedics  [facility]

## 2018-01-16 NOTE — NURSING NOTE
"Chief Complaint   Patient presents with     RECHECK     Right knee OA last injection 11/14/17.       Initial Temp 97.6  F (36.4  C)  Resp 18  Ht 1.702 m (5' 7\")  Wt (!) 147.4 kg (325 lb)  BMI 50.9 kg/m2 Estimated body mass index is 50.9 kg/(m^2) as calculated from the following:    Height as of this encounter: 1.702 m (5' 7\").    Weight as of this encounter: 147.4 kg (325 lb).  Medication Reconciliation: complete   Stacey Freedman MA      "

## 2018-01-16 NOTE — PROGRESS NOTES
Follow up right knee primary osteoarthritis.  Last injection 11/14/17.  Range of motion 5-115.    She  desires injection today of right knee(s).  Risks, benefits, potential complications and alternatives were discussed.   With the patient's consent, sterile prep was performed of right knee(s).  Right knee was injected with Depo Medrol 80 mg and lidocaine at anterolateral site.  Return to clinic as needed.    Left total knee arthroplasty from 4/26/17 is doing well.  Good stability.  Range of motion 0-120 degrees.    Continue activities as tolerated.  Return to clinic April with x-ray left knee, [place].

## 2018-01-16 NOTE — MR AVS SNAPSHOT
After Visit Summary   1/16/2018    Melody Ferguson    MRN: [id number]           Patient Information     Date Of Birth          1960        Visit Information        Provider Department      1/16/2018 9:45 AM Armond George MD [facility]        Today's Diagnoses     Primary osteoarthritis of right knee    -  1      Care Instructions    You have had a steroid injection today.  For the first 2 hours there will likely be some numbing in the joint from the lidocaine.  This is a good sign, indicating that the injection is in the right place.  In 2 hours the lidocaine will wear off, and the joint will hurt like you had a shot.  Each day the cortisone makes it feel better.  It reaches peak effect in 2 weeks.  We expect it to last for 3 months.  You may resume regular activity when you feel ready.  If you are diabetic, your glucoses will be quite high for several days.            Follow-ups after your visit        Your next 10 appointments already scheduled     Jan 19, 2018  2:30 PM CST   (Arrive by 2:15 PM)   MA SCREENING DIGITAL BILATERAL with [id number]   [facility] ([facility])    [address] [place] [id number] [phone]           Do not use any powder, lotion or deodorant under your arms or on your breast. If you do, we will ask you to remove it before your exam.  Wear comfortable, two-piece clothing.  If you have any allergies, tell your care team.  Bring any previous mammograms from other facilities or have them mailed to the breast center. Three-dimensional (3D) mammograms are available at [place] locations in [facility], [facility], and [place]. -Health locations include [facility] in [place]. Benefits of 3D mammograms include: - Improved rate of cancer detection - Decreases your chance of having to go back for more tests, which  "means fewer: - \"False-positive\" results (This means that there is an abnormal area but it isn't cancer.) - Invasive testing procedures, such as a biopsy or surgery - Can provide clearer images of the breast if you have dense breast tissue. 3D mammography is an optional exam that anyone can have with a 2D mammogram. It doesn't replace or take the place of a 2D mammogram. 2D mammograms remain an effective screening test for all women.  Not all insurance companies cover the cost of a 3D mammogram. Check with your insurance.              Who to contact     If you have questions or need follow up information about today's clinic visit or your schedule please contact [facility] directly at [phone].  Normal or non-critical lab and imaging results will be communicated to you by [employer], letter or phone within 4 business days after the clinic has received the results. If you do not hear from us within 7 days, please contact the clinic through [employer] or phone. If you have a critical or abnormal lab result, we will notify you by phone as soon as possible.  Submit refill requests through [employer] or call your pharmacy and they will forward the refill request to us. Please allow 3 business days for your refill to be completed.          Additional Information About Your Visit        [employer] Information     [employer] gives you secure access to your electronic health record. If you see a primary care provider, you can also send messages to your care team and make appointments. If you have questions, please call your primary care clinic.  If you do not have a primary care provider, please call [phone] and they will assist you.        Care EveryWhere ID     This is your Care EveryWhere ID. This could be used by other organizations to access your [place] medical records  [id number]        Your Vitals Were     Temperature Respirations Height BMI (Body Mass Index)          97.6  F (36.4  C) 18 1.702 m (5' " "7\") 50.9 kg/m2         Blood Pressure from Last 3 Encounters:   01/15/18 121/67   01/10/18 138/78   11/13/17 156/79    Weight from Last 3 Encounters:   01/16/18 (!) 147.4 kg (325 lb)   01/10/18 (!) 147.9 kg (326 lb)   09/12/17 (!) 142 kg (313 lb)              We Performed the Following     DRAIN/INJECT LARGE JOINT/BURSA     METHYLPREDNISOLONE 80 MG INJ          Today's Medication Changes          These changes are accurate as of: 1/16/18 11:55 AM.  If you have any questions, ask your nurse or doctor.               Start taking these medicines.        Dose/Directions    methylPREDNISolone acetate 80 MG/ML injection   Commonly known as:  DEPO-MEDROL   Used for:  Primary osteoarthritis of right knee   Started by:  Armond George MD        Dose:  80 mg   1 mL (80 mg) by INTRA-ARTICULAR route once for 1 dose   Quantity:  1 mL   Refills:  0            Where to get your medicines      Some of these will need a paper prescription and others can be bought over the counter.  Ask your nurse if you have questions.     You don't need a prescription for these medications     methylPREDNISolone acetate 80 MG/ML injection                Primary Care Provider Office Phone # Fax #    Elizabeth Lamas [phone]       [address]  [facility] [address]        Equal Access to Services     ADELA KIRAN AH: Hadii norberto ku hadasho Soomaali, waaxda luqadaha, qaybta kaalmada adeegyada, issac eagle haybarbara claire . So [facility] [phone].    ATENCIÓN: Si habla español, tiene a turner disposición servicios gratuitos de asistencia lingüística. Llame al [phone].    We comply with applicable federal civil rights laws and [place] laws. We do not discriminate on the basis of race, color, national origin, age, disability, sex, sexual orientation, or gender identity.            Thank you!     Thank you for choosing [facility]  for your care. Our goal is always to provide you with excellent " care. Hearing back from our patients is one way we can continue to improve our services. Please take a few minutes to complete the written survey that you may receive in the mail after your visit with us. Thank you!             Your Updated Medication List - Protect others around you: Learn how to safely use, store and throw away your medicines at www.disposemymeds.org.          This list is accurate as of: 1/16/18 11:55 AM.  Always use your most recent med list.                   Brand Name Dispense Instructions for use Diagnosis    * ACCU-CHEK DAMIEN PLUS test strip   Generic drug:  blood glucose monitoring     200 strip    TEST BLOOD SUGARS TWICE DAILY    Type 2 diabetes mellitus with diabetic nephropathy (H)       * blood glucose monitoring test strip    no brand specified    180 strip    Use to test blood sugars 2 times daily or as directed    Type 2 diabetes mellitus with diabetic nephropathy, without long-term current use of insulin (H)       ACE NOT PRESCRIBED (INTENTIONAL)     0 each    Reported on 5/10/2017    Type 2 diabetes mellitus without complication (H)       albuterol 108 (90 BASE) MCG/ACT Inhaler    PROAIR HFA/PROVENTIL HFA/VENTOLIN HFA     Inhale 2 puffs into the lungs every 6 hours        ASPIRIN PO      Take 81 mg by mouth daily        ATORVASTATIN CALCIUM PO      Take 40 mg by mouth daily        BUPROPION HCL PO      Take 50 mg by mouth 2 times daily        citalopram 40 MG tablet    celeXA    90 tablet    TAKE 1 TABLET(40 MG) BY MOUTH EVERY MORNING    Major depressive disorder, recurrent episode, moderate (H)       * cyclobenzaprine 5 MG tablet    FLEXERIL    60 tablet    Take 1 tablet (5 mg) by mouth 2 times daily as needed for muscle spasms    Left sided sciatica, Back muscle spasm       * cyclobenzaprine 5 MG tablet    FLEXERIL    60 tablet    TAKE 1 TABLET(5 MG) BY MOUTH TWICE DAILY AS NEEDED FOR MUSCLE SPASMS    Left sided sciatica, Back muscle spasm       esomeprazole 40 MG CR capsule     nexIUM    90 capsule    Take 1 capsule (40 mg) by mouth every morning (before breakfast) Take 30-60 minutes before eating.    Dyspepsia       fluticasone 27.5 MCG/SPRAY spray    VERAMYST     Spray 2 sprays into both nostrils daily        gabapentin 300 MG capsule    NEURONTIN    90 capsule    Take 1 capsule (300 mg) by mouth At Bedtime    Acute left-sided low back pain with left-sided sciatica       glipiZIDE-metFORMIN 5-500 MG per tablet    METAGLIP    360 tablet    Take 2 tablets by mouth 2 times daily (before meals)    Type 2 diabetes mellitus with diabetic nephropathy, without long-term current use of insulin (H)       insulin pen needle 32G X 6 MM    NOVOFINE    100 each    Use once daily or as directed.    Type 2 diabetes mellitus with diabetic nephropathy, without long-term current use of insulin (H)       liraglutide 18 MG/3ML soln    VICTOZA    6 mL    Inject 0.6 mg Subcutaneous daily Increase to 1.2 mg daily after 1 week    Type 2 diabetes mellitus with diabetic nephropathy, without long-term current use of insulin (H)       losartan 50 MG tablet    COZAAR    90 tablet    Take 1 tablet (50 mg) by mouth daily    HTN, goal below 140/90, Type 2 diabetes mellitus with diabetic nephropathy, without long-term current use of insulin (H)       melatonin 3 MG tablet     30 tablet    TAKE 1 TABLET BY MOUTH ONCE AT BEDTIME AS NEEDED FOR SLEEP    Major depressive disorder, recurrent episode, moderate (H)       methylPREDNISolone acetate 80 MG/ML injection    DEPO-MEDROL    1 mL    1 mL (80 mg) by INTRA-ARTICULAR route once for 1 dose    Primary osteoarthritis of right knee       metoprolol succinate 25 MG 24 hr tablet    TOPROL XL    90 tablet    Take 1 tablet (25 mg) by mouth daily    Old myocardial infarction       montelukast 10 MG tablet    SINGULAIR    90 tablet    Take 1 tablet (10 mg) by mouth At Bedtime    Mild intermittent asthma without complication       nystatin 195919 UNIT/GM Powd    MYCOSTATIN    60 g     APPLY TO THE AFFECTED AREA TWO TO THREE TIMES DAILY AS NEEDED    Morbid obesity with body mass index of 45.0-49.9 in adult (H)       order for DME     3 Month    Diabetic test strips and lancets per insurance formulary Check blood sugar 2 times daily    Type 2 diabetes mellitus with diabetic nephropathy (H)       polyethylene glycol powder    MIRALAX/GLYCOLAX    510 g    Take 17 g by mouth daily DISSOLVE 17 GRAMS IN LIQUID AND DRINK DAILY AS DIRECTED    Chronic pain syndrome       solifenacin 10 MG tablet    VESICARE    90 tablet    Take 1 tablet (10 mg) by mouth daily    Mixed incontinence       traMADol 50 MG tablet    ULTRAM    30 tablet    TAKE ONE TABLET BY MOUTH EVERY 8 HOURS AS NEEDED FOR MODERATE PAIN    Chronic pain syndrome       * Notice:  This list has 4 medication(s) that are the same as other medications prescribed for you. Read the directions carefully, and ask your doctor or other care provider to review them with you.

## 2018-01-19 ENCOUNTER — RADIANT APPOINTMENT (OUTPATIENT)
Dept: MAMMOGRAPHY | Facility: CLINIC | Age: 58
End: 2018-01-19
Payer: MEDICARE

## 2018-01-19 DIAGNOSIS — Z12.31 ENCOUNTER FOR SCREENING MAMMOGRAM FOR BREAST CANCER: ICD-10-CM

## 2018-01-19 PROCEDURE — 77067 SCR MAMMO BI INCL CAD: CPT | Mod: TC

## 2018-01-23 ENCOUNTER — TELEPHONE (OUTPATIENT)
Dept: PALLIATIVE MEDICINE | Facility: CLINIC | Age: 58
End: 2018-01-23

## 2018-01-23 NOTE — TELEPHONE ENCOUNTER
Patient had a  L5-SI Interlaminar epidural steroid injection on 1/15/18.  Called patient for an update.      Left message that we were calling for an update about how she was doing after the injection.  LM that if she has any problems or questions to call the nurse line at [phone].

## 2018-01-29 ENCOUNTER — MYC MEDICAL ADVICE (OUTPATIENT)
Dept: FAMILY MEDICINE | Facility: CLINIC | Age: 58
End: 2018-01-29

## 2018-01-30 ENCOUNTER — TELEPHONE (OUTPATIENT)
Dept: PALLIATIVE MEDICINE | Facility: CLINIC | Age: 58
End: 2018-01-30

## 2018-01-30 NOTE — TELEPHONE ENCOUNTER
Please assist patient in setting up a clinic appointment to discuss these concerns.  Thanks,  Elizabeth Lamas MD MPH

## 2018-01-30 NOTE — TELEPHONE ENCOUNTER
1/29 1138am    Patient LM stating the Injection didn t work. [phone].    Alyce Bryson    [facility]

## 2018-01-30 NOTE — TELEPHONE ENCOUNTER
Updated patient via [employer]. Gave her number to schedule appointment.     Shaheen Head RN, BSN

## 2018-01-31 NOTE — TELEPHONE ENCOUNTER
Pt had .  Pt was referred by PCP for L5-SI Interlaminar epidural steroid injection that was completed on 1/15/18. Pt will need to reconnect with PCP to determine next steps. Chart review shows that she has reached out to her PCP for discussion of next steps. Pt scheduled with Dr. Lamas on 2/6 at 0800.     ANDREW Kurtz, RN-BC  Patient Care Supervisor/Care Coordinator  [facility]

## 2018-02-04 ENCOUNTER — MYC REFILL (OUTPATIENT)
Dept: FAMILY MEDICINE | Facility: CLINIC | Age: 58
End: 2018-02-04

## 2018-02-04 DIAGNOSIS — E11.21 TYPE 2 DIABETES MELLITUS WITH DIABETIC NEPHROPATHY, WITHOUT LONG-TERM CURRENT USE OF INSULIN (H): ICD-10-CM

## 2018-02-04 DIAGNOSIS — M54.32 LEFT SIDED SCIATICA: ICD-10-CM

## 2018-02-04 DIAGNOSIS — I10 HTN, GOAL BELOW 140/90: Chronic | ICD-10-CM

## 2018-02-04 DIAGNOSIS — I25.2 OLD MYOCARDIAL INFARCTION: ICD-10-CM

## 2018-02-04 DIAGNOSIS — N39.46 MIXED INCONTINENCE: ICD-10-CM

## 2018-02-04 DIAGNOSIS — J45.20 MILD INTERMITTENT ASTHMA WITHOUT COMPLICATION: ICD-10-CM

## 2018-02-04 DIAGNOSIS — G89.4 CHRONIC PAIN SYNDROME: ICD-10-CM

## 2018-02-04 DIAGNOSIS — E66.01 MORBID OBESITY WITH BODY MASS INDEX OF 45.0-49.9 IN ADULT (H): ICD-10-CM

## 2018-02-04 DIAGNOSIS — M62.830 BACK MUSCLE SPASM: ICD-10-CM

## 2018-02-04 NOTE — TELEPHONE ENCOUNTER
"Requested Prescriptions   Pending Prescriptions Disp Refills     losartan (COZAAR) 50 MG tablet [Pharmacy Med Name: LOSARTAN 50MG TABLETS]Last Written Prescription Date:  11/17/17  Last Fill Quantity: 90,  # refills: 0   Last Office Visit with [facility], [facility] or [facility] prescribing provider:  1/10/18   Future Office Visit:    Next 5 appointments (look out 90 days)     Feb 06, 2018  8:00 AM CST   Office Visit with Elizabeth Lamas MD   [facility]    [address] [place] [address]   [phone]                  90 tablet 0     Sig: TAKE 1 TABLET(50 MG) BY MOUTH DAILY    Angiotensin-II Receptors Failed    2/4/2018 10:31 AM       Failed - Normal serum creatinine on file in past 12 months    Recent Labs   Lab Test  01/05/18   0822   CR  0.51*            Passed - Blood pressure under 140/90 in past 12 months.    BP Readings from Last 3 Encounters:   01/15/18 121/67   01/10/18 138/78   11/13/17 156/79                Passed - Recent or future visit with authorizing provider's specialty    Patient had office visit in the last year or has a visit in the next 30 days with authorizing provider.  See \"Patient Info\" tab in inbasket, or \"Choose Columns\" in Meds & Orders section of the refill encounter.            Passed - Patient is age 18 or older       Passed - No active pregnancy on record       Passed - Normal serum potassium on file in past 12 months    Recent Labs   Lab Test  01/05/18   0822   POTASSIUM  3.9                   Passed - No positive pregnancy test in past 12 months        glipiZIDE-metFORMIN (METAGLIP) 5-500 MG per tablet [Pharmacy Med Name: GLIPIZIDE-METFORMIN 5MG/500MG TABS]    Last Written Prescription Date:  11/7/17  Last Fill Quantity: 360,  # refills: 0   Last Office Visit with [facility], [facility] or  Health prescribing provider:  1/10/18   Future Office Visit:    Next 5 appointments (look out 90 days)     Feb 06, 2018  8:00 AM CST   Office Visit " "with [facility]   [facility]    [address] [facility] [address] [phone]                  360 tablet 0     Sig: TAKE 2 TABLETS BY MOUTH TWICE DAILY BEFORE MEALS    Combination Oral Antihyperglycemic Agents Passed    2/4/2018 10:31 AM       Passed - Patient's BP is less than 140/90    BP Readings from Last 3 Encounters:   01/15/18 121/67   01/10/18 138/78   11/13/17 156/79                Passed - Patient has a documented LDL level within past 12 mos.    Recent Labs   Lab Test  01/05/18   0822   LDL  109*            Passed - Patient has a documented Microalbumin level within past 12 mos.    Recent Labs   Lab Test  01/05/18   0827   MICROL  10   UMALCR  7.97            Passed - Patient has documented A1c within the specified period of time.    Recent Labs   Lab Test  01/05/18   0822   A1C  6.9*            Passed - Patient's CR is NOT>1.4 OR Patient's EGFR is NOT<45 within past 12 mos.    Recent Labs   Lab Test  01/05/18   0822   GFRESTIMATED  >90   GFRESTBLACK  >90       Recent Labs   Lab Test  01/05/18   0822   CR  0.51*            Passed - Patient does not have a diagnosis of CHF.       Passed - Patient is 18 years old or older.       Passed - Patient is not pregnant       Passed - Patient has not had a positive pregnancy test within the past 12 mos.       Passed - Patient has had appt within past 6 mos    Patient had office visit in the last 6 months or has a visit in the next 30 days with authorizing provider.  See \"Patient Info\" tab in inbasket, or \"Choose Columns\" in Meds & Orders section of the refill encounter.            montelukast (SINGULAIR) 10 MG tablet [Pharmacy Med Name: MONTELUKAST 10MG TABLETS]    Last Written Prescription Date:  11/7/17  Last Fill Quantity: 90,  # refills: 0   Last Office Visit with G, P or [facility] prescribing provider:  1/10/18   Future Office Visit:    Next 5 appointments (look out 90 days)  " "   Feb 06, 2018  8:00 AM CST   Office Visit with Elizabeth Lamas MD   [facility] ([facility])    [address] [facility] [address]   [phone]                  90 tablet 0     Sig: TAKE 1 TABLET(10 MG) BY MOUTH AT BEDTIME    Leukotriene Inhibitors Protocol Failed    2/4/2018 10:31 AM       Failed - Asthma control test 20 or greater in past 6 months    Please review ACT score.          Passed - Patient is age 12 or older    If patient is under 16, ok to refill using age based dosing.          Passed - Recent (6 mo) or future visit with authorizing provider's specialty    Patient had office visit in the last 6 months or has a visit in the next 30 days with authorizing provider.  See \"Patient Info\" tab in inbasket, or \"Choose Columns\" in Meds & Orders section of the refill encounter.            cyclobenzaprine (FLEXERIL) 5 MG tablet [Pharmacy Med Name: CYCLOBENZAPRINE 5MG TABLETS]    Last Written Prescription Date:  12/29/17  Last Fill Quantity: 60,  # refills: 0   Last Office Visit with [facility] or  Health prescribing provider:  1/10/18   Future Office Visit:    Next 5 appointments (look out 90 days)     Feb 06, 2018  8:00 AM CST   Office Visit with Elizabeth Lamas MD   [facility] ([facility])    [address] [facility] [address]   [phone]                  60 tablet 0     Sig: TAKE 1 TABLET(5 MG) BY MOUTH TWICE DAILY AS NEEDED FOR MUSCLE SPASMS    There is no refill protocol information for this order        traMADol (ULTRAM) 50 MG tablet [Pharmacy Med Name: TRAMADOL 50MG TABLETS]    Last Written Prescription Date:  12/29/17  Last Fill Quantity: 90,  # refills: 0   Last Office Visit with [facility] or  Health prescribing provider:  1/10/18   Future Office Visit:    Next 5 appointments (look out 90 days)     Feb 06, 2018  8:00 AM CST   Office Visit with Elizabeth Lamas MD   [facility] ([facility])    [address] [place] [address]   [phone]                  30 tablet 0     Sig: TAKE 1 TABLET BY MOUTH EVERY 8 HOURS AS NEEDED FOR MODERATE PAIN    There is no refill protocol information for this order              Jose Carlos Faarax  [facility]

## 2018-02-05 NOTE — TELEPHONE ENCOUNTER
Message from [employer]:  Original authorizing provider: SHARI Francis    Melody Ferguson would like a refill of the following medications:  solifenacin (VESICARE) 10 MG tablet [SHARI Francis]    Preferred pharmacy: [facility] [id number] [place] [address] - [address] [facility] [address]    Comment:  Bupropion SR 150mg Tablets (12 H) Fluticasone 50mcg Nasal Sp (120) Rx Ventolin HFA Inh W/Dos Ctr 200puffs Im not seeing these on my list the nasal spray and inhaler are for allergies but I want to be ready for spring    Medication renewals requested in this message routed to other providers:  polyethylene glycol (MIRALAX/GLYCOLAX) powder [Elizabeth Lamas MD]  losartan (COZAAR) 50 MG tablet [Elizabeth Lamas MD]  cyclobenzaprine (FLEXERIL) 5 MG tablet [Elizabeth Lamas MD]  traMADol (ULTRAM) 50 MG tablet [Elizabeth Lamas MD]  nystatin (MYCOSTATIN) 814578 UNIT/GM POWD [Elizabeth Lamas MD]  metoprolol (TOPROL XL) 25 MG 24 hr tablet [Elizabeth Lamas MD]

## 2018-02-05 NOTE — TELEPHONE ENCOUNTER
Message from [employer]:  Original authorizing provider: Elizabeth Lamas MD    Melody Ferguson would like a refill of the following medications:  polyethylene glycol (MIRALAX/GLYCOLAX) powder [Elizabeth Lamas MD]  losartan (COZAAR) 50 MG tablet [Elizabeth Lmaas MD]  cyclobenzaprine (FLEXERIL) 5 MG tablet [Elizabeth Lamas MD]  traMADol (ULTRAM) 50 MG tablet [Elizabeth Lamas MD]  nystatin (MYCOSTATIN) 537103 UNIT/GM POWD [Elizabeth Lamas MD]  metoprolol (TOPROL XL) 25 MG 24 hr tablet [Elizabeth Lamas MD]    Preferred pharmacy: [facility] [id number] [place] [address] - [address] AT [facility] [address]    Comment:  Bupropion SR 150mg Tablets (12 H) Fluticasone 50mcg Nasal Sp (120) Rx Ventolin HFA Inh W/Dos Ctr 200puffs Im not seeing these on my list the nasal spray and inhaler are for allergies but I want to be ready for spring    Medication renewals requested in this message routed to other providers:  solifenacin (VESICARE) 10 MG tablet [SHARI Francis]

## 2018-02-05 NOTE — TELEPHONE ENCOUNTER
"Requested Prescriptions   Pending Prescriptions Disp Refills     solifenacin (VESICARE) 10 MG tablet  Last Written Prescription Date:  12/05/17  Last Fill Quantity: 90,  # refills: 0   Last Office Visit with G, P or [facility] prescribing provider:  01/10/18   Future Office Visit:    Next 5 appointments (look out 90 days)     Feb 06, 2018  8:00 AM CST   Office Visit with Elizabeth Lamas MD   [facility]    [address] [place] [address] [phone]                90 tablet 0     Sig: Take 1 tablet (10 mg) by mouth daily    Muscarinic Antagonists (Urinary Incontinence Agents) Passed    2/5/2018  7:17 AM       Passed - Recent or future visit with authorizing provider's specialty    Patient had office visit in the last year or has a visit in the next 30 days with authorizing provider.  See \"Patient Info\" tab in inbasket, or \"Choose Columns\" in Meds & Orders section of the refill encounter.            Passed - Patient does not have a diagnosis of glaucoma on the problem list    If glaucoma diagnosis is new, refer refill to physician.         Passed - Patient is 18 years of age or older          "

## 2018-02-06 RX ORDER — POLYETHYLENE GLYCOL 3350 17 G/17G
17 POWDER, FOR SOLUTION ORAL DAILY
Qty: 510 G | Refills: 3 | Status: SHIPPED | OUTPATIENT
Start: 2018-02-06 | End: 2018-06-18

## 2018-02-06 RX ORDER — LOSARTAN POTASSIUM 50 MG/1
50 TABLET ORAL DAILY
Qty: 90 TABLET | Refills: 0 | Status: SHIPPED | OUTPATIENT
Start: 2018-02-06 | End: 2018-12-07

## 2018-02-06 RX ORDER — METOPROLOL SUCCINATE 25 MG/1
25 TABLET, EXTENDED RELEASE ORAL DAILY
Qty: 90 TABLET | Refills: 1 | Status: SHIPPED | OUTPATIENT
Start: 2018-02-06 | End: 2019-05-28

## 2018-02-06 RX ORDER — TRAMADOL HYDROCHLORIDE 50 MG/1
50 TABLET ORAL EVERY 8 HOURS PRN
Qty: 30 TABLET | Refills: 0 | Status: SHIPPED | OUTPATIENT
Start: 2018-02-06 | End: 2018-02-27

## 2018-02-06 RX ORDER — CYCLOBENZAPRINE HCL 5 MG
5 TABLET ORAL 2 TIMES DAILY PRN
Qty: 60 TABLET | Refills: 0 | Status: SHIPPED | OUTPATIENT
Start: 2018-02-06 | End: 2018-02-27

## 2018-02-06 RX ORDER — NYSTATIN 100000 [USP'U]/G
POWDER TOPICAL
Qty: 60 G | Refills: 1 | Status: SHIPPED | OUTPATIENT
Start: 2018-02-06 | End: 2018-06-18

## 2018-02-06 NOTE — TELEPHONE ENCOUNTER
Approved, script for Tramadol placed in TC basket.   Elizabeth Lamas MD MPH   (Routing comment)     Faxed Rx for the Ultram to [facility], [phone],  Right fax confirmed at 10:07 am today.  Yuli Giang MA/  For [employer] and [facility]

## 2018-02-07 RX ORDER — GLIPIZIDE AND METFORMIN HCL 5; 500 MG/1; MG/1
TABLET, FILM COATED ORAL
Qty: 360 TABLET | Refills: 0 | Status: SHIPPED | OUTPATIENT
Start: 2018-02-07 | End: 2018-02-27

## 2018-02-07 RX ORDER — TRAMADOL HYDROCHLORIDE 50 MG/1
TABLET ORAL
Qty: 30 TABLET | Refills: 0 | OUTPATIENT
Start: 2018-02-07

## 2018-02-07 RX ORDER — SOLIFENACIN SUCCINATE 10 MG/1
10 TABLET, FILM COATED ORAL DAILY
Qty: 90 TABLET | Refills: 0 | Status: SHIPPED | OUTPATIENT
Start: 2018-02-07 | End: 2018-11-12

## 2018-02-07 RX ORDER — LOSARTAN POTASSIUM 50 MG/1
TABLET ORAL
Qty: 90 TABLET | Refills: 0 | OUTPATIENT
Start: 2018-02-07

## 2018-02-07 RX ORDER — CYCLOBENZAPRINE HCL 5 MG
TABLET ORAL
Qty: 60 TABLET | Refills: 0 | OUTPATIENT
Start: 2018-02-07

## 2018-02-07 RX ORDER — MONTELUKAST SODIUM 10 MG/1
TABLET ORAL
Qty: 90 TABLET | Refills: 0 | Status: SHIPPED | OUTPATIENT
Start: 2018-02-07 | End: 2018-10-30

## 2018-02-07 NOTE — TELEPHONE ENCOUNTER
Routing refill request to provider for review/approval because:  Failed protocol  Johanne Musa RN

## 2018-02-08 DIAGNOSIS — E11.21 TYPE 2 DIABETES MELLITUS WITH DIABETIC NEPHROPATHY (H): Chronic | ICD-10-CM

## 2018-02-09 NOTE — TELEPHONE ENCOUNTER
Chart review shows test strips sent to pharmacy on 1/2/18. Called and spoke with pharmacy, per pharmacy, pt needs PA for accu-chek capri test strips and lancets. CMN form is needed as pt tests 2 x daily and medicare will only cover 1 x daily. CMN form faxed by pharmacy to [employer] fax # today. Needs provider to fill out and sign and send back to pharmacy. Flagging encounter for TC to watch for forms.    Susan Heath RN  [facility] Triage

## 2018-02-09 NOTE — TELEPHONE ENCOUNTER
I have not seen form come through. MA to send to PA team.  Yuli Giang MA/  For Teams [employer] [facility] and [facility]

## 2018-02-14 ENCOUNTER — TELEPHONE (OUTPATIENT)
Dept: FAMILY MEDICINE | Facility: CLINIC | Age: 58
End: 2018-02-14

## 2018-02-14 NOTE — TELEPHONE ENCOUNTER
Patient is requesting glucose test strips and lancets.              Jose Carlos Faarax  [facility]

## 2018-02-14 NOTE — TELEPHONE ENCOUNTER
Chart review shows test strips sent to pharmacy on 1/2/18. Called and spoke with pharmacy, per pharmacy, pt needs PA for accu-chek capri test strips and lancets. CMN form is needed as pt tests 2 x daily and medicare will only cover 1 x daily. CMN form faxed by pharmacy to [employer] fax # today. Needs provider to fill out and sign and send back to pharmacy. Flagging encounter for TC to watch for forms.     Susan Heath RN  [facility] Triage

## 2018-02-16 NOTE — TELEPHONE ENCOUNTER
Called [facility] at [phone], confirmed with pharmacist that a Prior Authorization is not needed but a new CMN form filled out, signed, and sent back to them with updated 2/day testing. Pharmacist stated they have sent the forms out couple times now. Forwarding back to clinic as PA team does not handle.

## 2018-02-27 ENCOUNTER — OFFICE VISIT (OUTPATIENT)
Dept: ORTHOPEDICS | Facility: CLINIC | Age: 58
End: 2018-02-27
Payer: MEDICARE

## 2018-02-27 ENCOUNTER — OFFICE VISIT (OUTPATIENT)
Dept: FAMILY MEDICINE | Facility: CLINIC | Age: 58
End: 2018-02-27
Payer: MEDICARE

## 2018-02-27 VITALS — WEIGHT: 293 LBS | HEIGHT: 67 IN | BODY MASS INDEX: 45.99 KG/M2 | TEMPERATURE: 98 F | RESPIRATION RATE: 18 BRPM

## 2018-02-27 VITALS
BODY MASS INDEX: 45.99 KG/M2 | WEIGHT: 293 LBS | TEMPERATURE: 97.7 F | HEIGHT: 67 IN | OXYGEN SATURATION: 99 % | HEART RATE: 84 BPM | DIASTOLIC BLOOD PRESSURE: 70 MMHG | SYSTOLIC BLOOD PRESSURE: 128 MMHG

## 2018-02-27 DIAGNOSIS — M51.369 DDD (DEGENERATIVE DISC DISEASE), LUMBAR: Chronic | ICD-10-CM

## 2018-02-27 DIAGNOSIS — M17.11 PRIMARY OSTEOARTHRITIS OF RIGHT KNEE: Primary | ICD-10-CM

## 2018-02-27 DIAGNOSIS — Z13.89 SCREENING FOR DIABETIC PERIPHERAL NEUROPATHY: ICD-10-CM

## 2018-02-27 DIAGNOSIS — E11.21 TYPE 2 DIABETES MELLITUS WITH DIABETIC NEPHROPATHY, WITHOUT LONG-TERM CURRENT USE OF INSULIN (H): Primary | ICD-10-CM

## 2018-02-27 DIAGNOSIS — E66.01 MORBID OBESITY WITH BMI OF 45.0-49.9, ADULT (H): Chronic | ICD-10-CM

## 2018-02-27 DIAGNOSIS — F33.1 MAJOR DEPRESSIVE DISORDER, RECURRENT EPISODE, MODERATE (H): ICD-10-CM

## 2018-02-27 DIAGNOSIS — G89.4 CHRONIC PAIN SYNDROME: ICD-10-CM

## 2018-02-27 PROCEDURE — 99207 C FOOT EXAM  NO CHARGE: CPT | Performed by: PREVENTIVE MEDICINE

## 2018-02-27 PROCEDURE — 99214 OFFICE O/P EST MOD 30 MIN: CPT | Performed by: PREVENTIVE MEDICINE

## 2018-02-27 PROCEDURE — 20610 DRAIN/INJ JOINT/BURSA W/O US: CPT | Mod: RT | Performed by: ORTHOPAEDIC SURGERY

## 2018-02-27 RX ORDER — METHYLPREDNISOLONE ACETATE 80 MG/ML
80 INJECTION, SUSPENSION INTRA-ARTICULAR; INTRALESIONAL; INTRAMUSCULAR; SOFT TISSUE ONCE
Qty: 1 ML | Refills: 0 | OUTPATIENT
Start: 2018-02-27 | End: 2018-02-27

## 2018-02-27 RX ORDER — TRAMADOL HYDROCHLORIDE 50 MG/1
50 TABLET ORAL EVERY 8 HOURS PRN
Qty: 30 TABLET | Refills: 0 | Status: SHIPPED | OUTPATIENT
Start: 2018-02-27 | End: 2018-04-08

## 2018-02-27 RX ORDER — GLIPIZIDE AND METFORMIN HCL 5; 500 MG/1; MG/1
TABLET, FILM COATED ORAL
Qty: 360 TABLET | Refills: 0 | COMMUNITY
Start: 2018-02-27 | End: 2018-05-21

## 2018-02-27 ASSESSMENT — PAIN SCALES - GENERAL: PAINLEVEL: WORST PAIN (10)

## 2018-02-27 NOTE — PROGRESS NOTES
SUBJECTIVE:   Melody Ferguson is a 57 year old female who presents to clinic today for the following health issues:      Diabetes Follow-up    Patient is checking blood sugars: once daily.  Results are as follows:         am -     Diabetic concerns: None     Symptoms of hypoglycemia (low blood sugar): glucose down to 47 last week      Paresthesias (numbness or burning in feet) or sores: No     Date of last diabetic eye exam: UTD    BP Readings from Last 2 Encounters:   01/15/18 121/67   01/10/18 138/78     Hemoglobin A1C (%)   Date Value   01/05/2018 6.9 (H)   04/18/2017 6.1 (H)     LDL Cholesterol Calculated (mg/dL)   Date Value   01/05/2018 109 (H)   11/01/2016 75       Amount of exercise or physical activity: None    Problems taking medications regularly: No    Medication side effects: none    Diet: diabetic      Medication Followup of pain medications Tramadol    Taking Medication as prescribed: yes    Side Effects:  yes    Medication Helping Symptoms:  no       Hyperlipidemia Follow-Up      Rate your low fat/cholesterol diet?: fair    Taking statin?  Yes, no muscle aches from statin    Other lipid medications/supplements?:  none    Hypertension Follow-up      Outpatient blood pressures are not being checked.    Low Salt Diet: low salt    Depression and Anxiety Follow-Up    Status since last visit: No change    Other associated symptoms:None    Complicating factors:     Significant life event: No     Current substance abuse: None    PHQ-9 10/10/2016 12/15/2016 8/10/2017   Total Score 16 14 10   Q9: Suicide Ideation Not at all Not at all Not at all     DANY-7 SCORE 7/7/2016 10/10/2016 8/10/2017   Total Score - - 10 (moderate anxiety)   Total Score 6 11 10     In the past two weeks have you had thoughts of suicide or self-harm?  No.    Do you have concerns about your personal safety or the safety of others?   No  PHQ-9  English  PHQ-9   Any Language  DANY-7  Suicide Assessment Five-step Evaluation and Treatment  (SAFE-T)    Problem list and histories reviewed & adjusted, as indicated.  Additional history: as documented    Patient Active Problem List   Diagnosis     Primary osteoarthritis of both knees     PTSD (post-traumatic stress disorder)     Chronic pain syndrome     Fibromyalgia     History of TIA (transient ischemic attack) and stroke     Major depressive disorder, recurrent episode, moderate (H)     Urge incontinence of urine     Prolapse of vaginal wall     Mild intermittent asthma without complication     HTN, goal below 140/90     Hyperlipidemia LDL goal <100     Type 2 diabetes mellitus with diabetic nephropathy (H)     Allergic rhinitis, unspecified allergic rhinitis type     Morbid obesity with BMI of 45.0-49.9, adult (H)     History of colonic polyps     Vaginal high risk HPV DNA test positive     DM type 2 without retinopathy (H)     Hiatal hernia     DDD (degenerative disc disease), lumbar     Diabetes mellitus, type 2 (H)     History of total knee arthroplasty, left     Type 2 diabetes mellitus without retinopathy (H)     Primary osteoarthritis of right knee     Past Surgical History:   Procedure Laterality Date     ABDOMEN SURGERY  05/20/2005     BIOPSY  1984     BREAST SURGERY  1984     C TOTAL KNEE ARTHROPLASTY Left 04/26/2017    DML at [facility]     CHOLECYSTECTOMY  2000     COLONOSCOPY       ENT SURGERY  2008    tonsils removed     GENITOURINARY SURGERY  2005    a & p repair     REPAIR PTOSIS       SLING TRANSVAGINAL N/A 7/14/2017    Procedure: SLING TRANSVAGINAL;  Sling ([place]);  Surgeon: Talon Katz MD;  Location: [facility] OR       Social History   Substance Use Topics     Smoking status: Former Smoker     Packs/day: 0.50     Years: 25.00     Types: Cigarettes     Quit date: 10/15/1991     Smokeless tobacco: Never Used     Alcohol use 0.0 oz/week      Comment: occassionally     Family History   Problem Relation Age of Onset     Thyroid Disease Sister      CANCER Brother      CANCER Sister      breast  cancer     CEREBROVASCULAR DISEASE Sister 62     Other - See Comments Sister      Angioplasty 8/2016     Hypertension Father      Hyperlipidemia Father      Prostate Cancer Father      Glaucoma Maternal Grandmother      CANCER Maternal Grandfather      CANCER Paternal Grandmother      Breast Cancer Paternal Grandmother      CEREBROVASCULAR DISEASE Paternal Grandfather      CEREBROVASCULAR DISEASE Maternal Half-Sister      4/08/2016     Breast Cancer Maternal Half-Sister      Asthma Maternal Half-Sister      Other Cancer Brother      Passed from cancer unsure what type     Anesthesia Reaction Daughter      DIABETES No family hx of      Macular Degeneration No family hx of          Current Outpatient Prescriptions   Medication Sig Dispense Refill     glipiZIDE-metFORMIN (METAGLIP) 5-500 MG per tablet TAKE 1 TABLETS BY MOUTH TWICE DAILY BEFORE MEALS 360 tablet 0     traMADol (ULTRAM) 50 MG tablet Take 1 tablet (50 mg) by mouth every 8 hours as needed for moderate pain 30 tablet 0     methylPREDNISolone acetate (DEPO-MEDROL) 80 MG/ML injection 1 mL (80 mg) by INTRA-ARTICULAR route once for 1 dose 1 mL 0     solifenacin (VESICARE) 10 MG tablet Take 1 tablet (10 mg) by mouth daily 90 tablet 0     montelukast (SINGULAIR) 10 MG tablet TAKE 1 TABLET(10 MG) BY MOUTH AT BEDTIME 90 tablet 0     [DISCONTINUED] glipiZIDE-metFORMIN (METAGLIP) 5-500 MG per tablet TAKE 2 TABLETS BY MOUTH TWICE DAILY BEFORE MEALS 360 tablet 0     polyethylene glycol (MIRALAX/GLYCOLAX) powder Take 17 g by mouth daily DISSOLVE 17 GRAMS IN LIQUID AND DRINK DAILY AS DIRECTED 510 g 3     losartan (COZAAR) 50 MG tablet Take 1 tablet (50 mg) by mouth daily 90 tablet 0     nystatin (MYCOSTATIN) 502409 UNIT/GM POWD Apply topically 2 times daily 60 g 1     metoprolol succinate (TOPROL XL) 25 MG 24 hr tablet Take 1 tablet (25 mg) by mouth daily 90 tablet 1     insulin pen needle (NOVOFINE) 32G X 6 MM Use once daily or as directed. 100 each prn     liraglutide  (VICTOZA) 18 MG/3ML soln Inject 0.6 mg Subcutaneous daily Increase to 1.2 mg daily after 1 week 6 mL 1     blood glucose monitoring (NO BRAND SPECIFIED) test strip Use to test blood sugars 2 times daily or as directed 180 strip 3     citalopram (CELEXA) 40 MG tablet TAKE 1 TABLET(40 MG) BY MOUTH EVERY MORNING 90 tablet 0     cyclobenzaprine (FLEXERIL) 5 MG tablet TAKE 1 TABLET(5 MG) BY MOUTH TWICE DAILY AS NEEDED FOR MUSCLE SPASMS 60 tablet 0     esomeprazole (NEXIUM) 40 MG CR capsule Take 1 capsule (40 mg) by mouth every morning (before breakfast) Take 30-60 minutes before eating. 90 capsule 1     ACCU-CHEK DAMIEN PLUS test strip TEST BLOOD SUGARS TWICE DAILY 200 strip 0     gabapentin (NEURONTIN) 300 MG capsule Take 1 capsule (300 mg) by mouth At Bedtime 90 capsule 1     melatonin 3 MG tablet TAKE 1 TABLET BY MOUTH ONCE AT BEDTIME AS NEEDED FOR SLEEP 30 tablet 0     order for DME Diabetic test strips and lancets per insurance formulary Check blood sugar 2 times daily 3 Month 3     albuterol (PROAIR HFA, PROVENTIL HFA, VENTOLIN HFA) 108 (90 BASE) MCG/ACT inhaler Inhale 2 puffs into the lungs every 6 hours       ASPIRIN PO Take 81 mg by mouth daily       ATORVASTATIN CALCIUM PO Take 40 mg by mouth daily       BUPROPION HCL PO Take 50 mg by mouth 2 times daily        fluticasone (VERAMYST) 27.5 MCG/SPRAY nasal spray Spray 2 sprays into both nostrils daily       ACE NOT PRESCRIBED, INTENTIONAL, Reported on 5/10/2017 0 each 0     Allergies   Allergen Reactions     Milk Protein Extract GI Disturbance     lactose intolerance.  Can tolerate milk products if uses lactaid     Bee Venom Swelling     Latex      Lisinopril Cough     Onion GI Disturbance     Vomiting, feels like throat is clogged     Aloe Rash     pain     Chlorine Rash     Recent Labs   Lab Test  01/05/18   0822  07/11/17   0841  04/18/17   0900  11/01/16   0752  03/01/16   0806   A1C  6.9*   --   6.1*  6.7*  6.2*   LDL  109*   --    --   75  66   HDL  59   --   "  --   61  53   TRIG  126   --    --   159*  109   ALT  22   --    --   27   --    CR  0.51*  0.43*  0.47*  0.47*  0.56   GFRESTIMATED  >90  >90  Non African American GFR Calc    >90  Non  GFR Calc    >90  Non  GFR Calc    >90  Non  GFR Calc     GFRESTBLACK  >90  >90  African American GFR Calc    >90   GFR Calc    >90   GFR Calc    >90   GFR Calc     POTASSIUM  3.9  3.8  3.9  3.8  3.4   TSH   --    --    --   1.20   --       BP Readings from Last 3 Encounters:   02/27/18 128/70   01/15/18 121/67   01/10/18 138/78    Wt Readings from Last 3 Encounters:   02/27/18 (!) 315 lb (142.9 kg)   02/27/18 (!) 318 lb (144.2 kg)   01/16/18 (!) 325 lb (147.4 kg)                  Labs reviewed in EPIC    Reviewed and updated as needed this visit by clinical staff  Allergies       Reviewed and updated as needed this visit by Provider         ROS:  Constitutional, neuro, ENT, endocrine, pulmonary, cardiac, gastrointestinal, genitourinary, musculoskeletal, integument and psychiatric systems are negative, except as otherwise noted.    OBJECTIVE:                                                    /70  Pulse 84  Temp 97.7  F (36.5  C) (Oral)  Ht 5' 7\" (1.702 m)  Wt (!) 318 lb (144.2 kg)  SpO2 99%  Breastfeeding? No  BMI 49.81 kg/m2  Body mass index is 49.81 kg/(m^2).  GENERAL APPEARANCE: healthy, alert and over weight  EYES: Eyes grossly normal to inspection and conjunctivae and sclerae normal  NECK: no adenopathy and trachea midline and normal to palpation  RESP: lungs clear to auscultation - no rales, rhonchi or wheezes  CV: regular rates and rhythm, normal S1 S2, no S3 or S4, no murmur, click or rub, no irregular beats and peripheral pulses strong  ABDOMEN: soft, non-tender and no rebound or guarding   MS: extremities normal- no gross deformities noted  SKIN: no suspicious lesions or rashes  NEURO: Normal strength and tone, " mentation intact and speech normal  PSYCH: mentation appears normal  Diabetic foot exam: Onychomycosis+, sensation intact, no ulcerations     Diagnostic test results:  Diagnostic Test Results:  No results found for this or any previous visit (from the past 24 hour(s)).     ASSESSMENT/PLAN:                                                    1. Type 2 diabetes mellitus with diabetic nephropathy, without long-term current use of insulin (H)  -Had episode of hypoglycemia last week  -Has tolerated Victoza well  -Decrease Metaglip from 2 tablets twice a day to one twice a day  -Goal will be to discontinue Sulfonylurea once glucose stable on Victoza.  - glipiZIDE-metFORMIN (METAGLIP) 5-500 MG per tablet; TAKE 1 TABLETS BY MOUTH TWICE DAILY BEFORE MEALS  Dispense: 360 tablet; Refill: 0, has many tablets at home, recent refill, hence will Continue with this for now, later switch to Metformin alone     2. Morbid obesity with BMI of 45.0-49.9, adult (H)  -Has lost weight from 325 to 318 pounds on the Victoza, has noted decreased appetite     3. Major depressive disorder, recurrent episode, moderate (H)  -On Celexa  -Not seeing a therapist at this time     4. Chronic pain syndrome  -Takes 2 Tramadol at night  - traMADol (ULTRAM) 50 MG tablet; Take 1 tablet (50 mg) by mouth every 8 hours as needed for moderate pain  Dispense: 30 tablet; Refill: 0  - PAIN MANAGEMENT REFERRAL    5. DDD (degenerative disc disease), lumbar  -Has had MRI of the lumbar spine in 2016  -Saw Neurosurgery in 2016 as well  -Has had LESI since then, more recently not helping    - ORTHO  REFERRAL    6. Screening for diabetic peripheral neuropathy  - FOOT EXAM  NO CHARGE [11525.114]      Follow up with Provider - 4 months, sooner if any changes or concerns      Elizabeth Lamas MD MPH    [facility]

## 2018-02-27 NOTE — MR AVS SNAPSHOT
"              After Visit Summary   2/27/2018    Melody Ferguson    MRN: [id number]           Patient Information     Date Of Birth          1960        Visit Information        Provider Department      2/27/2018 10:15 AM Armond George MD [facility]        Today's Diagnoses     Primary osteoarthritis of right knee    -  1       Follow-ups after your visit        Who to contact     If you have questions or need follow up information about today's clinic visit or your schedule please contact [facility] directly at [phone].  Normal or non-critical lab and imaging results will be communicated to you by Printlandhart, letter or phone within 4 business days after the clinic has received the results. If you do not hear from us within 7 days, please contact the clinic through nCrypted Cloudt or phone. If you have a critical or abnormal lab result, we will notify you by phone as soon as possible.  Submit refill requests through XtremeData or call your pharmacy and they will forward the refill request to us. Please allow 3 business days for your refill to be completed.          Additional Information About Your Visit        MyChart Information     XtremeData gives you secure access to your electronic health record. If you see a primary care provider, you can also send messages to your care team and make appointments. If you have questions, please call your primary care clinic.  If you do not have a primary care provider, please call [phone] and they will assist you.        Care EveryWhere ID     This is your Care EveryWhere ID. This could be used by other organizations to access your [place] medical records  [id number]        Your Vitals Were     Temperature Respirations Height BMI (Body Mass Index)          98  F (36.7  C) 18 1.702 m (5' 7\") 49.34 kg/m2         Blood Pressure from Last 3 Encounters:   02/27/18 128/70   01/15/18 121/67   01/10/18 138/78    Weight from Last 3 " Encounters:   02/27/18 (!) 142.9 kg (315 lb)   02/27/18 (!) 144.2 kg (318 lb)   01/16/18 (!) 147.4 kg (325 lb)              We Performed the Following     DRAIN/INJECT LARGE JOINT/BURSA     METHYLPREDNISOLONE 80 MG INJ          Today's Medication Changes          These changes are accurate as of 2/27/18 11:24 AM.  If you have any questions, ask your nurse or doctor.               Start taking these medicines.        Dose/Directions    methylPREDNISolone acetate 80 MG/ML injection   Commonly known as:  DEPO-MEDROL   Used for:  Primary osteoarthritis of right knee   Started by:  Armond George MD        Dose:  80 mg   1 mL (80 mg) by INTRA-ARTICULAR route once for 1 dose   Quantity:  1 mL   Refills:  0         These medicines have changed or have updated prescriptions.        Dose/Directions    cyclobenzaprine 5 MG tablet   Commonly known as:  FLEXERIL   This may have changed:  Another medication with the same name was removed. Continue taking this medication, and follow the directions you see here.   Used for:  Left sided sciatica, Back muscle spasm   Changed by:  Elizabeth Lamas MD        TAKE 1 TABLET(5 MG) BY MOUTH TWICE DAILY AS NEEDED FOR MUSCLE SPASMS   Quantity:  60 tablet   Refills:  0       glipiZIDE-metFORMIN 5-500 MG per tablet   Commonly known as:  METAGLIP   This may have changed:  See the new instructions.   Used for:  Type 2 diabetes mellitus with diabetic nephropathy, without long-term current use of insulin (H)   Changed by:  Elizabeth Lamas MD        TAKE 1 TABLETS BY MOUTH TWICE DAILY BEFORE MEALS   Quantity:  360 tablet   Refills:  0            Where to get your medicines      Some of these will need a paper prescription and others can be bought over the counter.  Ask your nurse if you have questions.     Bring a paper prescription for each of these medications     traMADol 50 MG tablet       You don't need a prescription for these medications     methylPREDNISolone acetate 80 MG/ML  injection                Primary Care Provider Office Phone # Fax #    Elizabeth Lamas [phone]       [address]  [facility] [address]        Equal Access to Services     EVY KIRAN : Hadii norberto ku lulúo Soomaali, waaxda luqadaha, qaybta kaalmada adeegyada, issac novak laJamesbarbara reed. So [facility] [phone].    ATENCIÓN: Si habla español, tiene a turner disposición servicios gratuitos de asistencia lingüística. Llame al [phone].    We comply with applicable federal civil rights laws and [place] laws. We do not discriminate on the basis of race, color, national origin, age, disability, sex, sexual orientation, or gender identity.            Thank you!     Thank you for choosing [facility]  for your care. Our goal is always to provide you with excellent care. Hearing back from our patients is one way we can continue to improve our services. Please take a few minutes to complete the written survey that you may receive in the mail after your visit with us. Thank you!             Your Updated Medication List - Protect others around you: Learn how to safely use, store and throw away your medicines at www.disposemymeds.org.          This list is accurate as of 2/27/18 11:24 AM.  Always use your most recent med list.                   Brand Name Dispense Instructions for use Diagnosis    * ACCU-CHEK DAMIEN PLUS test strip   Generic drug:  blood glucose monitoring     200 strip    TEST BLOOD SUGARS TWICE DAILY    Type 2 diabetes mellitus with diabetic nephropathy (H)       * blood glucose monitoring test strip    no brand specified    180 strip    Use to test blood sugars 2 times daily or as directed    Type 2 diabetes mellitus with diabetic nephropathy, without long-term current use of insulin (H)       ACE NOT PRESCRIBED (INTENTIONAL)     0 each    Reported on 5/10/2017    Type 2 diabetes mellitus without complication (H)       albuterol 108 (90 BASE) MCG/ACT  Inhaler    PROAIR HFA/PROVENTIL HFA/VENTOLIN HFA     Inhale 2 puffs into the lungs every 6 hours        ASPIRIN PO      Take 81 mg by mouth daily        ATORVASTATIN CALCIUM PO      Take 40 mg by mouth daily        BUPROPION HCL PO      Take 50 mg by mouth 2 times daily        citalopram 40 MG tablet    celeXA    90 tablet    TAKE 1 TABLET(40 MG) BY MOUTH EVERY MORNING    Major depressive disorder, recurrent episode, moderate (H)       cyclobenzaprine 5 MG tablet    FLEXERIL    60 tablet    TAKE 1 TABLET(5 MG) BY MOUTH TWICE DAILY AS NEEDED FOR MUSCLE SPASMS    Left sided sciatica, Back muscle spasm       esomeprazole 40 MG CR capsule    nexIUM    90 capsule    Take 1 capsule (40 mg) by mouth every morning (before breakfast) Take 30-60 minutes before eating.    Dyspepsia       fluticasone 27.5 MCG/SPRAY spray    VERAMYST     Spray 2 sprays into both nostrils daily        gabapentin 300 MG capsule    NEURONTIN    90 capsule    Take 1 capsule (300 mg) by mouth At Bedtime    Acute left-sided low back pain with left-sided sciatica       glipiZIDE-metFORMIN 5-500 MG per tablet    METAGLIP    360 tablet    TAKE 1 TABLETS BY MOUTH TWICE DAILY BEFORE MEALS    Type 2 diabetes mellitus with diabetic nephropathy, without long-term current use of insulin (H)       insulin pen needle 32G X 6 MM    NOVOFINE    100 each    Use once daily or as directed.    Type 2 diabetes mellitus with diabetic nephropathy, without long-term current use of insulin (H)       liraglutide 18 MG/3ML soln    VICTOZA    6 mL    Inject 0.6 mg Subcutaneous daily Increase to 1.2 mg daily after 1 week    Type 2 diabetes mellitus with diabetic nephropathy, without long-term current use of insulin (H)       losartan 50 MG tablet    COZAAR    90 tablet    Take 1 tablet (50 mg) by mouth daily    HTN, goal below 140/90, Type 2 diabetes mellitus with diabetic nephropathy, without long-term current use of insulin (H)       melatonin 3 MG tablet     30 tablet     TAKE 1 TABLET BY MOUTH ONCE AT BEDTIME AS NEEDED FOR SLEEP    Major depressive disorder, recurrent episode, moderate (H)       methylPREDNISolone acetate 80 MG/ML injection    DEPO-MEDROL    1 mL    1 mL (80 mg) by INTRA-ARTICULAR route once for 1 dose    Primary osteoarthritis of right knee       metoprolol succinate 25 MG 24 hr tablet    TOPROL XL    90 tablet    Take 1 tablet (25 mg) by mouth daily    Old myocardial infarction       montelukast 10 MG tablet    SINGULAIR    90 tablet    TAKE 1 TABLET(10 MG) BY MOUTH AT BEDTIME    Mild intermittent asthma without complication       nystatin 978284 UNIT/GM Powd    MYCOSTATIN    60 g    Apply topically 2 times daily    Morbid obesity with body mass index of 45.0-49.9 in adult (H)       order for DME     3 Month    Diabetic test strips and lancets per insurance formulary Check blood sugar 2 times daily    Type 2 diabetes mellitus with diabetic nephropathy (H)       polyethylene glycol powder    MIRALAX/GLYCOLAX    510 g    Take 17 g by mouth daily DISSOLVE 17 GRAMS IN LIQUID AND DRINK DAILY AS DIRECTED    Chronic pain syndrome       solifenacin 10 MG tablet    VESICARE    90 tablet    Take 1 tablet (10 mg) by mouth daily    Mixed incontinence       traMADol 50 MG tablet    ULTRAM    30 tablet    Take 1 tablet (50 mg) by mouth every 8 hours as needed for moderate pain    Chronic pain syndrome       * Notice:  This list has 2 medication(s) that are the same as other medications prescribed for you. Read the directions carefully, and ask your doctor or other care provider to review them with you.

## 2018-02-27 NOTE — PATIENT INSTRUCTIONS
At [facility], we strive to deliver an exceptional experience to you, every time we see you.  If you receive a survey in the mail, please send us back your thoughts. We really do value your feedback.    Based on your medical history, these are the current health maintenance/preventive care services that you are due for (some may have been done at this visit.)  Health Maintenance Due   Topic Date Due     ADVANCE DIRECTIVE PLANNING Q5 YRS  09/25/2013     FOOT EXAM Q1 YEAR  03/01/2017     ASTHMA CONTROL TEST Q6 MOS  10/18/2017     URINE DRUG SCREEN Q1 YR  11/01/2017         Suggested websites for health information:  Www.[place].org : Up to date and easily searchable information on multiple topics.  Www.medlineplus.gov : medication info, interactive tutorials, watch real surgeries online  Www.familydoctor.org : good info from the Academy of Family Physicians  Www.cdc.gov : public health info, travel advisories, epidemics (H1N1)  Www.aap.org : children's health info, normal development, vaccinations  Www.health.[facility].[place].us : [place] dept of health, public health issues in [place], N1N1    Your care team:                            Family Medicine Internal Medicine   MD Kip Estrella MD Shantel Branch-Fleming, MD Katya Georgiev PA-C Megan Hill, APRMEMO Mandujano MD Pediatrics   MARLIN De La Garza, JELANI Smith APRN CNP   MD Monica Hu MD Deborah Mielke, MD Kim Thein, APRN [facility]      Clinic hours: Monday - Thursday 7 am-7 pm; Fridays 7 am-5 pm.   Urgent care: Monday - Friday 11 am-9 pm; Saturday and Sunday 9 am-5 pm.  Pharmacy : Monday -Thursday 8 am-8 pm; Friday 8 am-6 pm; Saturday and Sunday 9 am-5 pm.     Clinic: [phone]   Pharmacy: [phone]

## 2018-02-27 NOTE — MR AVS SNAPSHOT
After Visit Summary   2/27/2018    Melody Ferguson    MRN: [id number]           Patient Information     Date Of Birth          1960        Visit Information        Provider Department      2/27/2018 9:40 AM Elizabeth Lamas MD [facility]        Today's Diagnoses     Screening for diabetic peripheral neuropathy    -  1    Type 2 diabetes mellitus with diabetic nephropathy, without long-term current use of insulin (H)        Chronic pain syndrome        DDD (degenerative disc disease), lumbar          Care Instructions    At [facility], we strive to deliver an exceptional experience to you, every time we see you.  If you receive a survey in the mail, please send us back your thoughts. We really do value your feedback.    Based on your medical history, these are the current health maintenance/preventive care services that you are due for (some may have been done at this visit.)  Health Maintenance Due   Topic Date Due     ADVANCE DIRECTIVE PLANNING Q5 YRS  09/25/2013     FOOT EXAM Q1 YEAR  03/01/2017     ASTHMA CONTROL TEST Q6 MOS  10/18/2017     URINE DRUG SCREEN Q1 YR  11/01/2017         Suggested websites for health information:  Www.[facility].org : Up to date and easily searchable information on multiple topics.  Www.medlineplus.gov : medication info, interactive tutorials, watch real surgeries online  Www.familydoctor.org : good info from the Academy of Family Physicians  Www.cdc.gov : public health info, travel advisories, epidemics (H1N1)  Www.aap.org : children's health info, normal development, vaccinations  Www.health.[facility].us : [place] dept of health, public health issues in [place], N1N1    Your care team:                            Family Medicine Internal Medicine   MD Kip Estrella MD Shantel Branch-Fleming, MD Katya Georgiev PA-C Megan Hill, APRN JELANI Mandujano MD Pediatrics   MARLIN De La Garza, JELANI Smith  MD Monica Hu CNP, MD Deborah Mielke, MD Kim Thein, APRN CNP      Clinic hours: Monday - Thursday 7 am-7 pm; Fridays 7 am-5 pm.   Urgent care: Monday - Friday 11 am-9 pm; Saturday and Sunday 9 am-5 pm.  Pharmacy : Monday -Thursday 8 am-8 pm; Friday 8 am-6 pm; Saturday and Sunday 9 am-5 pm.     Clinic: [phone]   Pharmacy: [phone]              Follow-ups after your visit        Additional Services     ORTHO  REFERRAL       [facility] is referring you to the Orthopedic  Services at [facility].       The  Representative will assist you in the coordination of your Orthopedic and Musculoskeletal Care as prescribed by your physician.    The  Representative will call you within 1 business day to help schedule your appointment, or you may contact the  Representative at:    All areas ~ [phone]     Type of Referral : Spine: Lumbar  **Choose Medical Spine Specialist (unless patient was seen by a Medical Spine Specialist within the past 6 months).**  Surgical Evaluation is advised if the patient presents with one or more of the following red flags: Evidence of Spinal Tumor, Infection or Fracture, Cauda Equina Syndrome, Sudden or Progressive Weakness, Loss of Bowel or Bladder Control, or any other documented emergent neurological condition resulting from a Lumbar Spinal Condition. Medical Spine Specialist        Timeframe requested: Routine    Coverage of these services is subject to the terms and limitations of your health insurance plan.  Please call member services at your health plan with any benefit or coverage questions.      If X-rays, CT or MRI's have been performed, please contact the facility where they were done to arrange for , prior to your scheduled appointment.  Please bring this referral request to your appointment and present it to your specialist.            PAIN  MANAGEMENT REFERRAL       Your provider has referred you to: FM: [facility] -    Reason for Referral: Evaluation for comprehensive services- patients will be evaluated if appropriate for comprehensive service including medication changes, procedures, pain psychology, and pain physical therapy.  While involved with comprehensive services, pain providers will work with referring provider/PCP to stabilize appropriate medication management, with long-term plan of transition of prescribing back to referring provider/PCP upon completion of comprehensive services.      Please complete the following questions:    Do you have any specific questions for the pain specialist? No    Are there any red flags that may impact the assessment or management of the patient? Mental Illness / Communication Difficulties (explain): Depressive symptoms       What is your diagnosis for the patient's pain? Chronic pain       For any questions, contact the [facility] at [phone].     **ANY DIAGNOSTIC TESTS THAT ARE NOT IN EPIC SHOULD BE SENT TO THE PAIN CENTER**    REGARDING OPIOID MEDICATIONS:  The discussion of opioids management, appropriateness of therapy, and dosing will be discussed in patients being seen for evaluation.  The pain management clinics are not long-term prescribing clinics, with transition of prescribing of medications ultimately going back to the referring provider/PCP.  If prescribing is taken over at the pain clinic, it is in actively involved patients whom are appropriate for opioids, urine drug screening is completed, and long-term prescribing plan has been determined.  Therefore, we will not be automatically taking over prescribing at the patient's first visit.  Is this agreeable to you? agrees.     Please be aware that coverage of these services is subject to the terms and limitations of your health insurance plan.  Call member services at your health plan with any  "benefit or coverage questions.      Please bring the following with you to your appointment:    (1) Any X-Rays, CTs or MRIs which have been performed.  Contact the facility where they were done to arrange for  prior to your scheduled appointment.    (2) List of current medications   (3) This referral request   (4) Any documents/labs given to you for this referral                  Who to contact     If you have questions or need follow up information about today's clinic visit or your schedule please contact [facility] directly at [phone].  Normal or non-critical lab and imaging results will be communicated to you by [facility], letter or phone within 4 business days after the clinic has received the results. If you do not hear from us within 7 days, please contact the clinic through combionict or phone. If you have a critical or abnormal lab result, we will notify you by phone as soon as possible.  Submit refill requests through [facility] or call your pharmacy and they will forward the refill request to us. Please allow 3 business days for your refill to be completed.          Additional Information About Your Visit        [facility] Information     [facility] gives you secure access to your electronic health record. If you see a primary care provider, you can also send messages to your care team and make appointments. If you have questions, please call your primary care clinic.  If you do not have a primary care provider, please call [phone] and they will assist you.        Care EveryWhere ID     This is your Care EveryWhere ID. This could be used by other organizations to access your [facility] medical records  [id number]        Your Vitals Were     Pulse Temperature Height Pulse Oximetry Breastfeeding? BMI (Body Mass Index)    84 97.7  F (36.5  C) (Oral) 5' 7\" (1.702 m) 99% No 49.81 kg/m2       Blood Pressure from Last 3 Encounters:   02/27/18 128/70   01/15/18 121/67   01/10/18 138/78    " Weight from Last 3 Encounters:   02/27/18 (!) 318 lb (144.2 kg)   01/16/18 (!) 325 lb (147.4 kg)   01/10/18 (!) 326 lb (147.9 kg)              We Performed the Following     FOOT EXAM  NO CHARGE [90847.114]     ORTHO  REFERRAL     PAIN MANAGEMENT REFERRAL          Today's Medication Changes          These changes are accurate as of 2/27/18 10:16 AM.  If you have any questions, ask your nurse or doctor.               These medicines have changed or have updated prescriptions.        Dose/Directions    cyclobenzaprine 5 MG tablet   Commonly known as:  FLEXERIL   This may have changed:  Another medication with the same name was removed. Continue taking this medication, and follow the directions you see here.   Used for:  Left sided sciatica, Back muscle spasm   Changed by:  Elizabeth Lamas MD        TAKE 1 TABLET(5 MG) BY MOUTH TWICE DAILY AS NEEDED FOR MUSCLE SPASMS   Quantity:  60 tablet   Refills:  0       glipiZIDE-metFORMIN 5-500 MG per tablet   Commonly known as:  METAGLIP   This may have changed:  See the new instructions.   Used for:  Type 2 diabetes mellitus with diabetic nephropathy, without long-term current use of insulin (H)   Changed by:  Elizabeth Lamas MD        TAKE 1 TABLETS BY MOUTH TWICE DAILY BEFORE MEALS   Quantity:  360 tablet   Refills:  0            Where to get your medicines      Some of these will need a paper prescription and others can be bought over the counter.  Ask your nurse if you have questions.     Bring a paper prescription for each of these medications     traMADol 50 MG tablet                Primary Care Provider Office Phone # Fax #    Elizabeth Lamas [phone] [address]  [facility] [address]        Equal Access to Services     [facility] AH: Hadii norberto calle Socorey, waaxda luqadaha, qaybta kaalissac sandoval. So [facility] [phone].    ATENCIÓN: Si hema zavala a turner disposición  servicios gratuitos de asistencia lingüística. Emilie vargas [phone].    We comply with applicable federal civil rights laws and [place] laws. We do not discriminate on the basis of race, color, national origin, age, disability, sex, sexual orientation, or gender identity.            Thank you!     Thank you for choosing [facility]  for your care. Our goal is always to provide you with excellent care. Hearing back from our patients is one way we can continue to improve our services. Please take a few minutes to complete the written survey that you may receive in the mail after your visit with us. Thank you!             Your Updated Medication List - Protect others around you: Learn how to safely use, store and throw away your medicines at www.disposemymeds.org.          This list is accurate as of 2/27/18 10:16 AM.  Always use your most recent med list.                   Brand Name Dispense Instructions for use Diagnosis    * ACCU-CHEK DAMIEN PLUS test strip   Generic drug:  blood glucose monitoring     200 strip    TEST BLOOD SUGARS TWICE DAILY    Type 2 diabetes mellitus with diabetic nephropathy (H)       * blood glucose monitoring test strip    no brand specified    180 strip    Use to test blood sugars 2 times daily or as directed    Type 2 diabetes mellitus with diabetic nephropathy, without long-term current use of insulin (H)       ACE NOT PRESCRIBED (INTENTIONAL)     0 each    Reported on 5/10/2017    Type 2 diabetes mellitus without complication (H)       albuterol 108 (90 BASE) MCG/ACT Inhaler    PROAIR HFA/PROVENTIL HFA/VENTOLIN HFA     Inhale 2 puffs into the lungs every 6 hours        ASPIRIN PO      Take 81 mg by mouth daily        ATORVASTATIN CALCIUM PO      Take 40 mg by mouth daily        BUPROPION HCL PO      Take 50 mg by mouth 2 times daily        citalopram 40 MG tablet    celeXA    90 tablet    TAKE 1 TABLET(40 MG) BY MOUTH EVERY MORNING    Major depressive disorder,  recurrent episode, moderate (H)       cyclobenzaprine 5 MG tablet    FLEXERIL    60 tablet    TAKE 1 TABLET(5 MG) BY MOUTH TWICE DAILY AS NEEDED FOR MUSCLE SPASMS    Left sided sciatica, Back muscle spasm       esomeprazole 40 MG CR capsule    nexIUM    90 capsule    Take 1 capsule (40 mg) by mouth every morning (before breakfast) Take 30-60 minutes before eating.    Dyspepsia       fluticasone 27.5 MCG/SPRAY spray    VERAMYST     Spray 2 sprays into both nostrils daily        gabapentin 300 MG capsule    NEURONTIN    90 capsule    Take 1 capsule (300 mg) by mouth At Bedtime    Acute left-sided low back pain with left-sided sciatica       glipiZIDE-metFORMIN 5-500 MG per tablet    METAGLIP    360 tablet    TAKE 1 TABLETS BY MOUTH TWICE DAILY BEFORE MEALS    Type 2 diabetes mellitus with diabetic nephropathy, without long-term current use of insulin (H)       insulin pen needle 32G X 6 MM    NOVOFINE    100 each    Use once daily or as directed.    Type 2 diabetes mellitus with diabetic nephropathy, without long-term current use of insulin (H)       liraglutide 18 MG/3ML soln    VICTOZA    6 mL    Inject 0.6 mg Subcutaneous daily Increase to 1.2 mg daily after 1 week    Type 2 diabetes mellitus with diabetic nephropathy, without long-term current use of insulin (H)       losartan 50 MG tablet    COZAAR    90 tablet    Take 1 tablet (50 mg) by mouth daily    HTN, goal below 140/90, Type 2 diabetes mellitus with diabetic nephropathy, without long-term current use of insulin (H)       melatonin 3 MG tablet     30 tablet    TAKE 1 TABLET BY MOUTH ONCE AT BEDTIME AS NEEDED FOR SLEEP    Major depressive disorder, recurrent episode, moderate (H)       metoprolol succinate 25 MG 24 hr tablet    TOPROL XL    90 tablet    Take 1 tablet (25 mg) by mouth daily    Old myocardial infarction       montelukast 10 MG tablet    SINGULAIR    90 tablet    TAKE 1 TABLET(10 MG) BY MOUTH AT BEDTIME    Mild intermittent asthma without  complication       nystatin 290542 UNIT/GM Powd    MYCOSTATIN    60 g    Apply topically 2 times daily    Morbid obesity with body mass index of 45.0-49.9 in adult (H)       order for DME     3 Month    Diabetic test strips and lancets per insurance formulary Check blood sugar 2 times daily    Type 2 diabetes mellitus with diabetic nephropathy (H)       polyethylene glycol powder    MIRALAX/GLYCOLAX    510 g    Take 17 g by mouth daily DISSOLVE 17 GRAMS IN LIQUID AND DRINK DAILY AS DIRECTED    Chronic pain syndrome       solifenacin 10 MG tablet    VESICARE    90 tablet    Take 1 tablet (10 mg) by mouth daily    Mixed incontinence       traMADol 50 MG tablet    ULTRAM    30 tablet    Take 1 tablet (50 mg) by mouth every 8 hours as needed for moderate pain    Chronic pain syndrome       * Notice:  This list has 2 medication(s) that are the same as other medications prescribed for you. Read the directions carefully, and ask your doctor or other care provider to review them with you.

## 2018-02-27 NOTE — PROGRESS NOTES
Follow up right knee primary osteoarthritis.  Last injection 1/16/18  Range of motion 5-115.    She  desires injection today of right knee(s).  Risks, benefits, potential complications and alternatives were discussed.   With the patient's consent, sterile prep was performed of right knee(s).  Right knee was injected with Depo Medrol 80 mg and lidocaine at anterolateral site.  Return to clinic as needed.    Left total knee arthroplasty from 4/26/17 is doing well. She does have some superior lateral knee tenderness above patella.  Good stability.  Range of motion 0-120 degrees.    Continue activities as tolerated.  Return to clinic April with x-ray left knee, [place].

## 2018-02-27 NOTE — LETTER
2/27/2018         RE: Melody Ferguson  [address]  [facility] [address]        Dear Colleague,    Thank you for referring your patient, Melody Ferguson, to the [facility]. Please see a copy of my visit note below.    The patient's right knee was prepped with betadine solution after verification of allergies. Area approximately 10 cm x 10 cm prepped in a sterile fashion. After injection, betadine removed with soap and water and band-aids applied.    1ml depo-medrol with 1% lidocaine plain injected into patient's right knee by Dr. Armond George  LOT# [id number]  Exp. 03/2020    Talon Fenton PA-C  Supervising physician: Armond George MD  Dept. of Orthopedics  [facility]          Follow up right knee primary osteoarthritis.  Last injection 1/16/18  Range of motion 5-115.    She  desires injection today of right knee(s).  Risks, benefits, potential complications and alternatives were discussed.   With the patient's consent, sterile prep was performed of right knee(s).  Right knee was injected with Depo Medrol 80 mg and lidocaine at anterolateral site.  Return to clinic as needed.    Left total knee arthroplasty from 4/26/17 is doing well. She does have some superior lateral knee tenderness above patella.  Good stability.  Range of motion 0-120 degrees.    Continue activities as tolerated.  Return to clinic April with x-ray left knee, [place].         Again, thank you for allowing me to participate in the care of your patient.        Sincerely,        Armond Geroge MD

## 2018-02-28 ENCOUNTER — OFFICE VISIT (OUTPATIENT)
Dept: PALLIATIVE MEDICINE | Facility: CLINIC | Age: 58
End: 2018-02-28
Payer: MEDICARE

## 2018-02-28 VITALS
HEART RATE: 72 BPM | WEIGHT: 293 LBS | BODY MASS INDEX: 45.99 KG/M2 | DIASTOLIC BLOOD PRESSURE: 79 MMHG | HEIGHT: 67 IN | SYSTOLIC BLOOD PRESSURE: 124 MMHG

## 2018-02-28 DIAGNOSIS — Z79.891 ENCOUNTER FOR LONG-TERM OPIATE ANALGESIC USE: ICD-10-CM

## 2018-02-28 DIAGNOSIS — M79.18 MYOFASCIAL MUSCLE PAIN: ICD-10-CM

## 2018-02-28 DIAGNOSIS — M54.16 LUMBAR RADICULOPATHY: Primary | ICD-10-CM

## 2018-02-28 PROCEDURE — 99000 SPECIMEN HANDLING OFFICE-LAB: CPT | Performed by: PAIN MEDICINE

## 2018-02-28 PROCEDURE — 99215 OFFICE O/P EST HI 40 MIN: CPT | Performed by: PAIN MEDICINE

## 2018-02-28 PROCEDURE — 80307 DRUG TEST PRSMV CHEM ANLYZR: CPT | Mod: 90 | Performed by: PAIN MEDICINE

## 2018-02-28 ASSESSMENT — PAIN SCALES - GENERAL: PAINLEVEL: SEVERE PAIN (7)

## 2018-02-28 ASSESSMENT — ASTHMA QUESTIONNAIRES: ACT_TOTALSCORE: 22

## 2018-02-28 NOTE — TELEPHONE ENCOUNTER
I had filled out the form Tuesday evening 2/27/18, and placed in TC basket. Please make sure it is faxed since patient has been waiting on supplies.  Thanks,  Elizabeth Lamas MD MPH

## 2018-02-28 NOTE — Clinical Note
- Consider taking 25mg  Tramadol during the day and 75mg at night as needed. Will preform a utox today. In the short may take over prescribing until patient's regimen stable. Thanks for ref

## 2018-02-28 NOTE — TELEPHONE ENCOUNTER
This was faxed this am to [facility], [phone],  Right fax confirmed at 11:11 am.copy in TC basket.  /Yuli Giang MA/  For [employer] and [employer]ha

## 2018-02-28 NOTE — PROGRESS NOTES
[facility] Consultation    Date of visit: 2/28/2018    Reason for consultation:    Primary Care Provider is Elizabeth Lamas.  Pain medications are being prescribed by n/a.    Please see the [facility] health questionnaire which the patient completed and reviewed with me in detail.    Chief Complaint:    Chief Complaint   Patient presents with     Pain     Fibromyalgia         Pain history:  Melody Ferguson is a 57 year old female hx of fibro. The pt underwent a L5-SI interlaminar epidural steroid injection on 1/18 provided no relief. Of note pt had sig relief in the past with her epidural injections. The pt also has had a recent R knee injection with sig relief.   The pain is currently L LBP radiating to her L planter surface of her foot. The pt also also reports significant pain in her L buttocks. The pain is worse walking. The pain is worse when standing for extended perioids of time. The pain is worse when sitting for extended periods of times. The pain is slightly improved with ext worse with flexion. Additionally she notes some improvement on current medical regimen. Tramadol 100mg at night, Gabapentin 300mg at night. She takes the flexeril approx once a wk. She takes diclofenac rarely.  Of note pt is on celexa.   The reports chronic incontinence- denies any progression. She denies any progressive weakness.           Pain rating: intensity  Averages 6/10 on a 0-10 scale.    Criteria A patient satisfies diagnostic criteria for fibromyalgia if the following three conditions are met: 1) Widespread pain index (WPI) ?7 and symptom severity (SS) scale score ?5 or WPI 3 to 6 and SS scale score ?9.   2) Symptoms have been present at a similar level for at least three months.  3) The patient does not have a disorder that would otherwise explain the pain.   Score will be between 0 and 19. Pt score 9. Neck Jaw, left Jaw, right Shoulder girdle, left Shoulder girdle, right Upper arm,  left Upper arm, right Lower arm, left Lower arm, right Chest Abdomen Upper back Lower back Hip (buttock, trochanter), left Hip (buttock, trochanter), right Upper leg, left Upper leg, right Lower leg, left Lower leg, right   2) SS scale score For the each of the three symptoms below, indicate the level of severity over the past week using the following scale: 0 = no problem 1 = slight or mild problems, generally mild or intermittent 2 = moderate, considerable problems, often present and/or at a moderate level 3 = severe, pervasive, continuous, life-disturbing problems -  Fatigue (0 to 3) - 3  Waking unrefreshed (0 to 3) -3  Cognitive symptoms (0 to 3) 3  Considering somatic symptoms in general,*  indicate whether the patient has: - 3No symptoms (0) - Few symptoms (1) - A moderate number of symptoms (2) - A great deal of symptoms (3)     The final score is between 0 and 12. Pt score 12  Current treatments include:  Gabapentin , celexa,   victoza-lost weight     Previous medication treatments included:  n/a    Other treatments have included:  Melody Ferguson has been seen at a pain clinic in the past.    PT: helped   Chiropractic: no  Acupuncture: no  TENs Unit: no help  Injections: L5-SI Interlaminar epidural steroid injection1/18   L5-SI interlaminar epidural steroid injection on 11/13/117   L5-S1 12/16 Intralaminar   Past Medical History:  Past Medical History:   Diagnosis Date     Arthritis 2000     Cancer (H) 1983     Cerebral infarction (H) 2005     COPD (chronic obstructive pulmonary disease) (H) 1990     Depressive disorder 1990     DM type 2 (diabetes mellitus, type 2) (H)      History of TIA (transient ischemic attack)      HTN (hypertension)      Hyperlipidemia      Uncomplicated asthma 1990     Past Surgical History:  Past Surgical History:   Procedure Laterality Date     ABDOMEN SURGERY  05/20/2005     BIOPSY  1984     BREAST SURGERY  1984     C TOTAL KNEE ARTHROPLASTY Left 04/26/2017    [place] [facility] [place]      CHOLECYSTECTOMY  2000     COLONOSCOPY       ENT SURGERY  2008    tonsils removed     GENITOURINARY SURGERY  2005    a & p repair     REPAIR PTOSIS       SLING TRANSVAGINAL N/A 7/14/2017    Procedure: SLING TRANSVAGINAL;  Sling ([place]);  Surgeon: Talon Katz MD;  Location: [facility] OR     Medications:  Current Outpatient Prescriptions   Medication Sig Dispense Refill     glipiZIDE-metFORMIN (METAGLIP) 5-500 MG per tablet TAKE 1 TABLETS BY MOUTH TWICE DAILY BEFORE MEALS 360 tablet 0     traMADol (ULTRAM) 50 MG tablet Take 1 tablet (50 mg) by mouth every 8 hours as needed for moderate pain 30 tablet 0     solifenacin (VESICARE) 10 MG tablet Take 1 tablet (10 mg) by mouth daily 90 tablet 0     montelukast (SINGULAIR) 10 MG tablet TAKE 1 TABLET(10 MG) BY MOUTH AT BEDTIME 90 tablet 0     polyethylene glycol (MIRALAX/GLYCOLAX) powder Take 17 g by mouth daily DISSOLVE 17 GRAMS IN LIQUID AND DRINK DAILY AS DIRECTED 510 g 3     losartan (COZAAR) 50 MG tablet Take 1 tablet (50 mg) by mouth daily 90 tablet 0     nystatin (MYCOSTATIN) 529453 UNIT/GM POWD Apply topically 2 times daily 60 g 1     metoprolol succinate (TOPROL XL) 25 MG 24 hr tablet Take 1 tablet (25 mg) by mouth daily 90 tablet 1     liraglutide (VICTOZA) 18 MG/3ML soln Inject 0.6 mg Subcutaneous daily Increase to 1.2 mg daily after 1 week 6 mL 1     blood glucose monitoring (NO BRAND SPECIFIED) test strip Use to test blood sugars 2 times daily or as directed 180 strip 3     citalopram (CELEXA) 40 MG tablet TAKE 1 TABLET(40 MG) BY MOUTH EVERY MORNING 90 tablet 0     cyclobenzaprine (FLEXERIL) 5 MG tablet TAKE 1 TABLET(5 MG) BY MOUTH TWICE DAILY AS NEEDED FOR MUSCLE SPASMS 60 tablet 0     esomeprazole (NEXIUM) 40 MG CR capsule Take 1 capsule (40 mg) by mouth every morning (before breakfast) Take 30-60 minutes before eating. 90 capsule 1     ACCU-CHEK DAMIEN PLUS test strip TEST BLOOD SUGARS TWICE DAILY 200 strip 0     gabapentin (NEURONTIN) 300 MG capsule Take 1  capsule (300 mg) by mouth At Bedtime 90 capsule 1     melatonin 3 MG tablet TAKE 1 TABLET BY MOUTH ONCE AT BEDTIME AS NEEDED FOR SLEEP 30 tablet 0     order for DME Diabetic test strips and lancets per insurance formulary Check blood sugar 2 times daily 3 Month 3     albuterol (PROAIR HFA, PROVENTIL HFA, VENTOLIN HFA) 108 (90 BASE) MCG/ACT inhaler Inhale 2 puffs into the lungs every 6 hours       ASPIRIN PO Take 81 mg by mouth daily       ATORVASTATIN CALCIUM PO Take 40 mg by mouth daily       BUPROPION HCL PO Take 50 mg by mouth 2 times daily        fluticasone (VERAMYST) 27.5 MCG/SPRAY nasal spray Spray 2 sprays into both nostrils daily       insulin pen needle (NOVOFINE) 32G X 6 MM Use once daily or as directed. (Patient not taking: Reported on 2/28/2018) 100 each prn     ACE NOT PRESCRIBED, INTENTIONAL, Reported on 5/10/2017 0 each 0     Allergies:     Allergies   Allergen Reactions     Milk Protein Extract GI Disturbance     lactose intolerance.  Can tolerate milk products if uses lactaid     Bee Venom Swelling     Latex      Lisinopril Cough     Onion GI Disturbance     Vomiting, feels like throat is clogged     Aloe Rash     pain     Chlorine Rash     Social History:  Home situation: roomates  Occupation/Schooling: disabled   Tobacco use: no  Alcohol use: occasional  Drug use: no  History of chemical dependency treatment: no    Family history:  Family History   Problem Relation Age of Onset     Thyroid Disease Sister      CANCER Brother      CANCER Sister      breast cancer     CEREBROVASCULAR DISEASE Sister 62     Other - See Comments Sister      Angioplasty 8/2016     Hypertension Father      Hyperlipidemia Father      Prostate Cancer Father      Glaucoma Maternal Grandmother      CANCER Maternal Grandfather      CANCER Paternal Grandmother      Breast Cancer Paternal Grandmother      CEREBROVASCULAR DISEASE Paternal Grandfather      CEREBROVASCULAR DISEASE Maternal Half-Sister      4/08/2016     Breast  "Cancer Maternal Half-Sister      Asthma Maternal Half-Sister      Other Cancer Brother      Passed from cancer unsure what type     Anesthesia Reaction Daughter      DIABETES No family hx of      Macular Degeneration No family hx of      Family history of headaches: no    Review of Systems:  Skin: negative  Eyes: negative  Ears/Nose/Throat: negative  Respiratory: No shortness of breath, dyspnea on exertion, cough, or hemoptysis  Cardiovascular: negative  Gastrointestinal: negative  Genitourinary: negative  Musculoskeletal: negative  Neurologic: negative  Psychiatric: negative  Hematologic/Lymphatic/Immunologic: negative  Endocrine: negative    Physical Exam:  Vitals:    02/28/18 0957   BP: 124/79   Pulse: 72   Weight: (!) 144.2 kg (318 lb)   Height: 1.702 m (5' 7\")     Exam:  Constitutional: healthy, alert and no distress, obese  Head: normocephalic. Atraumatic.   Eyes: no redness or jaundice noted   ENT: oropharnx normal.  MMM.  Neck supple.    Cardiovascular: neg jvd  Respiratory: speaking full sentences  Gastrointestinal: soft, non-tender, normoactive bowel sounds   Skin: no suspicious lesions or rashes  Psychiatric: mentation appears normal and affect normal/bright    Musculoskeletal exam:  Gait/Station/Posture: wnl  Cervical spine: ROM       Thoracic spine:  Normal     Lumbar spine:     ROM: decrease ext   Myofascial tenderness:  + L buttocks   Mccarthy's: + on the left               Straight leg exam: neg   MARVIN/FADIR: neg              SI: + on the L   Greater trochanteric bursa: neg  Neurologic exam:  CN:  Cranial nerves 2-12 are normal  Motor:  5/5 UE and LE strength  Reflexes:        Patella:  +2      Sensory:  (upper and lower extremities):   Light touch: normal    Allodynia: absent    Dysethesia: absent    Hyperalgesia: absent     Diagnostic tests:   08/08/16 MRI lumbar spine (CDI): Overview: There is 4 mm L4 and 2 mm L3 degenerative anterolisthesis (sagittal images 8). There is minimal broad left convex " curvature maximal at L4. Vertebral body heights are normal. There is spondylosis which is moderate at L2-L3 and L4-L5, mild at L3-L4 and above T12, with minor marrow degenerative changes along the spondylotic endplates. Developmentally, spinal canal AP diameter is normal although there is acquired canal and recess stenosis from spondylosis and facet arthrosis (see below). There is no spondylolysis. Cord: Lower thoracic spinal cord imaged has normal contour and signal. Conus medullaris terminates normally at the L1-L2 disc level. Discs: Disc dehydration (long TR) is marked between L2 and L5 and above T12, mild at remaining levels. Disc height loss is moderate/marked at L2-L3 and L4-L5, moderate above T12, mild at L3-L4. Posterior annular fissuring is present at L4-L5. Facet joints: Arthrosis is marked at L4-L5 (axial images 10), moderate/marked at L3-L4 and L2-L3, and moderate at L5-S1 and L1-L2 within the lumbar segment. There is also thoracic facet arthrosis, moderate/marked at T11-T12 and on the left at T12-L1. Foramina: Stenosis is moderate on the right at L4-L5 (sagittal images 4) and T11-T12, mild bilaterally at L3-L4 and L2-L3. Specific findings at each level are as follows- L5-S1: Minimal circumferential disc bulge. Moderate facet arthrosis and overgrowth. Normal foramina. L4-L5: Marked facet arthrosis and facet overgrowth resulting in grade 1 degenerative L4 anterolisthesis, with a shallow circumferential disc bulge resulting in moderate right and mild left foraminal and marked right and moderate left subarticular recess and mild canal stenosis, sac AP diameter 9 mm. L3-L4: Moderate/marked facet arthrosis resulting in 2 mm degenerative L3 anterolisthesis. Shallow chronic circumferential disc bulge together with facet and ligament overgrowth result in marked bilateral subarticular recess (axial images 16) and moderate/marked central canal stenosis, sac AP diameter 5 mm, and mild foraminal stenosis. L2-L3:  Moderate/marked facet arthrosis with disc bulging, shallow posteriorly, resulting in marked bilateral subarticular recess and moderate/marked central canal stenosis, sac AP diameter 6 mm. Mild foraminal stenosis. L1-L2: Normal posterior disc margin. T12-L1: Normal posterior disc margin. Moderate/marked left facet arthrosis. T11-T12: Shallow chronic circumferential disc bulge. Moderate right foraminal stenosis. Moderate/marked facet arthrosis. Ancillary: There is generalized paraspinous muscle mild atrophy. There is a 1 cm simple cyst in the left kidney. There are minor degenerative changes at the lower SI joints. Conclusion: 1. Facet arthrosis, marked at L4-L5 and moderate/marked at L3-L4, L2-L3, and T11-T12, and spondylosis resulting in, at L2-L3 and L3-L4, marked bilateral recess and moderate/marked central canal stenosis. Also marked right and moderate left L4-5 recess stenosis. 2. Grade 1 L4 and L3 degenerative anterolisthesis. 3. No fracture, neoplasm, or infection.      Assessment/Plan:  Melody Ferguson is a 57 year old female who presents with the complaints of Left lBP radiating to her LE.   Melody was seen today for pain.    Diagnoses and all orders for this visit:    Lumbar radiculopathy    Myofascial muscle pain     - Further procedures recommended:    - consider repeat epidural vs TRANSFORAMINAL EPIDURAL STEROID INJECTION  In the future   - consider trigger point in the future  - Medication Management:    - Would increase gabapentin to 300mg twice a day, would increase as tolerated   - Discussed taking diclofenac as needed, consider switching to nabumetone 500mg daily as needed.    - Consider taking 25mg  Tramadol during the day and 75mg at night as needed. Will preform a utox today. In the short may take over prescribing until patient's regimen stable   - continue as needed flexeril    - consider Cymbalta in the future  - Physical Therapy: Will order patient will schedule today.    - Diagnostic Studies:  none  - Urine toxicology screen today: today    - Follow up: 2 months        Total time spent was 60 minutes, and more than 50% of face to face time was spent counseling and/or coordination of care regarding principles of multidisciplinary care and medication management Left lBP radiating to her LE.     Carlos Roche MD  [facility]

## 2018-02-28 NOTE — PATIENT INSTRUCTIONS
- Further procedures recommended:    - consider repeat epidural vs TRANSFORAMINAL EPIDURAL STEROID INJECTION  In the future   - consider trigger point in the future  - Medication Management:    - Would increase gabapentin to 300mg twice a day, would increase as tolerated   - Discussed taking diclofenac as needed, consider switching to nabumetone 500mg daily as needed.    - Consider taking 25mg  Tramadol during the day and 75mg at night as needed. Will preform a utox today. In the short  Term may take over prescribing until patient regimen stable   - continue as needed flexeril   - Physical Therapy: Will order patient will schedule today.  - Diagnostic Studies: none  - Urine toxicology screen today: today    - Follow up: 2 months  - Please keep diet/exercise journal   ----------------------------------------------------------------  Nurse Triage line:  [phone]   Call this number with any questions or concerns. You may leave a detailed message anytime. Calls are typically returned Monday through Friday between 8 AM and 4:30 PM. We usually get back to you within 2 business days depending on the issue/request.       Medication refills:    For non-narcotic medications, call your pharmacy directly to request a refill. The pharmacy will contact the [facility] for authorization. Please allow 3-4 days for these refills to be processed.     For narcotic refills, call the nurse triage line or send a [employer] message. Please contact us 7-10 days before your refill is due. The message MUST include the name of the specific medication(s) requested and how you would like to receive the prescription(s). The options are as follows:    Pain Clinic staff can mail the prescription to your pharmacy. Please tell us the name of the pharmacy.    You may pick the prescription up at the Pain Clinic (tell us the location) or during a clinic visit with your pain provider    Pain Clinic staff can deliver the prescription to the  [facility] pharmacy in the clinic building. Please tell us the location.      Scheduling number: [phone].  Call this number to schedule or change appointments.    We believe regular attendance is key to your success in our program.    Any time you are unable to keep your appointment we ask that you call us at least 24 hours in advance to let us know. This will allow us to offer the appointment time to another patient.

## 2018-02-28 NOTE — MR AVS SNAPSHOT
After Visit Summary   2/28/2018    Melody Ferguson    MRN: [id number]           Patient Information     Date Of Birth          1960        Visit Information        Provider Department      2/28/2018 10:00 AM Calros Roche MD [facility]        Today's Diagnoses     Lumbar radiculopathy    -  1    Myofascial muscle pain        Encounter for long-term opiate analgesic use          Care Instructions    - Further procedures recommended:    - consider repeat epidural vs TRANSFORAMINAL EPIDURAL STEROID INJECTION  In the future   - consider trigger point in the future  - Medication Management:    - Would increase gabapentin to 300mg twice a day, would increase as tolerated   - Discussed taking diclofenac as needed, consider switching to nabumetone 500mg daily as needed.    - Consider taking 25mg  Tramadol during the day and 75mg at night as needed. Will preform a utox today. In the short  Term may take over prescribing until patient regimen stable   - continue as needed flexeril   - Physical Therapy: Will order patient will schedule today.  - Diagnostic Studies: none  - Urine toxicology screen today: today    - Follow up: 2 months  - Please keep diet/exercise journal   ----------------------------------------------------------------  Nurse Triage line:  [phone]   Call this number with any questions or concerns. You may leave a detailed message anytime. Calls are typically returned Monday through Friday between 8 AM and 4:30 PM. We usually get back to you within 2 business days depending on the issue/request.       Medication refills:    For non-narcotic medications, call your pharmacy directly to request a refill. The pharmacy will contact the [facility] for authorization. Please allow 3-4 days for these refills to be processed.     For narcotic refills, call the nurse triage line or send a Neuralieve message. Please contact us 7-10 days before your refill is due. The  message MUST include the name of the specific medication(s) requested and how you would like to receive the prescription(s). The options are as follows:    Pain Clinic staff can mail the prescription to your pharmacy. Please tell us the name of the pharmacy.    You may pick the prescription up at the Pain Clinic (tell us the location) or during a clinic visit with your pain provider    Pain Clinic staff can deliver the prescription to the [facility] pharmacy in the clinic building. Please tell us the location.      Scheduling number: [phone].  Call this number to schedule or change appointments.    We believe regular attendance is key to your success in our program.    Any time you are unable to keep your appointment we ask that you call us at least 24 hours in advance to let us know. This will allow us to offer the appointment time to another patient.               Follow-ups after your visit        Additional Services     PAIN PT EVAL AND TREAT                 Your next 10 appointments already scheduled     Mar 06, 2018  2:20 PM CST   New Visit with Ismael Sanches MD   [facility] ([address] [address]   [phone]              Future tests that were ordered for you today     Open Future Orders        Priority Expected Expires Ordered    Drug Screen Comprehensive , Urine with Reported Meds (Pain Care Package) Routine  2/28/2019 2/28/2018            Who to contact     If you have questions or need follow up information about today's clinic visit or your schedule please contact [facility] JENN[facility] directly at [phone].  Normal or non-critical lab and imaging results will be communicated to you by MyChart, letter or phone within 4 business days after the clinic has received the results. If you do not hear from us within 7 days, please contact the clinic through MyChart or phone. If you have a critical or abnormal lab result, we  "will notify you by phone as soon as possible.  Submit refill requests through [employer] or call your pharmacy and they will forward the refill request to us. Please allow 3 business days for your refill to be completed.          Additional Information About Your Visit        Ahandyhandhart Information     [employer]ushFiles gives you secure access to your electronic health record. If you see a primary care provider, you can also send messages to your care team and make appointments. If you have questions, please call your primary care clinic.  If you do not have a primary care provider, please call [phone] and they will assist you.        Care EveryWhere ID     This is your Care EveryWhere ID. This could be used by other organizations to access your [facility] medical records  [id number]        Your Vitals Were     Pulse Height BMI (Body Mass Index)             72 1.702 m (5' 7\") 49.81 kg/m2          Blood Pressure from Last 3 Encounters:   02/28/18 124/79   02/27/18 128/70   01/15/18 121/67    Weight from Last 3 Encounters:   02/28/18 (!) 144.2 kg (318 lb)   02/27/18 (!) 142.9 kg (315 lb)   02/27/18 (!) 144.2 kg (318 lb)              We Performed the Following     PAIN PT EVAL AND TREAT        Primary Care Provider Office Phone # Fax #    Elizabeth Lamas [phone]       [address] [place]  [facility] [address]        Equal Access to Services     [facility]: Hadii aad ku hadasho Soomaali, waaxda luqadaha, qaybta kaalmada adeegyada, issac claire . So [facility] [phone].    ATENCIÓN: Si habla español, tiene a turner disposición servicios gratuitos de asistencia lingüística. Emilie al [phone].    We comply with applicable federal civil rights laws and [place] laws. We do not discriminate on the basis of race, color, national origin, age, disability, sex, sexual orientation, or gender identity.            Thank you!     Thank you for choosing [facility]  for your care. Our " goal is always to provide you with excellent care. Hearing back from our patients is one way we can continue to improve our services. Please take a few minutes to complete the written survey that you may receive in the mail after your visit with us. Thank you!             Your Updated Medication List - Protect others around you: Learn how to safely use, store and throw away your medicines at www.disposemymeds.org.          This list is accurate as of 2/28/18 10:54 AM.  Always use your most recent med list.                   Brand Name Dispense Instructions for use Diagnosis    * ACCU-CHEK DAMIEN PLUS test strip   Generic drug:  blood glucose monitoring     200 strip    TEST BLOOD SUGARS TWICE DAILY    Type 2 diabetes mellitus with diabetic nephropathy (H)       * blood glucose monitoring test strip    no brand specified    180 strip    Use to test blood sugars 2 times daily or as directed    Type 2 diabetes mellitus with diabetic nephropathy, without long-term current use of insulin (H)       ACE NOT PRESCRIBED (INTENTIONAL)     0 each    Reported on 5/10/2017    Type 2 diabetes mellitus without complication (H)       albuterol 108 (90 BASE) MCG/ACT Inhaler    PROAIR HFA/PROVENTIL HFA/VENTOLIN HFA     Inhale 2 puffs into the lungs every 6 hours        ASPIRIN PO      Take 81 mg by mouth daily        ATORVASTATIN CALCIUM PO      Take 40 mg by mouth daily        BUPROPION HCL PO      Take 50 mg by mouth 2 times daily        citalopram 40 MG tablet    celeXA    90 tablet    TAKE 1 TABLET(40 MG) BY MOUTH EVERY MORNING    Major depressive disorder, recurrent episode, moderate (H)       cyclobenzaprine 5 MG tablet    FLEXERIL    60 tablet    TAKE 1 TABLET(5 MG) BY MOUTH TWICE DAILY AS NEEDED FOR MUSCLE SPASMS    Left sided sciatica, Back muscle spasm       esomeprazole 40 MG CR capsule    nexIUM    90 capsule    Take 1 capsule (40 mg) by mouth every morning (before breakfast) Take 30-60 minutes before eating.    Dyspepsia        fluticasone 27.5 MCG/SPRAY spray    VERAMYST     Spray 2 sprays into both nostrils daily        gabapentin 300 MG capsule    NEURONTIN    90 capsule    Take 1 capsule (300 mg) by mouth At Bedtime    Acute left-sided low back pain with left-sided sciatica       glipiZIDE-metFORMIN 5-500 MG per tablet    METAGLIP    360 tablet    TAKE 1 TABLETS BY MOUTH TWICE DAILY BEFORE MEALS    Type 2 diabetes mellitus with diabetic nephropathy, without long-term current use of insulin (H)       insulin pen needle 32G X 6 MM    NOVOFINE    100 each    Use once daily or as directed.    Type 2 diabetes mellitus with diabetic nephropathy, without long-term current use of insulin (H)       liraglutide 18 MG/3ML soln    VICTOZA    6 mL    Inject 0.6 mg Subcutaneous daily Increase to 1.2 mg daily after 1 week    Type 2 diabetes mellitus with diabetic nephropathy, without long-term current use of insulin (H)       losartan 50 MG tablet    COZAAR    90 tablet    Take 1 tablet (50 mg) by mouth daily    HTN, goal below 140/90, Type 2 diabetes mellitus with diabetic nephropathy, without long-term current use of insulin (H)       melatonin 3 MG tablet     30 tablet    TAKE 1 TABLET BY MOUTH ONCE AT BEDTIME AS NEEDED FOR SLEEP    Major depressive disorder, recurrent episode, moderate (H)       metoprolol succinate 25 MG 24 hr tablet    TOPROL XL    90 tablet    Take 1 tablet (25 mg) by mouth daily    Old myocardial infarction       montelukast 10 MG tablet    SINGULAIR    90 tablet    TAKE 1 TABLET(10 MG) BY MOUTH AT BEDTIME    Mild intermittent asthma without complication       nystatin 486147 UNIT/GM Powd    MYCOSTATIN    60 g    Apply topically 2 times daily    Morbid obesity with body mass index of 45.0-49.9 in adult (H)       order for DME     3 Month    Diabetic test strips and lancets per insurance formulary Check blood sugar 2 times daily    Type 2 diabetes mellitus with diabetic nephropathy (H)       polyethylene glycol powder     MIRALAX/GLYCOLAX    510 g    Take 17 g by mouth daily DISSOLVE 17 GRAMS IN LIQUID AND DRINK DAILY AS DIRECTED    Chronic pain syndrome       solifenacin 10 MG tablet    VESICARE    90 tablet    Take 1 tablet (10 mg) by mouth daily    Mixed incontinence       traMADol 50 MG tablet    ULTRAM    30 tablet    Take 1 tablet (50 mg) by mouth every 8 hours as needed for moderate pain    Chronic pain syndrome       * Notice:  This list has 2 medication(s) that are the same as other medications prescribed for you. Read the directions carefully, and ask your doctor or other care provider to review them with you.

## 2018-03-01 ENCOUNTER — OFFICE VISIT (OUTPATIENT)
Dept: PALLIATIVE MEDICINE | Facility: CLINIC | Age: 58
End: 2018-03-01
Payer: MEDICARE

## 2018-03-01 DIAGNOSIS — M79.18 MYOFASCIAL MUSCLE PAIN: ICD-10-CM

## 2018-03-01 DIAGNOSIS — G89.29 CHRONIC PAIN: Primary | ICD-10-CM

## 2018-03-01 DIAGNOSIS — M54.16 LUMBAR RADICULOPATHY: ICD-10-CM

## 2018-03-01 PROCEDURE — G8978 MOBILITY CURRENT STATUS: HCPCS | Mod: CK | Performed by: PHYSICAL THERAPIST

## 2018-03-01 PROCEDURE — 97162 PT EVAL MOD COMPLEX 30 MIN: CPT | Mod: GP | Performed by: PHYSICAL THERAPIST

## 2018-03-01 PROCEDURE — G8979 MOBILITY GOAL STATUS: HCPCS | Mod: CJ | Performed by: PHYSICAL THERAPIST

## 2018-03-01 PROCEDURE — 97530 THERAPEUTIC ACTIVITIES: CPT | Mod: GP | Performed by: PHYSICAL THERAPIST

## 2018-03-01 ASSESSMENT — PATIENT HEALTH QUESTIONNAIRE - PHQ9: SUM OF ALL RESPONSES TO PHQ QUESTIONS 1-9: 13

## 2018-03-01 NOTE — MR AVS SNAPSHOT
After Visit Summary   3/1/2018    Melody Ferguson    MRN: [id number]           Patient Information     Date Of Birth          1960        Visit Information        Provider Department      3/1/2018 10:00 AM Bhakti Stinson, AJ [facility] [place]        Today's Diagnoses     Chronic pain    -  1    Lumbar radiculopathy        Myofascial muscle pain           Follow-ups after your visit        Your next 10 appointments already scheduled     Mar 06, 2018  2:20 PM CST   [facility] Visit with Ismael Sanches MD   [facility]    [address] [place] [address]  [place] [address]   [phone]            Mar 07, 2018  9:45 AM CST   Return Visit with Bhakti Stinson PT   [facility] [place] ([facility] [place])    [address]  [place] [address]   [phone]            Mar 14, 2018  9:45 AM CDT   Return Visit with Bhakti Stinson PT   [facility] [place] ([facility] [place])    [address]  [place] [address]   [phone]            Mar 21, 2018  9:45 AM CDT   Return Visit with Bhakti Stinson PT   [facility] [place] ([facility] [place])    [address]  [place] [address]   [phone]            May 02, 2018  8:00 AM CDT   Return Visit with Carlos Roche MD   [facility] [place] ([facility] [place])    [address]  [place] [address]   [phone]              Who to contact     If you have questions or need follow up information about today's clinic visit or your schedule please contact [facility] [place] directly at [phone].  Normal or non-critical lab and imaging results will be communicated to you by MyChart, letter or phone within 4 business days after the clinic has received the results. If you do not hear from us within 7 days, please contact the clinic through MyChart or phone. If you  have a critical or abnormal lab result, we will notify you by phone as soon as possible.  Submit refill requests through [employer] or call your pharmacy and they will forward the refill request to us. Please allow 3 business days for your refill to be completed.          Additional Information About Your Visit        Simply Wall Sthart Information     [employer] gives you secure access to your electronic health record. If you see a primary care provider, you can also send messages to your care team and make appointments. If you have questions, please call your primary care clinic.  If you do not have a primary care provider, please call [phone] and they will assist you.        Care EveryWhere ID     This is your Care EveryWhere ID. This could be used by other organizations to access your [facility] medical records  [id number]         Blood Pressure from Last 3 Encounters:   02/28/18 124/79   02/27/18 128/70   01/15/18 121/67    Weight from Last 3 Encounters:   02/28/18 (!) 144.2 kg (318 lb)   02/27/18 (!) 142.9 kg (315 lb)   02/27/18 (!) 144.2 kg (318 lb)              We Performed the Following     C MOBILITY CURRENT STATUS     C MOBILITY GOAL STATUS     HC PT EVAL, MODERATE COMPLEXITY     THERAPEUTIC ACTIVITIES        Primary Care Provider Office Phone # Fax #    Elizabeth Lamas [phone]       [address]  [facility] [address]        Equal Access to Services     [facility] : Hadii norberto ku hadasho Socorey, waaxda luqadaha, qaybta kaalmada kassidy, issac claire . So [facility] [phone].    ATENCIÓN: Si habla español, tiene a turner disposición servicios gratuitos de asistencia lingüística. Emilie al [phone].    We comply with applicable federal civil rights laws and [place] laws. We do not discriminate on the basis of race, color, national origin, age, disability, sex, sexual orientation, or gender identity.            Thank you!     Thank you for choosing [facility] [employer]  [facility]  for your care. Our goal is always to provide you with excellent care. Hearing back from our patients is one way we can continue to improve our services. Please take a few minutes to complete the written survey that you may receive in the mail after your visit with us. Thank you!             Your Updated Medication List - Protect others around you: Learn how to safely use, store and throw away your medicines at www.disposemymeds.org.          This list is accurate as of 3/1/18 11:25 AM.  Always use your most recent med list.                   Brand Name Dispense Instructions for use Diagnosis    * ACCU-CHEK DAMIEN PLUS test strip   Generic drug:  blood glucose monitoring     200 strip    TEST BLOOD SUGARS TWICE DAILY    Type 2 diabetes mellitus with diabetic nephropathy (H)       * blood glucose monitoring test strip    no brand specified    180 strip    Use to test blood sugars 2 times daily or as directed    Type 2 diabetes mellitus with diabetic nephropathy, without long-term current use of insulin (H)       ACE NOT PRESCRIBED (INTENTIONAL)     0 each    Reported on 5/10/2017    Type 2 diabetes mellitus without complication (H)       albuterol 108 (90 BASE) MCG/ACT Inhaler    PROAIR HFA/PROVENTIL HFA/VENTOLIN HFA     Inhale 2 puffs into the lungs every 6 hours        ASPIRIN PO      Take 81 mg by mouth daily        ATORVASTATIN CALCIUM PO      Take 40 mg by mouth daily        BUPROPION HCL PO      Take 50 mg by mouth 2 times daily        citalopram 40 MG tablet    celeXA    90 tablet    TAKE 1 TABLET(40 MG) BY MOUTH EVERY MORNING    Major depressive disorder, recurrent episode, moderate (H)       cyclobenzaprine 5 MG tablet    FLEXERIL    60 tablet    TAKE 1 TABLET(5 MG) BY MOUTH TWICE DAILY AS NEEDED FOR MUSCLE SPASMS    Left sided sciatica, Back muscle spasm       esomeprazole 40 MG CR capsule    nexIUM    90 capsule    Take 1 capsule (40 mg) by mouth every morning (before breakfast) Take 30-60  minutes before eating.    Dyspepsia       fluticasone 27.5 MCG/SPRAY spray    VERAMYST     Spray 2 sprays into both nostrils daily        gabapentin 300 MG capsule    NEURONTIN    90 capsule    Take 1 capsule (300 mg) by mouth At Bedtime    Acute left-sided low back pain with left-sided sciatica       glipiZIDE-metFORMIN 5-500 MG per tablet    METAGLIP    360 tablet    TAKE 1 TABLETS BY MOUTH TWICE DAILY BEFORE MEALS    Type 2 diabetes mellitus with diabetic nephropathy, without long-term current use of insulin (H)       insulin pen needle 32G X 6 MM    NOVOFINE    100 each    Use once daily or as directed.    Type 2 diabetes mellitus with diabetic nephropathy, without long-term current use of insulin (H)       liraglutide 18 MG/3ML soln    VICTOZA    6 mL    Inject 0.6 mg Subcutaneous daily Increase to 1.2 mg daily after 1 week    Type 2 diabetes mellitus with diabetic nephropathy, without long-term current use of insulin (H)       losartan 50 MG tablet    COZAAR    90 tablet    Take 1 tablet (50 mg) by mouth daily    HTN, goal below 140/90, Type 2 diabetes mellitus with diabetic nephropathy, without long-term current use of insulin (H)       melatonin 3 MG tablet     30 tablet    TAKE 1 TABLET BY MOUTH ONCE AT BEDTIME AS NEEDED FOR SLEEP    Major depressive disorder, recurrent episode, moderate (H)       metoprolol succinate 25 MG 24 hr tablet    TOPROL XL    90 tablet    Take 1 tablet (25 mg) by mouth daily    Old myocardial infarction       montelukast 10 MG tablet    SINGULAIR    90 tablet    TAKE 1 TABLET(10 MG) BY MOUTH AT BEDTIME    Mild intermittent asthma without complication       nystatin [id number] UNIT/GM Powd    MYCOSTATIN    60 g    Apply topically 2 times daily    Morbid obesity with body mass index of 45.0-49.9 in adult (H)       order for DME     3 Month    Diabetic test strips and lancets per insurance formulary Check blood sugar 2 times daily    Type 2 diabetes mellitus with diabetic nephropathy (H)        polyethylene glycol powder    MIRALAX/GLYCOLAX    510 g    Take 17 g by mouth daily DISSOLVE 17 GRAMS IN LIQUID AND DRINK DAILY AS DIRECTED    Chronic pain syndrome       solifenacin 10 MG tablet    VESICARE    90 tablet    Take 1 tablet (10 mg) by mouth daily    Mixed incontinence       traMADol 50 MG tablet    ULTRAM    30 tablet    Take 1 tablet (50 mg) by mouth every 8 hours as needed for moderate pain    Chronic pain syndrome       * Notice:  This list has 2 medication(s) that are the same as other medications prescribed for you. Read the directions carefully, and ask your doctor or other care provider to review them with you.

## 2018-03-01 NOTE — PROGRESS NOTES
PHYSICAL THERAPY INITIAL EVALUATION and PLAN OF CARE    Patient Name: Melody Ferguson     : 1960    MRN: [id number]   Pain Management Provider:  Dr. Carlos Roche    Diagnosis:    Chronic pain  Lumbar radiculopathy  Myofascial muscle pain    SUBJECTIVE:    PRESENTATION AND ETIOLOGY    Chief Complaint: Has been having issues with low back pain, mostly on the L for a long time, does get pain down the L LE     Having issues with pain overall; has hx fibromyalgia; needs to have R knee replaced, hopefully next year    Onset / Etiology: has been having for a long time, per chart 10+ years;     Has had a number of injection that have helped, except the last one didn't; does have a neurosurgery consult set up for later this month    Pattern Since Onset: Worsened    Pain worse at night when she has been busy during the day; doing a lot of stairs    Pain is described as back: dull most of the time, does get that sharp pain; L LE: sharp pain, shooting, tingling     Frequency: Constant,  intensity changes; depends on activity or even when just sitting; can get muscle spasms at various times     Intensity: Best 2/10, Worst 11/10;  Current 2-3/10 ; Average 4/10; gets to 11/10 about 1x/wk; lot more lately, the first part of this week was awful    Sleep: not really good, about 4-6 hours and need to get up, can't lay around any more; can't get comfortable; is most comfortable in my recliner    Fatigue Level: (scale 0 (good energy) 10 (exhausted))  6/10 average    SENSATION: tingling in the L LE at times to the knee; comes and goes    R handed    LEVEL OF FUNCTION AT START OF CARE    Walking tolerance: likes to walk around [employer] with the cart--leaning on it, about 30 mins, just walking about 10 mins  Sitting tolerance: depends on the chair; my recliner--about 2 hours, regular chair about 20 mins  Standing tolerance: maybe 10 mins    Current Aggravating Activities / Functional Limitations: uses chair to sit and do dishes;  stairs; showers--standing and reaching up with the arms--feel dizzy; making the bed; tying shoes--using velcro now; crossing legs; getting in and out bed, chairs--if too low; doesn't get down on the floor    CURRENT / PREVIOUS INTERVENTION(S):     Relieving Activities / Self Care: Heat--sometimes; Meds; my chair    Previous / Current therapies for current chief complaint: PT: has been helpful; Chiropractic - none, Acupuncture: none, TENS had one--latex allergy--worse at the time     Functional Level: No functional limitations prior to onset of chief complaint.     DEMOGRAPHICS  Employment Status: Not working at this time; on disability    Social Support: Lives with friends/housemates--renting room    Home:house, renting a room; Stairs: yes, not so good, go slow; laundry in the basement    Fall History: denies recent falls; has had some falls--clumsy    Assistive Devices: walker; shower bench--not using at this time, from L TKA; 4/2017    Patient's perceived quality of life:  good    Pertinent Medical  / Surgical History: Epic Snapshot Reviewed:  Contributing factors to the severity / complexity of the patient's chief complaint include: See provider's note    Patient's goals for physical therapy: none stated at this time    ===============================================================  OBJECTIVE:  POSTURE:  Observation: Slightly Guarded posture;  rounded shoulders,  forward head;  clenching jaw and fists at times, poor core engagement with seated posture; able to speak in full sentences without SOB or cough noted;  upper chest breathing      GAIT, LOCOMOTION, and BALANCE:  Gait and Locomotion: Antalgic; leaning from side to side    Balance: Demo poor SLS with UE support; demo poor core stability with SLS; difficulty getting into tandem stance with UE support, moderate sway in Romberg stance    RANGE OF MOTION:   Cervical: WFL  Shoulders: WFL, complaints of L shoulder pain--not sure why  Lumbar: WFL, complaints with  flexion/extension      Demo substitution patterns with hiking of shoulders with movements    MUSCLE PERFORMANCE:   Strength: demo core instability; poor scapular humeral rhythm; able to do heel raises/heel walk with UE support;  able to perform minimal squat with UE support    Flexibility: tightness noted in upper trap, levator scap, GS, HS hip flexor, pects    FUNCTIONAL TESTING/OBSERVATION: moves slowly with sit to/from stand, using handrails, grimacing    Pain behaviors: None    Information gathered through testing and observation    ===============================================================  Today's Treatment:  Initial evaluation  Therapeutic Activity:   Discussed basics of pacing; energy conservation--may benefit from use of shower stool; and body mechanics, instructed in proper sit to/from stand  Discussed Pain Center PT and POC  ===============================================================  ASSESSMENT:  Physical Therapy Diagnosis:Impaired Posture and Impaired Muscle Performance    Patient requires PT intervention for the following impairments: Limited knowledge of condition and / or self care - inability to control symptoms, Impaired insight, Impaired functional mobility, Impaired gait / weight bearing tolerance, Impaired posture / muscle imbalance, Impaired sensation, Impaired static and / or dynamic balance, Muscle flexibility limitations, Muscle strength and endurance limitations, Pain and Deconditioning    Anticipated Goals and Expected Outcomes:    Goals   8 weeks  Patient will report the use of 2 self care practices during their day.  Patient will report the participation in 15 minutes of aerobic activity daily and practicing stretching daily.  Patient will demonstrate the ability to find core strength in neutral posture.  Patient will demonstrate the ability to relax muscle group before stretching.    16 weeks  Patient will be independent with a home exercise program.  Patient will be  independent with posture correction.  Patient will report independence with a self care/flare management program.  Patient will demonstrate improved functional strength and endurance as reports by increased tolerance for IADLs and more consistent participation in daily activity.     Rehab potential for achieving goals: fair.  Patient has a number of psychosocial contributing factors. Prognosis will depend on concurrent addressing of psychosocial contributing factors.    ===============================================================  PLAN:   Patient will benefit from skilled physical therapy consisting of:  neuromuscular reeducation of: kinesthetic sense and posture for sitting and/or standing activities, education in self care / home management training to include instruction in: symptom control techniques, therapeutic activities to achieve improved functional performance in: daily actvities and therapeutic exercise to develop: strength and endurance, flexibility and core stability.        Assessment will be ongoing with changes in treatment as indicated.  Benefits/risks/alternatives to treatment have been reviewed and the patient has been instructed to contact this office if they have any questions or concerns.  This plan of care has been discussed with the patient and the patient is in agreement.     Frequency / Duration:  Patient will be seen for a total of 8-12 visits;  at a frequency of once every 1-2 weeks for 3-4 visits, once every 2-4 weeks for 3-4 visits, then once every 4-6 weeks.    Next Visit: Focus on HEP, posture    Total Visit Time:  45 minutes  Eval: 30 mins  TA: 15 mins             Bhakti Stinson, PT                                      Date:  3/1/2018    G Code 1/10  Mobility Status  G 8978 Current Status  CK 40-59% impaired  G 8979 Goal Status CJ 20-39% impaired    G Codes established based on subjective and objective measures.    Re-certification Date 5/26/18    Therapy Evaluation Codes:    1) History comprised of:                  Personal factors that impact the plan of care:                                    Anxiety, Past/current experiences and Time since onset of symptoms.                   Comorbidity factors that impact the plan of care are:                                    Depression, Fibromyalgia and Mental illness; hx of PTSD; Diabetes; hx of TIAs.                    Medications impacting care: None.  2) Examination of Body Systems comprised of:                  Body structures and functions that impact the plan of care:                                    Knee, Lumbar spine and Shoulder.                  Activity limitations that impact the plan of care are:                                    Sitting, Squatting/kneeling, Stairs, Standing, Walking, Sleeping and Reaching overhead.  3) Clinical presentation characteristics are:                  Evolving/Changing.  4) Decision-Making                                  Moderate complexity using standardized patient assessment instrument and/or measureable assessment of functional outcome.  Cumulative Therapy Evaluation is: Moderate complexity.    =====================================================  **  Referring Provider Certification: Referring Provider reviewing certifies that the above treatment / plan of care is required and authorized, and that the patient's plan will be reviewed every thirty (30) days **.   ======================================================     PRESENT:  NA    MULTIDISCIPLINARY PATIENT / FAMILY EDUCATION RECORD  Department:  Physical Therapy    Readiness to Learn: Ability to understand verbal instructions, Ability to understand written instructions, Knowledge of educational needs / treatment plan  Specific Barriers to Learning: None  Referrals: None  Learning Needs: Rehabilitation techniques to improve functional independence Pain management education to improve daily activity tolerance.  Who:  Patient  How: Demonstration, Verbal instructions, Written instructions  Response: Appropriate verbal response, Asked questions, Demonstrated ability, Verbalized recall / understanding

## 2018-03-01 NOTE — LETTER
DEPARTMENT OF HEALTH AND HUMAN SERVICES  [facility]ICA[facility]    PLAN/UPDATED PLAN OF PROGRESS FOR OUTPATIENT REHABILITATION    PATIENTS NAME:  Melody Ferguson     : 1960    PROVIDER NUMBER:    [id number]    HICN:  [id number]     PROVIDER NAME: SARAH SCHNEIDER    MEDICAL RECORD NUMBER: [id number]     START OF CARE DATE:  SOC Date: 18   TYPE:  PT    PRIMARY/TREATMENT DIAGNOSIS: (Pertinent Medical Diagnosis)     Chronic pain  Lumbar radiculopathy  Myofascial muscle pain    VISITS FROM START OF CARE:  Rxs Used: 1     PHYSICAL THERAPY INITIAL EVALUATION and PLAN OF CARE    Patient Name: Melody Ferguson     : 1960    MRN: [id number]   Pain Management Provider:  Dr. Carlos Roche    Diagnosis:    Chronic pain  Lumbar radiculopathy  Myofascial muscle pain    SUBJECTIVE:    PRESENTATION AND ETIOLOGY    Chief Complaint: Has been having issues with low back pain, mostly on the L for a long time, does get pain down the L LE     Having issues with pain overall; has hx fibromyalgia; needs to have R knee replaced, hopefully next year    Onset / Etiology: has been having for a long time, per chart 10+ years;     Has had a number of injection that have helped, except the last one didn't; does have a neurosurgery consult set up for later this month    Pattern Since Onset: Worsened    Pain worse at night when she has been busy during the day; doing a lot of stairs    Pain is described as back: dull most of the time, does get that sharp pain; L LE: sharp pain, shooting, tingling     Frequency: Constant,  intensity changes; depends on activity or even when just sitting; can get muscle spasms at various times     Intensity: Best 2/10, Worst 11/10;  Current 2-3/10 ; Average 4/10; gets to 11/10 about 1x/wk; lot more lately, the first part of this week was awful    Sleep: not really good, about 4-6 hours and need to get up, can't lay around any more; can't get comfortable; is most  comfortable in my recliner    Fatigue Level: (scale 0 (good energy) 10 (exhausted))  6/10 average    SENSATION: tingling in the L LE at times to the knee; comes and goes    R handed    LEVEL OF FUNCTION AT START OF CARE    Walking tolerance: likes to walk around [employer] with the cart--leaning on it, about 30 mins, just walking about 10 mins  Sitting tolerance: depends on the chair; my recliner--about 2 hours, regular chair about 20 mins  Standing tolerance: maybe 10 mins    Current Aggravating Activities / Functional Limitations: uses chair to sit and do dishes; stairs; showers--standing and reaching up with the arms--feel dizzy; making the bed; tying shoes--using velcro now; crossing legs; getting in and out bed, chairs--if too low; doesn't get down on the floor    CURRENT / PREVIOUS INTERVENTION(S):     Relieving Activities / Self Care: Heat--sometimes; Meds; my chair    Previous / Current therapies for current chief complaint: PT: has been helpful; Chiropractic - none, Acupuncture: none, TENS had one--latex allergy--worse at the time     Functional Level: No functional limitations prior to onset of chief complaint.     DEMOGRAPHICS  Employment Status: Not working at this time; on disability    Social Support: Lives with friends/housemates--renting room    Home:house, renting a room; Stairs: yes, not so good, go slow; laundry in the basement    Fall History: denies recent falls; has had some falls--clumsy    Assistive Devices: walker; shower bench--not using at this time, from L TKA; 4/2017    Patient's perceived quality of life:  good    Pertinent Medical  / Surgical History: Epic Snapshot Reviewed:  Contributing factors to the severity / complexity of the patient's chief complaint include: See provider's note    Patient's goals for physical therapy: none stated at this time    ===============================================================  OBJECTIVE:  POSTURE:  Observation: Slightly Guarded posture;  rounded  shoulders,  forward head;  clenching jaw and fists at times, poor core engagement with seated posture; able to speak in full sentences without SOB or cough noted;  upper chest breathing      GAIT, LOCOMOTION, and BALANCE:  Gait and Locomotion: Antalgic; leaning from side to side    Balance: Demo poor SLS with UE support; demo poor core stability with SLS; difficulty getting into tandem stance with UE support, moderate sway in Romberg stance    RANGE OF MOTION:   Cervical: WFL  Shoulders: WFL, complaints of L shoulder pain--not sure why  Lumbar: WFL, complaints with flexion/extension      Demo substitution patterns with hiking of shoulders with movements    MUSCLE PERFORMANCE:   Strength: demo core instability; poor scapular humeral rhythm; able to do heel raises/heel walk with UE support;  able to perform minimal squat with UE support    Flexibility: tightness noted in upper trap, levator scap, GS, HS hip flexor, pects    FUNCTIONAL TESTING/OBSERVATION: moves slowly with sit to/from stand, using handrails, grimacing    Pain behaviors: None    Information gathered through testing and observation    ===============================================================  Today's Treatment:  Initial evaluation  Therapeutic Activity:   Discussed basics of pacing; energy conservation--may benefit from use of shower stool; and body mechanics, instructed in proper sit to/from stand  Discussed Pain Center PT and POC  ===============================================================  ASSESSMENT:  Physical Therapy Diagnosis:Impaired Posture and Impaired Muscle Performance    Patient requires PT intervention for the following impairments: Limited knowledge of condition and / or self care - inability to control symptoms, Impaired insight, Impaired functional mobility, Impaired gait / weight bearing tolerance, Impaired posture / muscle imbalance, Impaired sensation, Impaired static and / or dynamic balance, Muscle flexibility limitations,  Muscle strength and endurance limitations, Pain and Deconditioning    Anticipated Goals and Expected Outcomes:    Goals   8 weeks  Patient will report the use of 2 self care practices during their day.  Patient will report the participation in 15 minutes of aerobic activity daily and practicing stretching daily.  Patient will demonstrate the ability to find core strength in neutral posture.  Patient will demonstrate the ability to relax muscle group before stretching.    16 weeks  Patient will be independent with a home exercise program.  Patient will be independent with posture correction.  Patient will report independence with a self care/flare management program.  Patient will demonstrate improved functional strength and endurance as reports by increased tolerance for IADLs and more consistent participation in daily activity.     Rehab potential for achieving goals: fair.  Patient has a number of psychosocial contributing factors. Prognosis will depend on concurrent addressing of psychosocial contributing factors.    ===============================================================  PLAN:   Patient will benefit from skilled physical therapy consisting of:  neuromuscular reeducation of: kinesthetic sense and posture for sitting and/or standing activities, education in self care / home management training to include instruction in: symptom control techniques, therapeutic activities to achieve improved functional performance in: daily actvities and therapeutic exercise to develop: strength and endurance, flexibility and core stability.        Assessment will be ongoing with changes in treatment as indicated.  Benefits/risks/alternatives to treatment have been reviewed and the patient has been instructed to contact this office if they have any questions or concerns.  This plan of care has been discussed with the patient and the patient is in agreement.     Frequency / Duration:  Patient will be seen for a total of 8-12  visits;  at a frequency of once every 1-2 weeks for 3-4 visits, once every 2-4 weeks for 3-4 visits, then once every 4-6 weeks.    Next Visit: Focus on HEP, posture    Total Visit Time:  45 minutes  Eval: 30 mins  TA: 15 mins             Bhakti Stinson, PT                                      Date:  3/1/2018    G Code 1/10  Mobility Status  G 8978 Current Status  CK 40-59% impaired  G 8979 Goal Status CJ 20-39% impaired    G Codes established based on subjective and objective measures.    Re-certification Date 5/26/18    Therapy Evaluation Codes:   1) History comprised of:                  Personal factors that impact the plan of care:                                    Anxiety, Past/current experiences and Time since onset of symptoms.                   Comorbidity factors that impact the plan of care are:                                    Depression, Fibromyalgia and Mental illness; hx of PTSD; Diabetes; hx of TIAs.                    Medications impacting care: None.  2) Examination of Body Systems comprised of:                  Body structures and functions that impact the plan of care:                                    Knee, Lumbar spine and Shoulder.                  Activity limitations that impact the plan of care are:                                    Sitting, Squatting/kneeling, Stairs, Standing, Walking, Sleeping and Reaching overhead.  3) Clinical presentation characteristics are:                  Evolving/Changing.  4) Decision-Making                                  Moderate complexity using standardized patient assessment instrument and/or measureable assessment of functional outcome.  Cumulative Therapy Evaluation is: Moderate complexity.    =====================================================  **  Referring Provider Certification: Referring Provider reviewing certifies that the above treatment / plan of care is required and authorized, and that the patient's plan will be reviewed every thirty  "(30) days **.   ======================================================     PRESENT:  NA    MULTIDISCIPLINARY PATIENT / FAMILY EDUCATION RECORD  Department:  Physical Therapy    Readiness to Learn: Ability to understand verbal instructions, Ability to understand written instructions, Knowledge of educational needs / treatment plan  Specific Barriers to Learning: None  Referrals: None  Learning Needs: Rehabilitation techniques to improve functional independence Pain management education to improve daily activity tolerance.  Who: Patient  How: Demonstration, Verbal instructions, Written instructions  Response: Appropriate verbal response, Asked questions, Demonstrated ability, Verbalized recall / understanding      Caregiver Signature/Credentials _____________________________ Date ________       Treating Provider: Bhakti Stinson, PT   I have reviewed and certified the need for these services and plan of treatment while under my care.        PHYSICIAN'S SIGNATURE:   _________________________________________  Date___________   Carlos Roche    Certification period:  Beginning of Cert date period: 03/01/18 to  End of Cert period date: 05/26/18     Functional Level Progress Report: Please see attached \"Goal Flow sheet for Functional level.\"    ____X____ Continue Services or       ________ DC Services                Service dates: From  SOC Date: 03/01/18 date to present                         "

## 2018-03-03 ENCOUNTER — MYC MEDICAL ADVICE (OUTPATIENT)
Dept: FAMILY MEDICINE | Facility: CLINIC | Age: 58
End: 2018-03-03

## 2018-03-03 DIAGNOSIS — G89.4 CHRONIC PAIN SYNDROME: Primary | ICD-10-CM

## 2018-03-03 DIAGNOSIS — E11.21 TYPE 2 DIABETES MELLITUS WITH DIABETIC NEPHROPATHY, WITHOUT LONG-TERM CURRENT USE OF INSULIN (H): ICD-10-CM

## 2018-03-03 RX ORDER — LIRAGLUTIDE 6 MG/ML
INJECTION SUBCUTANEOUS
Qty: 6 ML | Refills: 0 | Status: CANCELLED | OUTPATIENT
Start: 2018-03-03

## 2018-03-03 NOTE — TELEPHONE ENCOUNTER
Requested Prescriptions   Pending Prescriptions Disp Refills     VICTOZA PEN 18 MG/3ML soln [Pharmacy Med Name: VICTOZA 18MG/3ML INJ PEN 2 X 3ML]    Last Written Prescription Date:  1/10/18  Last Fill Quantity: 6 ml,  # refills: 1   Last Office Visit with [facility]CRISTI, PIEDAD or [facility] prescribing provider:  2/27/18   Future Office Visit:    Next 5 appointments (look out 90 days)     Mar 07, 2018  9:45 AM CST   Return Visit with Bhakti Stinson, PT   [facility] [place] ([facility] [place])    [address]  [place] [address]   [phone]            Mar 14, 2018  9:45 AM CDT   Return Visit with Bhakti Stinson PT   [facility] [place] ([facility] [place])    [address]  [place] [address]   [phone]            Mar 21, 2018  9:45 AM CDT   Return Visit with Bhakti Stinson PT   [facility] [place] ([facility] [place])    [address]  [place] [address]   [phone]            May 02, 2018  8:00 AM CDT   Return Visit with Carlos Roche MD   [facility] [place] ([facility] [place])    [address]  [place] [address]   [phone]                  6 mL 0     Sig: ADMINISTER 0.6 MG UNDER THE SKIN DAILY. INCREASE TO 1.2 MG DAILY AFTER 1 WEEK    GLP-1 Agonists Protocol Failed    3/3/2018  4:21 PM       Failed - Normal serum creatinine on file in past 12 months    Recent Labs   Lab Test  01/05/18   0822   CR  0.51*            Passed - Blood pressure less than 140/90 in past 6 months    BP Readings from Last 3 Encounters:   02/28/18 124/79   02/27/18 128/70   01/15/18 121/67                Passed - LDL on file in past 12 months    Recent Labs   Lab Test  01/05/18   0822   LDL  109*            Passed - Microalbumin on file in past 12 months    Recent Labs   Lab Test  01/05/18   0827   MICROL  10   UMALCR  7.97            Passed - HgbA1C in past 3 or 6 months    Recent Labs  "  Lab Test  01/05/18   0822   A1C  6.9*            Passed - Patient is age 18 or older       Passed - No active pregnancy on record       Passed - No positive pregnancy test in past 12 months       Passed - Recent (6 mo) or future (30 days) visit within the authorizing provider's specialty    Patient had office visit in the last 6 months or has a visit in the next 30 days with authorizing provider.  See \"Patient Info\" tab in inbasket, or \"Choose Columns\" in Meds & Orders section of the refill encounter.                  Jose Carlos Faarax  [facility]  "

## 2018-03-05 LAB — PAIN DRUG SCR UR W RPTD MEDS: NORMAL

## 2018-03-06 ENCOUNTER — MYC MEDICAL ADVICE (OUTPATIENT)
Dept: FAMILY MEDICINE | Facility: CLINIC | Age: 58
End: 2018-03-06

## 2018-03-06 DIAGNOSIS — E11.21 TYPE 2 DIABETES MELLITUS WITH DIABETIC NEPHROPATHY, WITHOUT LONG-TERM CURRENT USE OF INSULIN (H): Primary | ICD-10-CM

## 2018-03-06 RX ORDER — LIRAGLUTIDE 6 MG/ML
1.2 INJECTION SUBCUTANEOUS DAILY
Qty: 18 ML | Refills: 3 | Status: SHIPPED | OUTPATIENT
Start: 2018-03-06 | End: 2018-05-21

## 2018-03-09 DIAGNOSIS — N39.46 MIXED INCONTINENCE: ICD-10-CM

## 2018-03-09 NOTE — TELEPHONE ENCOUNTER
"Requested Prescriptions   Pending Prescriptions Disp Refills     VESICARE 10 MG tablet [Pharmacy Med Name: VESICARE 10MG TABLETS]  Last Written Prescription Date:  02/07/18  Last Fill Quantity: 90,  # refills: 0   Last Office Visit with [facility], [facility] or [facility] prescribing provider:  02/27/18   Future Office Visit:    Next 5 appointments (look out 90 days)     Mar 14, 2018  9:45 AM CDT   Return Visit with Bhakti Stinson PT   [facility] ([facility])    [address] [address]   [phone]            Mar 21, 2018  9:45 AM CDT   Return Visit with Bhakti Stinson PT   [facility] ([facility])    [address] [address]   [phone]            May 02, 2018  8:00 AM CDT   Return Visit with Carlos Roche MD   [facility] ([facility])    [address] [address]   [phone]                90 tablet 0     Sig: TAKE 1 TABLET(10 MG) BY MOUTH DAILY    Muscarinic Antagonists (Urinary Incontinence Agents) Passed    3/9/2018 12:18 PM       Passed - Recent (12 mo) or future (30 days) visit within the authorizing provider's specialty    Patient had office visit in the last 12 months or has a visit in the next 30 days with authorizing provider or within the authorizing provider's specialty.  See \"Patient Info\" tab in inbasket, or \"Choose Columns\" in Meds & Orders section of the refill encounter.           Passed - Patient does not have a diagnosis of glaucoma on the problem list    If glaucoma diagnosis is new, refer refill to physician.         Passed - Patient is 18 years of age or older          "

## 2018-03-12 RX ORDER — SOLIFENACIN SUCCINATE 10 MG/1
TABLET, FILM COATED ORAL
Qty: 90 TABLET | Refills: 0 | OUTPATIENT
Start: 2018-03-12

## 2018-03-12 NOTE — TELEPHONE ENCOUNTER
Per chart review, medication was filled for a 3 month supply at requesting pharmacy and patient should have enough medication until 04/2018. Refusing medication request:        Sammie Hartmann RN

## 2018-03-14 ENCOUNTER — OFFICE VISIT (OUTPATIENT)
Dept: PALLIATIVE MEDICINE | Facility: CLINIC | Age: 58
End: 2018-03-14
Payer: MEDICARE

## 2018-03-14 DIAGNOSIS — G89.29 CHRONIC PAIN: Primary | ICD-10-CM

## 2018-03-14 DIAGNOSIS — I10 HTN (HYPERTENSION): Primary | ICD-10-CM

## 2018-03-14 DIAGNOSIS — M79.18 MYOFASCIAL MUSCLE PAIN: ICD-10-CM

## 2018-03-14 DIAGNOSIS — M54.16 LUMBAR RADICULOPATHY: ICD-10-CM

## 2018-03-14 PROCEDURE — 97530 THERAPEUTIC ACTIVITIES: CPT | Mod: GP | Performed by: PHYSICAL THERAPIST

## 2018-03-14 NOTE — PROGRESS NOTES
Patient called to set up appointment with Dr. Benson.  She is requesting to only see Dr. Benson.   Will place order.  Is due to see him in May or June.

## 2018-03-14 NOTE — PROGRESS NOTES
"Pain Physical Therapy Progress Note    Patient Name: Melody Ferguson     YOB: 1960     Medical Record Number: [id number]    Visits: 2/12    Diagnosis:    Chronic pain  Lumbar radiculopathy  Myofascial muscle pain    Subjective: Patient reports having issues with increased back pain--woke up with it; going across the whole back, not just on the L side.    Will going down to [place] in week; car ride about 5 hours; does plan to make stops    Self Care  HEP: NA  Walking/Aerobic Activity: has done some walking in house, would like to go outside--worried about R knee  Posture: NA  Breathing/Relaxation: NA  Heat/Ice: doesn't notice much change with heat or ice  Mini Breaks: NA  Pacing: NA    Objective Findings:    OBSERVATION: forward head, rounded shoulders, clenching jaw, poor core engagement in seated position; upper chest breathing  GAIT & LOCOMOTION: Antalgic; leaning from side to side    Treatment Interventions:  Therapeutic Activity:   Discussed self care for chronic pain and mini-breaks, discussed focusing on self/taking time out for ones self--it's not being selfish; discuss mini-breaks, being more proactive in pain management, not using distractions to avoid pain; taking time to \"check in\" to see how ones self is doing throughout the day and then doing something to address the issues if needed;--changing posture, changing positions, taking a break, doing stretches, doing some breathing/relaxation techniques, shoulder shrugs/rolls, etc.  Gave handout.  Discussed taking breaks/stops on car ride down and back; using heated seats/ice packs--small relief better than no relief  Discussed use of cane--use in opposite hand, if R knee bothering, use in L hand  ________________________________________________________________    Instruction on home program: mini breaks    Assessment:    Patient tolerated/responded well to treatment    Impression/Recommendations: continue      Intensity Level: 2 (1=low intensity; " 5=high intensity)    Demonstrates/Verbalizes Technique: NA (1= poor technique-difficulty performing exercises,significant cues required; 5= good technique-performs exercises without cues)    Body Awareness: 2 (1=low awareness; 5=high awareness)    Posture/Stability: 2 (1= poor posture, stability; 5= good posture, stability)    Motivational Level: Cooperative    Response to Teaching: cooperative    Factors that affect learning: None    _______________________________________________________________________  Plan of Care   Continue PT to support reactivation and integration of self regulation pain management skills;  Focus of next session will be on: posture, HEP     Present:  FELICIA     Total Visit Time:  40 minutes  TA: 40 mins    Therapist: Bhakti Stinson PT             Date: 3/14/2018    G Code 2/10  Re-certication Date: 5/26/18

## 2018-03-14 NOTE — MR AVS SNAPSHOT
After Visit Summary   3/14/2018    Melody Ferguson    MRN: [id number]           Patient Information     Date Of Birth          1960        Visit Information        Provider Department      3/14/2018 9:45 AM Bhakti Stinson, [facility]        Today's Diagnoses     Chronic pain    -  1    Lumbar radiculopathy        Myofascial muscle pain           Follow-ups after your visit        Your next 10 appointments already scheduled     Mar 20, 2018 11:00 AM CDT   New Visit with CARMEN Bello CNP   [facility] ([facility])    [address] [place] [address]   [phone]            Mar 28, 2018  9:45 AM CDT   Return Visit with Bhakti Stinson PT   [facility] ([facility])    [address] [facility] [address] [id number] [phone]            May 02, 2018  8:00 AM CDT   Return Visit with Carlos Roche MD   [facility] ([facility])    [address] [id number] [phone]              Who to contact     If you have questions or need follow up information about today's clinic visit or your schedule please contact [facility] directly at [phone].  Normal or non-critical lab and imaging results will be communicated to you by MyChart, letter or phone within 4 business days after the clinic has received the results. If you do not hear from us within 7 days, please contact the clinic through MyChart or phone. If you have a critical or abnormal lab result, we will notify you by phone as soon as possible.  Submit refill requests through [employer] or call your pharmacy and they will forward the refill request to us. Please allow 3 business days for your refill to be completed.          Additional Information About Your Visit        [email or web address] Information     [employer] gives you secure access to your  electronic health record. If you see a primary care provider, you can also send messages to your care team and make appointments. If you have questions, please call your primary care clinic.  If you do not have a primary care provider, please call [phone] and they will assist you.        Care EveryWhere ID     This is your Care EveryWhere ID. This could be used by other organizations to access your [facility] medical records  [id number]         Blood Pressure from Last 3 Encounters:   02/28/18 124/79   02/27/18 128/70   01/15/18 121/67    Weight from Last 3 Encounters:   02/28/18 (!) 144.2 kg (318 lb)   02/27/18 (!) 142.9 kg (315 lb)   02/27/18 (!) 144.2 kg (318 lb)              We Performed the Following     THERAPEUTIC ACTIVITIES        Primary Care Provider Office Phone # Fax #    Elizabeth Lamas [phone]       [address]  [facility] [address]        Equal Access to Services     [facility]: Hadii aad ku hadasho Soomaali, waaxda luqadaha, qaybta kaalmada adeegyada, waxay idiin haygerryn brittney hernandezaramillie claire . So [facility] [phone].    ATENCIÓN: Si habla español, tiene a turner disposición servicios gratuitos de asistencia lingüística. [facility] [phone].    We comply with applicable federal civil rights laws and [place] laws. We do not discriminate on the basis of race, color, national origin, age, disability, sex, sexual orientation, or gender identity.            Thank you!     Thank you for choosing [facility]  for your care. Our goal is always to provide you with excellent care. Hearing back from our patients is one way we can continue to improve our services. Please take a few minutes to complete the written survey that you may receive in the mail after your visit with us. Thank you!             Your Updated Medication List - Protect others around you: Learn how to safely use, store and throw away your medicines at www.disposemymeds.org.          This list is  accurate as of 3/14/18 12:03 PM.  Always use your most recent med list.                   Brand Name Dispense Instructions for use Diagnosis    * ACCU-CHEK DAMIEN PLUS test strip   Generic drug:  blood glucose monitoring     200 strip    TEST BLOOD SUGARS TWICE DAILY    Type 2 diabetes mellitus with diabetic nephropathy (H)       * blood glucose monitoring test strip    no brand specified    180 strip    Use to test blood sugars 2 times daily or as directed    Type 2 diabetes mellitus with diabetic nephropathy, without long-term current use of insulin (H)       ACE NOT PRESCRIBED (INTENTIONAL)     0 each    Reported on 5/10/2017    Type 2 diabetes mellitus without complication (H)       albuterol 108 (90 BASE) MCG/ACT Inhaler    PROAIR HFA/PROVENTIL HFA/VENTOLIN HFA     Inhale 2 puffs into the lungs every 6 hours        ASPIRIN PO      Take 81 mg by mouth daily        ATORVASTATIN CALCIUM PO      Take 40 mg by mouth daily        BUPROPION HCL PO      Take 50 mg by mouth 2 times daily        citalopram 40 MG tablet    celeXA    90 tablet    TAKE 1 TABLET(40 MG) BY MOUTH EVERY MORNING    Major depressive disorder, recurrent episode, moderate (H)       cyclobenzaprine 5 MG tablet    FLEXERIL    60 tablet    TAKE 1 TABLET(5 MG) BY MOUTH TWICE DAILY AS NEEDED FOR MUSCLE SPASMS    Left sided sciatica, Back muscle spasm       diclofenac 50 MG EC tablet    VOLTAREN    60 tablet    Take 1 tablet (50 mg) by mouth 2 times daily as needed for moderate pain    Chronic pain syndrome       esomeprazole 40 MG CR capsule    nexIUM    90 capsule    Take 1 capsule (40 mg) by mouth every morning (before breakfast) Take 30-60 minutes before eating.    Dyspepsia       fluticasone 27.5 MCG/SPRAY spray    VERAMYST     Spray 2 sprays into both nostrils daily        gabapentin 300 MG capsule    NEURONTIN    90 capsule    Take 1 capsule (300 mg) by mouth At Bedtime    Acute left-sided low back pain with left-sided sciatica        glipiZIDE-metFORMIN 5-500 MG per tablet    METAGLIP    360 tablet    TAKE 1 TABLETS BY MOUTH TWICE DAILY BEFORE MEALS    Type 2 diabetes mellitus with diabetic nephropathy, without long-term current use of insulin (H)       insulin pen needle 32G X 6 MM    NOVOFINE    100 each    Use once daily or as directed.    Type 2 diabetes mellitus with diabetic nephropathy, without long-term current use of insulin (H)       liraglutide 18 MG/3ML soln    VICTOZA    18 mL    Inject 1.2 mg Subcutaneous daily    Type 2 diabetes mellitus with diabetic nephropathy, without long-term current use of insulin (H)       losartan 50 MG tablet    COZAAR    90 tablet    Take 1 tablet (50 mg) by mouth daily    HTN, goal below 140/90, Type 2 diabetes mellitus with diabetic nephropathy, without long-term current use of insulin (H)       melatonin 3 MG tablet     30 tablet    TAKE 1 TABLET BY MOUTH ONCE AT BEDTIME AS NEEDED FOR SLEEP    Major depressive disorder, recurrent episode, moderate (H)       metoprolol succinate 25 MG 24 hr tablet    TOPROL XL    90 tablet    Take 1 tablet (25 mg) by mouth daily    Old myocardial infarction       montelukast 10 MG tablet    SINGULAIR    90 tablet    TAKE 1 TABLET(10 MG) BY MOUTH AT BEDTIME    Mild intermittent asthma without complication       nystatin 990457 UNIT/GM Powd    MYCOSTATIN    60 g    Apply topically 2 times daily    Morbid obesity with body mass index of 45.0-49.9 in adult (H)       order for DME     3 Month    Diabetic test strips and lancets per insurance formulary Check blood sugar 2 times daily    Type 2 diabetes mellitus with diabetic nephropathy (H)       polyethylene glycol powder    MIRALAX/GLYCOLAX    510 g    Take 17 g by mouth daily DISSOLVE 17 GRAMS IN LIQUID AND DRINK DAILY AS DIRECTED    Chronic pain syndrome       solifenacin 10 MG tablet    VESICARE    90 tablet    Take 1 tablet (10 mg) by mouth daily    Mixed incontinence       traMADol 50 MG tablet    ULTRAM    30 tablet     Take 1 tablet (50 mg) by mouth every 8 hours as needed for moderate pain    Chronic pain syndrome       * Notice:  This list has 2 medication(s) that are the same as other medications prescribed for you. Read the directions carefully, and ask your doctor or other care provider to review them with you.

## 2018-03-20 ENCOUNTER — OFFICE VISIT (OUTPATIENT)
Dept: NEUROSURGERY | Facility: CLINIC | Age: 58
End: 2018-03-20
Attending: NURSE PRACTITIONER
Payer: MEDICARE

## 2018-03-20 VITALS
BODY MASS INDEX: 45.99 KG/M2 | WEIGHT: 293 LBS | DIASTOLIC BLOOD PRESSURE: 77 MMHG | SYSTOLIC BLOOD PRESSURE: 135 MMHG | HEART RATE: 74 BPM | OXYGEN SATURATION: 97 % | HEIGHT: 67 IN

## 2018-03-20 DIAGNOSIS — F41.9 ANXIETY: Primary | ICD-10-CM

## 2018-03-20 DIAGNOSIS — M54.16 LUMBAR RADICULAR PAIN: ICD-10-CM

## 2018-03-20 PROCEDURE — 99204 OFFICE O/P NEW MOD 45 MIN: CPT | Performed by: NURSE PRACTITIONER

## 2018-03-20 PROCEDURE — G0463 HOSPITAL OUTPT CLINIC VISIT: HCPCS | Performed by: NURSE PRACTITIONER

## 2018-03-20 RX ORDER — DIAZEPAM 5 MG
5-10 TABLET ORAL ONCE
Qty: 2 TABLET | Refills: 0 | Status: SHIPPED | OUTPATIENT
Start: 2018-03-20 | End: 2018-03-20

## 2018-03-20 ASSESSMENT — PAIN SCALES - GENERAL: PAINLEVEL: MODERATE PAIN (5)

## 2018-03-20 NOTE — NURSING NOTE
"Melody Ferguson is a 57 year old female who presents for:  Chief Complaint   Patient presents with     Neurologic Problem     Lumbar, DDD sometimes has left leg pain         Initial Vitals:  /77 (BP Location: Right arm, Patient Position: Sitting, Cuff Size: Adult Large)  Pulse 74  Ht 5' 7.44\" (1.713 m)  Wt (!) 321 lb (145.6 kg)  SpO2 97%  BMI 49.62 kg/m2 Estimated body mass index is 49.62 kg/(m^2) as calculated from the following:    Height as of this encounter: 5' 7.44\" (1.713 m).    Weight as of this encounter: 321 lb (145.6 kg).. Body surface area is 2.63 meters squared. BP completed using cuff size: large  Moderate Pain (5)    Do you feel safe in your environment?  Yes  Do you need any refills today? No    Nursing Comments: Lumbar, DDD sometimes has left leg pain.        5 min. nursing intake time  Chiara Strange MA       Discharge plan: 1. Please call [phone] to schedule or change your lumbar MRI.  I will contact you with the results.      2.  Valium take prior to MRI   2 min. nursing discharge time  Chiara Strange MA        "

## 2018-03-20 NOTE — PATIENT INSTRUCTIONS
1. Please call [phone] to schedule or change your lumbar MRI.  I will contact you with the results.      2.  Valium take prior to MRI

## 2018-03-20 NOTE — PROGRESS NOTES
Dr. Ismael Sanches  [facility]  Neurosurgery Clinic Visit        CC: low back and left leg pain    Primary care Provider: Elizabeth Lamas      Reason For Visit:   I was asked by Dr. Lamas to consult on the patient for lumbar radicular pain on the left .      HPI: Melody Ferguson is a 57 year old female with lumbar radicular pain on the left. She reports that she has had this pain for many years and has noted that in the last 6 months the pain was worse. She is seeing Dr. Roche at [facility] in [place].  She had a LESI on 1- without relief.  She now notes constant back pain with intermittent left leg symptoms. She notes that pain that shoots down her posterior left leg to the knee.  She denies any falls, foot drop or drag. She is in PT at the pain clinic as well.  She feels that standing walking less than a block makes it worse. She feels that sitting is the best.      Pain at its worst 10  Pain right now:  5    Past Medical History:   Diagnosis Date     Arthritis 2000     Cancer (H) 1983     Cerebral infarction (H) 2005     COPD (chronic obstructive pulmonary disease) (H) 1990     Depressive disorder 1990     DM type 2 (diabetes mellitus, type 2) (H)      History of TIA (transient ischemic attack)      HTN (hypertension)      Hyperlipidemia      Uncomplicated asthma 1990       Past Medical History reviewed with patient during visit.    Past Surgical History:   Procedure Laterality Date     ABDOMEN SURGERY  05/20/2005     BIOPSY  1984     BREAST SURGERY  1984     C TOTAL KNEE ARTHROPLASTY Left 04/26/2017    DML at [facility]     CHOLECYSTECTOMY  2000     COLONOSCOPY       ENT SURGERY  2008    tonsils removed     GENITOURINARY SURGERY  2005    a & p repair     REPAIR PTOSIS       SLING TRANSVAGINAL N/A 7/14/2017    Procedure: SLING TRANSVAGINAL;  Sling ([place]);  Surgeon: Talon Katz MD;  Location: [facility] OR     Past Surgical History reviewed with patient during visit.    Current  Outpatient Prescriptions   Medication     diclofenac (VOLTAREN) 50 MG EC tablet     liraglutide (VICTOZA) 18 MG/3ML soln     glipiZIDE-metFORMIN (METAGLIP) 5-500 MG per tablet     traMADol (ULTRAM) 50 MG tablet     solifenacin (VESICARE) 10 MG tablet     montelukast (SINGULAIR) 10 MG tablet     polyethylene glycol (MIRALAX/GLYCOLAX) powder     losartan (COZAAR) 50 MG tablet     nystatin (MYCOSTATIN) 913587 UNIT/GM POWD     metoprolol succinate (TOPROL XL) 25 MG 24 hr tablet     insulin pen needle (NOVOFINE) 32G X 6 MM     blood glucose monitoring (NO BRAND SPECIFIED) test strip     citalopram (CELEXA) 40 MG tablet     cyclobenzaprine (FLEXERIL) 5 MG tablet     esomeprazole (NEXIUM) 40 MG CR capsule     ACCU-CHEK DAMIEN PLUS test strip     gabapentin (NEURONTIN) 300 MG capsule     melatonin 3 MG tablet     order for DME     albuterol (PROAIR HFA, PROVENTIL HFA, VENTOLIN HFA) 108 (90 BASE) MCG/ACT inhaler     ASPIRIN PO     ATORVASTATIN CALCIUM PO     BUPROPION HCL PO     fluticasone (VERAMYST) 27.5 MCG/SPRAY nasal spray     ACE NOT PRESCRIBED, INTENTIONAL,     No current facility-administered medications for this visit.        Allergies   Allergen Reactions     Milk Protein Extract GI Disturbance     lactose intolerance.  Can tolerate milk products if uses lactaid     Bee Venom Swelling     Latex      Lisinopril Cough     Onion GI Disturbance     Vomiting, feels like throat is clogged     Aloe Rash     pain     Chlorine Rash       Social History     Social History     Marital status: Single     Spouse name: N/A     Number of children: N/A     Years of education: N/A     Occupational History      Disabled     Social History Main Topics     Smoking status: Former Smoker     Packs/day: 0.50     Years: 25.00     Types: Cigarettes     Quit date: 10/15/1991     Smokeless tobacco: Never Used     Alcohol use 0.0 oz/week      Comment: occassionally     Drug use: No     Sexual activity: Yes     Partners: Male     Birth control/  "protection: Other      Comment: kalina     Other Topics Concern     Parent/Sibling W/ Cabg, Mi Or Angioplasty Before 65f 55m? Yes     Mirna Angioplasty 2015     Social History Narrative       Family History   Problem Relation Age of Onset     Thyroid Disease Sister      CANCER Brother      CANCER Sister      breast cancer     CEREBROVASCULAR DISEASE Sister 62     Other - See Comments Sister      Angioplasty 8/2016     Hypertension Father      Hyperlipidemia Father      Prostate Cancer Father      Glaucoma Maternal Grandmother      CANCER Maternal Grandfather      CANCER Paternal Grandmother      Breast Cancer Paternal Grandmother      CEREBROVASCULAR DISEASE Paternal Grandfather      CEREBROVASCULAR DISEASE Maternal Half-Sister      4/08/2016     Breast Cancer Maternal Half-Sister      Asthma Maternal Half-Sister      Other Cancer Brother      Passed from cancer unsure what type     Anesthesia Reaction Daughter      DIABETES No family hx of      Macular Degeneration No family hx of          Review Of Systems  Skin: negative  Eyes: negative  Ears/Nose/Throat: negative  Respiratory:Controlled asthma.    Cardiovascular: HTN/HLD  Gastrointestinal: negative  Genitourinary: negative  Musculoskeletal: back pain  Neurologic: left leg pain   Psychiatric: negative  Hematologic/Lymphatic/Immunologic: negative  Endocrine: diabetes     ROS: 10 point ROS neg other than the symptoms noted above in the HPI.    Vital Signs: /77 (BP Location: Right arm, Patient Position: Sitting, Cuff Size: Adult Large)  Pulse 74  Ht 5' 7.44\" (1.713 m)  Wt (!) 321 lb (145.6 kg)  SpO2 97%  BMI 49.62 kg/m2    Examination:  Constitutional:  Alert, well nourished, NAD.  Memory: recent and remote memory intact   HEENT: Normocephalic, atraumatic.   Pulm:  Without shortness of breath   CV:  No pitting edema of BLE.    Neurological:  Awake  Alert  Oriented x 3  Speech clear  Cranial nerves II - XII intact  PERRL  EOMI  Face symmetric  Tongue " midline  Motor exam   Shoulder Abduction:  Right:  5/5   Left:  5/5  Biceps:                      Right:  5/5   Left:  5/5  Triceps:                     Right:  5/5   Left:  5/5  Wrist Extensors:       Right:  5/5   Left:  5/5  Wrist Flexors:           Right:  5/5   Left:  5/5  Intrinsics:                   Right:  5/5   Left:  5/5   Hip Flexor:                Right: 5/5  Left:  5/5  Hip Adductor:             Right:  5/5  Left:  5/5  Hip Abductor:             Right:  5/5  Left:  5/5  Gastroc Soleus:        Right:  5/5  Left:  5/5  Tib/Ant:                      Right:  5/5  Left:  5/5  EHL:                          Right:  5/5  Left:  5/5   Sensation normal to bilateral upper and lower extremities  Muscle tone to bilateral upper and lower extremities normal   Gait: Able to stand from a seated position. Normal non-antalgic, non-myelopathic gait.  Able to heel/toe walk without loss of balance      Lumbar examination reveals tenderness of the spine and paraspinous muscles.  Pain with lumbar extension.  Hip height is symmetrical. Negative SI joint, sciatic notch or greater trochanteric tenderness to palpation bilaterally.  Straight leg raise is negative bilaterally.      Imaging: No new images     Assessment/Plan:    Melody Ferguson is a 57 year old female with lumbar radicular pain on the left. She reports that she has had this pain for many years and has noted that in the last 6 months the pain was worse. She is seeing Dr. Roche at [facility] in [place].  She had a LESI on 1- without relief.  She now notes constant back pain with intermittent left leg symptoms. She notes that pain that shoots down her posterior left leg to the knee.  She denies any falls, foot drop or drag. She is in PT at the pain clinic as well.  She feels that standing walking less than a block makes it worse. She feels that sitting is the best.  The pt is neurologically intact. She has noted lumbar facet pain with palpation.  At  this time we will order an update lumbar MRI. She is claustrophobic. We will provide Valium to take prior to the test.  The pts BMI is over 49.  She was educated that if she is a candidate of surgery we would recommend a BMI of less than 35 due to post op recovery and incision healing risks.  She did verbalize understanding.     Patient Instructions   1. Please call [phone] to schedule or change your lumbar MRI.  I will contact you with the results.      2.  Valium take prior to MRI                  [facility]  [facility]  [address] [address]    Tel [phone]  Pager [phone] ay

## 2018-03-20 NOTE — LETTER
3/20/2018         RE: Melody Ferguson  [address] [facility] [address]        Dear Colleague,    Thank you for referring your patient, Melody Ferguson, to the [facility] NEUROSURGERY CLINIC. Please see a copy of my visit note below.    Dr. Ismael Sanches  [facility]  Neurosurgery Clinic Visit        CC: low back and left leg pain    Primary care Provider: Elizabeth Lamas      Reason For Visit:   I was asked by Dr. Lamas to consult on the patient for lumbar radicular pain on the left .      HPI: Mleody Ferguson is a 57 year old female with lumbar radicular pain on the left. She reports that she has had this pain for many years and has noted that in the last 6 months the pain was worse. She is seeing Dr. Roche at [facility] in [place].  She had a LESI on 1- without relief.  She now notes constant back pain with intermittent left leg symptoms. She notes that pain that shoots down her posterior left leg to the knee.  She denies any falls, foot drop or drag. She is in PT at the pain clinic as well.  She feels that standing walking less than a block makes it worse. She feels that sitting is the best.      Pain at its worst 10  Pain right now:  5    Past Medical History:   Diagnosis Date     Arthritis 2000     Cancer (H) 1983     Cerebral infarction (H) 2005     COPD (chronic obstructive pulmonary disease) (H) 1990     Depressive disorder 1990     DM type 2 (diabetes mellitus, type 2) (H)      History of TIA (transient ischemic attack)      HTN (hypertension)      Hyperlipidemia      Uncomplicated asthma 1990       Past Medical History reviewed with patient during visit.    Past Surgical History:   Procedure Laterality Date     ABDOMEN SURGERY  05/20/2005     BIOPSY  1984     BREAST SURGERY  1984     C TOTAL KNEE ARTHROPLASTY Left 04/26/2017    DML at [facility]     CHOLECYSTECTOMY  2000     COLONOSCOPY       ENT SURGERY  2008    tonsils removed     GENITOURINARY SURGERY  2005     a & p repair     REPAIR PTOSIS       SLING TRANSVAGINAL N/A 7/14/2017    Procedure: SLING TRANSVAGINAL;  Sling ([place]);  Surgeon: Talon Katz MD;  Location: [facility] OR     Past Surgical History reviewed with patient during visit.    Current Outpatient Prescriptions   Medication     diclofenac (VOLTAREN) 50 MG EC tablet     liraglutide (VICTOZA) 18 MG/3ML soln     glipiZIDE-metFORMIN (METAGLIP) 5-500 MG per tablet     traMADol (ULTRAM) 50 MG tablet     solifenacin (VESICARE) 10 MG tablet     montelukast (SINGULAIR) 10 MG tablet     polyethylene glycol (MIRALAX/GLYCOLAX) powder     losartan (COZAAR) 50 MG tablet     nystatin (MYCOSTATIN) 954596 UNIT/GM POWD     metoprolol succinate (TOPROL XL) 25 MG 24 hr tablet     insulin pen needle (NOVOFINE) 32G X 6 MM     blood glucose monitoring (NO BRAND SPECIFIED) test strip     citalopram (CELEXA) 40 MG tablet     cyclobenzaprine (FLEXERIL) 5 MG tablet     esomeprazole (NEXIUM) 40 MG CR capsule     ACCU-CHEK DAMIEN PLUS test strip     gabapentin (NEURONTIN) 300 MG capsule     melatonin 3 MG tablet     order for DME     albuterol (PROAIR HFA, PROVENTIL HFA, VENTOLIN HFA) 108 (90 BASE) MCG/ACT inhaler     ASPIRIN PO     ATORVASTATIN CALCIUM PO     BUPROPION HCL PO     fluticasone (VERAMYST) 27.5 MCG/SPRAY nasal spray     ACE NOT PRESCRIBED, INTENTIONAL,     No current facility-administered medications for this visit.        Allergies   Allergen Reactions     Milk Protein Extract GI Disturbance     lactose intolerance.  Can tolerate milk products if uses lactaid     Bee Venom Swelling     Latex      Lisinopril Cough     Onion GI Disturbance     Vomiting, feels like throat is clogged     Aloe Rash     pain     Chlorine Rash       Social History     Social History     Marital status: Single     Spouse name: N/A     Number of children: N/A     Years of education: N/A     Occupational History      Disabled     Social History Main Topics     Smoking status: Former Smoker      "Packs/day: 0.50     Years: 25.00     Types: Cigarettes     Quit date: 10/15/1991     Smokeless tobacco: Never Used     Alcohol use 0.0 oz/week      Comment: occassionally     Drug use: No     Sexual activity: Yes     Partners: Male     Birth control/ protection: Other      Comment: hystro     Other Topics Concern     Parent/Sibling W/ Cabg, Mi Or Angioplasty Before 65f 55m? Yes     Mirna Angioplasty 2015     Social History Narrative       Family History   Problem Relation Age of Onset     Thyroid Disease Sister      CANCER Brother      CANCER Sister      breast cancer     CEREBROVASCULAR DISEASE Sister 62     Other - See Comments Sister      Angioplasty 8/2016     Hypertension Father      Hyperlipidemia Father      Prostate Cancer Father      Glaucoma Maternal Grandmother      CANCER Maternal Grandfather      CANCER Paternal Grandmother      Breast Cancer Paternal Grandmother      CEREBROVASCULAR DISEASE Paternal Grandfather      CEREBROVASCULAR DISEASE Maternal Half-Sister      4/08/2016     Breast Cancer Maternal Half-Sister      Asthma Maternal Half-Sister      Other Cancer Brother      Passed from cancer unsure what type     Anesthesia Reaction Daughter      DIABETES No family hx of      Macular Degeneration No family hx of          Review Of Systems  Skin: negative  Eyes: negative  Ears/Nose/Throat: negative  Respiratory:Controlled asthma.    Cardiovascular: HTN/HLD  Gastrointestinal: negative  Genitourinary: negative  Musculoskeletal: back pain  Neurologic: left leg pain   Psychiatric: negative  Hematologic/Lymphatic/Immunologic: negative  Endocrine: diabetes     ROS: 10 point ROS neg other than the symptoms noted above in the HPI.    Vital Signs: /77 (BP Location: Right arm, Patient Position: Sitting, Cuff Size: Adult Large)  Pulse 74  Ht 5' 7.44\" (1.713 m)  Wt (!) 321 lb (145.6 kg)  SpO2 97%  BMI 49.62 kg/m2    Examination:  Constitutional:  Alert, well nourished, NAD.  Memory: recent and remote " memory intact   HEENT: Normocephalic, atraumatic.   Pulm:  Without shortness of breath   CV:  No pitting edema of BLE.    Neurological:  Awake  Alert  Oriented x 3  Speech clear  Cranial nerves II - XII intact  PERRL  EOMI  Face symmetric  Tongue midline  Motor exam   Shoulder Abduction:  Right:  5/5   Left:  5/5  Biceps:                      Right:  5/5   Left:  5/5  Triceps:                     Right:  5/5   Left:  5/5  Wrist Extensors:       Right:  5/5   Left:  5/5  Wrist Flexors:           Right:  5/5   Left:  5/5  Intrinsics:                   Right:  5/5   Left:  5/5   Hip Flexor:                Right: 5/5  Left:  5/5  Hip Adductor:             Right:  5/5  Left:  5/5  Hip Abductor:             Right:  5/5  Left:  5/5  Gastroc Soleus:        Right:  5/5  Left:  5/5  Tib/Ant:                      Right:  5/5  Left:  5/5  EHL:                          Right:  5/5  Left:  5/5   Sensation normal to bilateral upper and lower extremities  Muscle tone to bilateral upper and lower extremities normal   Gait: Able to stand from a seated position. Normal non-antalgic, non-myelopathic gait.  Able to heel/toe walk without loss of balance      Lumbar examination reveals tenderness of the spine and paraspinous muscles.  Pain with lumbar extension.  Hip height is symmetrical. Negative SI joint, sciatic notch or greater trochanteric tenderness to palpation bilaterally.  Straight leg raise is negative bilaterally.      Imaging: No new images     Assessment/Plan:    Melody Ferguson is a 57 year old female with lumbar radicular pain on the left. She reports that she has had this pain for many years and has noted that in the last 6 months the pain was worse. She is seeing Dr. Roche at [facility] in [place].  She had a LESI on 1- without relief.  She now notes constant back pain with intermittent left leg symptoms. She notes that pain that shoots down her posterior left leg to the knee.  She denies any falls, foot  drop or drag. She is in PT at the pain clinic as well.  She feels that standing walking less than a block makes it worse. She feels that sitting is the best.  The pt is neurologically intact. She has noted lumbar facet pain with palpation.  At this time we will order an update lumbar MRI. She is claustrophobic. We will provide Valium to take prior to the test.  The pts BMI is over 49.  She was educated that if she is a candidate of surgery we would recommend a BMI of less than 35 due to post op recovery and incision healing risks.  She did verbalize understanding.     Patient Instructions   1. Please call [phone] to schedule or change your lumbar MRI.  I will contact you with the results.      2.  Valium take prior to MRI                  Alvina Garza CNP  [facility]  [address] [address]    Tel [phone]  Pager [phone]  ay    Again, thank you for allowing me to participate in the care of your patient.        Sincerely,        CARMEN Bello CNP

## 2018-03-20 NOTE — MR AVS SNAPSHOT
After Visit Summary   3/20/2018    Melody Ferguson    MRN: [id number]           Patient Information     Date Of Birth          1960        Visit Information        Provider Department      3/20/2018 11:00 AM Alvina Garza APRN CNP [facility]        Today's Diagnoses     Anxiety    -  1    Lumbar radicular pain          Care Instructions    1. Please call [phone] to schedule or change your lumbar MRI.  I will contact you with the results.      2.  Valium take prior to MRI           Follow-ups after your visit        Your next 10 appointments already scheduled     Mar 28, 2018  9:45 AM CDT   Return Visit with Bhatki Stinson PT   [facility] ([facility])    [address] [facility] [id number] [phone]            May 02, 2018  8:00 AM CDT   Return Visit with Carlos Roche MD   [facility] ([facility])    [address] [address] [facility] [id number] [phone]              Future tests that were ordered for you today     Open Future Orders        Priority Expected Expires Ordered    MR Lumbar Spine w/o Contrast Routine  3/20/2019 3/20/2018            Who to contact     If you have questions or need follow up information about today's clinic visit or your schedule please contact [facility] directly at [phone].  Normal or non-critical lab and imaging results will be communicated to you by MyChart, letter or phone within 4 business days after the clinic has received the results. If you do not hear from us within 7 days, please contact the clinic through MyChart or phone. If you have a critical or abnormal lab result, we will notify you by phone as soon as possible.  Submit refill requests through [employer] or call your pharmacy and they will forward the refill request to us. Please allow 3 business days for your refill to be  "completed.          Additional Information About Your Visit        [employer] Information     [employer] gives you secure access to your electronic health record. If you see a primary care provider, you can also send messages to your care team and make appointments. If you have questions, please call your primary care clinic.  If you do not have a primary care provider, please call [phone] and they will assist you.        Care EveryWhere ID     This is your Care EveryWhere ID. This could be used by other organizations to access your [facility] medical records  [id number]        Your Vitals Were     Pulse Height Pulse Oximetry BMI (Body Mass Index)          74 5' 7.44\" (1.713 m) 97% 49.62 kg/m2         Blood Pressure from Last 3 Encounters:   03/20/18 135/77   02/28/18 124/79   02/27/18 128/70    Weight from Last 3 Encounters:   03/20/18 (!) 321 lb (145.6 kg)   02/28/18 (!) 318 lb (144.2 kg)   02/27/18 (!) 315 lb (142.9 kg)                 Today's Medication Changes          These changes are accurate as of 3/20/18 11:20 AM.  If you have any questions, ask your nurse or doctor.               Start taking these medicines.        Dose/Directions    diazepam 5 MG tablet   Commonly known as:  VALIUM   Used for:  Anxiety   Started by:  Alvina Garza APRN CNP        Dose:  5-10 mg   Take 1-2 tablets (5-10 mg) by mouth once for 1 dose   Quantity:  2 tablet   Refills:  0         These medicines have changed or have updated prescriptions.        Dose/Directions    gabapentin 300 MG capsule   Commonly known as:  NEURONTIN   This may have changed:  when to take this   Used for:  Acute left-sided low back pain with left-sided sciatica        Dose:  300 mg   Take 1 capsule (300 mg) by mouth At Bedtime   Quantity:  90 capsule   Refills:  1            Where to get your medicines      Some of these will need a paper prescription and others can be bought over the counter.  Ask your nurse if you have questions.     Bring a paper " prescription for each of these medications     diazepam 5 MG tablet                Primary Care Provider Office Phone # Fax #    Elizabeth Lamas [phone] [address] [place]  [facility] [address]        Equal Access to Services     EVY KIRAN : Hadii norberto ku lulúo Soomaali, waaxda luqadaha, qaybta kaalmada adeegyada, issac earlyn brittney novak laJamesbarbara reed. So [facility] [phone].    ATENCIÓN: Si habla español, tiene a turner disposición servicios gratuitos de asistencia lingüística. Llame al [phone].    We comply with applicable federal civil rights laws and [place] laws. We do not discriminate on the basis of race, color, national origin, age, disability, sex, sexual orientation, or gender identity.            Thank you!     Thank you for choosing [facility]  for your care. Our goal is always to provide you with excellent care. Hearing back from our patients is one way we can continue to improve our services. Please take a few minutes to complete the written survey that you may receive in the mail after your visit with us. Thank you!             Your Updated Medication List - Protect others around you: Learn how to safely use, store and throw away your medicines at www.disposemymeds.org.          This list is accurate as of 3/20/18 11:20 AM.  Always use your most recent med list.                   Brand Name Dispense Instructions for use Diagnosis    * ACCU-CHEK DAMIEN PLUS test strip   Generic drug:  blood glucose monitoring     200 strip    TEST BLOOD SUGARS TWICE DAILY    Type 2 diabetes mellitus with diabetic nephropathy (H)       * blood glucose monitoring test strip    no brand specified    180 strip    Use to test blood sugars 2 times daily or as directed    Type 2 diabetes mellitus with diabetic nephropathy, without long-term current use of insulin (H)       ACE NOT PRESCRIBED (INTENTIONAL)     0 each    Reported on 5/10/2017    Type 2 diabetes  mellitus without complication (H)       albuterol 108 (90 BASE) MCG/ACT Inhaler    PROAIR HFA/PROVENTIL HFA/VENTOLIN HFA     Inhale 2 puffs into the lungs every 6 hours        ASPIRIN PO      Take 81 mg by mouth daily        ATORVASTATIN CALCIUM PO      Take 40 mg by mouth daily        BUPROPION HCL PO      Take 50 mg by mouth 2 times daily        citalopram 40 MG tablet    celeXA    90 tablet    TAKE 1 TABLET(40 MG) BY MOUTH EVERY MORNING    Major depressive disorder, recurrent episode, moderate (H)       cyclobenzaprine 5 MG tablet    FLEXERIL    60 tablet    TAKE 1 TABLET(5 MG) BY MOUTH TWICE DAILY AS NEEDED FOR MUSCLE SPASMS    Left sided sciatica, Back muscle spasm       diazepam 5 MG tablet    VALIUM    2 tablet    Take 1-2 tablets (5-10 mg) by mouth once for 1 dose    Anxiety       diclofenac 50 MG EC tablet    VOLTAREN    60 tablet    Take 1 tablet (50 mg) by mouth 2 times daily as needed for moderate pain    Chronic pain syndrome       esomeprazole 40 MG CR capsule    nexIUM    90 capsule    Take 1 capsule (40 mg) by mouth every morning (before breakfast) Take 30-60 minutes before eating.    Dyspepsia       fluticasone 27.5 MCG/SPRAY spray    VERAMYST     Spray 2 sprays into both nostrils daily        gabapentin 300 MG capsule    NEURONTIN    90 capsule    Take 1 capsule (300 mg) by mouth At Bedtime    Acute left-sided low back pain with left-sided sciatica       glipiZIDE-metFORMIN 5-500 MG per tablet    METAGLIP    360 tablet    TAKE 1 TABLETS BY MOUTH TWICE DAILY BEFORE MEALS    Type 2 diabetes mellitus with diabetic nephropathy, without long-term current use of insulin (H)       insulin pen needle 32G X 6 MM    NOVOFINE    100 each    Use once daily or as directed.    Type 2 diabetes mellitus with diabetic nephropathy, without long-term current use of insulin (H)       liraglutide 18 MG/3ML soln    VICTOZA    18 mL    Inject 1.2 mg Subcutaneous daily    Type 2 diabetes mellitus with diabetic  nephropathy, without long-term current use of insulin (H)       losartan 50 MG tablet    COZAAR    90 tablet    Take 1 tablet (50 mg) by mouth daily    HTN, goal below 140/90, Type 2 diabetes mellitus with diabetic nephropathy, without long-term current use of insulin (H)       melatonin 3 MG tablet     30 tablet    TAKE 1 TABLET BY MOUTH ONCE AT BEDTIME AS NEEDED FOR SLEEP    Major depressive disorder, recurrent episode, moderate (H)       metoprolol succinate 25 MG 24 hr tablet    TOPROL XL    90 tablet    Take 1 tablet (25 mg) by mouth daily    Old myocardial infarction       montelukast 10 MG tablet    SINGULAIR    90 tablet    TAKE 1 TABLET(10 MG) BY MOUTH AT BEDTIME    Mild intermittent asthma without complication       nystatin 467008 UNIT/GM Powd    MYCOSTATIN    60 g    Apply topically 2 times daily    Morbid obesity with body mass index of 45.0-49.9 in adult (H)       order for DME     3 Month    Diabetic test strips and lancets per insurance formulary Check blood sugar 2 times daily    Type 2 diabetes mellitus with diabetic nephropathy (H)       polyethylene glycol powder    MIRALAX/GLYCOLAX    510 g    Take 17 g by mouth daily DISSOLVE 17 GRAMS IN LIQUID AND DRINK DAILY AS DIRECTED    Chronic pain syndrome       solifenacin 10 MG tablet    VESICARE    90 tablet    Take 1 tablet (10 mg) by mouth daily    Mixed incontinence       traMADol 50 MG tablet    ULTRAM    30 tablet    Take 1 tablet (50 mg) by mouth every 8 hours as needed for moderate pain    Chronic pain syndrome       * Notice:  This list has 2 medication(s) that are the same as other medications prescribed for you. Read the directions carefully, and ask your doctor or other care provider to review them with you.

## 2018-04-05 ENCOUNTER — MYC MEDICAL ADVICE (OUTPATIENT)
Dept: FAMILY MEDICINE | Facility: CLINIC | Age: 58
End: 2018-04-05

## 2018-04-05 DIAGNOSIS — E11.21 TYPE 2 DIABETES MELLITUS WITH DIABETIC NEPHROPATHY, WITHOUT LONG-TERM CURRENT USE OF INSULIN (H): Primary | ICD-10-CM

## 2018-04-08 DIAGNOSIS — M62.830 BACK MUSCLE SPASM: ICD-10-CM

## 2018-04-08 DIAGNOSIS — M54.32 LEFT SIDED SCIATICA: ICD-10-CM

## 2018-04-08 DIAGNOSIS — G89.4 CHRONIC PAIN SYNDROME: ICD-10-CM

## 2018-04-08 NOTE — TELEPHONE ENCOUNTER
"Requested Prescriptions   Pending Prescriptions Disp Refills     diclofenac (VOLTAREN) 50 MG EC tablet [Pharmacy Med Name: DICLOFENAC SODIUM 50MG DR TABLETS] 180 tablet 1    Last Written Prescription Date:  3/6/18  Last Fill Quantity: 60,  # refills: 1   Last Office Visit with [facility], [facility] or [facility] prescribing provider:  2/27/2018     Future Office Visit:    Next 5 appointments (look out 90 days)     May 02, 2018  8:00 AM CDT   Return Visit with Carlos Roche MD   [facility] ([facility])    [address] [facility] [id number] [phone]                  Sig: TAKE 1 TABLET(50 MG) BY MOUTH TWICE DAILY AS NEEDED FOR MODERATE PAIN    NSAID Medications Failed    4/8/2018 12:41 PM       Failed - Normal serum creatinine on file in past 12 months    Recent Labs   Lab Test  01/05/18   0822   CR  0.51*            Passed - Blood pressure under 140/90 in past 12 months    BP Readings from Last 3 Encounters:   03/20/18 135/77   02/28/18 124/79   02/27/18 128/70                Passed - Normal ALT on file in past 12 months    Recent Labs   Lab Test  01/05/18   0822   ALT  22            Passed - Normal AST on file in past 12 months    Recent Labs   Lab Test  01/05/18   0822   AST  10            Passed - Recent (12 mo) or future (30 days) visit within the authorizing provider's specialty    Patient had office visit in the last 12 months or has a visit in the next 30 days with authorizing provider or within the authorizing provider's specialty.  See \"Patient Info\" tab in inbasket, or \"Choose Columns\" in Meds & Orders section of the refill encounter.           Passed - Patient is age 6-64 years       Passed - Normal CBC on file in past 12 months    Recent Labs   Lab Test  07/11/17   0841   WBC  8.6   RBC  4.01   HGB  10.9*   HCT  34.9*   PLT  290            Passed - No active pregnancy on record       Passed - No positive pregnancy test in past 12 months        traMADol " (ULTRAM) 50 MG tablet [Pharmacy Med Name: TRAMADOL 50MG TABLETS] 30 tablet 0    Last Written Prescription Date:  2/27/18  Last Fill Quantity: 30,  # refills: 0   Last Office Visit with [facility], [facility] or  Health prescribing provider:  2/27/2018     Future Office Visit:    Next 5 appointments (look out 90 days)     May 02, 2018  8:00 AM CDT   Return Visit with Carlos Roche MD   [facility] ([facility])    [address] [address]   [phone]                  Sig: TAKE 1 TABLET BY MOUTH EVERY 8 HOURS AS NEEDED FOR MODERATE PAIN    There is no refill protocol information for this order        cyclobenzaprine (FLEXERIL) 5 MG tablet [Pharmacy Med Name: CYCLOBENZAPRINE 5MG TABLETS] 60 tablet 0    Last Written Prescription Date:  12/29/17  Last Fill Quantity: 60,  # refills: 0   Last Office Visit with [facility], [facility] or  Health prescribing provider:  2/27/2018     Future Office Visit:    Next 5 appointments (look out 90 days)     May 02, 2018  8:00 AM CDT   Return Visit with Carlos Roche MD   [facility] ([facility])    [address] [address]   [phone]                  Sig: TAKE 1 TABLET(5 MG) BY MOUTH TWICE DAILY AS NEEDED FOR MUSCLE SPASMS    There is no refill protocol information for this order        diclofenac (VOLTAREN) 50 MG EC tablet [Pharmacy Med Name: DICLOFENAC SODIUM 50MG DR TABLETS] 180 tablet 0     Sig: TAKE 1 TABLET(50 MG) BY MOUTH TWICE DAILY AS NEEDED FOR MODERATE PAIN    NSAID Medications Failed    4/8/2018 12:41 PM       Failed - Normal serum creatinine on file in past 12 months    Recent Labs   Lab Test  01/05/18   0822   CR  0.51*            Passed - Blood pressure under 140/90 in past 12 months    BP Readings from Last 3 Encounters:   03/20/18 135/77   02/28/18 124/79   02/27/18 128/70                Passed - Normal ALT on file in past 12 months    Recent Labs  "  Lab Test  01/05/18   0822   ALT  22            Passed - Normal AST on file in past 12 months    Recent Labs   Lab Test  01/05/18   0822   AST  10            Passed - Recent (12 mo) or future (30 days) visit within the authorizing provider's specialty    Patient had office visit in the last 12 months or has a visit in the next 30 days with authorizing provider or within the authorizing provider's specialty.  See \"Patient Info\" tab in inbasket, or \"Choose Columns\" in Meds & Orders section of the refill encounter.           Passed - Patient is age 6-64 years       Passed - Normal CBC on file in past 12 months    Recent Labs   Lab Test  07/11/17   0841   WBC  8.6   RBC  4.01   HGB  10.9*   HCT  34.9*   PLT  290            Passed - No active pregnancy on record       Passed - No positive pregnancy test in past 12 months          "

## 2018-04-10 ENCOUNTER — MYC MEDICAL ADVICE (OUTPATIENT)
Dept: FAMILY MEDICINE | Facility: CLINIC | Age: 58
End: 2018-04-10

## 2018-04-10 ENCOUNTER — TELEPHONE (OUTPATIENT)
Dept: PALLIATIVE MEDICINE | Facility: CLINIC | Age: 58
End: 2018-04-10

## 2018-04-10 DIAGNOSIS — M54.16 LUMBAR RADICULOPATHY: Primary | ICD-10-CM

## 2018-04-10 RX ORDER — CYCLOBENZAPRINE HCL 5 MG
TABLET ORAL
Qty: 60 TABLET | Refills: 1 | Status: SHIPPED | OUTPATIENT
Start: 2018-04-10 | End: 2018-11-05

## 2018-04-10 RX ORDER — TRAMADOL HYDROCHLORIDE 50 MG/1
TABLET ORAL
Qty: 30 TABLET | Refills: 0 | Status: SHIPPED | OUTPATIENT
Start: 2018-04-10 | End: 2018-08-09

## 2018-04-10 NOTE — TELEPHONE ENCOUNTER
Faxed Rx for the Ultram to [facility],  Right fax confirmed at 3:15 pm today.  Yuli Giang MA/  For [employer]

## 2018-04-10 NOTE — TELEPHONE ENCOUNTER
Per last office visit:     - Further procedures recommended:                         - consider repeat epidural vs TRANSFORAMINAL EPIDURAL STEROID INJECTION  In the future                        - consider trigger point in the future      Patient requesting another injection, last done on 1/15/18. Please advise. If ok, please order and route to .     Sabrina CORNEJON-RN Care Coordinator  [facility]

## 2018-04-10 NOTE — TELEPHONE ENCOUNTER
This writer attempted to contact patient on 04/10/18      Reason for call referral and left message.      If patient calls back:  Patient contacted by 2nd floor [facility] Team (MA/TC). Inform patient that someone from the team will contact them, document that pt called and route to care team.         Clarita Mendoza MA

## 2018-04-10 NOTE — TELEPHONE ENCOUNTER
Routing refill request to provider for review/approval because:  Drug not on the [facility] refill protocol   Labs out of range:  Creatinine    Shaheen Head RN, BSN

## 2018-04-10 NOTE — TELEPHONE ENCOUNTER
MyChart Message received from patient stating:       Please review and advise for scheduling.        Latricia Fernando    [facility]

## 2018-04-11 NOTE — TELEPHONE ENCOUNTER
Received fax from pharmacy stating need to write dispense as insurance preferred.  Arianne ROBERTS

## 2018-04-11 NOTE — TELEPHONE ENCOUNTER
This writer attempted to contact patient on 04/11/18      Reason for call evisit and left detailed message.        Rama Fernando MA

## 2018-04-11 NOTE — TELEPHONE ENCOUNTER
Pre-screening Questions for Radiology Injections:     Injection to be done at which interventional clinic site? [facility] [place]     Procedure ordered by Melchor     Procedure ordered? Lumbar Epidural Steroid Injection     What insurance would patient like us to bill for this procedure? MEDICARE/[employer]       Worker's comp-Any injection DO NOT SCHEDULE and route to Alyce Bryson.       [employer] insurance - For SI joint injections, DO NOT SCHEDULE and route Azalia Gonzales.       [employer]- MBB's must be scheduled at LEAST two weeks apart       [employer] - Any injection besides hip/shoulder/knee joint DO NOT SCHEDULE and route to Azalia Gonzales. She will obtain PA and call pt back to schedule procedure or notify pt of denial.     HP [employer]-PA REQUIRED FOR NON-JESUS OR Joint injections     Any chance of pregnancy? NO   If YES, do NOT schedule and route to RN pool     Is an  needed? No     Patient has a drive home? (mandatory) YES:      Is patient taking any blood thinners (plavix, coumadin, jantoven, warfarin, heparin, pradaxa or dabigatran )? No   If hold needed, do NOT schedule, route to RN pool     Is patient taking any aspirin products? Yes - Pt takes 81mg daily; instructed to hold 0 day(s) prior to procedure.      If more than 325mg/day do NOT schedule; route to RN pool     For CERVICAL procedures, hold all aspirin products for 6 days.      Does the patient have a bleeding or clotting disorder? No     If YES, okay to schedule AND route to RN nurse pool    **For any patients with platelet count <100, must be forwarded to provider**     Is patient diabetic?  Yes  If YES, have them bring their glucometer.     Does patient have an active infection or treated for one within the past week? No     Is patient currently taking any antibiotics?  No     For patients on chronic, preventative, or prophylactic antibiotics, procedures may be scheduled.     For patients on antibiotics for active  or recent infection:    Stanislav Padilla Burton, [place]-antibiotic course must have been completed for 4 days    Dr. Richardson-antibiotic course must have been completed for 7 days     Is patient currently taking any steroid medications? (i.e. Prednisone, Medrol)  No     For patients on steroid medications:    Stanislav Padilla Burton, [place]-steroid course must have been completed for 4 days    -steroid course must have been completed for 7 days     Reviewed with patient:  If you are started on any steroids or antibiotics between now and your appointment, you must contact us because it may affect our ability to perform your procedure.  Yes     Is patient actively being treated for cancer or immunocompromised? No  If YES, do NOT schedule and route to RN pool      Are you able to get on and off an exam table with minimal or no assistance? Yes  If NO, do NOT schedule and route to RN pool     Are you able to roll over and lay on your stomach with minimal or no assistance? Yes  If NO, do NOT schedule and route to RN pool     Any allergies to contrast dye, iodine, shellfish, or numbing and steroid medications? No  If YES, route to RN pool AND add allergy information to appointment notes     Allergies: Milk protein extract; Bee venom; Latex; Lisinopril; Onion; Aloe; and Chlorine      Has the patient had a flu shot or any other vaccinations within 7 days before or after the procedure.  No     Does patient have an MRI/CT?  YES:   (SI joint, hip injections, lumbar sympathetic blocks, and stellate ganglion blocks do not require an MRI)    Was the MRI done w/in the last 3 years?  Yes    Was MRI done at [place]? Yes      If not, where was it done? N/A        If MRI was not done at [place], [facility] or [facility] do NOT schedule and route to nursing.  If pt has an imaging disc, the injection may be scheduled but pt has to bring disc to appt. If they show up w/out disc the injection cannot be  done     Reminders (please tell patient if applicable):       Instructed pt to arrive 30 minutes early for IV start if this is for a cervical procedure, ALL sympathetic (stellate ganglion, hypogastric, or lumbar sympathetic block) and all sedation procedures (RFA, spinal cord stimulation trials).  Not Applicable   -IVs are not routinely placed for Dr. Mehta cervical cases   -Dr. Roche: IVs for cervical ESIs and cervical TBDs (not CMBBs/facet inj)       If NPO for sedation, informed patient that it is okay to take medications with sips of water (except if they are to hold blood thinners).  Not Applicable                        *DO take blood pressure medication if it is prescribed*       If this is for a cervical JESUS, informed patient that aspirin needs to be held for 6 days.   Not Applicable       For all patients not having spinal cord stimulator (SCS) trials or radiofrequency ablations (RFAs), informed patient:     IV sedation is not provided for this procedure.  If you feel that an oral anti-anxiety medication is needed, you can discuss this further with your referring provider or primary care provider.  The Pain Clinic provider will discuss specifics of what the procedure includes at your appointment.  Most procedures last 10-20 minutes.  We use numbing medications to help with any discomfort during the procedure.  Not Applicable       Do not schedule procedures requiring IV placement in the first appointment of the day or first appointment after lunch.        For patients 85 or older we recommend having an adult stay w/ them for the remainder of the day.        Does the patient have any questions?  No

## 2018-04-11 NOTE — TELEPHONE ENCOUNTER
I had put that in the comments to the pharmacy. I will go ahead and resend it.  Thanks,  Elizabeth Lamas MD MPH

## 2018-04-12 ENCOUNTER — TRANSFERRED RECORDS (OUTPATIENT)
Dept: HEALTH INFORMATION MANAGEMENT | Facility: CLINIC | Age: 58
End: 2018-04-12

## 2018-04-12 LAB
CHOLEST SERPL-MCNC: 214 MG/DL (ref 100–199)
HBA1C MFR BLD: 6.3 % (ref 4.8–5.6)
HDLC SERPL-MCNC: 51 MG/DL
LDLC SERPL CALC-MCNC: 139 MG/DL (ref 0–99)
TRIGL SERPL-MCNC: 119 MG/DL (ref 0–149)

## 2018-04-24 ENCOUNTER — RADIANT APPOINTMENT (OUTPATIENT)
Dept: GENERAL RADIOLOGY | Facility: CLINIC | Age: 58
End: 2018-04-24
Attending: ORTHOPAEDIC SURGERY
Payer: MEDICARE

## 2018-04-24 ENCOUNTER — OFFICE VISIT (OUTPATIENT)
Dept: ORTHOPEDICS | Facility: CLINIC | Age: 58
End: 2018-04-24
Payer: MEDICARE

## 2018-04-24 VITALS
RESPIRATION RATE: 20 BRPM | TEMPERATURE: 98.5 F | WEIGHT: 293 LBS | BODY MASS INDEX: 45.99 KG/M2 | SYSTOLIC BLOOD PRESSURE: 134 MMHG | DIASTOLIC BLOOD PRESSURE: 80 MMHG | HEIGHT: 67 IN

## 2018-04-24 DIAGNOSIS — M17.0 PRIMARY OSTEOARTHRITIS OF BOTH KNEES: ICD-10-CM

## 2018-04-24 DIAGNOSIS — Z96.652 HISTORY OF TOTAL KNEE ARTHROPLASTY, LEFT: ICD-10-CM

## 2018-04-24 DIAGNOSIS — Z96.652 HISTORY OF TOTAL KNEE ARTHROPLASTY, LEFT: Primary | ICD-10-CM

## 2018-04-24 PROCEDURE — 73562 X-RAY EXAM OF KNEE 3: CPT | Mod: LT

## 2018-04-24 PROCEDURE — 20610 DRAIN/INJ JOINT/BURSA W/O US: CPT | Mod: RT | Performed by: ORTHOPAEDIC SURGERY

## 2018-04-24 RX ORDER — METHYLPREDNISOLONE ACETATE 80 MG/ML
80 INJECTION, SUSPENSION INTRA-ARTICULAR; INTRALESIONAL; INTRAMUSCULAR; SOFT TISSUE ONCE
Qty: 1 ML | Refills: 0 | OUTPATIENT
Start: 2018-04-24 | End: 2018-04-24

## 2018-04-24 NOTE — PROGRESS NOTES
Melody Ferguson is seen for follow up left total knee arthroplasty from 4/26/17.  She has no complaints on left knee.  She does have pain in her right knee. Last injections 2/27/18. She desires repeat injection.  Exercise:  Walking    Oxford score 36     Past Medical History:   Diagnosis Date     Arthritis 2000     Cancer (H) 1983     Cerebral infarction (H) 2005     COPD (chronic obstructive pulmonary disease) (H) 1990     Depressive disorder 1990     DM type 2 (diabetes mellitus, type 2) (H)      History of TIA (transient ischemic attack)      HTN (hypertension)      Hyperlipidemia      Uncomplicated asthma 1990       Past Surgical History:   Procedure Laterality Date     ABDOMEN SURGERY  05/20/2005     BIOPSY  1984     BREAST SURGERY  1984     C TOTAL KNEE ARTHROPLASTY Left 04/26/2017    [place] at [facility]     CHOLECYSTECTOMY  2000     COLONOSCOPY       ENT SURGERY  2008    tonsils removed     GENITOURINARY SURGERY  2005    a & p repair     REPAIR PTOSIS       SLING TRANSVAGINAL N/A 7/14/2017    Procedure: SLING TRANSVAGINAL;  Sling ([place]);  Surgeon: Talon Katz MD;  Location: [facility] OR       Family History   Problem Relation Age of Onset     Thyroid Disease Sister      CANCER Brother      CANCER Sister      breast cancer     CEREBROVASCULAR DISEASE Sister 62     Other - See Comments Sister      Angioplasty 8/2016     Hypertension Father      Hyperlipidemia Father      Prostate Cancer Father      Glaucoma Maternal Grandmother      CANCER Maternal Grandfather      CANCER Paternal Grandmother      Breast Cancer Paternal Grandmother      CEREBROVASCULAR DISEASE Paternal Grandfather      CEREBROVASCULAR DISEASE Maternal Half-Sister      4/08/2016     Breast Cancer Maternal Half-Sister      Asthma Maternal Half-Sister      Other Cancer Brother      Passed from cancer unsure what type     Anesthesia Reaction Daughter      DIABETES No family hx of      Macular Degeneration No family hx of        Social History     Social  History     Marital status: Single     Spouse name: N/A     Number of children: N/A     Years of education: N/A     Occupational History      Disabled     Social History Main Topics     Smoking status: Former Smoker     Packs/day: 0.50     Years: 25.00     Types: Cigarettes     Quit date: 10/15/1991     Smokeless tobacco: Never Used     Alcohol use 0.0 oz/week      Comment: occassionally     Drug use: No     Sexual activity: Yes     Partners: Male     Birth control/ protection: Other      Comment: hystmichelle     Other Topics Concern     Parent/Sibling W/ Cabg, Mi Or Angioplasty Before 65f 55m? Yes     Mirna Angioplasty 2015     Social History Narrative       Current Outpatient Prescriptions   Medication Sig Dispense Refill     ACCU-CHEK DAMIEN PLUS test strip TEST BLOOD SUGARS TWICE DAILY 200 strip 0     ACE NOT PRESCRIBED, INTENTIONAL, Reported on 5/10/2017 0 each 0     albuterol (PROAIR HFA, PROVENTIL HFA, VENTOLIN HFA) 108 (90 BASE) MCG/ACT inhaler Inhale 2 puffs into the lungs every 6 hours       ASPIRIN PO Take 81 mg by mouth daily       ATORVASTATIN CALCIUM PO Take 40 mg by mouth daily       blood glucose monitoring (NO BRAND SPECIFIED) meter device kit Use to test blood sugar 1 times daily or as directed. Please dispense insurance preferred. 1 kit 0     blood glucose monitoring (NO BRAND SPECIFIED) test strip Use to test blood sugars 2 times daily or as directed 180 strip 3     BUPROPION HCL PO Take 50 mg by mouth 2 times daily        citalopram (CELEXA) 40 MG tablet TAKE 1 TABLET(40 MG) BY MOUTH EVERY MORNING 90 tablet 0     cyclobenzaprine (FLEXERIL) 5 MG tablet TAKE 1 TABLET(5 MG) BY MOUTH TWICE DAILY AS NEEDED FOR MUSCLE SPASMS 60 tablet 0     cyclobenzaprine (FLEXERIL) 5 MG tablet TAKE 1 TABLET(5 MG) BY MOUTH TWICE DAILY AS NEEDED FOR MUSCLE SPASMS 60 tablet 1     diclofenac (VOLTAREN) 50 MG EC tablet TAKE 1 TABLET(50 MG) BY MOUTH TWICE DAILY AS NEEDED FOR MODERATE PAIN 180 tablet 0     esomeprazole (NEXIUM)  40 MG CR capsule Take 1 capsule (40 mg) by mouth every morning (before breakfast) Take 30-60 minutes before eating. 90 capsule 1     fluticasone (VERAMYST) 27.5 MCG/SPRAY nasal spray Spray 2 sprays into both nostrils daily       gabapentin (NEURONTIN) 300 MG capsule Take 1 capsule (300 mg) by mouth At Bedtime (Patient taking differently: Take 300 mg by mouth 2 times daily ) 90 capsule 1     glipiZIDE-metFORMIN (METAGLIP) 5-500 MG per tablet TAKE 1 TABLETS BY MOUTH TWICE DAILY BEFORE MEALS 360 tablet 0     insulin pen needle (NOVOFINE) 32G X 6 MM Use once daily or as directed. 100 each prn     liraglutide (VICTOZA) 18 MG/3ML soln Inject 1.2 mg Subcutaneous daily 18 mL 3     losartan (COZAAR) 50 MG tablet Take 1 tablet (50 mg) by mouth daily 90 tablet 0     melatonin 3 MG tablet TAKE 1 TABLET BY MOUTH ONCE AT BEDTIME AS NEEDED FOR SLEEP 30 tablet 0     metoprolol succinate (TOPROL XL) 25 MG 24 hr tablet Take 1 tablet (25 mg) by mouth daily 90 tablet 1     montelukast (SINGULAIR) 10 MG tablet TAKE 1 TABLET(10 MG) BY MOUTH AT BEDTIME 90 tablet 0     nystatin (MYCOSTATIN) 363545 UNIT/GM POWD Apply topically 2 times daily 60 g 1     order for DME Diabetic test strips and lancets per insurance formulary Check blood sugar 2 times daily 3 Month 3     polyethylene glycol (MIRALAX/GLYCOLAX) powder Take 17 g by mouth daily DISSOLVE 17 GRAMS IN LIQUID AND DRINK DAILY AS DIRECTED 510 g 3     solifenacin (VESICARE) 10 MG tablet Take 1 tablet (10 mg) by mouth daily 90 tablet 0     traMADol (ULTRAM) 50 MG tablet TAKE 1 TABLET BY MOUTH EVERY 8 HOURS AS NEEDED FOR MODERATE PAIN 30 tablet 0       Allergies   Allergen Reactions     Milk Protein Extract GI Disturbance     lactose intolerance.  Can tolerate milk products if uses lactaid     Bee Venom Swelling     Latex      Lisinopril Cough     Onion GI Disturbance     Vomiting, feels like throat is clogged     Aloe Rash     pain     Chlorine Rash       REVIEW OF SYSTEMS:  CONSTITUTIONAL:  " NEGATIVE for fever, chills, change in weight, not feeling tired  SKIN:  NEGATIVE for worrisome rashes, no skin lumps, no skin ulcers and no non-healing wounds  EYES:  NEGATIVE for vision changes or irritation.  ENT/MOUTH:  NEGATIVE.  No hearing loss, no hoarseness, no difficulty swallowing.  RESP:  NEGATIVE. No cough or shortness of breath.  BREAST:  NEGATIVE for masses, tenderness or discharge  CV:  NEGATIVE for chest pain, palpitations or peripheral edema  GI:  NEGATIVE for nausea, abdominal pain, heartburn, or change in bowel habits  :  Negative. No dysuria, no hematuria  MUSCULOSKELETAL:  See HPI above  NEURO:  NEGATIVE . No headaches, no dizziness,  no numbness  ENDOCRINE:  NEGATIVE for temperature intolerance, skin/hair changes  HEME/ALLERGY/IMMUNE:  NEGATIVE for bleeding problems  PSYCHIATRIC:  NEGATIVE. no anxiety, no depression.      Xray:  Xray images were independently visualized with the patient.  This shows good position of components.  No sign of loosening or wear.     Exam:  Vitals: /80  Temp 98.5  F (36.9  C)  Resp 20  Ht 1.702 m (5' 7\")  Wt 145.2 kg (320 lb)  BMI 50.12 kg/m2  BMI= Body mass index is 50.12 kg/(m^2).  Range of motion right 5-115 degrees, left 0-126 degrees.  Alignment  Normal.  Stability:   Right       Lateral collateral ligament no laxity       Medial collateral ligament no laxity  Left:       Lateral collateral ligament no laxity       Medial collateral ligament no laxity  Wound:  Well healed.  Edema: Minor - both lower legs  Effusion: Minor - right knee  Tenderness: Right Significant - medial joint line.       Left:  Minor - superior lateral aspect of the patella  Sensation, motor, and circulation intact.    Assessment:  Left total knee arthroplasty doing well  Right knee osteoarthritis severe.  Plan:  She  desires injection today of right knee(s).  Risks, benefits, potential complications and alternatives were discussed.   With the patient's consent, sterile prep was " performed of right knee(s).  Right knee was injected with Depo Medrol 80 mg and lidocaine at anteromedial site.  Return to clinic as needed.  Resume activity as tolerated.  Return to clinic 4 years with xray left knee.  Continue antibiotics for dental work.

## 2018-04-24 NOTE — LETTER
4/24/2018         RE: Melody Ferguson  [address]  [facility] [address]        Dear Colleague,    Thank you for referring your patient, Melody Ferguson, to the [facility]. Please see a copy of my visit note below.    Melody Ferguson is seen for follow up left total knee arthroplasty from 4/26/17.  She has no complaints on left knee.  She does have pain in her right knee. Last injections 2/27/18. She desires repeat injection.  Exercise:  Walking    Oxford score 36     Past Medical History:   Diagnosis Date     Arthritis 2000     Cancer (H) 1983     Cerebral infarction (H) 2005     COPD (chronic obstructive pulmonary disease) (H) 1990     Depressive disorder 1990     DM type 2 (diabetes mellitus, type 2) (H)      History of TIA (transient ischemic attack)      HTN (hypertension)      Hyperlipidemia      Uncomplicated asthma 1990       Past Surgical History:   Procedure Laterality Date     ABDOMEN SURGERY  05/20/2005     BIOPSY  1984     BREAST SURGERY  1984     C TOTAL KNEE ARTHROPLASTY Left 04/26/2017    [facility]ML at [facility]     CHOLECYSTECTOMY  2000     COLONOSCOPY       ENT SURGERY  2008    tonsils removed     GENITOURINARY SURGERY  2005    a & p repair     REPAIR PTOSIS       SLING TRANSVAGINAL N/A 7/14/2017    Procedure: SLING TRANSVAGINAL;  Sling ([place]);  Surgeon: Talon Katz MD;  Location: [facility] OR       Family History   Problem Relation Age of Onset     Thyroid Disease Sister      CANCER Brother      CANCER Sister      breast cancer     CEREBROVASCULAR DISEASE Sister 62     Other - See Comments Sister      Angioplasty 8/2016     Hypertension Father      Hyperlipidemia Father      Prostate Cancer Father      Glaucoma Maternal Grandmother      CANCER Maternal Grandfather      CANCER Paternal Grandmother      Breast Cancer Paternal Grandmother      CEREBROVASCULAR DISEASE Paternal Grandfather      CEREBROVASCULAR DISEASE Maternal Half-Sister      4/08/2016     Breast Cancer Maternal  Half-Sister      Asthma Maternal Half-Sister      Other Cancer Brother      Passed from cancer unsure what type     Anesthesia Reaction Daughter      DIABETES No family hx of      Macular Degeneration No family hx of        Social History     Social History     Marital status: Single     Spouse name: N/A     Number of children: N/A     Years of education: N/A     Occupational History      Disabled     Social History Main Topics     Smoking status: Former Smoker     Packs/day: 0.50     Years: 25.00     Types: Cigarettes     Quit date: 10/15/1991     Smokeless tobacco: Never Used     Alcohol use 0.0 oz/week      Comment: occassionally     Drug use: No     Sexual activity: Yes     Partners: Male     Birth control/ protection: Other      Comment: kalina     Other Topics Concern     Parent/Sibling W/ Cabg, Mi Or Angioplasty Before 65f 55m? Yes     Mirna Angioplasty 2015     Social History Narrative       Current Outpatient Prescriptions   Medication Sig Dispense Refill     ACCU-CHEK DAMIEN PLUS test strip TEST BLOOD SUGARS TWICE DAILY 200 strip 0     ACE NOT PRESCRIBED, INTENTIONAL, Reported on 5/10/2017 0 each 0     albuterol (PROAIR HFA, PROVENTIL HFA, VENTOLIN HFA) 108 (90 BASE) MCG/ACT inhaler Inhale 2 puffs into the lungs every 6 hours       ASPIRIN PO Take 81 mg by mouth daily       ATORVASTATIN CALCIUM PO Take 40 mg by mouth daily       blood glucose monitoring (NO BRAND SPECIFIED) meter device kit Use to test blood sugar 1 times daily or as directed. Please dispense insurance preferred. 1 kit 0     blood glucose monitoring (NO BRAND SPECIFIED) test strip Use to test blood sugars 2 times daily or as directed 180 strip 3     BUPROPION HCL PO Take 50 mg by mouth 2 times daily        citalopram (CELEXA) 40 MG tablet TAKE 1 TABLET(40 MG) BY MOUTH EVERY MORNING 90 tablet 0     cyclobenzaprine (FLEXERIL) 5 MG tablet TAKE 1 TABLET(5 MG) BY MOUTH TWICE DAILY AS NEEDED FOR MUSCLE SPASMS 60 tablet 0     cyclobenzaprine  (FLEXERIL) 5 MG tablet TAKE 1 TABLET(5 MG) BY MOUTH TWICE DAILY AS NEEDED FOR MUSCLE SPASMS 60 tablet 1     diclofenac (VOLTAREN) 50 MG EC tablet TAKE 1 TABLET(50 MG) BY MOUTH TWICE DAILY AS NEEDED FOR MODERATE PAIN 180 tablet 0     esomeprazole (NEXIUM) 40 MG CR capsule Take 1 capsule (40 mg) by mouth every morning (before breakfast) Take 30-60 minutes before eating. 90 capsule 1     fluticasone (VERAMYST) 27.5 MCG/SPRAY nasal spray Spray 2 sprays into both nostrils daily       gabapentin (NEURONTIN) 300 MG capsule Take 1 capsule (300 mg) by mouth At Bedtime (Patient taking differently: Take 300 mg by mouth 2 times daily ) 90 capsule 1     glipiZIDE-metFORMIN (METAGLIP) 5-500 MG per tablet TAKE 1 TABLETS BY MOUTH TWICE DAILY BEFORE MEALS 360 tablet 0     insulin pen needle (NOVOFINE) 32G X 6 MM Use once daily or as directed. 100 each prn     liraglutide (VICTOZA) 18 MG/3ML soln Inject 1.2 mg Subcutaneous daily 18 mL 3     losartan (COZAAR) 50 MG tablet Take 1 tablet (50 mg) by mouth daily 90 tablet 0     melatonin 3 MG tablet TAKE 1 TABLET BY MOUTH ONCE AT BEDTIME AS NEEDED FOR SLEEP 30 tablet 0     metoprolol succinate (TOPROL XL) 25 MG 24 hr tablet Take 1 tablet (25 mg) by mouth daily 90 tablet 1     montelukast (SINGULAIR) 10 MG tablet TAKE 1 TABLET(10 MG) BY MOUTH AT BEDTIME 90 tablet 0     nystatin (MYCOSTATIN) 981272 UNIT/GM POWD Apply topically 2 times daily 60 g 1     order for DME Diabetic test strips and lancets per insurance formulary Check blood sugar 2 times daily 3 Month 3     polyethylene glycol (MIRALAX/GLYCOLAX) powder Take 17 g by mouth daily DISSOLVE 17 GRAMS IN LIQUID AND DRINK DAILY AS DIRECTED 510 g 3     solifenacin (VESICARE) 10 MG tablet Take 1 tablet (10 mg) by mouth daily 90 tablet 0     traMADol (ULTRAM) 50 MG tablet TAKE 1 TABLET BY MOUTH EVERY 8 HOURS AS NEEDED FOR MODERATE PAIN 30 tablet 0       Allergies   Allergen Reactions     Milk Protein Extract GI Disturbance     lactose  "intolerance.  Can tolerate milk products if uses lactaid     Bee Venom Swelling     Latex      Lisinopril Cough     Onion GI Disturbance     Vomiting, feels like throat is clogged     Aloe Rash     pain     Chlorine Rash       REVIEW OF SYSTEMS:  CONSTITUTIONAL:  NEGATIVE for fever, chills, change in weight, not feeling tired  SKIN:  NEGATIVE for worrisome rashes, no skin lumps, no skin ulcers and no non-healing wounds  EYES:  NEGATIVE for vision changes or irritation.  ENT/MOUTH:  NEGATIVE.  No hearing loss, no hoarseness, no difficulty swallowing.  RESP:  NEGATIVE. No cough or shortness of breath.  BREAST:  NEGATIVE for masses, tenderness or discharge  CV:  NEGATIVE for chest pain, palpitations or peripheral edema  GI:  NEGATIVE for nausea, abdominal pain, heartburn, or change in bowel habits  :  Negative. No dysuria, no hematuria  MUSCULOSKELETAL:  See HPI above  NEURO:  NEGATIVE . No headaches, no dizziness,  no numbness  ENDOCRINE:  NEGATIVE for temperature intolerance, skin/hair changes  HEME/ALLERGY/IMMUNE:  NEGATIVE for bleeding problems  PSYCHIATRIC:  NEGATIVE. no anxiety, no depression.      Xray:  Xray images were independently visualized with the patient.  This shows good position of components.  No sign of loosening or wear.     Exam:  Vitals: /80  Temp 98.5  F (36.9  C)  Resp 20  Ht 1.702 m (5' 7\")  Wt 145.2 kg (320 lb)  BMI 50.12 kg/m2  BMI= Body mass index is 50.12 kg/(m^2).  Range of motion right 5-115 degrees, left 0-126 degrees.  Alignment  Normal.  Stability:   Right       Lateral collateral ligament no laxity       Medial collateral ligament no laxity  Left:       Lateral collateral ligament no laxity       Medial collateral ligament no laxity  Wound:  Well healed.  Edema: Minor - both lower legs  Effusion: Minor - right knee  Tenderness: Right Significant - medial joint line.       Left:  Minor - superior lateral aspect of the patella  Sensation, motor, and circulation " intact.    Assessment:  Left total knee arthroplasty doing well  Right knee osteoarthritis severe.  Plan:  She  desires injection today of right knee(s).  Risks, benefits, potential complications and alternatives were discussed.   With the patient's consent, sterile prep was performed of right knee(s).  Right knee was injected with Depo Medrol 80 mg and lidocaine at anteromedial site.  Return to clinic as needed.  Resume activity as tolerated.  Return to clinic 4 years with xray left knee.  Continue antibiotics for dental work.    The patient's right knee was prepped with betadine solution after verification of allergies. Area approximately 10 cm x 10 cm prepped in a sterile fashion. After injection, betadine removed with soap and water and band-aids applied.    1ml depo-medrol with 1% lidocaine plain injected into patient's right knee by Dr. Armond George  LOT# [id number]  Exp. 05/2020    Talon Fenton PA-C  Supervising physician: Armond George MD  Dept. of Orthopedics  [facility]          Again, thank you for allowing me to participate in the care of your patient.        Sincerely,        Armond George MD

## 2018-04-24 NOTE — PROGRESS NOTES
The patient's right knee was prepped with betadine solution after verification of allergies. Area approximately 10 cm x 10 cm prepped in a sterile fashion. After injection, betadine removed with soap and water and band-aids applied.    1ml depo-medrol with 1% lidocaine plain injected into patient's right knee by Dr. Armond George  LOT# [id number]  Exp. 05/2020    Talon Fenton PA-C  Supervising physician: Armond George MD  Dept. of Orthopedics  [facility]

## 2018-04-24 NOTE — MR AVS SNAPSHOT
After Visit Summary   4/24/2018    Melody Ferguson    MRN: [id number]           Patient Information     Date Of Birth          1960        Visit Information        Provider Department      4/24/2018 8:30 AM Armond George MD [facility] Melvin Velazquez        Today's Diagnoses     History of total knee arthroplasty, left    -  1      Care Instructions    You have had a steroid injection today.  For the first 2 hours there will likely be some numbing in the joint from the lidocaine.  This is a good sign, indicating that the injection is in the right place.  In 2 hours the lidocaine will wear off, and the joint will hurt like you had a shot.  Each day the cortisone makes it feel better.  It reaches peak effect in 2 weeks.  We expect it to last for 3 months.  You may resume regular activity when you feel ready.  If you are diabetic, your glucoses will be quite high for several days.    Continue antibiotics for dental work.  Return to clinic 2-4 years for left knee x-ray.          Follow-ups after your visit        Your next 10 appointments already scheduled     Apr 30, 2018  3:45 PM CDT   Radiology Injections with Sheri Colorado MD   [facility] ([facility])    [address] [address]   [phone]            May 02, 2018  8:00 AM CDT   Return Visit with Carlos Roche MD   [facility] ([facility])    [address] [address]   [phone]            Jul 31, 2018  2:15 PM CDT   Return Visit with Eduardo Benson MD   [facility] ([facility])    [address] [place] [address] [address]  [facility] [address]   [phone] OPT 2              Who to contact     If you have questions or need follow up information about today's clinic visit or your schedule please contact [facility] MELVIN VELAZQUEZ directly at  "[phone].  Normal or non-critical lab and imaging results will be communicated to you by [employer], letter or phone within 4 business days after the clinic has received the results. If you do not hear from us within 7 days, please contact the clinic through [employer] or phone. If you have a critical or abnormal lab result, we will notify you by phone as soon as possible.  Submit refill requests through [employer] or call your pharmacy and they will forward the refill request to us. Please allow 3 business days for your refill to be completed.          Additional Information About Your Visit        Hello [employer] Information     [employer] gives you secure access to your electronic health record. If you see a primary care provider, you can also send messages to your care team and make appointments. If you have questions, please call your primary care clinic.  If you do not have a primary care provider, please call [phone] and they will assist you.        Care EveryWhere ID     This is your Care EveryWhere ID. This could be used by other organizations to access your [facility] medical records  [id number]        Your Vitals Were     Temperature Respirations Height BMI (Body Mass Index)          98.5  F (36.9  C) 20 1.702 m (5' 7\") 50.12 kg/m2         Blood Pressure from Last 3 Encounters:   04/24/18 134/80   03/20/18 135/77   02/28/18 124/79    Weight from Last 3 Encounters:   04/24/18 145.2 kg (320 lb)   03/20/18 (!) 145.6 kg (321 lb)   02/28/18 (!) 144.2 kg (318 lb)                 Today's Medication Changes          These changes are accurate as of 4/24/18  8:43 AM.  If you have any questions, ask your nurse or doctor.               These medicines have changed or have updated prescriptions.        Dose/Directions    gabapentin 300 MG capsule   Commonly known as:  NEURONTIN   This may have changed:  when to take this   Used for:  Acute left-sided low back pain with left-sided sciatica        Dose:  300 mg   Take 1 capsule " (300 mg) by mouth At Bedtime   Quantity:  90 capsule   Refills:  1                Primary Care Provider Office Phone # Fax #    Elizabeth Lamas [phone] [address]  [facility] system [address]        Equal Access to Services     EVY KIRAN : Hadii aad ku hadasho Soomaali, waaxda luqadaha, qaybta kaalmada adeegyada, waxay idiin hayaan adeeg talamillie layusuf reed. So [facility] [phone].    ATENCIÓN: Si habla español, tiene a turner disposición servicios gratuitos de asistencia lingüística. Llame al [phone].    We comply with applicable federal civil rights laws and [place] laws. We do not discriminate on the basis of race, color, national origin, age, disability, sex, sexual orientation, or gender identity.            Thank you!     Thank you for choosing [facility]  for your care. Our goal is always to provide you with excellent care. Hearing back from our patients is one way we can continue to improve our services. Please take a few minutes to complete the written survey that you may receive in the mail after your visit with us. Thank you!             Your Updated Medication List - Protect others around you: Learn how to safely use, store and throw away your medicines at www.disposemymeds.org.          This list is accurate as of 4/24/18  8:43 AM.  Always use your most recent med list.                   Brand Name Dispense Instructions for use Diagnosis    * ACCU-CHEK DAMIEN PLUS test strip   Generic drug:  blood glucose monitoring     200 strip    TEST BLOOD SUGARS TWICE DAILY    Type 2 diabetes mellitus with diabetic nephropathy (H)       * blood glucose monitoring test strip    no brand specified    180 strip    Use to test blood sugars 2 times daily or as directed    Type 2 diabetes mellitus with diabetic nephropathy, without long-term current use of insulin (H)       ACE NOT PRESCRIBED (INTENTIONAL)     0 each    Reported on 5/10/2017    Type 2 diabetes mellitus  without complication (H)       albuterol 108 (90 Base) MCG/ACT Inhaler    PROAIR HFA/PROVENTIL HFA/VENTOLIN HFA     Inhale 2 puffs into the lungs every 6 hours        ASPIRIN PO      Take 81 mg by mouth daily        ATORVASTATIN CALCIUM PO      Take 40 mg by mouth daily        blood glucose monitoring meter device kit    no brand specified    1 kit    Use to test blood sugar 1 times daily or as directed. Please dispense insurance preferred.    Type 2 diabetes mellitus with diabetic nephropathy, without long-term current use of insulin (H)       BUPROPION HCL PO      Take 50 mg by mouth 2 times daily        citalopram 40 MG tablet    celeXA    90 tablet    TAKE 1 TABLET(40 MG) BY MOUTH EVERY MORNING    Major depressive disorder, recurrent episode, moderate (H)       * cyclobenzaprine 5 MG tablet    FLEXERIL    60 tablet    TAKE 1 TABLET(5 MG) BY MOUTH TWICE DAILY AS NEEDED FOR MUSCLE SPASMS    Left sided sciatica, Back muscle spasm       * cyclobenzaprine 5 MG tablet    FLEXERIL    60 tablet    TAKE 1 TABLET(5 MG) BY MOUTH TWICE DAILY AS NEEDED FOR MUSCLE SPASMS    Left sided sciatica, Back muscle spasm       diclofenac 50 MG EC tablet    VOLTAREN    180 tablet    TAKE 1 TABLET(50 MG) BY MOUTH TWICE DAILY AS NEEDED FOR MODERATE PAIN    Chronic pain syndrome       esomeprazole 40 MG CR capsule    nexIUM    90 capsule    Take 1 capsule (40 mg) by mouth every morning (before breakfast) Take 30-60 minutes before eating.    Dyspepsia       fluticasone 27.5 MCG/SPRAY spray    VERAMYST     Spray 2 sprays into both nostrils daily        gabapentin 300 MG capsule    NEURONTIN    90 capsule    Take 1 capsule (300 mg) by mouth At Bedtime    Acute left-sided low back pain with left-sided sciatica       glipiZIDE-metFORMIN 5-500 MG per tablet    METAGLIP    360 tablet    TAKE 1 TABLETS BY MOUTH TWICE DAILY BEFORE MEALS    Type 2 diabetes mellitus with diabetic nephropathy, without long-term current use of insulin (H)        insulin pen needle 32G X 6 MM    NOVOFINE    100 each    Use once daily or as directed.    Type 2 diabetes mellitus with diabetic nephropathy, without long-term current use of insulin (H)       liraglutide 18 MG/3ML soln    VICTOZA    18 mL    Inject 1.2 mg Subcutaneous daily    Type 2 diabetes mellitus with diabetic nephropathy, without long-term current use of insulin (H)       losartan 50 MG tablet    COZAAR    90 tablet    Take 1 tablet (50 mg) by mouth daily    HTN, goal below 140/90, Type 2 diabetes mellitus with diabetic nephropathy, without long-term current use of insulin (H)       melatonin 3 MG tablet     30 tablet    TAKE 1 TABLET BY MOUTH ONCE AT BEDTIME AS NEEDED FOR SLEEP    Major depressive disorder, recurrent episode, moderate (H)       metoprolol succinate 25 MG 24 hr tablet    TOPROL XL    90 tablet    Take 1 tablet (25 mg) by mouth daily    Old myocardial infarction       montelukast 10 MG tablet    SINGULAIR    90 tablet    TAKE 1 TABLET(10 MG) BY MOUTH AT BEDTIME    Mild intermittent asthma without complication       nystatin 815677 UNIT/GM Powd    MYCOSTATIN    60 g    Apply topically 2 times daily    Morbid obesity with body mass index of 45.0-49.9 in adult (H)       order for DME     3 Month    Diabetic test strips and lancets per insurance formulary Check blood sugar 2 times daily    Type 2 diabetes mellitus with diabetic nephropathy (H)       polyethylene glycol powder    MIRALAX/GLYCOLAX    510 g    Take 17 g by mouth daily DISSOLVE 17 GRAMS IN LIQUID AND DRINK DAILY AS DIRECTED    Chronic pain syndrome       solifenacin 10 MG tablet    VESICARE    90 tablet    Take 1 tablet (10 mg) by mouth daily    Mixed incontinence       traMADol 50 MG tablet    ULTRAM    30 tablet    TAKE 1 TABLET BY MOUTH EVERY 8 HOURS AS NEEDED FOR MODERATE PAIN    Chronic pain syndrome       * Notice:  This list has 4 medication(s) that are the same as other medications prescribed for you. Read the directions  carefully, and ask your doctor or other care provider to review them with you.

## 2018-04-30 ENCOUNTER — RADIOLOGY INJECTION OFFICE VISIT (OUTPATIENT)
Dept: PALLIATIVE MEDICINE | Facility: CLINIC | Age: 58
End: 2018-04-30
Attending: PAIN MEDICINE
Payer: MEDICARE

## 2018-04-30 ENCOUNTER — RADIANT APPOINTMENT (OUTPATIENT)
Dept: RADIOLOGY | Facility: CLINIC | Age: 58
End: 2018-04-30
Attending: PSYCHIATRY & NEUROLOGY
Payer: MEDICARE

## 2018-04-30 VITALS — DIASTOLIC BLOOD PRESSURE: 64 MMHG | HEART RATE: 76 BPM | OXYGEN SATURATION: 95 % | SYSTOLIC BLOOD PRESSURE: 148 MMHG

## 2018-04-30 DIAGNOSIS — M47.819 FACET ARTHROPATHY: ICD-10-CM

## 2018-04-30 DIAGNOSIS — M47.819 FACET ARTHROPATHY: Primary | ICD-10-CM

## 2018-04-30 DIAGNOSIS — M54.16 LUMBAR RADICULOPATHY: ICD-10-CM

## 2018-04-30 PROCEDURE — 64493 INJ PARAVERT F JNT L/S 1 LEV: CPT | Mod: LT | Performed by: PSYCHIATRY & NEUROLOGY

## 2018-04-30 PROCEDURE — 64494 INJ PARAVERT F JNT L/S 2 LEV: CPT | Mod: LT | Performed by: PSYCHIATRY & NEUROLOGY

## 2018-04-30 ASSESSMENT — PAIN SCALES - GENERAL: PAINLEVEL: SEVERE PAIN (6)

## 2018-04-30 NOTE — NURSING NOTE
Pre-procedure Intake    Have you been fasting? NA    If yes, for how long? NA    Are you taking a prescribed blood thinner such as coumadin, Plavix, Xarelto?    No    If yes, when did you take your last dose? NA    Do you take aspirin?  No    If cervical procedure, have you held aspirin for 6 days?   NA    Do you have any allergies to contrast dye, iodine, steroid and/or numbing medications?  Not Applicable    Are you currently taking antibiotics or have an active infection?  NO    Have you had a fever/elevated temperature within the past week? NO    Are you currently taking oral steroids? NO    Do you have a ? Yes       Are you pregnant or breastfeeding?  Not Applicable    Are the vital signs normal?  Yes    Candi Franklin CMA ([facility])

## 2018-04-30 NOTE — MR AVS SNAPSHOT
After Visit Summary   4/30/2018    Melody Ferguson    MRN: [id number]           Patient Information     Date Of Birth          1960        Visit Information        Provider Department      4/30/2018 3:45 PM Sheri Colorado MD [facility]        Care Instructions    [facility]   Procedure Discharge Instructions    Today you saw:  Dr. Sheri Colorado      You had an:  Lumbar Facet Joint injections       Medications used:  Lidocaine   Bupivacaine   Dexamethasone  Omnipaque          Be cautious when walking. Numbness and/or weakness in the lower extremities may occur for up to 6-8 hours after the procedure due to effect of the local anesthetic    Do not drive for 6 hours. The effect of the local anesthetic could slow your reflexes.     You may resume your regular activities after 24 hours    Avoid strenuous activity for the first 24 hours    You may shower, however avoid swimming, tub baths or hot tubs for 24 hours following your procedure    You may have a mild to moderate increase in pain for several days following the injection.    It may take up to 14 days for the steroid medication to start working although you may feel the effect as early as a few days after the procedure.       You may use ice packs for 10-15 minutes, 3 to 4 times a day at the injection site for comfort    Do not use heat to painful areas for 6 to 8 hours. This will give the local anesthetic time to wear off and prevent you from accidentally burning your skin.     You may use anti-inflammatory medications (such as Ibuprofen or Aleve or Advil) or Tylenol for pain control if necessary    If you were fasting, you may resume your normal diet and medications after the procedure    If you have diabetes, check your blood sugar more frequently than usual as your blood sugar may be higher than normal for 10-14 days following a steroid injection. Contact your doctor who manages your diabetes if your  blood sugar is higher than usual    If you experience any of the following, call the Pain Clinic during work hours at [phone] or the Provider Line after hours at [phone]:  -Fever over 100 degree F  -Swelling, bleeding, redness, drainage, warmth at the injection site  -Progressive weakness or numbness in your legs   -Loss of bowel or bladder function  -Unusual new onset of pain that is not improving                  Follow-ups after your visit        Your next 10 appointments already scheduled     May 30, 2018 10:30 AM CDT   Return Visit with Carlos Roche MD   [facility] ([facility])    [address] [address] [phone]            Jul 31, 2018  2:15 PM CDT   Return Visit with Eduardo Benson MD   [facility] ([facility])    [address] [place] [address] [address]  [facility] [address] [phone] OPT 2              Who to contact     If you have questions or need follow up information about today's clinic visit or your schedule please contact [facility] directly at [phone].  Normal or non-critical lab and imaging results will be communicated to you by MyChart, letter or phone within 4 business days after the clinic has received the results. If you do not hear from us within 7 days, please contact the clinic through MyChart or phone. If you have a critical or abnormal lab result, we will notify you by phone as soon as possible.  Submit refill requests through [employer] or call your pharmacy and they will forward the refill request to us. Please allow 3 business days for your refill to be completed.          Additional Information About Your Visit        [employer] Information     [employer] gives you secure access to your electronic health record. If you see a primary care provider, you can also send messages to your care team and make appointments. If you have questions,  please call your primary care clinic.  If you do not have a primary care provider, please call [phone] and they will assist you.        Care EveryWhere ID     This is your Care EveryWhere ID. This could be used by other organizations to access your [place] medical records  [id number]        Your Vitals Were     Pulse                   71            Blood Pressure from Last 3 Encounters:   04/30/18 133/75   04/24/18 134/80   03/20/18 135/77    Weight from Last 3 Encounters:   04/24/18 145.2 kg (320 lb)   03/20/18 (!) 145.6 kg (321 lb)   02/28/18 (!) 144.2 kg (318 lb)              Today, you had the following     No orders found for display         Today's Medication Changes          These changes are accurate as of 4/30/18  3:54 PM.  If you have any questions, ask your nurse or doctor.               These medicines have changed or have updated prescriptions.        Dose/Directions    gabapentin 300 MG capsule   Commonly known as:  NEURONTIN   This may have changed:  when to take this   Used for:  Acute left-sided low back pain with left-sided sciatica        Dose:  300 mg   Take 1 capsule (300 mg) by mouth At Bedtime   Quantity:  90 capsule   Refills:  1                Primary Care Provider Office Phone # Fax #    Elizabeth Lamas [phone]       [address]  [facility] [address]        Equal Access to Services     [facility] AH: Hadii norberto espinoza hadasho Socorey, waaxda luqadaha, qaybta kaalmada kassidy, issac reed. So [facility] [phone].    ATENCIÓN: Si habla español, tiene a turner disposición servicios gratuitos de asistencia lingüística. Llame al [phone].    We comply with applicable federal civil rights laws and [place] laws. We do not discriminate on the basis of race, color, national origin, age, disability, sex, sexual orientation, or gender identity.            Thank you!     Thank you for choosing [facility]  for your care. Our  goal is always to provide you with excellent care. Hearing back from our patients is one way we can continue to improve our services. Please take a few minutes to complete the written survey that you may receive in the mail after your visit with us. Thank you!             Your Updated Medication List - Protect others around you: Learn how to safely use, store and throw away your medicines at www.disposemymeds.org.          This list is accurate as of 4/30/18  3:54 PM.  Always use your most recent med list.                   Brand Name Dispense Instructions for use Diagnosis    * ACCU-CHEK DAMIEN PLUS test strip   Generic drug:  blood glucose monitoring     200 strip    TEST BLOOD SUGARS TWICE DAILY    Type 2 diabetes mellitus with diabetic nephropathy (H)       * blood glucose monitoring test strip    no brand specified    180 strip    Use to test blood sugars 2 times daily or as directed    Type 2 diabetes mellitus with diabetic nephropathy, without long-term current use of insulin (H)       ACE NOT PRESCRIBED (INTENTIONAL)     0 each    Reported on 5/10/2017    Type 2 diabetes mellitus without complication (H)       albuterol 108 (90 Base) MCG/ACT Inhaler    PROAIR HFA/PROVENTIL HFA/VENTOLIN HFA     Inhale 2 puffs into the lungs every 6 hours        ASPIRIN PO      Take 81 mg by mouth daily        ATORVASTATIN CALCIUM PO      Take 40 mg by mouth daily        blood glucose monitoring meter device kit    no brand specified    1 kit    Use to test blood sugar 1 times daily or as directed. Please dispense insurance preferred.    Type 2 diabetes mellitus with diabetic nephropathy, without long-term current use of insulin (H)       BUPROPION HCL PO      Take 50 mg by mouth 2 times daily        citalopram 40 MG tablet    celeXA    90 tablet    TAKE 1 TABLET(40 MG) BY MOUTH EVERY MORNING    Major depressive disorder, recurrent episode, moderate (H)       * cyclobenzaprine 5 MG tablet    FLEXERIL    60 tablet    TAKE 1  TABLET(5 MG) BY MOUTH TWICE DAILY AS NEEDED FOR MUSCLE SPASMS    Left sided sciatica, Back muscle spasm       * cyclobenzaprine 5 MG tablet    FLEXERIL    60 tablet    TAKE 1 TABLET(5 MG) BY MOUTH TWICE DAILY AS NEEDED FOR MUSCLE SPASMS    Left sided sciatica, Back muscle spasm       diclofenac 50 MG EC tablet    VOLTAREN    180 tablet    TAKE 1 TABLET(50 MG) BY MOUTH TWICE DAILY AS NEEDED FOR MODERATE PAIN    Chronic pain syndrome       esomeprazole 40 MG CR capsule    nexIUM    90 capsule    Take 1 capsule (40 mg) by mouth every morning (before breakfast) Take 30-60 minutes before eating.    Dyspepsia       fluticasone 27.5 MCG/SPRAY spray    VERAMYST     Spray 2 sprays into both nostrils daily        gabapentin 300 MG capsule    NEURONTIN    90 capsule    Take 1 capsule (300 mg) by mouth At Bedtime    Acute left-sided low back pain with left-sided sciatica       glipiZIDE-metFORMIN 5-500 MG per tablet    METAGLIP    360 tablet    TAKE 1 TABLETS BY MOUTH TWICE DAILY BEFORE MEALS    Type 2 diabetes mellitus with diabetic nephropathy, without long-term current use of insulin (H)       insulin pen needle 32G X 6 MM    NOVOFINE    100 each    Use once daily or as directed.    Type 2 diabetes mellitus with diabetic nephropathy, without long-term current use of insulin (H)       liraglutide 18 MG/3ML soln    VICTOZA    18 mL    Inject 1.2 mg Subcutaneous daily    Type 2 diabetes mellitus with diabetic nephropathy, without long-term current use of insulin (H)       losartan 50 MG tablet    COZAAR    90 tablet    Take 1 tablet (50 mg) by mouth daily    HTN, goal below 140/90, Type 2 diabetes mellitus with diabetic nephropathy, without long-term current use of insulin (H)       melatonin 3 MG tablet     30 tablet    TAKE 1 TABLET BY MOUTH ONCE AT BEDTIME AS NEEDED FOR SLEEP    Major depressive disorder, recurrent episode, moderate (H)       metoprolol succinate 25 MG 24 hr tablet    TOPROL XL    90 tablet    Take 1 tablet  (25 mg) by mouth daily    Old myocardial infarction       montelukast 10 MG tablet    SINGULAIR    90 tablet    TAKE 1 TABLET(10 MG) BY MOUTH AT BEDTIME    Mild intermittent asthma without complication       nystatin 188673 UNIT/GM Powd    MYCOSTATIN    60 g    Apply topically 2 times daily    Morbid obesity with body mass index of 45.0-49.9 in adult (H)       order for DME     3 Month    Diabetic test strips and lancets per insurance formulary Check blood sugar 2 times daily    Type 2 diabetes mellitus with diabetic nephropathy (H)       polyethylene glycol powder    MIRALAX/GLYCOLAX    510 g    Take 17 g by mouth daily DISSOLVE 17 GRAMS IN LIQUID AND DRINK DAILY AS DIRECTED    Chronic pain syndrome       solifenacin 10 MG tablet    VESICARE    90 tablet    Take 1 tablet (10 mg) by mouth daily    Mixed incontinence       traMADol 50 MG tablet    ULTRAM    30 tablet    TAKE 1 TABLET BY MOUTH EVERY 8 HOURS AS NEEDED FOR MODERATE PAIN    Chronic pain syndrome       * Notice:  This list has 4 medication(s) that are the same as other medications prescribed for you. Read the directions carefully, and ask your doctor or other care provider to review them with you.

## 2018-04-30 NOTE — PATIENT INSTRUCTIONS
[facility]   Procedure Discharge Instructions    Today you saw:  Dr. Sheri Colorado      You had an:  Lumbar Facet Joint injections       Medications used:  Lidocaine   Bupivacaine   Dexamethasone  Omnipaque          Be cautious when walking. Numbness and/or weakness in the lower extremities may occur for up to 6-8 hours after the procedure due to effect of the local anesthetic    Do not drive for 6 hours. The effect of the local anesthetic could slow your reflexes.     You may resume your regular activities after 24 hours    Avoid strenuous activity for the first 24 hours    You may shower, however avoid swimming, tub baths or hot tubs for 24 hours following your procedure    You may have a mild to moderate increase in pain for several days following the injection.    It may take up to 14 days for the steroid medication to start working although you may feel the effect as early as a few days after the procedure.       You may use ice packs for 10-15 minutes, 3 to 4 times a day at the injection site for comfort    Do not use heat to painful areas for 6 to 8 hours. This will give the local anesthetic time to wear off and prevent you from accidentally burning your skin.     You may use anti-inflammatory medications (such as Ibuprofen or Aleve or Advil) or Tylenol for pain control if necessary    If you were fasting, you may resume your normal diet and medications after the procedure    If you have diabetes, check your blood sugar more frequently than usual as your blood sugar may be higher than normal for 10-14 days following a steroid injection. Contact your doctor who manages your diabetes if your blood sugar is higher than usual    If you experience any of the following, call the Pain Clinic during work hours at [phone] or the Provider Line after hours at [phone]:  -Fever over 100 degree F  -Swelling, bleeding, redness, drainage, warmth at the injection site  -Progressive weakness or  numbness in your legs   -Loss of bowel or bladder function  -Unusual new onset of pain that is not improving

## 2018-04-30 NOTE — PROGRESS NOTES
Pre procedure Diagnosis: facet arthropathy, lumbar spondylosis  Post procedure Diagnosis: Same  Procedure performed: left L4-5 and L5-S1 facet joint injection  Anesthesia: none  Complications: none  Operators: Cassy Colorado MD and Sallie Wilkinson MD (pain medicine fellow)    Indications:   Melody Ferguson is a 57 year old female was sent by Dr. Roche for a lumbar injection  They have a history of left sided axial low back pain.  Exam shows pain with palpation of left lumbar paraspinals, pain with lumbar extension and lateral rotation and they have tried conservative treatment including medications and prior injections.     MRI was done on 8/8/16 at [facility] which showed   1. Facet arthrosis, marked at L4-L5 and moderate/marked at L3-L4, L2-L3, and T11-T12, and spondylosis resulting in, at L2-L3 and L3-L4, marked bilateral recess and moderate/marked central canal stenosis. Also marked right and moderate left L4-5 recess stenosis.  2. Grade 1 L4 and L3 degenerative anterolisthesis.  3. No fracture, neoplasm, or infection.  Options/alternatives, benefits and risks were discussed with the patient including bleeding, infection, tissue trauma, numbness, weakness, paralysis, spinal cord injury, radiation exposure, headache and reaction to medications. Questions were answered to her satisfaction and she agrees to proceed. Voluntary informed consent was obtained and signed.     Vitals were reviewed: Yes  Allergies were reviewed:  Yes   Medications were reviewed:  Yes  Pre-procedure pain score: 6/10    Procedure:  After getting informed consent, patient was brought into the procedure suite and was placed in a prone position on the procedure table.   A Pause for the Cause was performed.  Patient was prepped and draped in sterile fashion.     Under AP fluoroscopic guidance the L4-5 and L5-S1 facet joints on the left side were identified, and the C-arm was rotated obliquely to the affected side to open the joint space. A total of 3 ml  of 1% lidocaine was injected at the needle entry point and needle tract. Then a 22 gauge 5 inch quincke type spinal needle was inserted and advanced under fluoroscopic guidance targeting the superior articular pillar of each joint. Once the needle made a contact with SAP, it was rotated and was then advanced into the joint.    AP fluoroscopic views were obtained to confirm the needle placement. Then,  Omnipaque 300 contrast dye was injected after negative aspiration for heme and CSF in each joint, confirming appropriate placement.  A total of 1ml of Omnipaque was used, and 9 was wasted.     The injection was then accomplished using a solution containing 1ml of 0.5% bupivacaine mixed with 10mg of dexamethasone, divided between the 2 joints. The needles were removed.    Hemostasis was achieved, the area was cleaned, and bandaids were placed when appropriate.  The patient tolerated the procedure well, and was taken to the recovery room.    Images were saved to PACS.    Post-procedure pain score: 0/10  Follow-up includes:   -f/u phone call in one week  -f/u with referring provider    Cassy Colorado MD  [facility]

## 2018-04-30 NOTE — NURSING NOTE
Discharge Information    IV Discontiued Time:  NA    Amount of Fluid Infused:  NA    Discharge Criteria = When patient returns to baseline or as per MD order    Consciousness:  Pt is fully awake    Circulation:  BP +/- 20% of pre-procedure level    Respiration:  Patient is able to breathe deeply    O2 Sat:  Patient is able to maintain O2 Sat >92% on room air    Activity:  Moves 4 extremities on command    Ambulation:  Patient is able to stand and walk or stand and pivot into wheelchair    Dressing:  Clean/dry or No Dressing    Notes:   Discharge instructions and AVS given to patient    Patient meets criteria for discharge?  YES    Admitted to PCU?  No    Responsible adult present to accompany patient home?  Yes    Signature/Title:    Juanita Marroquin RN Care Coordinator  [facility]

## 2018-05-05 ENCOUNTER — MYC REFILL (OUTPATIENT)
Dept: FAMILY MEDICINE | Facility: CLINIC | Age: 58
End: 2018-05-05

## 2018-05-05 DIAGNOSIS — M54.42 ACUTE LEFT-SIDED LOW BACK PAIN WITH LEFT-SIDED SCIATICA: ICD-10-CM

## 2018-05-07 NOTE — TELEPHONE ENCOUNTER
Message from [facility]:  Original authorizing provider: MD Melody Hu would like a refill of the following medications:  gabapentin (NEURONTIN) 300 MG capsule [Elizabeth Lamas MD]    Preferred pharmacy: [id number] - [facility], [place] - [address] AT [facility] [address] [facility] [address]    Comment:  the dosage was changed to 300mg 2 times daily by Dr Roche do I have you change the script or him I need a refill Thank You Melody

## 2018-05-08 RX ORDER — GABAPENTIN 300 MG/1
300 CAPSULE ORAL AT BEDTIME
Qty: 90 CAPSULE | Refills: 1 | Status: SHIPPED | OUTPATIENT
Start: 2018-05-08 | End: 2018-05-21

## 2018-05-11 ENCOUNTER — MYC MEDICAL ADVICE (OUTPATIENT)
Dept: PALLIATIVE MEDICINE | Facility: CLINIC | Age: 58
End: 2018-05-11

## 2018-05-11 DIAGNOSIS — M54.42 ACUTE LEFT-SIDED LOW BACK PAIN WITH LEFT-SIDED SCIATICA: ICD-10-CM

## 2018-05-11 RX ORDER — GABAPENTIN 300 MG/1
300 CAPSULE ORAL 2 TIMES DAILY
Qty: 60 CAPSULE | Refills: 1 | Status: CANCELLED | OUTPATIENT
Start: 2018-05-11

## 2018-05-11 NOTE — TELEPHONE ENCOUNTER
My chart sent to patient:  Indio Oliver,  It looks like your primary office, Dr. Lamas provides you with your gabapentin prescriptions. Dr. Roche only makes recommendations to him. It is ultimately up to your primary doctor if he wants to change your medications. Please contact Dr. Lamas's office for this medication. Thanks!        Sabrina CORNEJON-RN Care Coordinator  [facility] [place]

## 2018-05-11 NOTE — TELEPHONE ENCOUNTER
My chart sent from patient: routing to ARMANDO Ledesma   BSN-RN Care Coordinator  [facility]

## 2018-05-11 NOTE — TELEPHONE ENCOUNTER
Received MyChart request from patient requesting refill(s) for gabapentin (NEURONTIN) 300 MG capsule    I need a new script for my Gabapentin you increased my does from 1 a day to 2 a day and I have run out my pharm needs new script its 300mg 2 times a day and it is really helping. (per patient MyChart message)    Last refilled on - Spoke to pharmacy and patient has two refills remaining.  New Rx may need to be written due to new dosing.    Pt last seen on 02/28/18 for office visit, has had injection since  Next appt scheduled for 05/30/18    Will facilitate refill.

## 2018-05-11 NOTE — TELEPHONE ENCOUNTER
Gabapentin prescribed by PCP, recs only provided by our clinic. Patient will need to obtain new script from PCP    Sabrina MORALES-RN Care Coordinator  [facility]

## 2018-05-12 ENCOUNTER — MYC MEDICAL ADVICE (OUTPATIENT)
Dept: FAMILY MEDICINE | Facility: CLINIC | Age: 58
End: 2018-05-12

## 2018-05-12 DIAGNOSIS — M54.42 ACUTE LEFT-SIDED LOW BACK PAIN WITH LEFT-SIDED SCIATICA: ICD-10-CM

## 2018-05-12 DIAGNOSIS — F33.1 MAJOR DEPRESSIVE DISORDER, RECURRENT EPISODE, MODERATE (H): ICD-10-CM

## 2018-05-12 DIAGNOSIS — J30.2 CHRONIC SEASONAL ALLERGIC RHINITIS, UNSPECIFIED TRIGGER: ICD-10-CM

## 2018-05-12 DIAGNOSIS — E11.21 TYPE 2 DIABETES MELLITUS WITH DIABETIC NEPHROPATHY, WITHOUT LONG-TERM CURRENT USE OF INSULIN (H): ICD-10-CM

## 2018-05-12 DIAGNOSIS — E78.5 HYPERLIPIDEMIA LDL GOAL <100: Primary | Chronic | ICD-10-CM

## 2018-05-12 DIAGNOSIS — J45.20 MILD INTERMITTENT ASTHMA WITHOUT COMPLICATION: Chronic | ICD-10-CM

## 2018-05-14 RX ORDER — BUPROPION HYDROCHLORIDE 100 MG/1
100 TABLET, EXTENDED RELEASE ORAL 2 TIMES DAILY
Qty: 180 TABLET | Refills: 1 | Status: SHIPPED | OUTPATIENT
Start: 2018-05-14 | End: 2018-10-30

## 2018-05-14 RX ORDER — ATORVASTATIN CALCIUM 40 MG/1
40 TABLET, FILM COATED ORAL DAILY
Qty: 90 TABLET | Refills: 1 | Status: SHIPPED | OUTPATIENT
Start: 2018-05-14 | End: 2018-10-30

## 2018-05-14 RX ORDER — ALBUTEROL SULFATE 90 UG/1
2 AEROSOL, METERED RESPIRATORY (INHALATION) EVERY 6 HOURS
Qty: 1 INHALER | Refills: 1 | Status: SHIPPED | OUTPATIENT
Start: 2018-05-14 | End: 2018-10-03

## 2018-05-14 NOTE — TELEPHONE ENCOUNTER
My chart read by patient:    From   Sabrina Ledesma, RN    To   Melody Ferguson    Sent and Delivered   5/11/2018  4:07 PM         [facility] Read in [facility]   5/12/2018  8:27 AM by Melody Ledesma   BSN-RN Care Coordinator  [facility] [place]

## 2018-05-14 NOTE — TELEPHONE ENCOUNTER
Routing refill request to provider for review/approval because:  Medications are reported/historical    Shaheen Head RN, BSN

## 2018-05-14 NOTE — TELEPHONE ENCOUNTER
Refilled.  Agree with note below for other pending refills (Victoza).  Pending patient response.  CARMEN Bueno CNP (covering)

## 2018-05-16 NOTE — TELEPHONE ENCOUNTER
This writer attempted to contact Melody on 05/16/18      Reason for call schedule appointment and inform to read [employer] and left detailed message.      If patient calls back:   Schedule Office Visit appointment within 1 week with primary care, document that pt called and close encounter         Tiana Young RN

## 2018-05-17 NOTE — TELEPHONE ENCOUNTER
This writer attempted to contact Melody on 05/17/18      Reason for call discuss messages below-pt needs to be seen and discuss medications/dosages and did she read [employer] message sent on 5/14/18?   and left message.      If patient calls back:   Schedule office visit within 1 week with primary care, document, and close encounter if no questions or concerns.        Susan Heath RN

## 2018-05-18 NOTE — TELEPHONE ENCOUNTER
Patient  returned call    Best number to reach caller: Home number on file [phone] (home)    Is it ok to leave a detailed message: YES

## 2018-05-18 NOTE — TELEPHONE ENCOUNTER
This writer attempted to contact Melody on 05/18/18      Reason for call appears patient did read [employer] message - I asked her to call or mychart the clinic with a response and left message.      If patient calls back:   Patient contacted by a Registered Nurse. Inform patient that someone from the RN group will contact them, document that pt called and route to P DYAD 3 RN POOL [[id number]]        Linh Martinez RN

## 2018-05-21 RX ORDER — GABAPENTIN 300 MG/1
300 CAPSULE ORAL 2 TIMES DAILY
Qty: 180 CAPSULE | Refills: 1 | Status: SHIPPED | OUTPATIENT
Start: 2018-05-21 | End: 2018-06-18

## 2018-05-21 RX ORDER — LIRAGLUTIDE 6 MG/ML
1.8 INJECTION SUBCUTANEOUS DAILY
Qty: 30 ML | Refills: 0 | Status: SHIPPED | OUTPATIENT
Start: 2018-05-21 | End: 2018-08-27

## 2018-05-21 RX ORDER — GLIPIZIDE AND METFORMIN HCL 5; 500 MG/1; MG/1
TABLET, FILM COATED ORAL
Qty: 90 TABLET | Refills: 0 | Status: SHIPPED | OUTPATIENT
Start: 2018-05-21 | End: 2019-01-04

## 2018-05-22 NOTE — TELEPHONE ENCOUNTER
I sent in refills with the following changes:    -Gabapentin 300 mg TWO times a day.    -Metaglip 5-500 mg ONCE a day (decreased from twice a day)    -Victoza 1.8 mg daily (increased from 1.2 mg daily), I am hoping the higher dose will aid with weight loss and hopefully glucose will not get low since I have reduced the Metaglip.    Thanks.    Elizabeth Lamas MD MPH

## 2018-05-24 DIAGNOSIS — E11.21 TYPE 2 DIABETES MELLITUS WITH DIABETIC NEPHROPATHY, WITHOUT LONG-TERM CURRENT USE OF INSULIN (H): ICD-10-CM

## 2018-05-24 NOTE — TELEPHONE ENCOUNTER
"Requested Prescriptions   Pending Prescriptions Disp Refills     glipiZIDE-metFORMIN (METAGLIP) 5-500 MG per tablet 90 tablet 0     Sig: TAKE 1 TABLET BY MOUTH TONCE DAILY BEFORE MEALS    Combination Oral Antihyperglycemic Agents Failed    5/24/2018  8:11 AM       Failed - Blood pressure under 140/90 in past 12 months    BP Readings from Last 3 Encounters:   04/30/18 148/64   04/24/18 134/80   03/20/18 135/77                Passed - Patient has a documented LDL level within past 12 mos.    Recent Labs   Lab Test 04/12/18   LDL  139*            Passed - Patient has a documented Microalbumin level within past 12 mos.    Recent Labs   Lab Test  01/05/18   0827   MICROL  10   UMALCR  7.97            Passed - Patient has documented A1c within the specified period of time.    If HgbA1C is 8 or greater, it needs to be on file within the past 3 months.  If less than 8, must be on file within the past 6 months.     Recent Labs   Lab Test 04/12/18   A1C  6.3*            Passed - Patient's CR is NOT>1.4 OR Patient's EGFR is NOT<45 within past 12 mos.    Recent Labs   Lab Test  01/05/18   0822   GFRESTIMATED  >90   GFRESTBLACK  >90       Recent Labs   Lab Test  01/05/18   0822   CR  0.51*            Passed - Patient does not have a diagnosis of CHF.       Passed - Patient is 18 years old or older.       Passed - Patient is not pregnant       Passed - Patient has not had a positive pregnancy test within the past 12 mos.       Passed - Recent (6 mo) or future (30 days) visit within the authorizing provider's specialty    Patient had office visit in the last 6 months or has a visit in the next 30 days with authorizing provider or within the authorizing provider's specialty.  See \"Patient Info\" tab in inbasket, or \"Choose Columns\" in Meds & Orders section of the refill encounter.            Last Written Prescription Date:  5/21/18  Last Fill Quantity: 90,  # refills: 0   Last office visit: 2/27/2018 with prescribing provider:  " MARCY   Future Office Visit:   Next 5 appointments (look out 90 days)     May 30, 2018 10:30 AM CDT   Return Visit with Carlos Roche MD   [facility] [place] ([facility])    [address]  [place] [address]   [phone]            Jul 31, 2018  2:15 PM CDT   Return Visit with Eduardo Benson MD   [facility] ([facility])    [address] [place] [id number]   [phone] OPT 2

## 2018-05-25 RX ORDER — GLIPIZIDE AND METFORMIN HCL 5; 500 MG/1; MG/1
TABLET, FILM COATED ORAL
Qty: 90 TABLET | Refills: 0
Start: 2018-05-25

## 2018-05-29 ENCOUNTER — RADIANT APPOINTMENT (OUTPATIENT)
Dept: MRI IMAGING | Facility: CLINIC | Age: 58
End: 2018-05-29
Attending: NURSE PRACTITIONER
Payer: MEDICARE

## 2018-05-29 DIAGNOSIS — M54.16 LUMBAR RADICULAR PAIN: ICD-10-CM

## 2018-05-29 PROCEDURE — 72148 MRI LUMBAR SPINE W/O DYE: CPT | Mod: TC

## 2018-05-30 ENCOUNTER — OFFICE VISIT (OUTPATIENT)
Dept: PALLIATIVE MEDICINE | Facility: CLINIC | Age: 58
End: 2018-05-30
Payer: MEDICARE

## 2018-05-30 ENCOUNTER — TELEPHONE (OUTPATIENT)
Dept: NEUROSURGERY | Facility: CLINIC | Age: 58
End: 2018-05-30

## 2018-05-30 VITALS
SYSTOLIC BLOOD PRESSURE: 137 MMHG | HEART RATE: 79 BPM | WEIGHT: 293 LBS | HEIGHT: 67 IN | BODY MASS INDEX: 45.99 KG/M2 | DIASTOLIC BLOOD PRESSURE: 79 MMHG

## 2018-05-30 DIAGNOSIS — M79.18 MYOFASCIAL MUSCLE PAIN: ICD-10-CM

## 2018-05-30 DIAGNOSIS — M54.16 LUMBAR RADICULAR PAIN: Primary | ICD-10-CM

## 2018-05-30 DIAGNOSIS — M47.817 LUMBOSACRAL SPONDYLOSIS WITHOUT MYELOPATHY: Primary | ICD-10-CM

## 2018-05-30 PROCEDURE — 99214 OFFICE O/P EST MOD 30 MIN: CPT | Performed by: PAIN MEDICINE

## 2018-05-30 ASSESSMENT — PAIN SCALES - GENERAL: PAINLEVEL: MILD PAIN (3)

## 2018-05-30 NOTE — PROGRESS NOTES
[facility]    Last visit: 2/28/2018        Chief Complaint:    Chief Complaint   Patient presents with     Pain       Pain history:  Melody Ferguson is a 57 year old female hx of fibro. The pt underwent a Left 4-5, L5-S1 facet joint injection with sig relief. She cont to have axial L LBP, but is more reasonable then before. The pain is a deep aching pain. She denies any radiation to her lower ext. She denies any progressive weakness.  The pt taking gabapentin as rx would like to increase dose. The pt only taking flexeril/diclofenac when pain is severe. But not taking every day. With relief. The pt is taking a 50mg tab of tramadol at night with sig benefit.   Of note pt had sig weight loss since last visit       Current treatments include:  Gabapentin , celexa,   victoza-lost weight   Tramadol  diclofenac  Previous medication treatments included:  n/a    Other treatments have included:  Melody Ferguson has been seen at a pain clinic in the past.    PT: helped   Chiropractic: no  Acupuncture: no  TENs Unit: no help  Injections: L5-SI Interlaminar epidural steroid injection1/18   L5-SI interlaminar epidural steroid injection on 11/13/117   L5-S1 12/16 Intralaminar   R knee injection with sig relief.   Left 4-5, L5-S1 facet joint injection with sig relief.    Past Medical History:  Past Medical History:   Diagnosis Date     Arthritis 2000     Cancer (H) 1983     Cerebral infarction (H) 2005     COPD (chronic obstructive pulmonary disease) (H) 1990     Depressive disorder 1990     DM type 2 (diabetes mellitus, type 2) (H)      History of TIA (transient ischemic attack)      HTN (hypertension)      Hyperlipidemia      Uncomplicated asthma 1990     Past Surgical History:  Past Surgical History:   Procedure Laterality Date     ABDOMEN SURGERY  05/20/2005     BIOPSY  1984     BREAST SURGERY  1984     C TOTAL KNEE [place]THROPLASTY Left 04/26/2017    [place] [facility]     CHOLECYSTECTOMY  2000     COLONOSCOPY        ENT SURGERY  2008    tonsils removed     GENITOURINARY SURGERY  2005    a & p repair     REPAIR PTOSIS       SLING TRANSVAGINAL N/A 7/14/2017    Procedure: SLING TRANSVAGINAL;  Sling ([place]);  Surgeon: Talon Katz MD;  Location: [facility]     Medications:  Current Outpatient Prescriptions   Medication Sig Dispense Refill     ACCU-CHEK DAMIEN PLUS test strip TEST BLOOD SUGARS TWICE DAILY 200 strip 0     ACE NOT PRESCRIBED, INTENTIONAL, Reported on 5/10/2017 0 each 0     albuterol (PROAIR HFA/PROVENTIL HFA/VENTOLIN HFA) 108 (90 Base) MCG/ACT Inhaler Inhale 2 puffs into the lungs every 6 hours 1 Inhaler 1     ASPIRIN PO Take 81 mg by mouth daily       atorvastatin (LIPITOR) 40 MG tablet Take 1 tablet (40 mg) by mouth daily 90 tablet 1     blood glucose monitoring (NO BRAND SPECIFIED) meter device kit Use to test blood sugar 1 times daily or as directed. Please dispense insurance preferred. 1 kit 0     blood glucose monitoring (NO BRAND SPECIFIED) test strip Use to test blood sugars 2 times daily or as directed 180 strip 3     buPROPion (WELLBUTRIN SR) 100 MG 12 hr tablet Take 1 tablet (100 mg) by mouth 2 times daily 180 tablet 1     BUPROPION HCL PO Take 50 mg by mouth 2 times daily        citalopram (CELEXA) 40 MG tablet TAKE 1 TABLET(40 MG) BY MOUTH EVERY MORNING 90 tablet 0     cyclobenzaprine (FLEXERIL) 5 MG tablet TAKE 1 TABLET(5 MG) BY MOUTH TWICE DAILY AS NEEDED FOR MUSCLE SPASMS 60 tablet 1     diclofenac (VOLTAREN) 50 MG EC tablet TAKE 1 TABLET(50 MG) BY MOUTH TWICE DAILY AS NEEDED FOR MODERATE PAIN 180 tablet 0     esomeprazole (NEXIUM) 40 MG CR capsule Take 1 capsule (40 mg) by mouth every morning (before breakfast) Take 30-60 minutes before eating. 90 capsule 1     fluticasone (VERAMYST) 27.5 MCG/SPRAY spray Spray 2 sprays into both nostrils daily 10 g 11     gabapentin (NEURONTIN) 300 MG capsule Take 1 capsule (300 mg) by mouth 2 times daily 180 capsule 1     glipiZIDE-metFORMIN (METAGLIP) 5-500 MG  per tablet TAKE 1 TABLET BY MOUTH TONCE DAILY BEFORE MEALS 90 tablet 0     insulin pen needle (NOVOFINE) 32G X 6 MM Use once daily or as directed. 100 each prn     liraglutide (VICTOZA) 18 MG/3ML soln Inject 1.8 mg Subcutaneous daily 30 mL 0     losartan (COZAAR) 50 MG tablet Take 1 tablet (50 mg) by mouth daily 90 tablet 0     melatonin 3 MG tablet TAKE 1 TABLET BY MOUTH ONCE AT BEDTIME AS NEEDED FOR SLEEP 30 tablet 0     metoprolol succinate (TOPROL XL) 25 MG 24 hr tablet Take 1 tablet (25 mg) by mouth daily 90 tablet 1     montelukast (SINGULAIR) 10 MG tablet TAKE 1 TABLET(10 MG) BY MOUTH AT BEDTIME 90 tablet 0     nystatin (MYCOSTATIN) 788631 UNIT/GM POWD Apply topically 2 times daily 60 g 1     order for DME Diabetic test strips and lancets per insurance formulary Check blood sugar 2 times daily 3 Month 3     polyethylene glycol (MIRALAX/GLYCOLAX) powder Take 17 g by mouth daily DISSOLVE 17 GRAMS IN LIQUID AND DRINK DAILY AS DIRECTED 510 g 3     solifenacin (VESICARE) 10 MG tablet Take 1 tablet (10 mg) by mouth daily 90 tablet 0     traMADol (ULTRAM) 50 MG tablet TAKE 1 TABLET BY MOUTH EVERY 8 HOURS AS NEEDED FOR MODERATE PAIN 30 tablet 0     Allergies:     Allergies   Allergen Reactions     Milk Protein Extract GI Disturbance     lactose intolerance.  Can tolerate milk products if uses lactaid     Bee Venom Swelling     Latex      Lisinopril Cough     Onion GI Disturbance     Vomiting, feels like throat is clogged     Aloe Rash     pain     Chlorine Rash     Social History:  Home situation: roomates  Occupation/Schooling: disabled   Tobacco use: no  Alcohol use: occasional  Drug use: no  History of chemical dependency treatment: no    Family history:  Family History   Problem Relation Age of Onset     Thyroid Disease Sister      CANCER Brother      CANCER Sister      breast cancer     CEREBROVASCULAR DISEASE Sister 62     Other - See Comments Sister      Angioplasty 8/2016     Hypertension Father       "Hyperlipidemia Father      Prostate Cancer Father      Glaucoma Maternal Grandmother      CANCER Maternal Grandfather      CANCER Paternal Grandmother      Breast Cancer Paternal Grandmother      CEREBROVASCULAR DISEASE Paternal Grandfather      CEREBROVASCULAR DISEASE Maternal Half-Sister      4/08/2016     Breast Cancer Maternal Half-Sister      Asthma Maternal Half-Sister      Other Cancer Brother      Passed from cancer unsure what type     Anesthesia Reaction Daughter      DIABETES No family hx of      Macular Degeneration No family hx of      Family history of headaches: no    Review of Systems:  Skin: negative  Eyes: negative  Ears/Nose/Throat: negative  Respiratory: No shortness of breath, dyspnea on exertion, cough, or hemoptysis  Cardiovascular: negative  Gastrointestinal: negative  Genitourinary: negative  Musculoskeletal: negative  Neurologic: negative  Psychiatric: negative  Hematologic/Lymphatic/Immunologic: negative  Endocrine: negative    Physical Exam:  Vitals:    05/30/18 1027   BP: 137/79   Pulse: 79   Weight: 138.3 kg (305 lb)   Height: 1.702 m (5' 7\")     Exam:  Constitutional: healthy, alert and no distress, obese  Head: normocephalic. Atraumatic.   Eyes: no redness or jaundice noted   ENT: oropharnx normal.  MMM.  Neck supple.    Cardiovascular: neg jvd  Respiratory: speaking full sentences  Gastrointestinal: soft, non-tender, normoactive bowel sounds   Skin: no suspicious lesions or rashes  Psychiatric: mentation appears normal and affect normal/bright    Musculoskeletal exam:  Gait/Station/Posture: wnl  Cervical spine: ROM       Thoracic spine:  Normal     Lumbar spine:     ROM: decrease ext   Myofascial tenderness:  + mild L buttocks   Mccarthy's: + on the left  mild               Straight leg exam: neg   MARVIN/FADIR: neg              SI:neg today   Greater trochanteric bursa: neg  Neurologic exam:  CN:  Cranial nerves 2-12 are normal  Motor:  5/5 UE and LE strength  Reflexes:  "       Patella:  +2      Sensory:  (upper and lower extremities):   Light touch: normal    Allodynia: absent    Dysethesia: absent    Hyperalgesia: absent     Diagnostic tests:   08/08/16 MRI lumbar spine (CDI): Overview: There is 4 mm L4 and 2 mm L3 degenerative anterolisthesis (sagittal images 8). There is minimal broad left convex curvature maximal at L4. Vertebral body heights are normal. There is spondylosis which is moderate at L2-L3 and L4-L5, mild at L3-L4 and above T12, with minor marrow degenerative changes along the spondylotic endplates. Developmentally, spinal canal AP diameter is normal although there is acquired canal and recess stenosis from spondylosis and facet arthrosis (see below). There is no spondylolysis. Cord: Lower thoracic spinal cord imaged has normal contour and signal. Conus medullaris terminates normally at the L1-L2 disc level. Discs: Disc dehydration (long TR) is marked between L2 and L5 and above T12, mild at remaining levels. Disc height loss is moderate/marked at L2-L3 and L4-L5, moderate above T12, mild at L3-L4. Posterior annular fissuring is present at L4-L5. Facet joints: Arthrosis is marked at L4-L5 (axial images 10), moderate/marked at L3-L4 and L2-L3, and moderate at L5-S1 and L1-L2 within the lumbar segment. There is also thoracic facet arthrosis, moderate/marked at T11-T12 and on the left at T12-L1. Foramina: Stenosis is moderate on the right at L4-L5 (sagittal images 4) and T11-T12, mild bilaterally at L3-L4 and L2-L3. Specific findings at each level are as follows- L5-S1: Minimal circumferential disc bulge. Moderate facet arthrosis and overgrowth. Normal foramina. L4-L5: Marked facet arthrosis and facet overgrowth resulting in grade 1 degenerative L4 anterolisthesis, with a shallow circumferential disc bulge resulting in moderate right and mild left foraminal and marked right and moderate left subarticular recess and mild canal stenosis, sac AP diameter 9 mm. L3-L4:  Moderate/marked facet arthrosis resulting in 2 mm degenerative L3 anterolisthesis. Shallow chronic circumferential disc bulge together with facet and ligament overgrowth result in marked bilateral subarticular recess (axial images 16) and moderate/marked central canal stenosis, sac AP diameter 5 mm, and mild foraminal stenosis. L2-L3: Moderate/marked facet arthrosis with disc bulging, shallow posteriorly, resulting in marked bilateral subarticular recess and moderate/marked central canal stenosis, sac AP diameter 6 mm. Mild foraminal stenosis. L1-L2: Normal posterior disc margin. T12-L1: Normal posterior disc margin. Moderate/marked left facet arthrosis. T11-T12: Shallow chronic circumferential disc bulge. Moderate right foraminal stenosis. Moderate/marked facet arthrosis. Ancillary: There is generalized paraspinous muscle mild atrophy. There is a 1 cm simple cyst in the left kidney. There are minor degenerative changes at the lower SI joints. Conclusion: 1. Facet arthrosis, marked at L4-L5 and moderate/marked at L3-L4, L2-L3, and T11-T12, and spondylosis resulting in, at L2-L3 and L3-L4, marked bilateral recess and moderate/marked central canal stenosis. Also marked right and moderate left L4-5 recess stenosis. 2. Grade 1 L4 and L3 degenerative anterolisthesis. 3. No fracture, neoplasm, or infection.       L1-L2:   Normal disc, facet joints, spinal canal and neural foramina.         L2-L3:  Degeneration of the disc. Loss of disc space height. Endplate  degenerative changes. These endplate degenerative changes have  progressed since 2016. There is a diffuse annular disc bulge. There  are degenerative changes in the facet joints. The central spinal canal  is moderately narrowed due to the bulging disc and degenerative change  off the facet joints. Mild left lateral recess stenosis. Mild right  and moderate left foraminal stenosis due to the bulging disc. Central  stenosis has not changed significantly since 2016. The  left foraminal  stenosis has increased slightly since 2016.     L3-L4:  Degeneration of the disc. Diffuse annular disc bulge. Mild  degenerative change in the facet joints. Central canal is mildly  narrowed due to the bulging disc and degenerative changes off the  facet joints. Moderate right and mild left foraminal stenosis. This  has not changed significantly since 2016.     L4-L5:  Degeneration of the disc. Mild diffuse annular disc bulge.  Degenerative change in the facet joints. This is allowing slight  anterior subluxation of L4 on L5. The central canal is moderately  narrowed. There is moderate bilateral lateral recess stenosis. There  is moderate bilateral foraminal stenosis. When compared to the  previous scan, there has been no significant change at this level.     L5-S1:  Mild annular disc bulge. The central canal and neural foramen  are patent.         IMPRESSION:    1. Multilevel degenerative change. The degenerative changes have  increased slightly since 2016.  2. Central spinal stenosis at L2-L3, L3-L4, and L4-L5 due to the  degenerative changes.  3. Foraminal stenosis at multiple levels.  Assessment/Plan:  Melody Ferguson is a 57 year old female who presents with the complaints of Left lBP without radiation to her LE.  Melody was seen today for pain.    Diagnoses and all orders for this visit:    Lumbosacral spondylosis without myelopathy    Myofascial muscle pain     - Further procedures recommended:    - consider Left lumbar Medial branch blocks vs facet joints if symptoms worsen.   - Medication Management:    - comtinue gabapentin to 300mg twice a day   - Discussed taking diclofenac as needed   - stable on current dose tramadol 50mg daily as needed    - continue as needed flexeril   - Follow up: 3-6 months or if symptoms worsen        Total time spent was 30 minutes, and more than 50% of face to face time was spent counseling and/or coordination of care regarding principles of multidisciplinary care  and medication management  Left lBP without radiation to her LE.    Carlos Roche MD  [facility]

## 2018-05-30 NOTE — PATIENT INSTRUCTIONS
- Further procedures recommended:    - consider Left lumbar Medial branch blocks vs facet joints if symptoms worsen.   - Medication Management:    - comtinue gabapentin to 300mg twice a day   - Discussed taking diclofenac as needed   - stable on current dose tramadol 50mg daily as needed    - continue as needed flexeril   - Follow up: 3-6 months or if symptoms worsen          ----------------------------------------------------------------  Nurse Triage line:  [phone]   Call this number with any questions or concerns. You may leave a detailed message anytime. Calls are typically returned Monday through Friday between 8 AM and 4:30 PM. We usually get back to you within 2 business days depending on the issue/request.       Medication refills:    For non-narcotic medications, call your pharmacy directly to request a refill. The pharmacy will contact the [facility] for authorization. Please allow 3-4 days for these refills to be processed.     For narcotic refills, call the nurse triage line or send a [employer] message. Please contact us 7-10 days before your refill is due. The message MUST include the name of the specific medication(s) requested and how you would like to receive the prescription(s). The options are as follows:    Pain Clinic staff can mail the prescription to your pharmacy. Please tell us the name of the pharmacy.    You may pick the prescription up at the Pain Clinic (tell us the location) or during a clinic visit with your pain provider    Pain Clinic staff can deliver the prescription to the [facility] pharmacy in the clinic building. Please tell us the location.      Scheduling number: [phone].  Call this number to schedule or change appointments.    We believe regular attendance is key to your success in our program.    Any time you are unable to keep your appointment we ask that you call us at least 24 hours in advance to let us know. This will allow us to offer the appointment  time to another patient.

## 2018-05-30 NOTE — MR AVS SNAPSHOT
After Visit Summary   5/30/2018    Melody Ferguson    MRN: [id number]           Patient Information     Date Of Birth          1960        Visit Information        Provider Department      5/30/2018 10:30 AM Carlos Roche MD [facility] [place]    - Further procedures recommended:    - consider Left lumbar Medial branch blocks vs facet joints if symptoms worsen.   - Medication Management:    - comtinue gabapentin to 300mg twice a day   - Discussed taking diclofenac as needed   - stable on current dose tramadol 50mg daily as needed    - continue as needed flexeril   - Follow up: 3-6 months or if symptoms worsen          ----------------------------------------------------------------  Nurse Triage line:  [phone]   Call this number with any questions or concerns. You may leave a detailed message anytime. Calls are typically returned Monday through Friday between 8 AM and 4:30 PM. We usually get back to you within 2 business days depending on the issue/request.       Medication refills:    For non-narcotic medications, call your pharmacy directly to request a refill. The pharmacy will contact the [facility] for authorization. Please allow 3-4 days for these refills to be processed.     For narcotic refills, call the nurse triage line or send a Bunndle message. Please contact us 7-10 days before your refill is due. The message MUST include the name of the specific medication(s) requested and how you would like to receive the prescription(s). The options are as follows:    Pain Clinic staff can mail the prescription to your pharmacy. Please tell us the name of the pharmacy.    You may pick the prescription up at the Pain Clinic (tell us the location) or during a clinic visit with your pain provider    Pain Clinic staff can deliver the prescription to the [facility] pharmacy in the clinic building. Please tell us the location.      Scheduling number:  [phone].  Call this number to schedule or change appointments.    We believe regular attendance is key to your success in our program.    Any time you are unable to keep your appointment we ask that you call us at least 24 hours in advance to let us know. This will allow us to offer the appointment time to another patient.               Follow-ups after your visit        Your next 10 appointments already scheduled     Jul 31, 2018  2:15 PM CDT   Return Visit with Eduardo Benson MD   [facility] ([facility])    [address] [place] [id number] [phone] OPT 2              Future tests that were ordered for you today     Open Future Orders        Priority Expected Expires Ordered    XR Lumbar Spine 2/3 Views Routine 6/30/2018 5/30/2019 5/30/2018            Who to contact     If you have questions or need follow up information about today's clinic visit or your schedule please contact [facility] directly at [phone].  Normal or non-critical lab and imaging results will be communicated to you by FieldAwarehart, letter or phone within 4 business days after the clinic has received the results. If you do not hear from us within 7 days, please contact the clinic through Niblitzt or phone. If you have a critical or abnormal lab result, we will notify you by phone as soon as possible.  Submit refill requests through Feathr or call your pharmacy and they will forward the refill request to us. Please allow 3 business days for your refill to be completed.          Additional Information About Your Visit        Feathr Information     Feathr gives you secure access to your electronic health record. If you see a primary care provider, you can also send messages to your care team and make appointments. If you have questions, please call your primary care clinic.  If you do not have a primary care provider, please call [phone] and  "they will assist you.        Care EveryWhere ID     This is your Care EveryWhere ID. This could be used by other organizations to access your [facility] medical records  [id number]        Your Vitals Were     Pulse Height BMI (Body Mass Index)             79 1.702 m (5' 7\") 47.77 kg/m2          Blood Pressure from Last 3 Encounters:   05/30/18 137/79   04/30/18 148/64   04/24/18 134/80    Weight from Last 3 Encounters:   05/30/18 138.3 kg (305 lb)   04/24/18 145.2 kg (320 lb)   03/20/18 (!) 145.6 kg (321 lb)              Today, you had the following     No orders found for display       Primary Care Provider Office Phone # Fax #    Elizabeth Lamas [phone]       [id number] [place]  [facility] [address]        Equal Access to Services     : Hadii aad ku hadasho Socorey, waaxda luqadaha, qaybta kaalmada adeegyada, issac eagle haybarbara claire . So [facility] [phone].    ATENCIÓN: Si habla español, tiene a turner disposición servicios gratuitos de asistencia lingüística. Emilie al [phone].    We comply with applicable federal civil rights laws and [place] laws. We do not discriminate on the basis of race, color, national origin, age, disability, sex, sexual orientation, or gender identity.            Thank you!     Thank you for choosing [facility]  for your care. Our goal is always to provide you with excellent care. Hearing back from our patients is one way we can continue to improve our services. Please take a few minutes to complete the written survey that you may receive in the mail after your visit with us. Thank you!             Your Updated Medication List - Protect others around you: Learn how to safely use, store and throw away your medicines at www.disposemymeds.org.          This list is accurate as of 5/30/18 11:08 AM.  Always use your most recent med list.                   Brand Name Dispense Instructions for use Diagnosis    * ACCU-CHEK DAMIEN " PLUS test strip   Generic drug:  blood glucose monitoring     200 strip    TEST BLOOD SUGARS TWICE DAILY    Type 2 diabetes mellitus with diabetic nephropathy (H)       * blood glucose monitoring test strip    no brand specified    180 strip    Use to test blood sugars 2 times daily or as directed    Type 2 diabetes mellitus with diabetic nephropathy, without long-term current use of insulin (H)       ACE NOT PRESCRIBED (INTENTIONAL)     0 each    Reported on 5/10/2017    Type 2 diabetes mellitus without complication (H)       albuterol 108 (90 Base) MCG/ACT Inhaler    PROAIR HFA/PROVENTIL HFA/VENTOLIN HFA    1 Inhaler    Inhale 2 puffs into the lungs every 6 hours    Mild intermittent asthma without complication       ASPIRIN PO      Take 81 mg by mouth daily        atorvastatin 40 MG tablet    LIPITOR    90 tablet    Take 1 tablet (40 mg) by mouth daily    Hyperlipidemia LDL goal <100       blood glucose monitoring meter device kit    no brand specified    1 kit    Use to test blood sugar 1 times daily or as directed. Please dispense insurance preferred.    Type 2 diabetes mellitus with diabetic nephropathy, without long-term current use of insulin (H)       * BUPROPION HCL PO      Take 50 mg by mouth 2 times daily        * buPROPion 100 MG 12 hr tablet    WELLBUTRIN SR    180 tablet    Take 1 tablet (100 mg) by mouth 2 times daily    Major depressive disorder, recurrent episode, moderate (H)       citalopram 40 MG tablet    celeXA    90 tablet    TAKE 1 TABLET(40 MG) BY MOUTH EVERY MORNING    Major depressive disorder, recurrent episode, moderate (H)       cyclobenzaprine 5 MG tablet    FLEXERIL    60 tablet    TAKE 1 TABLET(5 MG) BY MOUTH TWICE DAILY AS NEEDED FOR MUSCLE SPASMS    Left sided sciatica, Back muscle spasm       diclofenac 50 MG EC tablet    VOLTAREN    180 tablet    TAKE 1 TABLET(50 MG) BY MOUTH TWICE DAILY AS NEEDED FOR MODERATE PAIN    Chronic pain syndrome       esomeprazole 40 MG CR capsule     nexIUM    90 capsule    Take 1 capsule (40 mg) by mouth every morning (before breakfast) Take 30-60 minutes before eating.    Dyspepsia       fluticasone 27.5 MCG/SPRAY spray    VERAMYST    10 g    Spray 2 sprays into both nostrils daily    Chronic seasonal allergic rhinitis, unspecified trigger       gabapentin 300 MG capsule    NEURONTIN    180 capsule    Take 1 capsule (300 mg) by mouth 2 times daily    Acute left-sided low back pain with left-sided sciatica       glipiZIDE-metFORMIN 5-500 MG per tablet    METAGLIP    90 tablet    TAKE 1 TABLET BY MOUTH TONCE DAILY BEFORE MEALS    Type 2 diabetes mellitus with diabetic nephropathy, without long-term current use of insulin (H)       insulin pen needle 32G X 6 MM    NOVOFINE    100 each    Use once daily or as directed.    Type 2 diabetes mellitus with diabetic nephropathy, without long-term current use of insulin (H)       liraglutide 18 MG/3ML soln    VICTOZA    30 mL    Inject 1.8 mg Subcutaneous daily    Type 2 diabetes mellitus with diabetic nephropathy, without long-term current use of insulin (H)       losartan 50 MG tablet    COZAAR    90 tablet    Take 1 tablet (50 mg) by mouth daily    HTN, goal below 140/90, Type 2 diabetes mellitus with diabetic nephropathy, without long-term current use of insulin (H)       melatonin 3 MG tablet     30 tablet    TAKE 1 TABLET BY MOUTH ONCE AT BEDTIME AS NEEDED FOR SLEEP    Major depressive disorder, recurrent episode, moderate (H)       metoprolol succinate 25 MG 24 hr tablet    TOPROL XL    90 tablet    Take 1 tablet (25 mg) by mouth daily    Old myocardial infarction       montelukast 10 MG tablet    SINGULAIR    90 tablet    TAKE 1 TABLET(10 MG) BY MOUTH AT BEDTIME    Mild intermittent asthma without complication       nystatin 756554 UNIT/GM Powd    MYCOSTATIN    60 g    Apply topically 2 times daily    Morbid obesity with body mass index of 45.0-49.9 in adult (H)       order for DME     3 Month    Diabetic test  strips and lancets per insurance formulary Check blood sugar 2 times daily    Type 2 diabetes mellitus with diabetic nephropathy (H)       polyethylene glycol powder    MIRALAX/GLYCOLAX    510 g    Take 17 g by mouth daily DISSOLVE 17 GRAMS IN LIQUID AND DRINK DAILY AS DIRECTED    Chronic pain syndrome       solifenacin 10 MG tablet    VESICARE    90 tablet    Take 1 tablet (10 mg) by mouth daily    Mixed incontinence       traMADol 50 MG tablet    ULTRAM    30 tablet    TAKE 1 TABLET BY MOUTH EVERY 8 HOURS AS NEEDED FOR MODERATE PAIN    Chronic pain syndrome       * Notice:  This list has 4 medication(s) that are the same as other medications prescribed for you. Read the directions carefully, and ask your doctor or other care provider to review them with you.

## 2018-06-05 NOTE — PROGRESS NOTES
The patient's right knee was prepped with betadine solution after verification of allergies. Area approximately 10 cm x 10 cm prepped in a sterile fashion. After injection, betadine removed with soap and water and band-aids applied.    1ml depo-medrol with 1% lidocaine plain injected into patient's right knee by Dr. Armond George  LOT# [id number]  Exp. 03/2020    Talon Fenton PA-C  Supervising physician: Armond George MD  Dept. of Orthopedics  [facility]         70

## 2018-06-18 ENCOUNTER — MYC REFILL (OUTPATIENT)
Dept: FAMILY MEDICINE | Facility: CLINIC | Age: 58
End: 2018-06-18

## 2018-06-18 DIAGNOSIS — G89.4 CHRONIC PAIN SYNDROME: ICD-10-CM

## 2018-06-18 DIAGNOSIS — E66.01 MORBID OBESITY WITH BODY MASS INDEX OF 45.0-49.9 IN ADULT (H): ICD-10-CM

## 2018-06-18 DIAGNOSIS — M54.42 ACUTE LEFT-SIDED LOW BACK PAIN WITH LEFT-SIDED SCIATICA: ICD-10-CM

## 2018-06-18 DIAGNOSIS — F33.1 MAJOR DEPRESSIVE DISORDER, RECURRENT EPISODE, MODERATE (H): ICD-10-CM

## 2018-06-18 NOTE — TELEPHONE ENCOUNTER
Message from Ayla:  Original authorizing provider: Elizabeth Lamas MD    Melody Ferguson would like a refill of the following medications:  citalopram (CELEXA) 40 MG tablet [Elizabeth Lamas MD]  polyethylene glycol (MIRALAX/GLYCOLAX) powder [Elizabeth Lamas MD]  nystatin (MYCOSTATIN) 612600 UNIT/GM POWD [Elizabeth Lamas MD]  gabapentin (NEURONTIN) 300 MG capsule [Elizabeth Lamas MD]    Preferred pharmacy: [facility] [id number] [place] [address] - [address] AT [facility] OF [facility] [address]    Comment:      Medication renewals requested in this message routed to other providers:  albuterol (PROAIR HFA/PROVENTIL HFA/VENTOLIN HFA) 108 (90 Base) MCG/ACT Inhaler [CARMEN Bueno CNP]  fluticasone (VERAMYST) 27.5 MCG/SPRAY spray [CARMEN Bueno CNP]  buPROPion (WELLBUTRIN SR) 100 MG 12 hr tablet [CARMEN Bueno CNP]

## 2018-06-18 NOTE — TELEPHONE ENCOUNTER
"Requested Prescriptions   Pending Prescriptions Disp Refills     citalopram (CELEXA) 40 MG tablet  Last Written Prescription Date:  12/29/17  Last Fill Quantity: 90,  # refills: 0   [place] [facility] or [facility] prescribing provider:  02/27/18   Future Office Visit:    Next 5 appointments (look out 90 days)     Jul 31, 2018  2:15 PM CDT   Return Visit with Eduardo Benson MD   [facility] ([facility])    [address] [place] [id number] [phone] OPT 2                90 tablet 0    SSRIs Protocol Failed    6/18/2018 11:34 AM       Failed - PHQ-9 score less than 5 in past 6 months    Please review last PHQ-9 score.          Passed - Patient is age 18 or older       Passed - No active pregnancy on record       Passed - No positive pregnancy test in last 12 months       Passed - Recent (6 mo) or future (30 days) visit within the authorizing provider's specialty    Patient had office visit in the last 6 months or has a visit in the next 30 days with authorizing provider or within the authorizing provider's specialty.  See \"Patient Info\" tab in inbasket, or \"Choose Columns\" in Meds & Orders section of the refill encounter.            polyethylene glycol (MIRALAX/GLYCOLAX) powder  Last Written Prescription Date:  02/06/18  Last Fill Quantity: 510g,  # refills: 3   Last Office Visit with [facility] or [facility] prescribing provider:  02/27/18   Future Office Visit:    Next 5 appointments (look out 90 days)     Jul 31, 2018  2:15 PM CDT   Return Visit with Eduardo Benson MD   [facility] ([facility])    [id number]  [facility] [id number] [phone] OPT 2                  510 g 3     Sig: Take 17 g by mouth daily DISSOLVE 17 GRAMS IN LIQUID AND DRINK DAILY AS DIRECTED    Laxatives Protocol Passed    6/18/2018 11:34 AM       Passed - Patient is age 6 or older    " "   Passed - Recent (12 mo) or future (30 days) visit within the authorizing provider's specialty    Patient had office visit in the last 12 months or has a visit in the next 30 days with authorizing provider or within the authorizing provider's specialty.  See \"Patient Info\" tab in inbasket, or \"Choose Columns\" in Meds & Orders section of the refill encounter.            nystatin (MYCOSTATIN) 231955 UNIT/GM POWD  Last Written Prescription Date:  02/06/18  Last Fill Quantity: 60g,  # refills: 1   Last Office Visit with [facility] or [facility] prescribing provider:  02/27/18   Future Office Visit:    Next 5 appointments (look out 90 days)     Jul 31, 2018  2:15 PM CDT   Return Visit with Eduardo Benson MD   [address] [id number] [phone] OPT 2                60 g 1     Sig: Apply topically 2 times daily    Antifungal Agents Passed    6/18/2018 11:34 AM       Passed - Recent (12 mo) or future (30 days) visit within the authorizing provider's specialty    Patient had office visit in the last 12 months or has a visit in the next 30 days with authorizing provider or within the authorizing provider's specialty.  See \"Patient Info\" tab in inbasket, or \"Choose Columns\" in Meds & Orders section of the refill encounter.           Passed - Not Fluconazole or Terconazole     If oral Fluconazole or Terconazole, may refill if indicated in progress notes.           gabapentin (NEURONTIN) 300 MG capsule        Last Written Prescription Date:  05/21/18  Last Fill Quantity: 180,   # refills: 1  Last Office Visit: 02/27/18  Future Office visit:    Next 5 appointments (look out 90 days)     Jul 31, 2018  2:15 PM CDT   Return Visit with Eduardo Benson MD   [facility])    [address] [place] [id number] [phone] OPT 2               "     Routing refill request to provider for review/approval because:  Drug not on the [facility] or [facility] refill protocol or controlled substance   180 capsule 1     Sig: Take 1 capsule (300 mg) by mouth 2 times daily    There is no refill protocol information for this order

## 2018-06-19 RX ORDER — NYSTATIN 100000 [USP'U]/G
POWDER TOPICAL
Qty: 60 G | Refills: 2 | Status: SHIPPED | OUTPATIENT
Start: 2018-06-19 | End: 2019-06-03

## 2018-06-19 RX ORDER — GABAPENTIN 300 MG/1
300 CAPSULE ORAL 2 TIMES DAILY
Qty: 180 CAPSULE | Refills: 1 | Status: SHIPPED | OUTPATIENT
Start: 2018-06-19 | End: 2020-12-17

## 2018-06-19 RX ORDER — CITALOPRAM HYDROBROMIDE 40 MG/1
40 TABLET ORAL DAILY
Qty: 90 TABLET | Refills: 0 | Status: SHIPPED | OUTPATIENT
Start: 2018-06-19 | End: 2018-10-03

## 2018-06-19 RX ORDER — POLYETHYLENE GLYCOL 3350 17 G/17G
17 POWDER, FOR SOLUTION ORAL DAILY
Qty: 510 G | Refills: 6 | Status: SHIPPED | OUTPATIENT
Start: 2018-06-19 | End: 2018-11-30

## 2018-06-19 NOTE — TELEPHONE ENCOUNTER
Prescription approved per [facility] Refill Protocol. - Miralax and Nystatin  Routing refill request to provider for review/approval because:  Drug not on the [facility] refill protocol - gabapentin  Labs out of range:  PHQ-9 >4  A break in medication - Sarah Martinez RN

## 2018-06-25 ENCOUNTER — TELEPHONE (OUTPATIENT)
Dept: PALLIATIVE MEDICINE | Facility: CLINIC | Age: 58
End: 2018-06-25

## 2018-06-25 DIAGNOSIS — M47.816 LUMBAR FACET ARTHROPATHY: Primary | ICD-10-CM

## 2018-06-25 NOTE — TELEPHONE ENCOUNTER
As Dr. Roche is out of the office, I will put in an order now.  Should be scheduled with Dr. Roche.    Cassy Colorado MD  [facility]

## 2018-06-25 NOTE — TELEPHONE ENCOUNTER
Called patient. She states that she would like to repeat injection done in April.  Same area and sensation of pain, states she had good pain relief but it is now wearing off. She had a Left 4-5, L5-S1 facet joint injection on 04/30/18. Advised that I would send her request to providers to review.     Routing to provider pool to review prepped order for repeat injection.   Progress Notes   Sheri Colorado MD (Physician)     Pain Clinic      Pre procedure Diagnosis: facet arthropathy, lumbar spondylosis  Post procedure Diagnosis: Same  Procedure performed: left L4-5 and L5-S1 facet joint injection        Tiana Kahn  BSN, RN Care Coordinator  [facility]

## 2018-06-25 NOTE — TELEPHONE ENCOUNTER
Patient calling requesting a lumbar injection.   Please place order if applicable and route to  for scheduling.         Misty MURRY    [facility]

## 2018-06-26 ENCOUNTER — OFFICE VISIT (OUTPATIENT)
Dept: ORTHOPEDICS | Facility: CLINIC | Age: 58
End: 2018-06-26
Payer: MEDICARE

## 2018-06-26 VITALS
HEIGHT: 67 IN | BODY MASS INDEX: 45.99 KG/M2 | RESPIRATION RATE: 18 BRPM | SYSTOLIC BLOOD PRESSURE: 157 MMHG | WEIGHT: 293 LBS | DIASTOLIC BLOOD PRESSURE: 90 MMHG

## 2018-06-26 DIAGNOSIS — M17.11 PRIMARY OSTEOARTHRITIS OF RIGHT KNEE: Primary | ICD-10-CM

## 2018-06-26 PROCEDURE — 20610 DRAIN/INJ JOINT/BURSA W/O US: CPT | Mod: RT | Performed by: ORTHOPAEDIC SURGERY

## 2018-06-26 RX ORDER — METHYLPREDNISOLONE ACETATE 80 MG/ML
80 INJECTION, SUSPENSION INTRA-ARTICULAR; INTRALESIONAL; INTRAMUSCULAR; SOFT TISSUE ONCE
Qty: 1 ML | Refills: 0 | OUTPATIENT
Start: 2018-06-26 | End: 2018-06-26

## 2018-06-26 NOTE — LETTER
6/26/2018         RE: Melody Ferguson  [address]  [facility] [address]        Dear Colleague,    Thank you for referring your patient, Melody Ferguson, to the [facility]. Please see a copy of my visit note below.    Follow up right knee primary osteoarthritis.  Last injection 4/24/18  Range of motion 5-115.    She  desires injection today of right knee(s).  Risks, benefits, potential complications and alternatives were discussed.   With the patient's consent, sterile prep was performed of right knee(s).  Right knee was injected with Depo Medrol 80 mg and lidocaine at anteromedial site.  Return to clinic as needed.      The patient's right knee was prepped with betadine solution after verification of allergies. Area approximately 10 cm x 10 cm prepped in a sterile fashion. After injection, betadine removed with soap and water and band-aids applied.    1ml depo medrol with 1% lidocaine plain injected into patient's right knee by Dr. Armond George  LOT# [id number]  Exp. 6/20    Again, thank you for allowing me to participate in the care of your patient.        Sincerely,        Armond George MD

## 2018-06-26 NOTE — MR AVS SNAPSHOT
After Visit Summary   6/26/2018    Melody Ferguson    MRN: [id number]           Patient Information     Date Of Birth          1960        Visit Information        Provider Department      6/26/2018 9:30 AM Armond George MD [facility]        Today's Diagnoses     Primary osteoarthritis of right knee    -  1      Care Instructions    You have had a steroid injection today.  For the first 2 hours there will likely be some numbing in the joint from the lidocaine.  This is a good sign, indicating that the injection is in the right place.  In 2 hours the lidocaine will wear off, and the joint will hurt like you had a shot.  Each day the cortisone makes it feel better.  It reaches peak effect in 2 weeks.  We expect it to last for 3 months.  You may resume regular activity when you feel ready.  If you are diabetic, your glucoses will be quite high for several days.            Follow-ups after your visit        Your next 10 appointments already scheduled     Jun 26, 2018  9:30 AM CDT   Return Visit with Amrond George MD   [facility] ([facility]    [address] [facility] [address]   [phone]            Jul 31, 2018  2:15 PM CDT   Return Visit with Eduardo Benson MD   [facility] ([facility])    [address] [place] [id number] [phone] OPT 2              Who to contact     If you have questions or need follow up information about today's clinic visit or your schedule please contact [facility] directly at [phone].  Normal or non-critical lab and imaging results will be communicated to you by MyChart, letter or phone within 4 business days after the clinic has received the results. If you do not hear from us within 7 days, please contact the clinic through MyChart or phone. If you have a critical or abnormal  "lab result, we will notify you by phone as soon as possible.  Submit refill requests through [employer] or call your pharmacy and they will forward the refill request to us. Please allow 3 business days for your refill to be completed.          Additional Information About Your Visit        [employer] Information     [employer] gives you secure access to your electronic health record. If you see a primary care provider, you can also send messages to your care team and make appointments. If you have questions, please call your primary care clinic.  If you do not have a primary care provider, please call [phone] and they will assist you.        Care EveryWhere ID     This is your Care EveryWhere ID. This could be used by other organizations to access your [place] medical records  [id number]        Your Vitals Were     Respirations Height BMI (Body Mass Index)             18 1.702 m (5' 7\") 47.77 kg/m2          Blood Pressure from Last 3 Encounters:   06/26/18 157/90   05/30/18 137/79   04/30/18 148/64    Weight from Last 3 Encounters:   06/26/18 138.3 kg (305 lb)   05/30/18 138.3 kg (305 lb)   04/24/18 145.2 kg (320 lb)              We Performed the Following     DRAIN/INJECT LARGE JOINT/BURSA     METHYLPREDNISOLONE 80 MG INJ          Today's Medication Changes          These changes are accurate as of 6/26/18  9:27 AM.  If you have any questions, ask your nurse or doctor.               Start taking these medicines.        Dose/Directions    methylPREDNISolone acetate 80 MG/ML injection   Commonly known as:  DEPO-MEDROL   Used for:  Primary osteoarthritis of right knee   Started by:  Armond George MD        Dose:  80 mg   1 mL (80 mg) by INTRA-ARTICULAR route once for 1 dose   Quantity:  1 mL   Refills:  0            Where to get your medicines      Some of these will need a paper prescription and others can be bought over the counter.  Ask your nurse if you have questions.     You don't need a prescription for " these medications     methylPREDNISolone acetate 80 MG/ML injection                Primary Care Provider Office Phone # Fax #    Elizabeth Lamas [phone]       [address]  [facility] [address]        Equal Access to Services     EVY KIRAN : Hadii norberto ku lulúo Soomaali, waaxda luqadaha, qaybta kaalmada adeegyada, issac ivain hayaan kavehcrissy novak alethea reed. So [facility] [phone].    ATENCIÓN: Si habla español, tiene a turner disposición servicios gratuitos de asistencia lingüística. Llame al [phone].    We comply with applicable federal civil rights laws and [place] laws. We do not discriminate on the basis of race, color, national origin, age, disability, sex, sexual orientation, or gender identity.            Thank you!     Thank you for choosing [facility]  for your care. Our goal is always to provide you with excellent care. Hearing back from our patients is one way we can continue to improve our services. Please take a few minutes to complete the written survey that you may receive in the mail after your visit with us. Thank you!             Your Updated Medication List - Protect others around you: Learn how to safely use, store and throw away your medicines at www.disposemymeds.org.          This list is accurate as of 6/26/18  9:27 AM.  Always use your most recent med list.                   Brand Name Dispense Instructions for use Diagnosis    * ACCU-CHEK DAMIEN PLUS test strip   Generic drug:  blood glucose monitoring     200 strip    TEST BLOOD SUGARS TWICE DAILY    Type 2 diabetes mellitus with diabetic nephropathy (H)       * blood glucose monitoring test strip    no brand specified    180 strip    Use to test blood sugars 2 times daily or as directed    Type 2 diabetes mellitus with diabetic nephropathy, without long-term current use of insulin (H)       ACE NOT PRESCRIBED (INTENTIONAL)     0 each    Reported on 5/10/2017    Type 2 diabetes mellitus  without complication (H)       albuterol 108 (90 Base) MCG/ACT Inhaler    PROAIR HFA/PROVENTIL HFA/VENTOLIN HFA    1 Inhaler    Inhale 2 puffs into the lungs every 6 hours    Mild intermittent asthma without complication       ASPIRIN PO      Take 81 mg by mouth daily        atorvastatin 40 MG tablet    LIPITOR    90 tablet    Take 1 tablet (40 mg) by mouth daily    Hyperlipidemia LDL goal <100       blood glucose monitoring meter device kit    no brand specified    1 kit    Use to test blood sugar 1 times daily or as directed. Please dispense insurance preferred.    Type 2 diabetes mellitus with diabetic nephropathy, without long-term current use of insulin (H)       * BUPROPION HCL PO      Take 50 mg by mouth 2 times daily        * buPROPion 100 MG 12 hr tablet    WELLBUTRIN SR    180 tablet    Take 1 tablet (100 mg) by mouth 2 times daily    Major depressive disorder, recurrent episode, moderate (H)       citalopram 40 MG tablet    celeXA    90 tablet    Take 1 tablet (40 mg) by mouth daily    Major depressive disorder, recurrent episode, moderate (H)       cyclobenzaprine 5 MG tablet    FLEXERIL    60 tablet    TAKE 1 TABLET(5 MG) BY MOUTH TWICE DAILY AS NEEDED FOR MUSCLE SPASMS    Left sided sciatica, Back muscle spasm       diclofenac 50 MG EC tablet    VOLTAREN    180 tablet    TAKE 1 TABLET(50 MG) BY MOUTH TWICE DAILY AS NEEDED FOR MODERATE PAIN    Chronic pain syndrome       esomeprazole 40 MG CR capsule    nexIUM    90 capsule    Take 1 capsule (40 mg) by mouth every morning (before breakfast) Take 30-60 minutes before eating.    Dyspepsia       fluticasone 27.5 MCG/SPRAY spray    VERAMYST    10 g    Spray 2 sprays into both nostrils daily    Chronic seasonal allergic rhinitis, unspecified trigger       gabapentin 300 MG capsule    NEURONTIN    180 capsule    Take 1 capsule (300 mg) by mouth 2 times daily    Acute left-sided low back pain with left-sided sciatica       glipiZIDE-metFORMIN 5-500 MG per  tablet    METAGLIP    90 tablet    TAKE 1 TABLET BY MOUTH TONCE DAILY BEFORE MEALS    Type 2 diabetes mellitus with diabetic nephropathy, without long-term current use of insulin (H)       insulin pen needle 32G X 6 MM    NOVOFINE    100 each    Use once daily or as directed.    Type 2 diabetes mellitus with diabetic nephropathy, without long-term current use of insulin (H)       liraglutide 18 MG/3ML soln    VICTOZA    30 mL    Inject 1.8 mg Subcutaneous daily    Type 2 diabetes mellitus with diabetic nephropathy, without long-term current use of insulin (H)       losartan 50 MG tablet    COZAAR    90 tablet    Take 1 tablet (50 mg) by mouth daily    HTN, goal below 140/90, Type 2 diabetes mellitus with diabetic nephropathy, without long-term current use of insulin (H)       melatonin 3 MG tablet     30 tablet    TAKE 1 TABLET BY MOUTH ONCE AT BEDTIME AS NEEDED FOR SLEEP    Major depressive disorder, recurrent episode, moderate (H)       methylPREDNISolone acetate 80 MG/ML injection    DEPO-MEDROL    1 mL    1 mL (80 mg) by INTRA-ARTICULAR route once for 1 dose    Primary osteoarthritis of right knee       metoprolol succinate 25 MG 24 hr tablet    TOPROL XL    90 tablet    Take 1 tablet (25 mg) by mouth daily    Old myocardial infarction       montelukast 10 MG tablet    SINGULAIR    90 tablet    TAKE 1 TABLET(10 MG) BY MOUTH AT BEDTIME    Mild intermittent asthma without complication       nystatin 175002 UNIT/GM Powd    MYCOSTATIN    60 g    Apply topically 2 times daily    Morbid obesity with body mass index of 45.0-49.9 in adult (H)       order for DME     3 Month    Diabetic test strips and lancets per insurance formulary Check blood sugar 2 times daily    Type 2 diabetes mellitus with diabetic nephropathy (H)       polyethylene glycol powder    MIRALAX/GLYCOLAX    510 g    Take 17 g by mouth daily DISSOLVE 17 GRAMS IN LIQUID AND DRINK DAILY AS DIRECTED    Chronic pain syndrome       solifenacin 10 MG tablet     VESICARE    90 tablet    Take 1 tablet (10 mg) by mouth daily    Mixed incontinence       traMADol 50 MG tablet    ULTRAM    30 tablet    TAKE 1 TABLET BY MOUTH EVERY 8 HOURS AS NEEDED FOR MODERATE PAIN    Chronic pain syndrome       * Notice:  This list has 4 medication(s) that are the same as other medications prescribed for you. Read the directions carefully, and ask your doctor or other care provider to review them with you.

## 2018-06-26 NOTE — PROGRESS NOTES
The patient's right knee was prepped with betadine solution after verification of allergies. Area approximately 10 cm x 10 cm prepped in a sterile fashion. After injection, betadine removed with soap and water and band-aids applied.    1ml depo medrol with 1% lidocaine plain injected into patient's right knee by Dr. Armond George  LOT# [id number]  Exp. 6/20

## 2018-06-26 NOTE — PROGRESS NOTES
Follow up right knee primary osteoarthritis.  Last injection 4/24/18  Range of motion 5-115.    She  desires injection today of right knee(s).  Risks, benefits, potential complications and alternatives were discussed.   With the patient's consent, sterile prep was performed of right knee(s).  Right knee was injected with Depo Medrol 80 mg and lidocaine at anteromedial site.  Return to clinic as needed.

## 2018-06-26 NOTE — TELEPHONE ENCOUNTER
Pre-screening questions for MBB Injections:    Injection to be done at which interventional clinic site? [facility]   [facility], instruct patient to arrive 30 minutes before the scheduled appointment time.     Procedure ordered by Dr. [place]/ Dr. Roche    Procedure ordered? Lumbar Medial Branch Block    What insurance would patient like us to bill for this procedure? [place], [place]      Worker's comp- Any injection DO NOT SCHEDULE and route to Misty Leeanne.      [employer] insurance - If scheduling an SI joint injection DO NOT SCHEDULE and route to Azalia Christian.     [employer]- MBB's must be scheduled at LEAST two weeks apart      [employer] - Any injection besides hip/shoulder/knee joint DO NOT SCHEDULE and route to Azalia Gonzales. She will obtain PA and call pt back to schedule procedure or notify pt of denial.     [employer]- PA required for all MBB's    Any chance of pregnancy? NO   If YES, do NOT schedule and route to RN pool    Is an  needed? No     Patient has a drive home? (mandatory) Yes     Is patient taking any blood thinners (plavix, coumadin, jantoven, warfarin, heparin, pradaxa or dabigatran )? No    If hold needed, do NOT schedule, route to RN pool     Is patient taking any aspirin products? Yes - Pt takes 81mg daily; instructed to hold 0 day(s) prior to procedure.      If more than 325mg/day do NOT schedule; route to RN pool     For CERVICAL procedures, hold all aspirin products for 6 days.      Does the patient have a bleeding or clotting disorder? No    If YES, okay to schedule AND route to RN nurse pool    **For any patients with platelet count <100, must be forwarded to provider**    Is patient diabetic? YES If YES, have them bring their glucometer.    Does patient have an active infection or treated for one within the past week? No    Is patient currently taking any antibiotics?  No    For patients on chronic, preventative, or prophylacti antibiotics,  procedures can be scheduled.     For patients on antibiotics for active or recent infection:    Jina Padilla [place], [place], [place]-antibiotic course must have been completed for 4 days     Is patient currently taking any steroid medications? (i.e. Prednisone, Medrol)  No     For patients on steroid medications:    Stanislav Padilla [place], [place]-steroid course must have been completed for 4 days    Review with patient:  If you are started on any steroids or antibiotics between now and your appointment, you must contact us because it may affect our ability to perform your procedure INFORMED    Is patient actively being treated for cancer or immunocompromised? No   If YES, do NOT schedule and route to RN    Are you able to get on and off an exam table with minimal or no assistance? Yes   If NO, do NOT schedule and route to RN    Are you able to roll over and lay on your stomach with minimal or no assistance? Yes   If NO, do NOT schedule and route to RN    Any allergies to contrast dye, iodine, shellfish, or numbing and steroid medications? No  (If so, inform nursing and note in scheduling comments.)    Allergies: Milk protein extract; Bee venom; Latex; Lisinopril; Onion; Aloe; and Chlorine     Has the patient had a flu shot or any other vaccinations within 7 days before or after the procedure.  No     Does patient have an MRI/CT?  YES: MRI  (SI joint, hip injections, lumbar sympathetic blocks, and stellate ganglion blocks do not require an MRI)    Was the MRI done w/in the last 3 years?  Yes    Was MRI done at [facility]? Yes      If not, where was it done? N/A       If MRI was not done at [facility], [facility] or [facility] do NOT schedule and route to nursing.  If pt has an imaging disc, the injection may be scheduled but pt has to bring disc to appt. If they show up w/out disc the injection cannot be done    **Must be scheduled with elapsed time interval of at least 2 weeks and not more than 6  months between the First MBB and the Second MBB**       Medial Branch Block Pre-Procedure Instructions    It is okay to take long acting pain medications (if you are on them) the day of the procedure but try not to take any short acting medications unless absolutely necessary. INFORMED        Long acting meds would include: Gabapentin (Neurontin), MS Contin, Oxycontin        Short acting meds would include:  Percocet, Oxycodone, Vicodin, Ibuprofen     The day of the procedure, you should try to do things that provoke your pain, since the injection is being done to see if it will relieve your pain . INFORMED    If your pain level is a 4 out of 10 or less on the day of the procedure, please call [phone] to reschedule.  INFORMED  Reminders (please tell patient if applicable):      Instructed pt to arrive 30 minutes early for IV start if this is for a cervical procedure, ALL sympathetic (stellate ganglion, hypogastric, or lumbar sympathetic block) and all sedation procedures (RFA, spinal cord stimulation trials). N/A        -IVs are not routinely placed for Dr. Mehta cervical cases        -Dr. Roche: no IV needed for CMBB       If NPO for sedation, it is okay to take medications with sips of water (except if they are to hold blood thinners). N/A   *DO take blood pressure medication if it is prescribed*      If this is for a cervical MBB aspirin needs to be held for 6 days.  N/A         Do not schedule procedures requiring IV placement in the first appointment of the day or first appointment after lunch. Do NOT schedule at 0745, 0815 or 1245.  N/A          For patients 85 or older we recommend having an adult stay w/ them for the remainder of the day.      Does the patient have any questions? VAZQUEZ Fernando    [facility]

## 2018-07-10 ENCOUNTER — RADIOLOGY INJECTION OFFICE VISIT (OUTPATIENT)
Dept: PALLIATIVE MEDICINE | Facility: CLINIC | Age: 58
End: 2018-07-10
Payer: MEDICARE

## 2018-07-10 ENCOUNTER — RADIANT APPOINTMENT (OUTPATIENT)
Dept: GENERAL RADIOLOGY | Facility: CLINIC | Age: 58
End: 2018-07-10
Attending: PAIN MEDICINE
Payer: MEDICARE

## 2018-07-10 VITALS — SYSTOLIC BLOOD PRESSURE: 138 MMHG | HEART RATE: 68 BPM | DIASTOLIC BLOOD PRESSURE: 68 MMHG | OXYGEN SATURATION: 96 %

## 2018-07-10 DIAGNOSIS — M47.816 FACET ARTHROPATHY, LUMBAR: ICD-10-CM

## 2018-07-10 DIAGNOSIS — M47.817 LUMBOSACRAL SPONDYLOSIS WITHOUT MYELOPATHY: Primary | ICD-10-CM

## 2018-07-10 PROCEDURE — 64493 INJ PARAVERT F JNT L/S 1 LEV: CPT | Mod: LT | Performed by: PAIN MEDICINE

## 2018-07-10 PROCEDURE — 64495 INJ PARAVERT F JNT L/S 3 LEV: CPT | Mod: LT | Performed by: PAIN MEDICINE

## 2018-07-10 PROCEDURE — 64494 INJ PARAVERT F JNT L/S 2 LEV: CPT | Mod: LT | Performed by: PAIN MEDICINE

## 2018-07-10 NOTE — PATIENT INSTRUCTIONS
[facility] Procedure Discharge Instructions    Today you saw:   Dr. Wendie Roche    Your procedure:   Facet joint injection      Medications used:  Lidocaine (anesthetic)  Bupivacaine (anesthetic)  Kenalog (steroid)  Omnipaque (contrast)             Be cautious when walking as numbness and/or weakness in the legs may occur up to 6-8 hours after the procedure due to effect of the local anesthetic    Do not drive for 6 hours. The effect of the local anesthetic could slow your reflexes.     Avoid strenuous activity for the first 24 hours. You may resume your regular activities after that.     You may shower, however avoid swimming, tub baths or hot tubs for 24 hours following your procedure    You may have a mild to moderate increase in pain for several days following the injection.      You may use ice packs for 10-15 minutes, 3 to 4 times a day at the injection site for comfort    Do not use heat to painful areas for 6 to 8 hours. This will give the local anesthetic time to wear off and prevent you from accidentally burning your skin.    You may use anti-inflammatory medications (such as Ibuprofen/Advil or Aleve) or Tylenol for pain control if necessary    With diabetes, check your blood sugar more frequently than usual as your blood sugar may be higher than normal for 10-14 days following a steroid injection. Contact your doctor who manages your diabetes if your blood sugar is higher than usual    It may take up to 14 days for the steroid medication to start working although you may feel the effect as early as a few days after the procedure.     Follow up with your referring provider in 2-3 weeks      If you experience any of the following, call the pain center line during work hours at [phone] or on-call physician after hours at [phone]:  -Fever over 100 degree F  -Swelling, bleeding, redness, drainage, warmth at the injection site  -Progressive weakness or numbness in  your legs or arms  -Loss of bowel or bladder function  -Unusual headache that is not relieved by Tylenol or your regular headache medication  -Unusual new onset of pain that is not improving    Phone #s:  Nurse triage line for general questions: [phone]

## 2018-07-10 NOTE — MR AVS SNAPSHOT
After Visit Summary   7/10/2018    Melody Ferguson    MRN: [id number]           Patient Information     Date Of Birth          1960        Visit Information        Provider Department      7/10/2018 10:15 AM Carlos Roche MD [facility]        Care Instructions    [facility] Procedure Discharge Instructions    Today you saw:   Dr. Wendie Roche    Your procedure:   Facet joint injection      Medications used:  Lidocaine (anesthetic)  Bupivacaine (anesthetic)  Kenalog (steroid)  Omnipaque (contrast)             Be cautious when walking as numbness and/or weakness in the legs may occur up to 6-8 hours after the procedure due to effect of the local anesthetic    Do not drive for 6 hours. The effect of the local anesthetic could slow your reflexes.     Avoid strenuous activity for the first 24 hours. You may resume your regular activities after that.     You may shower, however avoid swimming, tub baths or hot tubs for 24 hours following your procedure    You may have a mild to moderate increase in pain for several days following the injection.      You may use ice packs for 10-15 minutes, 3 to 4 times a day at the injection site for comfort    Do not use heat to painful areas for 6 to 8 hours. This will give the local anesthetic time to wear off and prevent you from accidentally burning your skin.    You may use anti-inflammatory medications (such as Ibuprofen/Advil or Aleve) or Tylenol for pain control if necessary    With diabetes, check your blood sugar more frequently than usual as your blood sugar may be higher than normal for 10-14 days following a steroid injection. Contact your doctor who manages your diabetes if your blood sugar is higher than usual    It may take up to 14 days for the steroid medication to start working although you may feel the effect as early as a few days after the procedure.     Follow up with your referring  provider in 2-3 weeks      If you experience any of the following, call the pain center line during work hours at [phone] or on-call physician after hours at [phone]:  -Fever over 100 degree F  -Swelling, bleeding, redness, drainage, warmth at the injection site  -Progressive weakness or numbness in your legs or arms  -Loss of bowel or bladder function  -Unusual headache that is not relieved by Tylenol or your regular headache medication  -Unusual new onset of pain that is not improving    Phone #s:  Nurse triage line for general questions: [phone]            Follow-ups after your visit        Your next 10 appointments already scheduled     Jul 31, 2018  2:15 PM CDT   Return Visit with Eduardo Benson MD   [facility] ([facility])    [address] [place] [id number] [phone] OPT 2              Who to contact     If you have questions or need follow up information about today's clinic visit or your schedule please contact [facility] directly at [phone].  Normal or non-critical lab and imaging results will be communicated to you by [employer], letter or phone within 4 business days after the clinic has received the results. If you do not hear from us within 7 days, please contact the clinic through [employer] or phone. If you have a critical or abnormal lab result, we will notify you by phone as soon as possible.  Submit refill requests through [employer] or call your pharmacy and they will forward the refill request to us. Please allow 3 business days for your refill to be completed.          Additional Information About Your Visit        [employer] Information     [employer] gives you secure access to your electronic health record. If you see a primary care provider, you can also send messages to your care team and make appointments. If you have questions, please call your primary care clinic.  If you do not have a  primary care provider, please call [phone] and they will assist you.        Care EveryWhere ID     This is your Care EveryWhere ID. This could be used by other organizations to access your [facility] medical records  [id number]        Your Vitals Were     Pulse Pulse Oximetry                63 96%           Blood Pressure from Last 3 Encounters:   07/10/18 139/74   06/26/18 157/90   05/30/18 137/79    Weight from Last 3 Encounters:   06/26/18 138.3 kg (305 lb)   05/30/18 138.3 kg (305 lb)   04/24/18 145.2 kg (320 lb)              Today, you had the following     No orders found for display       Primary Care Provider Office Phone # Fax #    Elizabeth Lamsa [phone] [address]  [facility] [address]        Equal Access to Services     [facility]: Hadii norberto espinoza hadasho Socorey, waaxda luqadaha, qaybta kaalmada adeegyada, issac claire . So [facility] [phone].    ATENCIÓN: Si habla español, tiene a turner disposición servicios gratuitos de asistencia lingüística. Llame al [phone].    We comply with applicable federal civil rights laws and [place] laws. We do not discriminate on the basis of race, color, national origin, age, disability, sex, sexual orientation, or gender identity.            Thank you!     Thank you for choosing [facility]  for your care. Our goal is always to provide you with excellent care. Hearing back from our patients is one way we can continue to improve our services. Please take a few minutes to complete the written survey that you may receive in the mail after your visit with us. Thank you!             Your Updated Medication List - Protect others around you: Learn how to safely use, store and throw away your medicines at www.disposemymeds.org.          This list is accurate as of 7/10/18 10:31 AM.  Always use your most recent med list.                   Brand Name Dispense Instructions for use Diagnosis    *  ACCU-CHEK DAMIEN PLUS test strip   Generic drug:  blood glucose monitoring     200 strip    TEST BLOOD SUGARS TWICE DAILY    Type 2 diabetes mellitus with diabetic nephropathy (H)       * blood glucose monitoring test strip    no brand specified    180 strip    Use to test blood sugars 2 times daily or as directed    Type 2 diabetes mellitus with diabetic nephropathy, without long-term current use of insulin (H)       ACE NOT PRESCRIBED (INTENTIONAL)     0 each    Reported on 5/10/2017    Type 2 diabetes mellitus without complication (H)       albuterol 108 (90 Base) MCG/ACT Inhaler    PROAIR HFA/PROVENTIL HFA/VENTOLIN HFA    1 Inhaler    Inhale 2 puffs into the lungs every 6 hours    Mild intermittent asthma without complication       ASPIRIN PO      Take 81 mg by mouth daily        atorvastatin 40 MG tablet    LIPITOR    90 tablet    Take 1 tablet (40 mg) by mouth daily    Hyperlipidemia LDL goal <100       blood glucose monitoring meter device kit    no brand specified    1 kit    Use to test blood sugar 1 times daily or as directed. Please dispense insurance preferred.    Type 2 diabetes mellitus with diabetic nephropathy, without long-term current use of insulin (H)       * BUPROPION HCL PO      Take 50 mg by mouth 2 times daily        * buPROPion 100 MG 12 hr tablet    WELLBUTRIN SR    180 tablet    Take 1 tablet (100 mg) by mouth 2 times daily    Major depressive disorder, recurrent episode, moderate (H)       citalopram 40 MG tablet    celeXA    90 tablet    Take 1 tablet (40 mg) by mouth daily    Major depressive disorder, recurrent episode, moderate (H)       cyclobenzaprine 5 MG tablet    FLEXERIL    60 tablet    TAKE 1 TABLET(5 MG) BY MOUTH TWICE DAILY AS NEEDED FOR MUSCLE SPASMS    Left sided sciatica, Back muscle spasm       diclofenac 50 MG EC tablet    VOLTAREN    180 tablet    TAKE 1 TABLET(50 MG) BY MOUTH TWICE DAILY AS NEEDED FOR MODERATE PAIN    Chronic pain syndrome       esomeprazole 40 MG CR  capsule    nexIUM    90 capsule    Take 1 capsule (40 mg) by mouth every morning (before breakfast) Take 30-60 minutes before eating.    Dyspepsia       fluticasone 27.5 MCG/SPRAY spray    VERAMYST    10 g    Spray 2 sprays into both nostrils daily    Chronic seasonal allergic rhinitis, unspecified trigger       gabapentin 300 MG capsule    NEURONTIN    180 capsule    Take 1 capsule (300 mg) by mouth 2 times daily    Acute left-sided low back pain with left-sided sciatica       glipiZIDE-metFORMIN 5-500 MG per tablet    METAGLIP    90 tablet    TAKE 1 TABLET BY MOUTH TONCE DAILY BEFORE MEALS    Type 2 diabetes mellitus with diabetic nephropathy, without long-term current use of insulin (H)       insulin pen needle 32G X 6 MM    NOVOFINE    100 each    Use once daily or as directed.    Type 2 diabetes mellitus with diabetic nephropathy, without long-term current use of insulin (H)       liraglutide 18 MG/3ML soln    VICTOZA    30 mL    Inject 1.8 mg Subcutaneous daily    Type 2 diabetes mellitus with diabetic nephropathy, without long-term current use of insulin (H)       losartan 50 MG tablet    COZAAR    90 tablet    Take 1 tablet (50 mg) by mouth daily    HTN, goal below 140/90, Type 2 diabetes mellitus with diabetic nephropathy, without long-term current use of insulin (H)       melatonin 3 MG tablet     30 tablet    TAKE 1 TABLET BY MOUTH ONCE AT BEDTIME AS NEEDED FOR SLEEP    Major depressive disorder, recurrent episode, moderate (H)       metoprolol succinate 25 MG 24 hr tablet    TOPROL XL    90 tablet    Take 1 tablet (25 mg) by mouth daily    Old myocardial infarction       montelukast 10 MG tablet    SINGULAIR    90 tablet    TAKE 1 TABLET(10 MG) BY MOUTH AT BEDTIME    Mild intermittent asthma without complication       nystatin 595241 UNIT/GM Powd    MYCOSTATIN    60 g    Apply topically 2 times daily    Morbid obesity with body mass index of 45.0-49.9 in adult (H)       order for DME     3 Month     Diabetic test strips and lancets per insurance formulary Check blood sugar 2 times daily    Type 2 diabetes mellitus with diabetic nephropathy (H)       polyethylene glycol powder    MIRALAX/GLYCOLAX    510 g    Take 17 g by mouth daily DISSOLVE 17 GRAMS IN LIQUID AND DRINK DAILY AS DIRECTED    Chronic pain syndrome       solifenacin 10 MG tablet    VESICARE    90 tablet    Take 1 tablet (10 mg) by mouth daily    Mixed incontinence       traMADol 50 MG tablet    ULTRAM    30 tablet    TAKE 1 TABLET BY MOUTH EVERY 8 HOURS AS NEEDED FOR MODERATE PAIN    Chronic pain syndrome       * Notice:  This list has 4 medication(s) that are the same as other medications prescribed for you. Read the directions carefully, and ask your doctor or other care provider to review them with you.

## 2018-07-10 NOTE — NURSING NOTE
Discharge Information    IV Discontiued Time:  NA    Amount of Fluid Infused:  NA    Discharge Criteria = When patient returns to baseline or as per MD order    Consciousness:  Pt is fully awake    Circulation:  BP +/- 20% of pre-procedure level    Respiration:  Patient is able to breathe deeply    O2 Sat:  Patient is able to maintain O2 Sat >92% on room air    Activity:  Moves 4 extremities on command    Ambulation:  Patient is able to stand and walk or stand and pivot into wheelchair    Dressing:  Clean/dry or No Dressing    Notes:   Discharge instructions and AVS given to patient    Patient meets criteria for discharge?  YES    Admitted to PCU?  No    Responsible adult present to accompany patient home?  Yes    Signature/Title:    Tiana Kahn  RN Care Coordinator  [facility]

## 2018-07-10 NOTE — PROGRESS NOTES
Pre procedure Diagnosis: facet arthropathy, lumbar spondylosis  Post procedure Diagnosis: Same  Procedure performed: left L3-4, L4-5, and L5-S1 facet joint injection  Anesthesia: none  Complications: none  Operators: Carlos Roche MD     Indications:   Melody Ferguson is a 57 year old femaleThey have a history of left sided axial low back pain.  Exam shows pain with palpation of left lumbar paraspinals, pain with lumbar extension and lateral rotation and they have tried conservative treatment including medications and prior injections (had approximately 3 months with prior injection).     MRI was done on 8/8/16 at [facility] which showed   1. Facet arthrosis, marked at L4-L5 and moderate/marked at L3-L4, L2-L3, and T11-T12, and spondylosis resulting in, at L2-L3 and L3-L4, marked bilateral recess and moderate/marked central canal stenosis. Also marked right and moderate left L4-5 recess stenosis.  2. Grade 1 L4 and L3 degenerative anterolisthesis.  3. No fracture, neoplasm, or infection.  Options/alternatives, benefits and risks were discussed with the patient including bleeding, infection, tissue trauma, numbness, weakness, paralysis, spinal cord injury, radiation exposure, headache and reaction to medications. Questions were answered to her satisfaction and she agrees to proceed. Voluntary informed consent was obtained and signed.     Vitals were reviewed: Yes  Allergies were reviewed:  Yes   Medications were reviewed:  Yes  Pre-procedure pain score: 7/10    Procedure:  After getting informed consent, patient was brought into the procedure suite and was placed in a prone position on the procedure table.   A Pause for the Cause was performed.  Patient was prepped and draped in sterile fashion.     Under AP fluoroscopic guidance the L3-4, L4-5 and L5-S1 facet joints on the left side were identified, and the C-arm was rotated obliquely to the affected side to open the joint space. A total of 3 ml of 1% lidocaine was  injected at the needle entry point and needle tract. Then a 22 gauge 5 inch quincke type spinal needle was inserted and advanced under fluoroscopic guidance targeting the superior articular pillar of each joint. Once the needle made a contact with SAP, it was rotated and was then advanced into the joint.    AP fluoroscopic views were obtained to confirm the needle placement. Then, 0.5ml Omnipaque 300 contrast dye was injected after negative aspiration for heme and CSF in each joint, confirming appropriate placement.  A total of 1.5ml of Omnipaque was used, and 8.5ml was wasted.     The injection was then accomplished using a solution containing 2ml of 0.25% bupivacaine mixed with 40mg of kenalog , divided between the 2 joints. The needles were removed.    Hemostasis was achieved, the area was cleaned, and bandaids were placed when appropriate.  The patient tolerated the procedure well, and was taken to the recovery room.    Images were saved to PACS.    Post-procedure pain score: 1/10  Follow-up includes:   -f/u phone call in one week  -f/u with referring provider    Carlos Roche MD  [facility]

## 2018-07-31 ENCOUNTER — OFFICE VISIT (OUTPATIENT)
Dept: CARDIOLOGY | Facility: CLINIC | Age: 58
End: 2018-07-31
Attending: INTERNAL MEDICINE
Payer: MEDICARE

## 2018-07-31 VITALS
SYSTOLIC BLOOD PRESSURE: 136 MMHG | DIASTOLIC BLOOD PRESSURE: 83 MMHG | HEART RATE: 65 BPM | HEIGHT: 67 IN | WEIGHT: 293 LBS | BODY MASS INDEX: 45.99 KG/M2

## 2018-07-31 DIAGNOSIS — R94.31 ABNORMAL ELECTROCARDIOGRAM: Primary | ICD-10-CM

## 2018-07-31 DIAGNOSIS — E11.21 TYPE 2 DIABETES MELLITUS WITH DIABETIC NEPHROPATHY, WITHOUT LONG-TERM CURRENT USE OF INSULIN (H): ICD-10-CM

## 2018-07-31 DIAGNOSIS — I10 ESSENTIAL HYPERTENSION: ICD-10-CM

## 2018-07-31 PROCEDURE — 99213 OFFICE O/P EST LOW 20 MIN: CPT | Performed by: INTERNAL MEDICINE

## 2018-07-31 NOTE — LETTER
7/31/2018    Elizabeth Lamas MD  [address]  [place] [address]    RE: Melodyevangelista Ferguson       Dear Colleague,    I had the pleasure of seeing Melody Marty in the [facility].    HPI and Plan:   See dictation    No orders of the defined types were placed in this encounter.    No orders of the defined types were placed in this encounter.    There are no discontinued medications.      Encounter Diagnosis   Name Primary?     HTN (hypertension)        CURRENT MEDICATIONS:  Current Outpatient Prescriptions   Medication Sig Dispense Refill     ACCU-CHEK DAMIEN PLUS test strip TEST BLOOD SUGARS TWICE DAILY 200 strip 0     ACE NOT PRESCRIBED, INTENTIONAL, Reported on 5/10/2017 0 each 0     albuterol (PROAIR HFA/PROVENTIL HFA/VENTOLIN HFA) 108 (90 Base) MCG/ACT Inhaler Inhale 2 puffs into the lungs every 6 hours 1 Inhaler 1     ASPIRIN PO Take 81 mg by mouth daily       atorvastatin (LIPITOR) 40 MG tablet Take 1 tablet (40 mg) by mouth daily 90 tablet 1     blood glucose monitoring (NO BRAND SPECIFIED) meter device kit Use to test blood sugar 1 times daily or as directed. Please dispense insurance preferred. 1 kit 0     blood glucose monitoring (NO BRAND SPECIFIED) test strip Use to test blood sugars 2 times daily or as directed 180 strip 3     buPROPion (WELLBUTRIN SR) 100 MG 12 hr tablet Take 1 tablet (100 mg) by mouth 2 times daily 180 tablet 1     BUPROPION HCL PO Take 50 mg by mouth 2 times daily        citalopram (CELEXA) 40 MG tablet Take 1 tablet (40 mg) by mouth daily 90 tablet 0     cyclobenzaprine (FLEXERIL) 5 MG tablet TAKE 1 TABLET(5 MG) BY MOUTH TWICE DAILY AS NEEDED FOR MUSCLE SPASMS 60 tablet 1     diclofenac (VOLTAREN) 50 MG EC tablet TAKE 1 TABLET(50 MG) BY MOUTH TWICE DAILY AS NEEDED FOR MODERATE PAIN 180 tablet 0     esomeprazole (NEXIUM) 40 MG CR capsule Take 1 capsule (40 mg) by mouth every morning (before breakfast) Take 30-60 minutes before eating. 90 capsule 1      fluticasone (VERAMYST) 27.5 MCG/SPRAY spray Spray 2 sprays into both nostrils daily 10 g 11     gabapentin (NEURONTIN) 300 MG capsule Take 1 capsule (300 mg) by mouth 2 times daily 180 capsule 1     glipiZIDE-metFORMIN (METAGLIP) 5-500 MG per tablet TAKE 1 TABLET BY MOUTH TONCE DAILY BEFORE MEALS 90 tablet 0     insulin pen needle (NOVOFINE) 32G X 6 MM Use once daily or as directed. 100 each prn     liraglutide (VICTOZA) 18 MG/3ML soln Inject 1.8 mg Subcutaneous daily 30 mL 0     losartan (COZAAR) 50 MG tablet Take 1 tablet (50 mg) by mouth daily 90 tablet 0     melatonin 3 MG tablet TAKE 1 TABLET BY MOUTH ONCE AT BEDTIME AS NEEDED FOR SLEEP 30 tablet 0     metoprolol succinate (TOPROL XL) 25 MG 24 hr tablet Take 1 tablet (25 mg) by mouth daily 90 tablet 1     montelukast (SINGULAIR) 10 MG tablet TAKE 1 TABLET(10 MG) BY MOUTH AT BEDTIME 90 tablet 0     nystatin (MYCOSTATIN) 587752 UNIT/GM POWD Apply topically 2 times daily 60 g 2     order for DME Diabetic test strips and lancets per insurance formulary Check blood sugar 2 times daily 3 Month 3     polyethylene glycol (MIRALAX/GLYCOLAX) powder Take 17 g by mouth daily DISSOLVE 17 GRAMS IN LIQUID AND DRINK DAILY AS DIRECTED 510 g 6     solifenacin (VESICARE) 10 MG tablet Take 1 tablet (10 mg) by mouth daily 90 tablet 0     traMADol (ULTRAM) 50 MG tablet TAKE 1 TABLET BY MOUTH EVERY 8 HOURS AS NEEDED FOR MODERATE PAIN 30 tablet 0       ALLERGIES     Allergies   Allergen Reactions     Milk Protein Extract GI Disturbance     lactose intolerance.  Can tolerate milk products if uses lactaid     Bee Venom Swelling     Latex      Lisinopril Cough     Onion GI Disturbance     Vomiting, feels like throat is clogged     Aloe Rash     pain     Chlorine Rash       PAST MEDICAL HISTORY:  Past Medical History:   Diagnosis Date     Arthritis 2000     Cancer (H) 1983     Cerebral infarction (H) 2005     COPD (chronic obstructive pulmonary disease) (H) 1990     Depressive disorder  1990     DM type 2 (diabetes mellitus, type 2) (H)      History of TIA (transient ischemic attack)      HTN (hypertension)      Hyperlipidemia      Uncomplicated asthma 1990       PAST SURGICAL HISTORY:  Past Surgical History:   Procedure Laterality Date     ABDOMEN SURGERY  05/20/2005     BIOPSY  1984     BREAST SURGERY  1984     C TOTAL KNEE ARTHROPLASTY Left 04/26/2017    DML at [facility]     CHOLECYSTECTOMY  2000     COLONOSCOPY       ENT SURGERY  2008    tonsils removed     GENITOURINARY SURGERY  2005    a & p repair     REPAIR PTOSIS       SLING TRANSVAGINAL N/A 7/14/2017    Procedure: SLING TRANSVAGINAL;  Sling ([facility]);  Surgeon: Talon Katz MD;  Location: [facility] OR       FAMILY HISTORY:  Family History   Problem Relation Age of Onset     Thyroid Disease Sister      Cancer Brother      Cancer Sister      breast cancer     Cerebrovascular Disease Sister 62     Other - See Comments Sister      Angioplasty 8/2016     Hypertension Father      Hyperlipidemia Father      Prostate Cancer Father      Glaucoma Maternal Grandmother      Cancer Maternal Grandfather      Cancer Paternal Grandmother      Breast Cancer Paternal Grandmother      Cerebrovascular Disease Paternal Grandfather      Cerebrovascular Disease Maternal Half-Sister      4/08/2016     Breast Cancer Maternal Half-Sister      Asthma Maternal Half-Sister      Other Cancer Brother      Passed from cancer unsure what type     Anesthesia Reaction Daughter      Diabetes No family hx of      Macular Degeneration No family hx of        SOCIAL HISTORY:  Social History     Social History     Marital status: Single     Spouse name: N/A     Number of children: N/A     Years of education: N/A     Occupational History      Disabled     Social History Main Topics     Smoking status: Former Smoker     Packs/day: 0.50     Years: 25.00     Types: Cigarettes     Quit date: 10/15/1991     Smokeless tobacco: Never Used     Alcohol use 0.0 oz/week      Comment: occassionally  "    Drug use: No     Sexual activity: Yes     Partners: Male     Birth control/ protection: Other      Comment: kalina     Other Topics Concern     Parent/Sibling W/ Cabg, Mi Or Angioplasty Before 65f 55m? Yes     Mirna Angioplasty 2015     Social History Narrative       Review of Systems:  Skin:  Negative     Eyes:  Negative    ENT:  Negative    Respiratory:  Negative cough  Cardiovascular:    Positive for;edema  Gastroenterology: Positive for    Genitourinary:  Negative    Musculoskeletal:  Positive for back pain;arthritis;joint pain  Neurologic:  Negative numbness or tingling of hands  Psychiatric:  Positive for sleep disturbances;anxiety;depression;excessive stress  Heme/Lymph/Imm:  Negative    Endocrine:  Positive for diabetes (DM 2)    Physical Exam:  Vitals: /83  Pulse 65  Ht 1.702 m (5' 7\")  Wt 136.5 kg (301 lb)  BMI 47.14 kg/m2    Constitutional:  cooperative, alert and oriented, well developed, well nourished, in no acute distress morbidly obese      Skin:  warm and dry to the touch, no apparent skin lesions or masses noted          Head:  normocephalic, no masses or lesions        Eyes:  pupils equal and round, conjunctivae and lids unremarkable, sclera white, no xanthalasma, EOMS intact, no nystagmus        Lymph:No Cervical lymphadenopathy present     ENT:  no pallor or cyanosis, dentition good        Neck:  carotid pulses are full and equal bilaterally, JVP normal, no carotid bruit        Respiratory:  normal breath sounds, clear to auscultation, normal A-P diameter, normal symmetry, normal respiratory excursion, no use of accessory muscles         Cardiac: regular rhythm, normal S1/S2, no S3 or S4, apical impulse not displaced, no murmurs, gallops or rubs                pulses full and equal, no bruits auscultated                                        GI:           Extremities and Muscular Skeletal:  no deformities, clubbing, cyanosis, erythema observed              Neurological:  no gross " motor deficits        Psych:  Alert and Oriented x 3        Recent Lab Results:  LIPID RESULTS:  Lab Results   Component Value Date    CHOL 214 (A) 04/12/2018    HDL 51 04/12/2018     (A) 04/12/2018    TRIG 119 04/12/2018       LIVER ENZYME RESULTS:  Lab Results   Component Value Date    AST 10 01/05/2018    ALT 22 01/05/2018       CBC RESULTS:  Lab Results   Component Value Date    WBC 8.6 07/11/2017    RBC 4.01 07/11/2017    HGB 10.9 (L) 07/11/2017    HCT 34.9 (L) 07/11/2017    MCV 87 07/11/2017    MCH 27.2 07/11/2017    MCHC 31.2 (L) 07/11/2017    RDW 15.0 07/11/2017     07/11/2017       BMP RESULTS:  Lab Results   Component Value Date     01/05/2018    POTASSIUM 3.9 01/05/2018    CHLORIDE 101 01/05/2018    CO2 33 (H) 01/05/2018    ANIONGAP 7 01/05/2018     (H) 01/05/2018    BUN 18 01/05/2018    CR 0.51 (L) 01/05/2018    GFRESTIMATED >90 01/05/2018    GFRESTBLACK >90 01/05/2018    DHRUV 8.9 01/05/2018        A1C RESULTS:  Lab Results   Component Value Date    A1C 6.3 (A) 04/12/2018       INR RESULTS:  No results found for: INR        CC  Eduardo Pop MD  [address]  [place] [address]                  Thank you for allowing me to participate in the care of your patient.      Sincerely,     EDUARDO POP MD     [facility]    cc:   Eduardo Pop MD  [address]  [place] [address]

## 2018-07-31 NOTE — PROGRESS NOTES
Service Date: 2018      HISTORY OF PRESENT ILLNESS:  Ms. Borden is a 57-year-old female with a history of abnormal EKG, diabetes, hypertension, distant myocardial infarction, TIA who initially had a nuclear stress test last year showing moderate ischemia.  This subsequently led to an angiogram showing no significant coronary artery disease.  She has been treated from a cardiac standpoint with Lipitor 40 mg daily, losartan 50 mg p.o. q. day., Toprol 25 p.o. q. day and tolerated well without any difficulties.  She has been successful in increasing her walking and losing weight and is quite excited by this.  She has had no cardiovascular symptoms of chest pain, chest pressure, shortness of breath, heart racing, palpitations or edema.  Her lipid profile from earlier this year shows an LDL of 139.  Her baseline echocardiogram done last year showed normal ejection fraction, left ventricular hypertrophy and normal valvular function.      ASSESSMENT AND PLAN:  Edison appears stable from a cardiovascular standpoint.  She is asymptomatic.  She has no evidence of angina, heart failure, cardiac arrhythmias.  Her blood pressure is controlled. Physical exam is unchanged.  I would like her to continue with diet and exercise to try and further bring down her weight.  Blood pressure improved and overall cardiovascular health and improved lipid profile.  Given the fact that she is diabetic, LDL goal would be 100 or less.  If it continues to be over this, I would consider increasing her Lipitor to 80 or changing to Crestor.  She continues to want to follow up with Cardiology on an annual basis and I will have her see Aybike in 1 year's time with a repeat EKG at that time.         TAO POP MD [facility]             D: 2018   T: 2018   MT: JULIENNE      Name:     EDISON BORDEN   MRN:      [id number]        Account:      [id number]   :      1960           Service Date: 2018      Document: [id number]

## 2018-07-31 NOTE — PROGRESS NOTES
HPI and Plan:   See dictation    No orders of the defined types were placed in this encounter.    No orders of the defined types were placed in this encounter.    There are no discontinued medications.      Encounter Diagnosis   Name Primary?     HTN (hypertension)        CURRENT MEDICATIONS:  Current Outpatient Prescriptions   Medication Sig Dispense Refill     ACCU-CHEK DAMIEN PLUS test strip TEST BLOOD SUGARS TWICE DAILY 200 strip 0     ACE NOT PRESCRIBED, INTENTIONAL, Reported on 5/10/2017 0 each 0     albuterol (PROAIR HFA/PROVENTIL HFA/VENTOLIN HFA) 108 (90 Base) MCG/ACT Inhaler Inhale 2 puffs into the lungs every 6 hours 1 Inhaler 1     ASPIRIN PO Take 81 mg by mouth daily       atorvastatin (LIPITOR) 40 MG tablet Take 1 tablet (40 mg) by mouth daily 90 tablet 1     blood glucose monitoring (NO BRAND SPECIFIED) meter device kit Use to test blood sugar 1 times daily or as directed. Please dispense insurance preferred. 1 kit 0     blood glucose monitoring (NO BRAND SPECIFIED) test strip Use to test blood sugars 2 times daily or as directed 180 strip 3     buPROPion (WELLBUTRIN SR) 100 MG 12 hr tablet Take 1 tablet (100 mg) by mouth 2 times daily 180 tablet 1     BUPROPION HCL PO Take 50 mg by mouth 2 times daily        citalopram (CELEXA) 40 MG tablet Take 1 tablet (40 mg) by mouth daily 90 tablet 0     cyclobenzaprine (FLEXERIL) 5 MG tablet TAKE 1 TABLET(5 MG) BY MOUTH TWICE DAILY AS NEEDED FOR MUSCLE SPASMS 60 tablet 1     diclofenac (VOLTAREN) 50 MG EC tablet TAKE 1 TABLET(50 MG) BY MOUTH TWICE DAILY AS NEEDED FOR MODERATE PAIN 180 tablet 0     esomeprazole (NEXIUM) 40 MG CR capsule Take 1 capsule (40 mg) by mouth every morning (before breakfast) Take 30-60 minutes before eating. 90 capsule 1     fluticasone (VERAMYST) 27.5 MCG/SPRAY spray Spray 2 sprays into both nostrils daily 10 g 11     gabapentin (NEURONTIN) 300 MG capsule Take 1 capsule (300 mg) by mouth 2 times daily 180 capsule 1      glipiZIDE-metFORMIN (METAGLIP) 5-500 MG per tablet TAKE 1 TABLET BY MOUTH TONCE DAILY BEFORE MEALS 90 tablet 0     insulin pen needle (NOVOFINE) 32G X 6 MM Use once daily or as directed. 100 each prn     liraglutide (VICTOZA) 18 MG/3ML soln Inject 1.8 mg Subcutaneous daily 30 mL 0     losartan (COZAAR) 50 MG tablet Take 1 tablet (50 mg) by mouth daily 90 tablet 0     melatonin 3 MG tablet TAKE 1 TABLET BY MOUTH ONCE AT BEDTIME AS NEEDED FOR SLEEP 30 tablet 0     metoprolol succinate (TOPROL XL) 25 MG 24 hr tablet Take 1 tablet (25 mg) by mouth daily 90 tablet 1     montelukast (SINGULAIR) 10 MG tablet TAKE 1 TABLET(10 MG) BY MOUTH AT BEDTIME 90 tablet 0     nystatin (MYCOSTATIN) 885310 UNIT/GM POWD Apply topically 2 times daily 60 g 2     order for DME Diabetic test strips and lancets per insurance formulary Check blood sugar 2 times daily 3 Month 3     polyethylene glycol (MIRALAX/GLYCOLAX) powder Take 17 g by mouth daily DISSOLVE 17 GRAMS IN LIQUID AND DRINK DAILY AS DIRECTED 510 g 6     solifenacin (VESICARE) 10 MG tablet Take 1 tablet (10 mg) by mouth daily 90 tablet 0     traMADol (ULTRAM) 50 MG tablet TAKE 1 TABLET BY MOUTH EVERY 8 HOURS AS NEEDED FOR MODERATE PAIN 30 tablet 0       ALLERGIES     Allergies   Allergen Reactions     Milk Protein Extract GI Disturbance     lactose intolerance.  Can tolerate milk products if uses lactaid     Bee Venom Swelling     Latex      Lisinopril Cough     Onion GI Disturbance     Vomiting, feels like throat is clogged     Aloe Rash     pain     Chlorine Rash       PAST MEDICAL HISTORY:  Past Medical History:   Diagnosis Date     Arthritis 2000     Cancer (H) 1983     Cerebral infarction (H) 2005     COPD (chronic obstructive pulmonary disease) (H) 1990     Depressive disorder 1990     DM type 2 (diabetes mellitus, type 2) (H)      History of TIA (transient ischemic attack)      HTN (hypertension)      Hyperlipidemia      Uncomplicated asthma 1990       PAST SURGICAL  HISTORY:  Past Surgical History:   Procedure Laterality Date     ABDOMEN SURGERY  05/20/2005     BIOPSY  1984     BREAST SURGERY  1984     C TOTAL KNEE ARTHROPLASTY Left 04/26/2017    DML at [facility]     CHOLECYSTECTOMY  2000     COLONOSCOPY       ENT SURGERY  2008    tonsils removed     GENITOURINARY SURGERY  2005    a & p repair     REPAIR PTOSIS       SLING TRANSVAGINAL N/A 7/14/2017    Procedure: SLING TRANSVAGINAL;  Sling ([place]);  Surgeon: Talon Katz MD;  Location: [facility] OR       FAMILY HISTORY:  Family History   Problem Relation Age of Onset     Thyroid Disease Sister      Cancer Brother      Cancer Sister      breast cancer     Cerebrovascular Disease Sister 62     Other - See Comments Sister      Angioplasty 8/2016     Hypertension Father      Hyperlipidemia Father      Prostate Cancer Father      Glaucoma Maternal Grandmother      Cancer Maternal Grandfather      Cancer Paternal Grandmother      Breast Cancer Paternal Grandmother      Cerebrovascular Disease Paternal Grandfather      Cerebrovascular Disease Maternal Half-Sister      4/08/2016     Breast Cancer Maternal Half-Sister      Asthma Maternal Half-Sister      Other Cancer Brother      Passed from cancer unsure what type     Anesthesia Reaction Daughter      Diabetes No family hx of      Macular Degeneration No family hx of        SOCIAL HISTORY:  Social History     Social History     Marital status: Single     Spouse name: N/A     Number of children: N/A     Years of education: N/A     Occupational History      Disabled     Social History Main Topics     Smoking status: Former Smoker     Packs/day: 0.50     Years: 25.00     Types: Cigarettes     Quit date: 10/15/1991     Smokeless tobacco: Never Used     Alcohol use 0.0 oz/week      Comment: occassionally     Drug use: No     Sexual activity: Yes     Partners: Male     Birth control/ protection: Other      Comment: kalina     Other Topics Concern     Parent/Sibling W/ Cabg, Mi Or Angioplasty  "Before 65f 55m? Yes     Mirna Angioplasty 2015     Social History Narrative       Review of Systems:  Skin:  Negative     Eyes:  Negative    ENT:  Negative    Respiratory:  Negative cough  Cardiovascular:    Positive for;edema  Gastroenterology: Positive for    Genitourinary:  Negative    Musculoskeletal:  Positive for back pain;arthritis;joint pain  Neurologic:  Negative numbness or tingling of hands  Psychiatric:  Positive for sleep disturbances;anxiety;depression;excessive stress  Heme/Lymph/Imm:  Negative    Endocrine:  Positive for diabetes (DM 2)    Physical Exam:  Vitals: /83  Pulse 65  Ht 1.702 m (5' 7\")  Wt 136.5 kg (301 lb)  BMI 47.14 kg/m2    Constitutional:  cooperative, alert and oriented, well developed, well nourished, in no acute distress morbidly obese      Skin:  warm and dry to the touch, no apparent skin lesions or masses noted          Head:  normocephalic, no masses or lesions        Eyes:  pupils equal and round, conjunctivae and lids unremarkable, sclera white, no xanthalasma, EOMS intact, no nystagmus        Lymph:No Cervical lymphadenopathy present     ENT:  no pallor or cyanosis, dentition good        Neck:  carotid pulses are full and equal bilaterally, JVP normal, no carotid bruit        Respiratory:  normal breath sounds, clear to auscultation, normal A-P diameter, normal symmetry, normal respiratory excursion, no use of accessory muscles         Cardiac: regular rhythm, normal S1/S2, no S3 or S4, apical impulse not displaced, no murmurs, gallops or rubs                pulses full and equal, no bruits auscultated                                        GI:           Extremities and Muscular Skeletal:  no deformities, clubbing, cyanosis, erythema observed              Neurological:  no gross motor deficits        Psych:  Alert and Oriented x 3        Recent Lab Results:  LIPID RESULTS:  Lab Results   Component Value Date    CHOL 214 (A) 04/12/2018    HDL 51 04/12/2018    LDL " 139 (A) 04/12/2018    TRIG 119 04/12/2018       LIVER ENZYME RESULTS:  Lab Results   Component Value Date    AST 10 01/05/2018    ALT 22 01/05/2018       CBC RESULTS:  Lab Results   Component Value Date    WBC 8.6 07/11/2017    RBC 4.01 07/11/2017    HGB 10.9 (L) 07/11/2017    HCT 34.9 (L) 07/11/2017    MCV 87 07/11/2017    MCH 27.2 07/11/2017    MCHC 31.2 (L) 07/11/2017    RDW 15.0 07/11/2017     07/11/2017       BMP RESULTS:  Lab Results   Component Value Date     01/05/2018    POTASSIUM 3.9 01/05/2018    CHLORIDE 101 01/05/2018    CO2 33 (H) 01/05/2018    ANIONGAP 7 01/05/2018     (H) 01/05/2018    BUN 18 01/05/2018    CR 0.51 (L) 01/05/2018    GFRESTIMATED >90 01/05/2018    GFRESTBLACK >90 01/05/2018    DHRUV 8.9 01/05/2018        A1C RESULTS:  Lab Results   Component Value Date    A1C 6.3 (A) 04/12/2018       INR RESULTS:  No results found for: INR        CC  Eduardo Benson MD  [id number]  [place] [id number]

## 2018-07-31 NOTE — MR AVS SNAPSHOT
After Visit Summary   7/31/2018    Melody Ferguson    MRN: [id number]           Patient Information     Date Of Birth          1960        Visit Information        Provider Department      7/31/2018 2:15 PM Eduardo Benson MD [facility]        Today's Diagnoses     Abnormal electrocardiogram    -  1    Essential hypertension        Type 2 diabetes mellitus with diabetic nephropathy, without long-term current use of insulin (H)           Follow-ups after your visit        Additional Services     Follow-Up with Cardiac Advanced Practice Provider       Follow up with Aybike                  Future tests that were ordered for you today     Open Future Orders        Priority Expected Expires Ordered    EKG 12-lead complete w/read - Clinics (to be scheduled) Routine 7/31/2019 8/1/2019 7/31/2018    Follow-Up with Cardiac Advanced Practice Provider Routine 7/31/2019 8/1/2019 7/31/2018            Who to contact     If you have questions or need follow up information about today's clinic visit or your schedule please contact [facility] directly at [phone].  Normal or non-critical lab and imaging results will be communicated to you by MyChart, letter or phone within 4 business days after the clinic has received the results. If you do not hear from us within 7 days, please contact the clinic through [employer] or phone. If you have a critical or abnormal lab result, we will notify you by phone as soon as possible.  Submit refill requests through [employer] or call your pharmacy and they will forward the refill request to us. Please allow 3 business days for your refill to be completed.          Additional Information About Your Visit        MyChart Information     [employer] gives you secure access to your electronic health record. If you see a primary care provider, you can also send messages to your care team and make  "appointments. If you have questions, please call your primary care clinic.  If you do not have a primary care provider, please call [phone] and they will assist you.        Care EveryWhere ID     This is your Care EveryWhere ID. This could be used by other organizations to access your [facility] medical records  [id number]        Your Vitals Were     Pulse Height BMI (Body Mass Index)             65 1.702 m (5' 7\") 47.14 kg/m2          Blood Pressure from Last 3 Encounters:   07/31/18 136/83   07/10/18 138/68   06/26/18 157/90    Weight from Last 3 Encounters:   07/31/18 136.5 kg (301 lb)   06/26/18 138.3 kg (305 lb)   05/30/18 138.3 kg (305 lb)              We Performed the Following     Follow-Up with Cardiologist        Primary Care Provider Office Phone # Fax #    Elizabeth Lamas [phone]       [id number]  [facility] [address]        Equal Access to Services     [facility]: Hadii norberto ku hadasho Soomaali, waaxda luqadaha, qaybta kaalmada adeegyapeggy, issac claire . So [facility] [phone].    ATENCIÓN: Si habla español, tiene a turner disposición servicios gratuitos de asistencia lingüística. [facility] [phone].    We comply with applicable federal civil rights laws and [place] laws. We do not discriminate on the basis of race, color, national origin, age, disability, sex, sexual orientation, or gender identity.            Thank you!     Thank you for choosing [facility]  for your care. Our goal is always to provide you with excellent care. Hearing back from our patients is one way we can continue to improve our services. Please take a few minutes to complete the written survey that you may receive in the mail after your visit with us. Thank you!             Your Updated Medication List - Protect others around you: Learn how to safely use, store and throw away your medicines at www.disposemymeds.org.          This " list is accurate as of 7/31/18  2:29 PM.  Always use your most recent med list.                   Brand Name Dispense Instructions for use Diagnosis    * ACCU-CHEK DAMIEN PLUS test strip   Generic drug:  blood glucose monitoring     200 strip    TEST BLOOD SUGARS TWICE DAILY    Type 2 diabetes mellitus with diabetic nephropathy (H)       * blood glucose monitoring test strip    no brand specified    180 strip    Use to test blood sugars 2 times daily or as directed    Type 2 diabetes mellitus with diabetic nephropathy, without long-term current use of insulin (H)       ACE NOT PRESCRIBED (INTENTIONAL)     0 each    Reported on 5/10/2017    Type 2 diabetes mellitus without complication (H)       albuterol 108 (90 Base) MCG/ACT Inhaler    PROAIR HFA/PROVENTIL HFA/VENTOLIN HFA    1 Inhaler    Inhale 2 puffs into the lungs every 6 hours    Mild intermittent asthma without complication       ASPIRIN PO      Take 81 mg by mouth daily        atorvastatin 40 MG tablet    LIPITOR    90 tablet    Take 1 tablet (40 mg) by mouth daily    Hyperlipidemia LDL goal <100       blood glucose monitoring meter device kit    no brand specified    1 kit    Use to test blood sugar 1 times daily or as directed. Please dispense insurance preferred.    Type 2 diabetes mellitus with diabetic nephropathy, without long-term current use of insulin (H)       * BUPROPION HCL PO      Take 50 mg by mouth 2 times daily        * buPROPion 100 MG 12 hr tablet    WELLBUTRIN SR    180 tablet    Take 1 tablet (100 mg) by mouth 2 times daily    Major depressive disorder, recurrent episode, moderate (H)       citalopram 40 MG tablet    celeXA    90 tablet    Take 1 tablet (40 mg) by mouth daily    Major depressive disorder, recurrent episode, moderate (H)       cyclobenzaprine 5 MG tablet    FLEXERIL    60 tablet    TAKE 1 TABLET(5 MG) BY MOUTH TWICE DAILY AS NEEDED FOR MUSCLE SPASMS    Left sided sciatica, Back muscle spasm       diclofenac 50 MG EC tablet     VOLTAREN    180 tablet    TAKE 1 TABLET(50 MG) BY MOUTH TWICE DAILY AS NEEDED FOR MODERATE PAIN    Chronic pain syndrome       esomeprazole 40 MG CR capsule    nexIUM    90 capsule    Take 1 capsule (40 mg) by mouth every morning (before breakfast) Take 30-60 minutes before eating.    Dyspepsia       fluticasone 27.5 MCG/SPRAY spray    VERAMYST    10 g    Spray 2 sprays into both nostrils daily    Chronic seasonal allergic rhinitis, unspecified trigger       gabapentin 300 MG capsule    NEURONTIN    180 capsule    Take 1 capsule (300 mg) by mouth 2 times daily    Acute left-sided low back pain with left-sided sciatica       glipiZIDE-metFORMIN 5-500 MG per tablet    METAGLIP    90 tablet    TAKE 1 TABLET BY MOUTH TONCE DAILY BEFORE MEALS    Type 2 diabetes mellitus with diabetic nephropathy, without long-term current use of insulin (H)       insulin pen needle 32G X 6 MM    NOVOFINE    100 each    Use once daily or as directed.    Type 2 diabetes mellitus with diabetic nephropathy, without long-term current use of insulin (H)       liraglutide 18 MG/3ML soln    VICTOZA    30 mL    Inject 1.8 mg Subcutaneous daily    Type 2 diabetes mellitus with diabetic nephropathy, without long-term current use of insulin (H)       losartan 50 MG tablet    COZAAR    90 tablet    Take 1 tablet (50 mg) by mouth daily    HTN, goal below 140/90, Type 2 diabetes mellitus with diabetic nephropathy, without long-term current use of insulin (H)       melatonin 3 MG tablet     30 tablet    TAKE 1 TABLET BY MOUTH ONCE AT BEDTIME AS NEEDED FOR SLEEP    Major depressive disorder, recurrent episode, moderate (H)       metoprolol succinate 25 MG 24 hr tablet    TOPROL XL    90 tablet    Take 1 tablet (25 mg) by mouth daily    Old myocardial infarction       montelukast 10 MG tablet    SINGULAIR    90 tablet    TAKE 1 TABLET(10 MG) BY MOUTH AT BEDTIME    Mild intermittent asthma without complication       nystatin 857049 UNIT/GM Powd     MYCOSTATIN    60 g    Apply topically 2 times daily    Morbid obesity with body mass index of 45.0-49.9 in adult (H)       order for DME     3 Month    Diabetic test strips and lancets per insurance formulary Check blood sugar 2 times daily    Type 2 diabetes mellitus with diabetic nephropathy (H)       polyethylene glycol powder    MIRALAX/GLYCOLAX    510 g    Take 17 g by mouth daily DISSOLVE 17 GRAMS IN LIQUID AND DRINK DAILY AS DIRECTED    Chronic pain syndrome       solifenacin 10 MG tablet    VESICARE    90 tablet    Take 1 tablet (10 mg) by mouth daily    Mixed incontinence       traMADol 50 MG tablet    ULTRAM    30 tablet    TAKE 1 TABLET BY MOUTH EVERY 8 HOURS AS NEEDED FOR MODERATE PAIN    Chronic pain syndrome       * Notice:  This list has 4 medication(s) that are the same as other medications prescribed for you. Read the directions carefully, and ask your doctor or other care provider to review them with you.

## 2018-07-31 NOTE — LETTER
7/31/2018      Elizabeth Lamas MD  [address]  [place] [address]      RE: Melody Ferguson       Dear Colleague,    I had the pleasure of seeing Melody Ferguson in the [facility].    Service Date: 07/31/2018      HISTORY OF PRESENT ILLNESS:  Ms. Ferguson is a 57-year-old female with a history of abnormal EKG, diabetes, hypertension, distant myocardial infarction, TIA who initially had a nuclear stress test last year showing moderate ischemia.  This subsequently led to an angiogram showing no significant coronary artery disease.  She has been treated from a cardiac standpoint with Lipitor 40 mg daily, losartan 50 mg p.o. q. day., Toprol 25 p.o. q. day and tolerated well without any difficulties.  She has been successful in increasing her walking and losing weight and is quite excited by this.  She has had no cardiovascular symptoms of chest pain, chest pressure, shortness of breath, heart racing, palpitations or edema.  Her lipid profile from earlier this year shows an LDL of 139.  Her baseline echocardiogram done last year showed normal ejection fraction, left ventricular hypertrophy and normal valvular function.      ASSESSMENT AND PLAN:  Melody appears stable from a cardiovascular standpoint.  She is asymptomatic.  She has no evidence of angina, heart failure, cardiac arrhythmias.  Her blood pressure is controlled. Physical exam is unchanged.  I would like her to continue with diet and exercise to try and further bring down her weight.  Blood pressure improved and overall cardiovascular health and improved lipid profile.  Given the fact that she is diabetic, LDL goal would be 100 or less.  If it continues to be over this, I would consider increasing her Lipitor to 80 or changing to Crestor.  She continues to want to follow up with Cardiology on an annual basis and I will have her see Cristhian in 1 year's time with a repeat EKG at that time.         TAO POP MD [facility]              D: 2018   T: 2018   MT: JULIENNE      Name:     EDISON BORDEN   MRN:      [id number]        Account:      [id number]   :      1960           Service Date: 2018      Document: [id number]         Outpatient Encounter Prescriptions as of 2018   Medication Sig Dispense Refill     ACCU-CHEK DAMIEN PLUS test strip TEST BLOOD SUGARS TWICE DAILY 200 strip 0     ACE NOT PRESCRIBED, INTENTIONAL, Reported on 5/10/2017 0 each 0     albuterol (PROAIR HFA/PROVENTIL HFA/VENTOLIN HFA) 108 (90 Base) MCG/ACT Inhaler Inhale 2 puffs into the lungs every 6 hours 1 Inhaler 1     ASPIRIN PO Take 81 mg by mouth daily       atorvastatin (LIPITOR) 40 MG tablet Take 1 tablet (40 mg) by mouth daily 90 tablet 1     blood glucose monitoring (NO BRAND SPECIFIED) meter device kit Use to test blood sugar 1 times daily or as directed. Please dispense insurance preferred. 1 kit 0     blood glucose monitoring (NO BRAND SPECIFIED) test strip Use to test blood sugars 2 times daily or as directed 180 strip 3     buPROPion (WELLBUTRIN SR) 100 MG 12 hr tablet Take 1 tablet (100 mg) by mouth 2 times daily 180 tablet 1     BUPROPION HCL PO Take 50 mg by mouth 2 times daily        citalopram (CELEXA) 40 MG tablet Take 1 tablet (40 mg) by mouth daily 90 tablet 0     cyclobenzaprine (FLEXERIL) 5 MG tablet TAKE 1 TABLET(5 MG) BY MOUTH TWICE DAILY AS NEEDED FOR MUSCLE SPASMS 60 tablet 1     diclofenac (VOLTAREN) 50 MG EC tablet TAKE 1 TABLET(50 MG) BY MOUTH TWICE DAILY AS NEEDED FOR MODERATE PAIN 180 tablet 0     esomeprazole (NEXIUM) 40 MG CR capsule Take 1 capsule (40 mg) by mouth every morning (before breakfast) Take 30-60 minutes before eating. 90 capsule 1     fluticasone (VERAMYST) 27.5 MCG/SPRAY spray Spray 2 sprays into both nostrils daily 10 g 11     gabapentin (NEURONTIN) 300 MG capsule Take 1 capsule (300 mg) by mouth 2 times daily 180 capsule 1     glipiZIDE-metFORMIN (METAGLIP) 5-500 MG per tablet TAKE 1 TABLET BY MOUTH  TONCE DAILY BEFORE MEALS 90 tablet 0     insulin pen needle (NOVOFINE) 32G X 6 MM Use once daily or as directed. 100 each prn     liraglutide (VICTOZA) 18 MG/3ML soln Inject 1.8 mg Subcutaneous daily 30 mL 0     losartan (COZAAR) 50 MG tablet Take 1 tablet (50 mg) by mouth daily 90 tablet 0     melatonin 3 MG tablet TAKE 1 TABLET BY MOUTH ONCE AT BEDTIME AS NEEDED FOR SLEEP 30 tablet 0     metoprolol succinate (TOPROL XL) 25 MG 24 hr tablet Take 1 tablet (25 mg) by mouth daily 90 tablet 1     montelukast (SINGULAIR) 10 MG tablet TAKE 1 TABLET(10 MG) BY MOUTH AT BEDTIME 90 tablet 0     nystatin (MYCOSTATIN) 028032 UNIT/GM POWD Apply topically 2 times daily 60 g 2     order for DME Diabetic test strips and lancets per insurance formulary Check blood sugar 2 times daily 3 Month 3     polyethylene glycol (MIRALAX/GLYCOLAX) powder Take 17 g by mouth daily DISSOLVE 17 GRAMS IN LIQUID AND DRINK DAILY AS DIRECTED 510 g 6     solifenacin (VESICARE) 10 MG tablet Take 1 tablet (10 mg) by mouth daily 90 tablet 0     traMADol (ULTRAM) 50 MG tablet TAKE 1 TABLET BY MOUTH EVERY 8 HOURS AS NEEDED FOR MODERATE PAIN 30 tablet 0     No facility-administered encounter medications on file as of 7/31/2018.      Again, thank you for allowing me to participate in the care of your patient.      Sincerely,    TAO POP MD     [facility]

## 2018-08-09 DIAGNOSIS — G89.4 CHRONIC PAIN SYNDROME: ICD-10-CM

## 2018-08-09 RX ORDER — TRAMADOL HYDROCHLORIDE 50 MG/1
TABLET ORAL
Qty: 30 TABLET | Refills: 0 | Status: SHIPPED | OUTPATIENT
Start: 2018-08-09 | End: 2018-10-03

## 2018-08-09 NOTE — TELEPHONE ENCOUNTER
Refilled. I reviewed  website- prescription pattern NOT consistent with potential divergence and IS consistent with our available records    Randa MORALES, CNP

## 2018-08-09 NOTE — TELEPHONE ENCOUNTER
Requested Prescriptions   Pending Prescriptions Disp Refills     traMADol (ULTRAM) 50 MG tablet [Pharmacy Med Name: TRAMADOL 50MG TABLETS]        Last Written Prescription Date:  04/10/18  Last Fill Quantity: 30,   # refills: 0  Last Office Visit: 02/28/18  Future Office visit:       Routing refill request to provider for review/approval because:  Drug not on the [facility], P or  Health refill protocol or controlled substance 30 tablet 0     Sig: TAKE ONE TABLET BY MOUTH EVERY 8 HOURS AS NEEDED FOR MODERATE PAIN    There is no refill protocol information for this order

## 2018-08-26 ENCOUNTER — MYC MEDICAL ADVICE (OUTPATIENT)
Dept: FAMILY MEDICINE | Facility: CLINIC | Age: 58
End: 2018-08-26

## 2018-08-26 DIAGNOSIS — E11.21 TYPE 2 DIABETES MELLITUS WITH DIABETIC NEPHROPATHY, WITHOUT LONG-TERM CURRENT USE OF INSULIN (H): ICD-10-CM

## 2018-08-27 RX ORDER — LIRAGLUTIDE 6 MG/ML
1.8 INJECTION SUBCUTANEOUS DAILY
Qty: 30 ML | Refills: 0 | Status: SHIPPED | OUTPATIENT
Start: 2018-08-27 | End: 2019-02-12

## 2018-08-27 NOTE — TELEPHONE ENCOUNTER
"See message from patient below    Requested Prescriptions   Pending Prescriptions Disp Refills     liraglutide (VICTOZA) 18 MG/3ML soln  Last Written Prescription Date:  05/21/18  Last Fill Quantity: 30ml,  # refills: 0   [facility] prescribing provider:  02/27/18   Future Office Visit:    30 mL 0     Sig: Inject 1.8 mg Subcutaneous daily    GLP-1 Agonists Protocol Failed    8/27/2018  7:21 AM       Failed - Normal serum creatinine on file in past 12 months    Recent Labs   Lab Test  01/05/18   0822   CR  0.51*            Passed - Blood pressure less than 140/90 in past 6 months    BP Readings from Last 3 Encounters:   07/31/18 136/83   07/10/18 138/68   06/26/18 157/90                Passed - LDL on file in past 12 months    Recent Labs   Lab Test 04/12/18   LDL  139*            Passed - Microalbumin on file in past 12 months    Recent Labs   Lab Test  01/05/18   0827   MICROL  10   UMALCR  7.97            Passed - HgbA1C in past 3 or 6 months    If HgbA1C is 8 or greater, it needs to be on file within the past 3 months.  If less than 8, must be on file within the past 6 months.     Recent Labs   Lab Test 04/12/18   A1C  6.3*            Passed - Patient is age 18 or older       Passed - No active pregnancy on record       Passed - No positive pregnancy test in past 12 months       Passed - Recent (6 mo) or future (30 days) visit within the authorizing provider's specialty    Patient had office visit in the last 6 months or has a visit in the next 30 days with authorizing provider.  See \"Patient Info\" tab in inbasket, or \"Choose Columns\" in Meds & Orders section of the refill encounter.              "

## 2018-08-27 NOTE — TELEPHONE ENCOUNTER
Routing refill request to provider for review/approval because:  Labs out of range:  Creatinine    Shaheen Head RN, BSN

## 2018-09-01 DIAGNOSIS — J45.20 MILD INTERMITTENT ASTHMA WITHOUT COMPLICATION: ICD-10-CM

## 2018-09-01 NOTE — TELEPHONE ENCOUNTER
"Requested Prescriptions   Pending Prescriptions Disp Refills     montelukast (SINGULAIR) 10 MG tablet [Pharmacy Med Name: MONTELUKAST 10MG TABLETS]  Last Written Prescription Date:  2/7/18  Last Fill Quantity: 90,  # refills: 0   Last office visit: No previous visit found with prescribing provider:  Adams   Future Office Visit:     90 tablet 0     Sig: TAKE 1 TABLET BY MOUTH EVERY NIGHT AT BEDTIME    Leukotriene Inhibitors Protocol Failed    9/1/2018  4:04 AM       Failed - Asthma control assessment score within normal limits in last 6 months    Please review ACT score.          Passed - Patient is age 12 or older    If patient is under 16, ok to refill using age based dosing.          Passed - Recent (6 mo) or future (30 days) visit within the authorizing provider's specialty    Patient had office visit in the last 6 months or has a visit in the next 30 days with authorizing provider or within the authorizing provider's specialty.  See \"Patient Info\" tab in inbasket, or \"Choose Columns\" in Meds & Orders section of the refill encounter.              "

## 2018-09-05 RX ORDER — MONTELUKAST SODIUM 10 MG/1
TABLET ORAL
Qty: 90 TABLET | Refills: 0 | Status: SHIPPED | OUTPATIENT
Start: 2018-09-05 | End: 2018-12-07

## 2018-09-06 ENCOUNTER — MYC REFILL (OUTPATIENT)
Dept: FAMILY MEDICINE | Facility: CLINIC | Age: 58
End: 2018-09-06

## 2018-09-06 DIAGNOSIS — J30.2 CHRONIC SEASONAL ALLERGIC RHINITIS, UNSPECIFIED TRIGGER: ICD-10-CM

## 2018-09-06 NOTE — TELEPHONE ENCOUNTER
Message from MyChart:  Original authorizing provider: CARMEN Bueno CNP    Melody Ferguson would like a refill of the following medications:  fluticasone (VERAMYST) 27.5 MCG/SPRAY spray [CARMEN Bueno CNP]    Preferred pharmacy: [facility] [id number] [place] [address] - [address] AT [facility] [address]    Comment:  antibiotic for dental appointment on the 13 of Sept

## 2018-09-11 ENCOUNTER — OFFICE VISIT (OUTPATIENT)
Dept: ORTHOPEDICS | Facility: CLINIC | Age: 58
End: 2018-09-11
Payer: MEDICARE

## 2018-09-11 VITALS
BODY MASS INDEX: 45.99 KG/M2 | HEIGHT: 67 IN | HEART RATE: 77 BPM | RESPIRATION RATE: 13 BRPM | WEIGHT: 293 LBS | DIASTOLIC BLOOD PRESSURE: 57 MMHG | OXYGEN SATURATION: 94 % | SYSTOLIC BLOOD PRESSURE: 90 MMHG

## 2018-09-11 DIAGNOSIS — M17.11 PRIMARY OSTEOARTHRITIS OF RIGHT KNEE: Primary | ICD-10-CM

## 2018-09-11 DIAGNOSIS — R10.13 DYSPEPSIA: ICD-10-CM

## 2018-09-11 PROCEDURE — 20610 DRAIN/INJ JOINT/BURSA W/O US: CPT | Mod: RT | Performed by: ORTHOPAEDIC SURGERY

## 2018-09-11 RX ORDER — METHYLPREDNISOLONE ACETATE 80 MG/ML
80 INJECTION, SUSPENSION INTRA-ARTICULAR; INTRALESIONAL; INTRAMUSCULAR; SOFT TISSUE ONCE
Qty: 1 ML | Refills: 0 | OUTPATIENT
Start: 2018-09-11 | End: 2018-09-11

## 2018-09-11 NOTE — LETTER
9/11/2018         RE: Melody Ferguson  [address]  [facility] [address]        Dear Colleague,    Thank you for referring your patient, Melody Ferguson, to the [facility]. Please see a copy of my visit note below.    The patient's right knee was prepped with betadine solution after verification of allergies. Area approximately 10 cm x 10 cm prepped in a sterile fashion. After injection, betadine removed with soap and water and band-aids applied.    1ml depo-medrol with 1% lidocaine plain injected into patient's right knee by Dr. Armond George  LOT# [id number]  Exp. 11/19    Talon Fenton PA-C  Supervising physician: Armond George MD  Dept. of Orthopedics  [facility]          Follow up right knee primary osteoarthritis.  Last injection 6/26/18  Range of motion 5-115.    She  desires injection today of right knee(s).  Risks, benefits, potential complications and alternatives were discussed.   With the patient's consent, sterile prep was performed of right knee(s).  Right knee was injected with Depo Medrol 80 mg and lidocaine at anteromedial site.  Return to clinic as needed.      Again, thank you for allowing me to participate in the care of your patient.        Sincerely,        Armond George MD

## 2018-09-11 NOTE — PROGRESS NOTES
Follow up right knee primary osteoarthritis.  Last injection 6/26/18  Range of motion 5-115.    She  desires injection today of right knee(s).  Risks, benefits, potential complications and alternatives were discussed.   With the patient's consent, sterile prep was performed of right knee(s).  Right knee was injected with Depo Medrol 80 mg and lidocaine at anteromedial site.  Return to clinic as needed.

## 2018-09-11 NOTE — TELEPHONE ENCOUNTER
"Requested Prescriptions   Pending Prescriptions Disp Refills     esomeprazole (NEXIUM) 40 MG CR capsule [Pharmacy Med Name: ESOMEPRAZOLE MAGNESIUM 40MG DR CAPS]  Last Written Prescription Date:  12/28/17  Last Fill Quantity: 90,  # refills: 1   Last Office Visit with [facility], [facility] or [facility] prescribing provider:  02/27/18Va   Future Office Visit:    90 capsule 0     Sig: TAKE 1 CAPSULE(40 MG) BY MOUTH EVERY MORNING 30 TO 60 MINUTES BEFORE BREAKFAST    PPI Protocol Passed    9/11/2018  4:05 AM       Passed - Not on Clopidogrel (unless Pantoprazole ordered)       Passed - No diagnosis of osteoporosis on record       Passed - Recent (12 mo) or future (30 days) visit within the authorizing provider's specialty    Patient had office visit in the last 12 months or has a visit in the next 30 days with authorizing provider or within the authorizing provider's specialty.  See \"Patient Info\" tab in inbasket, or \"Choose Columns\" in Meds & Orders section of the refill encounter.           Passed - Patient is age 18 or older       Passed - No active pregnacy on record       Passed - No positive pregnancy test in past 12 months          "

## 2018-09-11 NOTE — PROGRESS NOTES
The patient's right knee was prepped with betadine solution after verification of allergies. Area approximately 10 cm x 10 cm prepped in a sterile fashion. After injection, betadine removed with soap and water and band-aids applied.    1ml depo-medrol with 1% lidocaine plain injected into patient's right knee by Dr. Armond George  LOT# [id number]  Exp. 11/19    Talon Fenton PA-C  Supervising physician: Armond George MD  Dept. of Orthopedics  [facility]

## 2018-09-11 NOTE — MR AVS SNAPSHOT
"              After Visit Summary   9/11/2018    Melody Ferguson    MRN: [id number]           Patient Information     Date Of Birth          1960        Visit Information        Provider Department      9/11/2018 9:00 AM Armond George MD [facility]        Today's Diagnoses     Primary osteoarthritis of right knee    -  1       Follow-ups after your visit        Who to contact     If you have questions or need follow up information about today's clinic visit or your schedule please contact [facility] directly at [phone].  Normal or non-critical lab and imaging results will be communicated to you by [employer], letter or phone within 4 business days after the clinic has received the results. If you do not hear from us within 7 days, please contact the clinic through [employer] or phone. If you have a critical or abnormal lab result, we will notify you by phone as soon as possible.  Submit refill requests through [employer] or call your pharmacy and they will forward the refill request to us. Please allow 3 business days for your refill to be completed.          Additional Information About Your Visit        MyChart Information     [employer] gives you secure access to your electronic health record. If you see a primary care provider, you can also send messages to your care team and make appointments. If you have questions, please call your primary care clinic.  If you do not have a primary care provider, please call [phone] and they will assist you.        Care EveryWhere ID     This is your Care EveryWhere ID. This could be used by other organizations to access your [facility] medical records  [id number]        Your Vitals Were     Pulse Respirations Height Pulse Oximetry BMI (Body Mass Index)       77 13 1.702 m (5' 7\") 94% 46.99 kg/m2        Blood Pressure from Last 3 Encounters:   09/11/18 90/57   07/31/18 136/83   07/10/18 138/68    Weight from Last 3 Encounters: "   09/11/18 136.1 kg (300 lb)   07/31/18 136.5 kg (301 lb)   06/26/18 138.3 kg (305 lb)              We Performed the Following     DRAIN/INJECT LARGE JOINT/BURSA     METHYLPREDNISOLONE 80 MG INJ          Today's Medication Changes          These changes are accurate as of 9/11/18 10:56 AM.  If you have any questions, ask your nurse or doctor.               Start taking these medicines.        Dose/Directions    methylPREDNISolone acetate 80 MG/ML injection   Commonly known as:  DEPO-MEDROL   Used for:  Primary osteoarthritis of right knee   Started by:  Armond George MD        Dose:  80 mg   1 mL (80 mg) by INTRA-ARTICULAR route once for 1 dose   Quantity:  1 mL   Refills:  0            Where to get your medicines      Some of these will need a paper prescription and others can be bought over the counter.  Ask your nurse if you have questions.     You don't need a prescription for these medications     methylPREDNISolone acetate 80 MG/ML injection                Primary Care Provider Office Phone # Fax #    Elizabeth Lamas [phone]       [address]  [facility] [address]        Equal Access to Services     [facility]: Hadii aad ku hadasho Soomaali, waaxda luqadaha, qaybta kaalmada adeegkaylin, issac reed. So [facility] [phone].    ATENCIÓN: Si habla español, tiene a turner disposición servicios gratuitos de asistencia lingüística. Emilie al [phone].    We comply with applicable federal civil rights laws and [place] laws. We do not discriminate on the basis of race, color, national origin, age, disability, sex, sexual orientation, or gender identity.            Thank you!     Thank you for choosing [facility]  for your care. Our goal is always to provide you with excellent care. Hearing back from our patients is one way we can continue to improve our services. Please take a few minutes to complete the written survey that you may  receive in the mail after your visit with us. Thank you!             Your Updated Medication List - Protect others around you: Learn how to safely use, store and throw away your medicines at www.disposemymeds.org.          This list is accurate as of 9/11/18 10:56 AM.  Always use your most recent med list.                   Brand Name Dispense Instructions for use Diagnosis    * ACCU-CHEK DAMIEN PLUS test strip   Generic drug:  blood glucose monitoring     200 strip    TEST BLOOD SUGARS TWICE DAILY    Type 2 diabetes mellitus with diabetic nephropathy (H)       * blood glucose monitoring test strip    no brand specified    180 strip    Use to test blood sugars 2 times daily or as directed    Type 2 diabetes mellitus with diabetic nephropathy, without long-term current use of insulin (H)       ACE NOT PRESCRIBED (INTENTIONAL)     0 each    Reported on 5/10/2017    Type 2 diabetes mellitus without complication (H)       albuterol 108 (90 Base) MCG/ACT inhaler    PROAIR HFA/PROVENTIL HFA/VENTOLIN HFA    1 Inhaler    Inhale 2 puffs into the lungs every 6 hours    Mild intermittent asthma without complication       ASPIRIN PO      Take 81 mg by mouth daily        atorvastatin 40 MG tablet    LIPITOR    90 tablet    Take 1 tablet (40 mg) by mouth daily    Hyperlipidemia LDL goal <100       blood glucose monitoring meter device kit    no brand specified    1 kit    Use to test blood sugar 1 times daily or as directed. Please dispense insurance preferred.    Type 2 diabetes mellitus with diabetic nephropathy, without long-term current use of insulin (H)       * BUPROPION HCL PO      Take 50 mg by mouth 2 times daily        * buPROPion 100 MG 12 hr tablet    WELLBUTRIN SR    180 tablet    Take 1 tablet (100 mg) by mouth 2 times daily    Major depressive disorder, recurrent episode, moderate (H)       citalopram 40 MG tablet    celeXA    90 tablet    Take 1 tablet (40 mg) by mouth daily    Major depressive disorder, recurrent  episode, moderate (H)       cyclobenzaprine 5 MG tablet    FLEXERIL    60 tablet    TAKE 1 TABLET(5 MG) BY MOUTH TWICE DAILY AS NEEDED FOR MUSCLE SPASMS    Left sided sciatica, Back muscle spasm       diclofenac 50 MG EC tablet    VOLTAREN    180 tablet    TAKE 1 TABLET(50 MG) BY MOUTH TWICE DAILY AS NEEDED FOR MODERATE PAIN    Chronic pain syndrome       esomeprazole 40 MG CR capsule    nexIUM    90 capsule    Take 1 capsule (40 mg) by mouth every morning (before breakfast) Take 30-60 minutes before eating.    Dyspepsia       fluticasone 27.5 MCG/SPRAY spray    VERAMYST    10 g    Spray 2 sprays into both nostrils daily    Chronic seasonal allergic rhinitis, unspecified trigger       gabapentin 300 MG capsule    NEURONTIN    180 capsule    Take 1 capsule (300 mg) by mouth 2 times daily    Acute left-sided low back pain with left-sided sciatica       glipiZIDE-metFORMIN 5-500 MG per tablet    METAGLIP    90 tablet    TAKE 1 TABLET BY MOUTH TONCE DAILY BEFORE MEALS    Type 2 diabetes mellitus with diabetic nephropathy, without long-term current use of insulin (H)       insulin pen needle 32G X 6 MM    NOVOFINE    100 each    Use once daily or as directed.    Type 2 diabetes mellitus with diabetic nephropathy, without long-term current use of insulin (H)       liraglutide 18 MG/3ML soln    VICTOZA    30 mL    Inject 1.8 mg Subcutaneous daily    Type 2 diabetes mellitus with diabetic nephropathy, without long-term current use of insulin (H)       losartan 50 MG tablet    COZAAR    90 tablet    Take 1 tablet (50 mg) by mouth daily    HTN, goal below 140/90, Type 2 diabetes mellitus with diabetic nephropathy, without long-term current use of insulin (H)       melatonin 3 MG tablet     30 tablet    TAKE 1 TABLET BY MOUTH ONCE AT BEDTIME AS NEEDED FOR SLEEP    Major depressive disorder, recurrent episode, moderate (H)       methylPREDNISolone acetate 80 MG/ML injection    DEPO-MEDROL    1 mL    1 mL (80 mg) by  INTRA-ARTICULAR route once for 1 dose    Primary osteoarthritis of right knee       metoprolol succinate 25 MG 24 hr tablet    TOPROL XL    90 tablet    Take 1 tablet (25 mg) by mouth daily    Old myocardial infarction       * montelukast 10 MG tablet    SINGULAIR    90 tablet    TAKE 1 TABLET(10 MG) BY MOUTH AT BEDTIME    Mild intermittent asthma without complication       * montelukast 10 MG tablet    SINGULAIR    90 tablet    TAKE 1 TABLET BY MOUTH EVERY NIGHT AT BEDTIME    Mild intermittent asthma without complication       nystatin 166505 UNIT/GM Powd    MYCOSTATIN    60 g    Apply topically 2 times daily    Morbid obesity with body mass index of 45.0-49.9 in adult (H)       order for DME     3 Month    Diabetic test strips and lancets per insurance formulary Check blood sugar 2 times daily    Type 2 diabetes mellitus with diabetic nephropathy (H)       polyethylene glycol powder    MIRALAX/GLYCOLAX    510 g    Take 17 g by mouth daily DISSOLVE 17 GRAMS IN LIQUID AND DRINK DAILY AS DIRECTED    Chronic pain syndrome       solifenacin 10 MG tablet    VESICARE    90 tablet    Take 1 tablet (10 mg) by mouth daily    Mixed incontinence       traMADol 50 MG tablet    ULTRAM    30 tablet    TAKE ONE TABLET BY MOUTH EVERY 8 HOURS AS NEEDED FOR MODERATE PAIN    Chronic pain syndrome       * Notice:  This list has 6 medication(s) that are the same as other medications prescribed for you. Read the directions carefully, and ask your doctor or other care provider to review them with you.

## 2018-09-12 RX ORDER — ESOMEPRAZOLE MAGNESIUM 40 MG/1
CAPSULE, DELAYED RELEASE ORAL
Qty: 90 CAPSULE | Refills: 0 | Status: SHIPPED | OUTPATIENT
Start: 2018-09-12 | End: 2018-11-20

## 2018-09-12 NOTE — TELEPHONE ENCOUNTER
Nexium  Routing refill request to provider for review/approval because:  Appears to be a break in medication - should have been out around 6/2018    Cassy Youssef, RN, BSN

## 2018-10-03 DIAGNOSIS — J45.20 MILD INTERMITTENT ASTHMA WITHOUT COMPLICATION: Chronic | ICD-10-CM

## 2018-10-03 DIAGNOSIS — G89.4 CHRONIC PAIN SYNDROME: ICD-10-CM

## 2018-10-03 DIAGNOSIS — F33.1 MAJOR DEPRESSIVE DISORDER, RECURRENT EPISODE, MODERATE (H): ICD-10-CM

## 2018-10-03 RX ORDER — CITALOPRAM HYDROBROMIDE 40 MG/1
TABLET ORAL
Qty: 90 TABLET | Refills: 0 | Status: SHIPPED | OUTPATIENT
Start: 2018-10-03 | End: 2018-10-30

## 2018-10-03 RX ORDER — TRAMADOL HYDROCHLORIDE 50 MG/1
TABLET ORAL
Qty: 30 TABLET | Refills: 0 | Status: SHIPPED | OUTPATIENT
Start: 2018-10-03 | End: 2018-11-20

## 2018-10-03 NOTE — TELEPHONE ENCOUNTER
Approved but needs follow up for additional refills.   Elizabeth Lamas MD MPH   (Routing comment)   Faxed Rx for the Ultram to [facility] , [place], [phone], right fax confirmed at 1:31 pm today. Called and sent a My Chart message regarding needing an appointment.  Yuli Giang MA/  For [employer] and [employer]

## 2018-10-03 NOTE — TELEPHONE ENCOUNTER
"Requested Prescriptions   Pending Prescriptions Disp Refills     citalopram (CELEXA) 40 MG tablet [Pharmacy Med Name: CITALOPRAM 40MG TABLETS]  Last Written Prescription Date:  06/19/18  Last Fill Quantity: 90,  # refills: 0   Last Office Visit with FMCRISTI, PEIDAD or [facility] prescribing provider:  02/27/18Va   Future Office Visit:    90 tablet 0     Sig: TAKE 1 TABLET(40 MG) BY MOUTH DAILY    SSRIs Protocol Failed    10/3/2018  9:51 AM       Failed - PHQ-9 score less than 5 in past 6 months    Please review last PHQ-9 score.          Failed - Recent (6 mo) or future (30 days) visit within the authorizing provider's specialty    Patient had office visit in the last 6 months or has a visit in the next 30 days with authorizing provider or within the authorizing provider's specialty.  See \"Patient Info\" tab in inbasket, or \"Choose Columns\" in Meds & Orders section of the refill encounter.           Passed - Patient is age 18 or older       Passed - No active pregnancy on record       Passed - No positive pregnancy test in last 12 months          "

## 2018-10-03 NOTE — TELEPHONE ENCOUNTER
"Requested Prescriptions   Pending Prescriptions Disp Refills     albuterol (PROAIR HFA/PROVENTIL HFA/VENTOLIN HFA) 108 (90 Base) MCG/ACT inhaler  Last Written Prescription Date:  05/14/18  Last Fill Quantity: 1,  # refills: 1   Last Office Visit with G, P or [facility] prescribing provider:  2/27/18Va   Future Office Visit:    1 Inhaler 1     Sig: Inhale 2 puffs into the lungs every 6 hours    Asthma Maintenance Inhalers - Anticholinergics Failed    10/3/2018  1:33 PM       Failed - Asthma control assessment score within normal limits in last 6 months    Please review ACT score.          Failed - Recent (6 mo) or future (30 days) visit within the authorizing provider's specialty    Patient had office visit in the last 6 months or has a visit in the next 30 days with authorizing provider or within the authorizing provider's specialty.  See \"Patient Info\" tab in inbasket, or \"Choose Columns\" in Meds & Orders section of the refill encounter.           Passed - Patient is age 12 years or older        ACT Total Scores 10/10/2016 4/18/2017 2/27/2018   ACT TOTAL SCORE (Goal Greater than or Equal to 20) 17 25 22   In the past 12 months, how many times did you visit the emergency room for your asthma without being admitted to the hospital? 0 0 0   In the past 12 months, how many times were you hospitalized overnight because of your asthma? 0 0 0       "

## 2018-10-03 NOTE — TELEPHONE ENCOUNTER
Routing refill request to provider for review/approval because:  Patient needs to be seen because:  It has bee greater than 6 months since last OV.     Sammie Hartmann RN

## 2018-10-03 NOTE — TELEPHONE ENCOUNTER
Requested Prescriptions   Pending Prescriptions Disp Refills     traMADol (ULTRAM) 50 MG tablet [Pharmacy Med Name: TRAMADOL 50MG TABLETS]        Last Written Prescription Date:  08/09/18  Last Fill Quantity: 30,   # refills: 0  Last Office Visit: 02/27/18-[facility]  Future Office visit:       Routing refill request to provider for review/approval because:  Drug not on the [facility], [facility] or  Health refill protocol or controlled substance 30 tablet 0     Sig: TAKE 1 TABLET BY MOUTH EVERY 8 HOURS AS NEEDED FOR MODERATE PAIN    There is no refill protocol information for this order

## 2018-10-04 RX ORDER — ALBUTEROL SULFATE 90 UG/1
2 AEROSOL, METERED RESPIRATORY (INHALATION) EVERY 6 HOURS
Qty: 1 INHALER | Refills: 0 | Status: SHIPPED | OUTPATIENT
Start: 2018-10-04 | End: 2018-10-29

## 2018-10-04 NOTE — TELEPHONE ENCOUNTER
Medication is being filled for 1 time refill only due to:  Patient needs to be seen because due for asthma recheck; she is scheduled.     Next 5 appointments (look out 90 days)     Oct 16, 2018 11:00 AM CDT   [facility]   [facility]    [address] [place] [place] [id number]   [phone]                Shaheen Head RN, BSN

## 2018-10-05 ENCOUNTER — DOCUMENTATION ONLY (OUTPATIENT)
Dept: LAB | Facility: CLINIC | Age: 58
End: 2018-10-05

## 2018-10-05 DIAGNOSIS — E11.21 TYPE 2 DIABETES MELLITUS WITH DIABETIC NEPHROPATHY, WITHOUT LONG-TERM CURRENT USE OF INSULIN (H): Primary | ICD-10-CM

## 2018-10-05 NOTE — PROGRESS NOTES
This patient has a lab only appointment on 10/9/2018 but does not have future orders. Please review, associate diagnosis and sign pending lab orders for the upcoming appointment.  She has an appointment with you on 10/16/2018.    Thank you,    [facility]

## 2018-10-10 DIAGNOSIS — Z96.652 HISTORY OF TOTAL KNEE ARTHROPLASTY, LEFT: ICD-10-CM

## 2018-10-10 RX ORDER — AMOXICILLIN 500 MG/1
CAPSULE ORAL
Qty: 4 CAPSULE | Refills: 0 | OUTPATIENT
Start: 2018-10-10

## 2018-10-15 ENCOUNTER — TELEPHONE (OUTPATIENT)
Dept: PALLIATIVE MEDICINE | Facility: CLINIC | Age: 58
End: 2018-10-15

## 2018-10-15 DIAGNOSIS — M47.817 LUMBOSACRAL SPONDYLOSIS WITHOUT MYELOPATHY: Primary | ICD-10-CM

## 2018-10-15 NOTE — TELEPHONE ENCOUNTER
Patient is calling to request for a repeat injection, please review and place appropriate order.        Azalia CROWE    [facility]

## 2018-10-16 NOTE — TELEPHONE ENCOUNTER
Writer attempted to call pt, No answer.  LVM for Pt to call writer back at [phone]    Castillo Arthur, RN  Care Coordinator   [facility]

## 2018-10-16 NOTE — TELEPHONE ENCOUNTER
Please discussed with patient whether she would like to repeat facet joint injections or she would prefer to proceed with medial branch blocks/radiofrequency ablation

## 2018-10-18 NOTE — TELEPHONE ENCOUNTER
Received call from patient who is returning a nurse call. Phone #[phone]      Latricia Fernando    [facility]

## 2018-10-19 NOTE — TELEPHONE ENCOUNTER
Called patient. LM to call back to discuss.     Tiana CORNEJON, RN Care Coordinator  [facility]

## 2018-10-23 ENCOUNTER — RADIANT APPOINTMENT (OUTPATIENT)
Dept: GENERAL RADIOLOGY | Facility: CLINIC | Age: 58
End: 2018-10-23
Attending: ORTHOPAEDIC SURGERY
Payer: MEDICARE

## 2018-10-23 ENCOUNTER — OFFICE VISIT (OUTPATIENT)
Dept: ORTHOPEDICS | Facility: CLINIC | Age: 58
End: 2018-10-23
Payer: MEDICARE

## 2018-10-23 VITALS
HEIGHT: 67 IN | HEART RATE: 70 BPM | WEIGHT: 293 LBS | BODY MASS INDEX: 45.99 KG/M2 | SYSTOLIC BLOOD PRESSURE: 125 MMHG | OXYGEN SATURATION: 96 % | RESPIRATION RATE: 13 BRPM | DIASTOLIC BLOOD PRESSURE: 86 MMHG

## 2018-10-23 DIAGNOSIS — Z96.652 HISTORY OF TOTAL KNEE ARTHROPLASTY, LEFT: ICD-10-CM

## 2018-10-23 DIAGNOSIS — Z96.652 HISTORY OF TOTAL KNEE ARTHROPLASTY, LEFT: Primary | ICD-10-CM

## 2018-10-23 PROCEDURE — 73562 X-RAY EXAM OF KNEE 3: CPT | Mod: LT

## 2018-10-23 PROCEDURE — 99213 OFFICE O/P EST LOW 20 MIN: CPT | Performed by: ORTHOPAEDIC SURGERY

## 2018-10-23 NOTE — LETTER
10/23/2018         RE: Melody Ferguson  [address]  [place] [address]        Dear Colleague,    Thank you for referring your patient, Melody Ferguson, to the [facility]. Please see a copy of my visit note below.    Melody Ferguson is seen for follow up left total knee arthroplasty from 4/26/17.  She complains of intermittent pain just above the patella.  Exercise:  Walking      Past Medical History:   Diagnosis Date     Arthritis 2000     Cancer (H) 1983     Cerebral infarction (H) 2005     COPD (chronic obstructive pulmonary disease) (H) 1990     Depressive disorder 1990     DM type 2 (diabetes mellitus, type 2) (H)      History of TIA (transient ischemic attack)      HTN (hypertension)      Hyperlipidemia      Uncomplicated asthma 1990       Past Surgical History:   Procedure Laterality Date     ABDOMEN SURGERY  05/20/2005     BIOPSY  1984     BREAST SURGERY  1984     C TOTAL KNEE ARTHROPLASTY Left 04/26/2017    [facility]ML at [facility]     CHOLECYSTECTOMY  2000     COLONOSCOPY       ENT SURGERY  2008    tonsils removed     GENITOURINARY SURGERY  2005    a & p repair     REPAIR PTOSIS       SLING TRANSVAGINAL N/A 7/14/2017    Procedure: SLING TRANSVAGINAL;  Sling ([place]);  Surgeon: Talon Katz MD;  Location: [facility] OR       Family History   Problem Relation Age of Onset     Thyroid Disease Sister      Cancer Brother      Cancer Sister      breast cancer     Cerebrovascular Disease Sister 62     Other - See Comments Sister      Angioplasty 8/2016     Hypertension Father      Hyperlipidemia Father      Prostate Cancer Father      Glaucoma Maternal Grandmother      Cancer Maternal Grandfather      Cancer Paternal Grandmother      Breast Cancer Paternal Grandmother      Cerebrovascular Disease Paternal Grandfather      Cerebrovascular Disease Maternal Half-Sister      4/08/2016     Breast Cancer Maternal Half-Sister      Asthma Maternal Half-Sister      Other Cancer Brother      Passed from cancer  unsure what type     Anesthesia Reaction Daughter      Diabetes No family hx of      Macular Degeneration No family hx of        Social History     Social History     Marital status: Single     Spouse name: N/A     Number of children: N/A     Years of education: N/A     Occupational History      Disabled     Social History Main Topics     Smoking status: Former Smoker     Packs/day: 0.50     Years: 25.00     Types: Cigarettes     Quit date: 10/15/1991     Smokeless tobacco: Never Used     Alcohol use 0.0 oz/week      Comment: occassionally     Drug use: No     Sexual activity: Yes     Partners: Male     Birth control/ protection: Other      Comment: hystro     Other Topics Concern     Parent/Sibling W/ Cabg, Mi Or Angioplasty Before 65f 55m? Yes     Mirna Angioplasty 2015     Social History Narrative       Current Outpatient Prescriptions   Medication Sig Dispense Refill     ACCU-CHEK DAMIEN PLUS test strip TEST BLOOD SUGARS TWICE DAILY 200 strip 0     ACE NOT PRESCRIBED, INTENTIONAL, Reported on 5/10/2017 0 each 0     albuterol (PROAIR HFA/PROVENTIL HFA/VENTOLIN HFA) 108 (90 Base) MCG/ACT inhaler Inhale 2 puffs into the lungs every 6 hours 1 Inhaler 0     amoxicillin (AMOXIL) 500 MG capsule TAKE 4 CAPSULES(2000 MG) BY MOUTH 1 TIME FOR 1 DOSE 4 capsule 0     ASPIRIN PO Take 81 mg by mouth daily       atorvastatin (LIPITOR) 40 MG tablet Take 1 tablet (40 mg) by mouth daily 90 tablet 1     blood glucose monitoring (NO BRAND SPECIFIED) meter device kit Use to test blood sugar 1 times daily or as directed. Please dispense insurance preferred. 1 kit 0     blood glucose monitoring (NO BRAND SPECIFIED) test strip Use to test blood sugars 2 times daily or as directed 180 strip 3     buPROPion (WELLBUTRIN SR) 100 MG 12 hr tablet Take 1 tablet (100 mg) by mouth 2 times daily 180 tablet 1     BUPROPION HCL PO Take 50 mg by mouth 2 times daily        citalopram (CELEXA) 40 MG tablet TAKE 1 TABLET(40 MG) BY MOUTH DAILY 90 tablet  0     cyclobenzaprine (FLEXERIL) 5 MG tablet TAKE 1 TABLET(5 MG) BY MOUTH TWICE DAILY AS NEEDED FOR MUSCLE SPASMS 60 tablet 1     diclofenac (VOLTAREN) 50 MG EC tablet TAKE 1 TABLET(50 MG) BY MOUTH TWICE DAILY AS NEEDED FOR MODERATE PAIN 180 tablet 0     esomeprazole (NEXIUM) 40 MG CR capsule TAKE 1 CAPSULE(40 MG) BY MOUTH EVERY MORNING 30 TO 60 MINUTES BEFORE BREAKFAST 90 capsule 0     fluticasone (VERAMYST) 27.5 MCG/SPRAY spray Spray 2 sprays into both nostrils daily 10 g 11     gabapentin (NEURONTIN) 300 MG capsule Take 1 capsule (300 mg) by mouth 2 times daily 180 capsule 1     glipiZIDE-metFORMIN (METAGLIP) 5-500 MG per tablet TAKE 1 TABLET BY MOUTH TONCE DAILY BEFORE MEALS 90 tablet 0     insulin pen needle (NOVOFINE) 32G X 6 MM Use once daily or as directed. 100 each prn     liraglutide (VICTOZA) 18 MG/3ML soln Inject 1.8 mg Subcutaneous daily 30 mL 0     losartan (COZAAR) 50 MG tablet Take 1 tablet (50 mg) by mouth daily 90 tablet 0     melatonin 3 MG tablet TAKE 1 TABLET BY MOUTH ONCE AT BEDTIME AS NEEDED FOR SLEEP 30 tablet 0     metoprolol succinate (TOPROL XL) 25 MG 24 hr tablet Take 1 tablet (25 mg) by mouth daily 90 tablet 1     montelukast (SINGULAIR) 10 MG tablet TAKE 1 TABLET BY MOUTH EVERY NIGHT AT BEDTIME 90 tablet 0     montelukast (SINGULAIR) 10 MG tablet TAKE 1 TABLET(10 MG) BY MOUTH AT BEDTIME 90 tablet 0     nystatin (MYCOSTATIN) 070401 UNIT/GM POWD Apply topically 2 times daily 60 g 2     order for DME Diabetic test strips and lancets per insurance formulary Check blood sugar 2 times daily 3 Month 3     polyethylene glycol (MIRALAX/GLYCOLAX) powder Take 17 g by mouth daily DISSOLVE 17 GRAMS IN LIQUID AND DRINK DAILY AS DIRECTED 510 g 6     solifenacin (VESICARE) 10 MG tablet Take 1 tablet (10 mg) by mouth daily 90 tablet 0     traMADol (ULTRAM) 50 MG tablet TAKE 1 TABLET BY MOUTH EVERY 8 HOURS AS NEEDED FOR MODERATE PAIN 30 tablet 0       Allergies   Allergen Reactions     Milk Protein  "Extract GI Disturbance     lactose intolerance.  Can tolerate milk products if uses lactaid     Bee Venom Swelling     Latex      Lisinopril Cough     Onion GI Disturbance     Vomiting, feels like throat is clogged     Aloe Rash     pain     Chlorine Rash       REVIEW OF SYSTEMS:  CONSTITUTIONAL:  NEGATIVE for fever, chills, change in weight, not feeling tired  SKIN:  NEGATIVE for worrisome rashes, no skin lumps, no skin ulcers and no non-healing wounds  EYES:  NEGATIVE for vision changes or irritation.  ENT/MOUTH:  NEGATIVE.  No hearing loss, no hoarseness, no difficulty swallowing.  RESP:  NEGATIVE. No cough or shortness of breath.  BREAST:  NEGATIVE for masses, tenderness or discharge  CV:  NEGATIVE for chest pain, palpitations or peripheral edema  GI:  NEGATIVE for nausea, abdominal pain, heartburn, or change in bowel habits  :  Negative. No dysuria, no hematuria  MUSCULOSKELETAL:  See HPI above  NEURO:  NEGATIVE . No headaches, no dizziness,  no numbness  ENDOCRINE:  NEGATIVE for temperature intolerance, skin/hair changes  HEME/ALLERGY/IMMUNE:  NEGATIVE for bleeding problems  PSYCHIATRIC:  NEGATIVE. no anxiety, no depression.      Xray:  Xray images were independently visualized with the patient.  This shows good position of components.  No sign of loosening or wear.     Exam:  Vitals: /86  Pulse 70  Resp 13  Ht 1.702 m (5' 7\")  Wt 136.1 kg (300 lb)  SpO2 96%  BMI 46.99 kg/m2  BMI= Body mass index is 46.99 kg/(m^2).  Range of motion right 5-115 degrees, left 0-127 degrees.  Alignment  Normal.  Stability:   Right       Lateral collateral ligament no laxity       Medial collateral ligament no laxity  Left:       Lateral collateral ligament no laxity       Medial collateral ligament no laxity  Wound:  Well healed.  Edema: Minor - both lower legs  Effusion: Minor - right knee  Tenderness: Right Significant - medial joint line.       Left:  Minor - superior aspect of the patella and quadriceps tendon. "   The medial half of the quadriceps tendon appears mildly recessed compared to lateral half.  No pain with resisted knee extension.  Sensation, motor, and circulation intact.    Assessment:  Left total knee arthroplasty doing well.  Pain appears to be from quadriceps tendon, but without disruption. We will observe that.  Resume activity as tolerated.  Return to clinic 4 years with xray left knee.  Continue antibiotics for dental work.    Again, thank you for allowing me to participate in the care of your patient.        Sincerely,        Armond George MD

## 2018-10-23 NOTE — MR AVS SNAPSHOT
After Visit Summary   10/23/2018    Melody Ferguson    MRN: [id number]           Patient Information     Date Of Birth          1960        Visit Information        Provider Department      10/23/2018 9:30 AM Armond George MD [facility]        Today's Diagnoses     History of total knee arthroplasty, left    -  1       Follow-ups after your visit        Your next 10 appointments already scheduled     Oct 24, 2018 11:40 AM CDT   [facility]   [facility]    [address] [id number] [phone]            Oct 30, 2018 10:40 AM CDT   [facility]   [facility]    [address] [id number] [phone]           Bring a current list of meds and any records pertaining to this visit. For Physicals, please bring immunization records and any forms needing to be filled out. Please arrive 10 minutes early to complete paperwork.              Who to contact     If you have questions or need follow up information about today's clinic visit or your schedule please contact [facility] directly at [phone].  Normal or non-critical lab and imaging results will be communicated to you by MyChart, letter or phone within 4 business days after the clinic has received the results. If you do not hear from us within 7 days, please contact the clinic through MyChart or phone. If you have a critical or abnormal lab result, we will notify you by phone as soon as possible.  Submit refill requests through [employer] or call your pharmacy and they will forward the refill request to us. Please allow 3 business days for your refill to be completed.          Additional Information About Your Visit        [employer]book[employer] Information     [employer] gives you secure access to your  "electronic health record. If you see a primary care provider, you can also send messages to your care team and make appointments. If you have questions, please call your primary care clinic.  If you do not have a primary care provider, please call [phone] and they will assist you.        Care EveryWhere ID     This is your Care EveryWhere ID. This could be used by other organizations to access your [place] medical records  [id number]        Your Vitals Were     Pulse Respirations Height Pulse Oximetry BMI (Body Mass Index)       70 13 1.702 m (5' 7\") 96% 46.99 kg/m2        Blood Pressure from Last 3 Encounters:   10/23/18 125/86   09/11/18 90/57   07/31/18 136/83    Weight from Last 3 Encounters:   10/23/18 136.1 kg (300 lb)   09/11/18 136.1 kg (300 lb)   07/31/18 136.5 kg (301 lb)               Primary Care Provider Office Phone # Fax #    Elizabeth Lamas [phone]       [address] [facility] [address]        Equal Access to Services     [facility] Center[facility] : Hadii aad ku hadasho Soomaali, waaxda luqadaha, qaybta kaalmada adeegyada, issac claire . So [facility] [phone].    ATENCIÓN: Si habla español, tiene a turner disposición servicios gratuitos de asistencia lingüística. [facility] [phone].    We comply with applicable federal civil rights laws and [place] laws. We do not discriminate on the basis of race, color, national origin, age, disability, sex, sexual orientation, or gender identity.            Thank you!     Thank you for choosing [facility]  for your care. Our goal is always to provide you with excellent care. Hearing back from our patients is one way we can continue to improve our services. Please take a few minutes to complete the written survey that you may receive in the mail after your visit with us. Thank you!             Your Updated Medication List - Protect others around you: Learn how to safely use, store and throw away " your medicines at www.disposemymeds.org.          This list is accurate as of 10/23/18 10:32 AM.  Always use your most recent med list.                   Brand Name Dispense Instructions for use Diagnosis    * ACCU-CHEK DAMIEN PLUS test strip   Generic drug:  blood glucose monitoring     200 strip    TEST BLOOD SUGARS TWICE DAILY    Type 2 diabetes mellitus with diabetic nephropathy (H)       * blood glucose monitoring test strip    no brand specified    180 strip    Use to test blood sugars 2 times daily or as directed    Type 2 diabetes mellitus with diabetic nephropathy, without long-term current use of insulin (H)       ACE NOT PRESCRIBED (INTENTIONAL)     0 each    Reported on 5/10/2017    Type 2 diabetes mellitus without complication (H)       albuterol 108 (90 Base) MCG/ACT inhaler    PROAIR HFA/PROVENTIL HFA/VENTOLIN HFA    1 Inhaler    Inhale 2 puffs into the lungs every 6 hours    Mild intermittent asthma without complication       amoxicillin 500 MG capsule    AMOXIL    4 capsule    TAKE 4 CAPSULES(2000 MG) BY MOUTH 1 TIME FOR 1 DOSE    History of total knee arthroplasty, left       ASPIRIN PO      Take 81 mg by mouth daily        atorvastatin 40 MG tablet    LIPITOR    90 tablet    Take 1 tablet (40 mg) by mouth daily    Hyperlipidemia LDL goal <100       blood glucose monitoring meter device kit    no brand specified    1 kit    Use to test blood sugar 1 times daily or as directed. Please dispense insurance preferred.    Type 2 diabetes mellitus with diabetic nephropathy, without long-term current use of insulin (H)       * BUPROPION HCL PO      Take 50 mg by mouth 2 times daily        * buPROPion 100 MG 12 hr tablet    WELLBUTRIN SR    180 tablet    Take 1 tablet (100 mg) by mouth 2 times daily    Major depressive disorder, recurrent episode, moderate (H)       citalopram 40 MG tablet    celeXA    90 tablet    TAKE 1 TABLET(40 MG) BY MOUTH DAILY    Major depressive disorder, recurrent episode, moderate  (H)       cyclobenzaprine 5 MG tablet    FLEXERIL    60 tablet    TAKE 1 TABLET(5 MG) BY MOUTH TWICE DAILY AS NEEDED FOR MUSCLE SPASMS    Left sided sciatica, Back muscle spasm       diclofenac 50 MG EC tablet    VOLTAREN    180 tablet    TAKE 1 TABLET(50 MG) BY MOUTH TWICE DAILY AS NEEDED FOR MODERATE PAIN    Chronic pain syndrome       esomeprazole 40 MG CR capsule    nexIUM    90 capsule    TAKE 1 CAPSULE(40 MG) BY MOUTH EVERY MORNING 30 TO 60 MINUTES BEFORE BREAKFAST    Dyspepsia       fluticasone 27.5 MCG/SPRAY spray    VERAMYST    10 g    Spray 2 sprays into both nostrils daily    Chronic seasonal allergic rhinitis, unspecified trigger       gabapentin 300 MG capsule    NEURONTIN    180 capsule    Take 1 capsule (300 mg) by mouth 2 times daily    Acute left-sided low back pain with left-sided sciatica       glipiZIDE-metFORMIN 5-500 MG per tablet    METAGLIP    90 tablet    TAKE 1 TABLET BY MOUTH TONCE DAILY BEFORE MEALS    Type 2 diabetes mellitus with diabetic nephropathy, without long-term current use of insulin (H)       insulin pen needle 32G X 6 MM    NOVOFINE    100 each    Use once daily or as directed.    Type 2 diabetes mellitus with diabetic nephropathy, without long-term current use of insulin (H)       liraglutide 18 MG/3ML soln    VICTOZA    30 mL    Inject 1.8 mg Subcutaneous daily    Type 2 diabetes mellitus with diabetic nephropathy, without long-term current use of insulin (H)       losartan 50 MG tablet    COZAAR    90 tablet    Take 1 tablet (50 mg) by mouth daily    HTN, goal below 140/90, Type 2 diabetes mellitus with diabetic nephropathy, without long-term current use of insulin (H)       melatonin 3 MG tablet     30 tablet    TAKE 1 TABLET BY MOUTH ONCE AT BEDTIME AS NEEDED FOR SLEEP    Major depressive disorder, recurrent episode, moderate (H)       metoprolol succinate 25 MG 24 hr tablet    TOPROL XL    90 tablet    Take 1 tablet (25 mg) by mouth daily    Old myocardial infarction        * montelukast 10 MG tablet    SINGULAIR    90 tablet    TAKE 1 TABLET(10 MG) BY MOUTH AT BEDTIME    Mild intermittent asthma without complication       * montelukast 10 MG tablet    SINGULAIR    90 tablet    TAKE 1 TABLET BY MOUTH EVERY NIGHT AT BEDTIME    Mild intermittent asthma without complication       nystatin 071932 UNIT/GM Powd    MYCOSTATIN    60 g    Apply topically 2 times daily    Morbid obesity with body mass index of 45.0-49.9 in adult (H)       order for DME     3 Month    Diabetic test strips and lancets per insurance formulary Check blood sugar 2 times daily    Type 2 diabetes mellitus with diabetic nephropathy (H)       polyethylene glycol powder    MIRALAX/GLYCOLAX    510 g    Take 17 g by mouth daily DISSOLVE 17 GRAMS IN LIQUID AND DRINK DAILY AS DIRECTED    Chronic pain syndrome       solifenacin 10 MG tablet    VESICARE    90 tablet    Take 1 tablet (10 mg) by mouth daily    Mixed incontinence       traMADol 50 MG tablet    ULTRAM    30 tablet    TAKE 1 TABLET BY MOUTH EVERY 8 HOURS AS NEEDED FOR MODERATE PAIN    Chronic pain syndrome       * Notice:  This list has 6 medication(s) that are the same as other medications prescribed for you. Read the directions carefully, and ask your doctor or other care provider to review them with you.

## 2018-10-23 NOTE — PROGRESS NOTES
Melody Ferguson is seen for follow up left total knee arthroplasty from 4/26/17.  She complains of intermittent pain just above the patella.  Exercise:  Walking      Past Medical History:   Diagnosis Date     Arthritis 2000     Cancer (H) 1983     Cerebral infarction (H) 2005     COPD (chronic obstructive pulmonary disease) (H) 1990     Depressive disorder 1990     DM type 2 (diabetes mellitus, type 2) (H)      History of TIA (transient ischemic attack)      HTN (hypertension)      Hyperlipidemia      Uncomplicated asthma 1990       Past Surgical History:   Procedure Laterality Date     ABDOMEN SURGERY  05/20/2005     BIOPSY  1984     BREAST SURGERY  1984     C TOTAL KNEE ARTHROPLASTY Left 04/26/2017    DML at [facility]     CHOLECYSTECTOMY  2000     COLONOSCOPY       ENT SURGERY  2008    tonsils removed     GENITOURINARY SURGERY  2005    a & p repair     REPAIR PTOSIS       SLING TRANSVAGINAL N/A 7/14/2017    Procedure: SLING TRANSVAGINAL;  Sling ([place]);  Surgeon: Talon Katz MD;  Location: [facility] OR       Family History   Problem Relation Age of Onset     Thyroid Disease Sister      Cancer Brother      Cancer Sister      breast cancer     Cerebrovascular Disease Sister 62     Other - See Comments Sister      Angioplasty 8/2016     Hypertension Father      Hyperlipidemia Father      Prostate Cancer Father      Glaucoma Maternal Grandmother      Cancer Maternal Grandfather      Cancer Paternal Grandmother      Breast Cancer Paternal Grandmother      Cerebrovascular Disease Paternal Grandfather      Cerebrovascular Disease Maternal Half-Sister      4/08/2016     Breast Cancer Maternal Half-Sister      Asthma Maternal Half-Sister      Other Cancer Brother      Passed from cancer unsure what type     Anesthesia Reaction Daughter      Diabetes No family hx of      Macular Degeneration No family hx of        Social History     Social History     Marital status: Single     Spouse name: N/A     Number of children: N/A      Years of education: N/A     Occupational History      Disabled     Social History Main Topics     Smoking status: Former Smoker     Packs/day: 0.50     Years: 25.00     Types: Cigarettes     Quit date: 10/15/1991     Smokeless tobacco: Never Used     Alcohol use 0.0 oz/week      Comment: occassionally     Drug use: No     Sexual activity: Yes     Partners: Male     Birth control/ protection: Other      Comment: kalina     Other Topics Concern     Parent/Sibling W/ Cabg, Mi Or Angioplasty Before 65f 55m? Yes     Mirna Angioplasty 2015     Social History Narrative       Current Outpatient Prescriptions   Medication Sig Dispense Refill     ACCU-CHEK DAMIEN PLUS test strip TEST BLOOD SUGARS TWICE DAILY 200 strip 0     ACE NOT PRESCRIBED, INTENTIONAL, Reported on 5/10/2017 0 each 0     albuterol (PROAIR HFA/PROVENTIL HFA/VENTOLIN HFA) 108 (90 Base) MCG/ACT inhaler Inhale 2 puffs into the lungs every 6 hours 1 Inhaler 0     amoxicillin (AMOXIL) 500 MG capsule TAKE 4 CAPSULES(2000 MG) BY MOUTH 1 TIME FOR 1 DOSE 4 capsule 0     ASPIRIN PO Take 81 mg by mouth daily       atorvastatin (LIPITOR) 40 MG tablet Take 1 tablet (40 mg) by mouth daily 90 tablet 1     blood glucose monitoring (NO BRAND SPECIFIED) meter device kit Use to test blood sugar 1 times daily or as directed. Please dispense insurance preferred. 1 kit 0     blood glucose monitoring (NO BRAND SPECIFIED) test strip Use to test blood sugars 2 times daily or as directed 180 strip 3     buPROPion (WELLBUTRIN SR) 100 MG 12 hr tablet Take 1 tablet (100 mg) by mouth 2 times daily 180 tablet 1     BUPROPION HCL PO Take 50 mg by mouth 2 times daily        citalopram (CELEXA) 40 MG tablet TAKE 1 TABLET(40 MG) BY MOUTH DAILY 90 tablet 0     cyclobenzaprine (FLEXERIL) 5 MG tablet TAKE 1 TABLET(5 MG) BY MOUTH TWICE DAILY AS NEEDED FOR MUSCLE SPASMS 60 tablet 1     diclofenac (VOLTAREN) 50 MG EC tablet TAKE 1 TABLET(50 MG) BY MOUTH TWICE DAILY AS NEEDED FOR MODERATE PAIN 180  tablet 0     esomeprazole (NEXIUM) 40 MG CR capsule TAKE 1 CAPSULE(40 MG) BY MOUTH EVERY MORNING 30 TO 60 MINUTES BEFORE BREAKFAST 90 capsule 0     fluticasone (VERAMYST) 27.5 MCG/SPRAY spray Spray 2 sprays into both nostrils daily 10 g 11     gabapentin (NEURONTIN) 300 MG capsule Take 1 capsule (300 mg) by mouth 2 times daily 180 capsule 1     glipiZIDE-metFORMIN (METAGLIP) 5-500 MG per tablet TAKE 1 TABLET BY MOUTH TONCE DAILY BEFORE MEALS 90 tablet 0     insulin pen needle (NOVOFINE) 32G X 6 MM Use once daily or as directed. 100 each prn     liraglutide (VICTOZA) 18 MG/3ML soln Inject 1.8 mg Subcutaneous daily 30 mL 0     losartan (COZAAR) 50 MG tablet Take 1 tablet (50 mg) by mouth daily 90 tablet 0     melatonin 3 MG tablet TAKE 1 TABLET BY MOUTH ONCE AT BEDTIME AS NEEDED FOR SLEEP 30 tablet 0     metoprolol succinate (TOPROL XL) 25 MG 24 hr tablet Take 1 tablet (25 mg) by mouth daily 90 tablet 1     montelukast (SINGULAIR) 10 MG tablet TAKE 1 TABLET BY MOUTH EVERY NIGHT AT BEDTIME 90 tablet 0     montelukast (SINGULAIR) 10 MG tablet TAKE 1 TABLET(10 MG) BY MOUTH AT BEDTIME 90 tablet 0     nystatin (MYCOSTATIN) 052045 UNIT/GM POWD Apply topically 2 times daily 60 g 2     order for DME Diabetic test strips and lancets per insurance formulary Check blood sugar 2 times daily 3 Month 3     polyethylene glycol (MIRALAX/GLYCOLAX) powder Take 17 g by mouth daily DISSOLVE 17 GRAMS IN LIQUID AND DRINK DAILY AS DIRECTED 510 g 6     solifenacin (VESICARE) 10 MG tablet Take 1 tablet (10 mg) by mouth daily 90 tablet 0     traMADol (ULTRAM) 50 MG tablet TAKE 1 TABLET BY MOUTH EVERY 8 HOURS AS NEEDED FOR MODERATE PAIN 30 tablet 0       Allergies   Allergen Reactions     Milk Protein Extract GI Disturbance     lactose intolerance.  Can tolerate milk products if uses lactaid     Bee Venom Swelling     Latex      Lisinopril Cough     Onion GI Disturbance     Vomiting, feels like throat is clogged     Aloe Rash     pain      "Chlorine Rash       REVIEW OF SYSTEMS:  CONSTITUTIONAL:  NEGATIVE for fever, chills, change in weight, not feeling tired  SKIN:  NEGATIVE for worrisome rashes, no skin lumps, no skin ulcers and no non-healing wounds  EYES:  NEGATIVE for vision changes or irritation.  ENT/MOUTH:  NEGATIVE.  No hearing loss, no hoarseness, no difficulty swallowing.  RESP:  NEGATIVE. No cough or shortness of breath.  BREAST:  NEGATIVE for masses, tenderness or discharge  CV:  NEGATIVE for chest pain, palpitations or peripheral edema  GI:  NEGATIVE for nausea, abdominal pain, heartburn, or change in bowel habits  :  Negative. No dysuria, no hematuria  MUSCULOSKELETAL:  See HPI above  NEURO:  NEGATIVE . No headaches, no dizziness,  no numbness  ENDOCRINE:  NEGATIVE for temperature intolerance, skin/hair changes  HEME/ALLERGY/IMMUNE:  NEGATIVE for bleeding problems  PSYCHIATRIC:  NEGATIVE. no anxiety, no depression.      Xray:  Xray images were independently visualized with the patient.  This shows good position of components.  No sign of loosening or wear.     Exam:  Vitals: /86  Pulse 70  Resp 13  Ht 1.702 m (5' 7\")  Wt 136.1 kg (300 lb)  SpO2 96%  BMI 46.99 kg/m2  BMI= Body mass index is 46.99 kg/(m^2).  Range of motion right 5-115 degrees, left 0-127 degrees.  Alignment  Normal.  Stability:   Right       Lateral collateral ligament no laxity       Medial collateral ligament no laxity  Left:       Lateral collateral ligament no laxity       Medial collateral ligament no laxity  Wound:  Well healed.  Edema: Minor - both lower legs  Effusion: Minor - right knee  Tenderness: Right Significant - medial joint line.       Left:  Minor - superior aspect of the patella and quadriceps tendon.   The medial half of the quadriceps tendon appears mildly recessed compared to lateral half.  No pain with resisted knee extension.  Sensation, motor, and circulation intact.    Assessment:  Left total knee arthroplasty doing well.  Pain " appears to be from quadriceps tendon, but without disruption. We will observe that.  Resume activity as tolerated.  Return to clinic 4 years with xray left knee.  Continue antibiotics for dental work.

## 2018-10-24 ENCOUNTER — MYC MEDICAL ADVICE (OUTPATIENT)
Dept: PALLIATIVE MEDICINE | Facility: CLINIC | Age: 58
End: 2018-10-24

## 2018-10-24 NOTE — TELEPHONE ENCOUNTER
Manpreet sent to pt:  Chandu Oliver.  We have called you and been unable to reach you    Dr. Roche wants to know what kind of injection you are wanting.  Do you want to repeat the lumbar facet joint injections where steroids are used or do you want to try the 2 diagnostic medial branch blocks followed by radiofrequency ablation?    Helen Lala, RN-BSN  [facility]

## 2018-10-25 NOTE — TELEPHONE ENCOUNTER
Per patient Otis message:  From: Melody Ferguson      Created: 10/24/2018 9:34 PM      i KNOW EVERY TIME i TRY TO CALL iM TOLD i NEED TO SPEAK TO A NURSE AND THEY CANT FIND ONE and then IWPINEDAT WITH MY PHONE BUT AS SOON AS I Have to go to the bathroom or take a shower I get the call.  I want the injection I have been getting and to talk to Dr Roche about the 2 diagnostic medial branch blocks followed by radiofrequency ablation?

## 2018-10-25 NOTE — TELEPHONE ENCOUNTER
Pt returning call for Helen, she called in to schedule MBB and RFA but no orders are in. Please call pt [phone]      Tahir ABRAHAM    [facility]

## 2018-10-25 NOTE — TELEPHONE ENCOUNTER
Per patient myChart message:  From: Melody Alemantere      Created: 10/25/2018 1:46 PM      medial branch blocks and radiofrequency ablation if needed.  Im in pain and it has been going on since Sept so as you see the shot did not last        Dr. Roche please place order for lumbar medial branch block and radiofrequency ablation.    Helen Lala RN-BSN  [facility]

## 2018-10-25 NOTE — TELEPHONE ENCOUNTER
Pt had lumbar facet injections 7/2018.  Last office visit:  5/2018.    Called pt and left message that mychart should be used for now on since she isn't able to answer the telephone.  She was advised to mychart back w/ which injection she wants to do.  Mychart sent to pt:  From: Helen Lala RN        Created: 10/25/2018 9:55 AM       I tried calling you again.  How long did the 7/10/18 lumbar facet joint injections last?    If you had significant relief up until now then these could be repeated.  The injection can always be scheduled and you and Dr. Roche can determine which one to do on the day of the visit.  I can mail you information on the medial branch blocks and radiofrequency ablation if needed.  We need to know what you would like to do.  You haven't seen Dr. Roche in clinic since 5/2018.  If you need further conversation about this you should schedule a clinic [phone].    Helen Lala RN-BSN  [facility]

## 2018-10-26 NOTE — TELEPHONE ENCOUNTER
OpenHatch message from patient on 10/26 at 1431:    medial branch blocks and radiofrequency ablation  this is what I want by mid Sept I was in pain  Please tell me what I need to do  ---------------  From: Helen VERMA  Sent: 10/25/2018  9:55 AM CDT  To: Melody Ferguson  Subject: RE:injection    I tried calling you again.  How long did the 7/10/18 lumbar facet joint injections last?    If you had significant relief up until now then these could be repeated.  The injection can always be scheduled and you and Dr. Roche can determine which one to do on the day of the visit.  I can mail you information on the medial branch blocks and radiofrequency ablation if needed.    We need to know what you would like to do.  You haven't seen Dr. Roche in clinic since 5/2018.  If you need further conversation about this you should schedule a clinic [phone].    Helen Lala, RN-BSN  [facility]

## 2018-10-26 NOTE — TELEPHONE ENCOUNTER
Injection order prepped for review by Dr. Roche. Dr. Roche please place order for lumbar medial branch block and radiofrequency ablation OR let us know if you would like to see patient in clinic first. She has not been seen since 5/2018.     Message sent to pt via [email or web address] encounter:    Dr. Melchor Moran needs to review your request and then put in an order for the procedure if that is appropriate at this time. Since he has not seen you since May, he might want to evaluate you in clinic first.  We will need to see what he says.  There is nothing more that you need to do at this time. We will let you know when he responds.     Thank you,     CHANTAL KurtzN, RN-BC  Patient Care Supervisor/Care Coordinator  [facility]

## 2018-10-26 NOTE — TELEPHONE ENCOUNTER
Message sent to pt:    Dr. Melchor Moran needs to review your request and then put in an order for the procedure if that is appropriate at this time. Since he has not seen you since May, he might want to evaluate you in clinic first.  We will need to see what he says.  There is nothing more that you need to do at this time. We will let you know when he responds.     Thank you,     CHANTAL KurtzN, RN-BC  Patient Care Supervisor/Care Coordinator  [facility]

## 2018-10-29 DIAGNOSIS — J45.20 MILD INTERMITTENT ASTHMA WITHOUT COMPLICATION: Chronic | ICD-10-CM

## 2018-10-29 NOTE — TELEPHONE ENCOUNTER
Pre-screening questions for MBB Injections:    Injection to be done at which interventional clinic site? [facility]     Instruct patient to arrive as directed prior to the scheduled appointment time:    [place] and [place]: 30 minutes before        Procedure ordered by Dr. Roche    Procedure ordered? Lumbar Medial Branch Block    What insurance would patient like us to bill for this procedure? Medicare, Medicaid      Worker's comp- Any injection DO NOT SCHEDULE and route to Azalia Gonzales.      [employer] insurance - If scheduling an SI joint injection DO NOT SCHEDULE and route to Azalia Gonzales.     [employer]- MBB's must be scheduled at LEAST two weeks apart      [employer] - Any injection besides hip/shoulder/knee joint DO NOT SCHEDULE and route to Azalia Gonzales. She will obtain PA and call pt back to schedule procedure or notify pt of denial.     HP [employer]- PA required for all MBB's    Any chance of pregnancy? NO   If YES, do NOT schedule and route to RN pool    Is an  needed? No     Patient has a drive home? (mandatory) Yes     Is patient taking any blood thinners (plavix, coumadin, jantoven, warfarin, heparin, pradaxa or dabigatran )? No    If hold needed, do NOT schedule, route to RN pool     Is patient taking any aspirin products? Yes - Pt takes 81mg daily; instructed to hold 0 day(s) prior to procedure.      If more than 325mg/day do NOT schedule; route to RN pool     For CERVICAL procedures, hold all aspirin products for 6 days.      Does the patient have a bleeding or clotting disorder? No    If YES, okay to schedule AND route to RN nurse pool    **For any patients with platelet count <100, must be forwarded to provider**    Is patient diabetic? YES If YES, have them bring their glucometer.    Does patient have an active infection or treated for one within the past week? No    Is patient currently taking any antibiotics?  No    For patients on chronic,  preventative, or prophylacti antibiotics, procedures can be scheduled.     For patients on antibiotics for active or recent infection:    Jina Padilla [place], [place], [place]-antibiotic course must have been completed for 4 days     Is patient currently taking any steroid medications? (i.e. Prednisone, Medrol)  No     For patients on steroid medications:    Stanislav Padilla [place], [place]-steroid course must have been completed for 4 days    Review with patient:  If you are started on any steroids or antibiotics between now and your appointment, you must contact us because it may affect our ability to perform your procedure INFORMED    Is patient actively being treated for cancer or immunocompromised? No   If YES, do NOT schedule and route to RN    Are you able to get on and off an exam table with minimal or no assistance? Yes   If NO, do NOT schedule and route to RN    Are you able to roll over and lay on your stomach with minimal or no assistance? Yes   If NO, do NOT schedule and route to RN    Any allergies to contrast dye, iodine, shellfish, or numbing and steroid medications? No  (If so, inform nursing and note in scheduling comments.)    Allergies: Milk protein extract; Bee venom; Latex; Lisinopril; Onion; Aloe; and Chlorine     Has the patient had a flu shot or any other vaccinations within 7 days before or after the procedure.  No     Does patient have an MRI/CT?  YES: MRI  (SI joint, hip injections, lumbar sympathetic blocks, and stellate ganglion blocks do not require an MRI)    Was the MRI done w/in the last 3 years?  Yes    Was MRI done at [place]? Yes      If not, where was it done? N/A       If MRI was not done at [place], [facility] or [facility] do NOT schedule and route to nursing.  If pt has an imaging disc, the injection may be scheduled but pt has to bring disc to appt. If they show up w/out disc the injection cannot be done    **Must be scheduled with elapsed time interval  of at least 2 weeks and not more than 6 months between the First MBB and the Second MBB**       Medial Branch Block Pre-Procedure Instructions    It is okay to take long acting pain medications (if you are on them) the day of the procedure but try not to take any short acting medications unless absolutely necessary. INFORMED        Long acting meds would include: Gabapentin (Neurontin), MS Contin, Oxycontin        Short acting meds would include:  Percocet, Oxycodone, Vicodin, Ibuprofen     The day of the procedure, you should try to do things that provoke your pain, since the injection is being done to see if it will relieve your pain . INFORMED    If your pain level is a 4 out of 10 or less on the day of the procedure, please call [phone] to reschedule.  INFORMED  Reminders (please tell patient if applicable):      Instructed pt to arrive 30 minutes early for IV start if this is for a cervical procedure, ALL sympathetic (stellate ganglion, hypogastric, or lumbar sympathetic block) and all sedation procedures (RFA, spinal cord stimulation trials). N/A        -IVs are not routinely placed for Dr. Mehta cervical cases        -Dr. Roche: no IV needed for CMBB       If NPO for sedation, it is okay to take medications with sips of water (except if they are to hold blood thinners). N/A   *DO take blood pressure medication if it is prescribed*      If this is for a cervical MBB aspirin needs to be held for 6 days.  N/A         Do not schedule procedures requiring IV placement in the first appointment of the day or first appointment after lunch. Do NOT schedule at 0745, 0815 or 1245.  N/A          For patients 85 or older we recommend having an adult stay w/ them for the remainder of the day.      Does the patient have any questions? NO      Latricia Fernando    [facility]

## 2018-10-29 NOTE — TELEPHONE ENCOUNTER
"Requested Prescriptions   Pending Prescriptions Disp Refills     albuterol (PROAIR HFA/PROVENTIL HFA/VENTOLIN HFA) 108 (90 Base) MCG/ACT inhaler  Last Written Prescription Date:  10/04/18  Last Fill Quantity: 1,  # refills: 0   Last Office Visit with [facility] or [place] prescribing provider:  01/10/18-Faisal   Future Office Visit:    Next 5 appointments (look out 90 days)     Oct 30, 2018 10:40 AM CDT   Office Visit with Elizabeth Lamas MD   [facility]    [address] [place] [id number]   [phone]                1 Inhaler 0     Sig: Inhale 2 puffs into the lungs every 6 hours    Asthma Maintenance Inhalers - Anticholinergics Failed    10/29/2018 11:47 AM       Failed - Asthma control assessment score within normal limits in last 6 months    Please review ACT score.          Passed - Patient is age 12 years or older       Passed - Recent (6 mo) or future (30 days) visit within the authorizing provider's specialty    Patient had office visit in the last 6 months or has a visit in the next 30 days with authorizing provider or within the authorizing provider's specialty.  See \"Patient Info\" tab in inbasket, or \"Choose Columns\" in Meds & Orders section of the refill encounter.            ACT Total Scores 10/10/2016 4/18/2017 2/27/2018   ACT TOTAL SCORE (Goal Greater than or Equal to 20) 17 25 22   In the past 12 months, how many times did you visit the emergency room for your asthma without being admitted to the hospital? 0 0 0   In the past 12 months, how many times were you hospitalized overnight because of your asthma? 0 0 0       "

## 2018-10-30 ENCOUNTER — OFFICE VISIT (OUTPATIENT)
Dept: FAMILY MEDICINE | Facility: CLINIC | Age: 58
End: 2018-10-30
Payer: MEDICARE

## 2018-10-30 VITALS
SYSTOLIC BLOOD PRESSURE: 135 MMHG | TEMPERATURE: 98.3 F | HEART RATE: 71 BPM | HEIGHT: 67 IN | OXYGEN SATURATION: 95 % | WEIGHT: 293 LBS | DIASTOLIC BLOOD PRESSURE: 87 MMHG | BODY MASS INDEX: 45.99 KG/M2

## 2018-10-30 DIAGNOSIS — E66.01 MORBID OBESITY WITH BMI OF 45.0-49.9, ADULT (H): Chronic | ICD-10-CM

## 2018-10-30 DIAGNOSIS — E78.5 HYPERLIPIDEMIA LDL GOAL <100: Chronic | ICD-10-CM

## 2018-10-30 DIAGNOSIS — F33.1 MAJOR DEPRESSIVE DISORDER, RECURRENT EPISODE, MODERATE (H): ICD-10-CM

## 2018-10-30 DIAGNOSIS — E11.21 TYPE 2 DIABETES MELLITUS WITH DIABETIC NEPHROPATHY, WITHOUT LONG-TERM CURRENT USE OF INSULIN (H): Primary | ICD-10-CM

## 2018-10-30 DIAGNOSIS — R10.32 ABDOMINAL PAIN, LEFT LOWER QUADRANT: ICD-10-CM

## 2018-10-30 DIAGNOSIS — Z23 NEED FOR PROPHYLACTIC VACCINATION AND INOCULATION AGAINST INFLUENZA: ICD-10-CM

## 2018-10-30 LAB
ALBUMIN SERPL-MCNC: 3.8 G/DL (ref 3.4–5)
ALBUMIN UR-MCNC: NEGATIVE MG/DL
ALP SERPL-CCNC: 116 U/L (ref 40–150)
ALT SERPL W P-5'-P-CCNC: 25 U/L (ref 0–50)
ANION GAP SERPL CALCULATED.3IONS-SCNC: 6 MMOL/L (ref 3–14)
APPEARANCE UR: CLEAR
AST SERPL W P-5'-P-CCNC: 13 U/L (ref 0–45)
BILIRUB DIRECT SERPL-MCNC: 0.1 MG/DL (ref 0–0.2)
BILIRUB SERPL-MCNC: 0.4 MG/DL (ref 0.2–1.3)
BILIRUB UR QL STRIP: NEGATIVE
BUN SERPL-MCNC: 10 MG/DL (ref 7–30)
CALCIUM SERPL-MCNC: 8.7 MG/DL (ref 8.5–10.1)
CHLORIDE SERPL-SCNC: 103 MMOL/L (ref 94–109)
CO2 SERPL-SCNC: 30 MMOL/L (ref 20–32)
COLOR UR AUTO: YELLOW
CREAT SERPL-MCNC: 0.46 MG/DL (ref 0.52–1.04)
CREAT UR-MCNC: 56 MG/DL
GFR SERPL CREATININE-BSD FRML MDRD: >90 ML/MIN/1.7M2
GLUCOSE SERPL-MCNC: 171 MG/DL (ref 70–99)
GLUCOSE UR STRIP-MCNC: NEGATIVE MG/DL
HBA1C MFR BLD: 6.8 % (ref 0–5.6)
HGB UR QL STRIP: NEGATIVE
KETONES UR STRIP-MCNC: NEGATIVE MG/DL
LDLC SERPL DIRECT ASSAY-MCNC: 88 MG/DL
LEUKOCYTE ESTERASE UR QL STRIP: ABNORMAL
MICROALBUMIN UR-MCNC: 6 MG/L
MICROALBUMIN/CREAT UR: 11.37 MG/G CR (ref 0–25)
NITRATE UR QL: NEGATIVE
NON-SQ EPI CELLS #/AREA URNS LPF: NORMAL /LPF
PH UR STRIP: 7.5 PH (ref 5–7)
POTASSIUM SERPL-SCNC: 3.9 MMOL/L (ref 3.4–5.3)
PROT SERPL-MCNC: 7 G/DL (ref 6.8–8.8)
RBC #/AREA URNS AUTO: NORMAL /HPF
SODIUM SERPL-SCNC: 139 MMOL/L (ref 133–144)
SOURCE: ABNORMAL
SP GR UR STRIP: 1.01 (ref 1–1.03)
TSH SERPL DL<=0.005 MIU/L-ACNC: 0.6 MU/L (ref 0.4–4)
UROBILINOGEN UR STRIP-ACNC: 2 EU/DL (ref 0.2–1)
WBC #/AREA URNS AUTO: NORMAL /HPF

## 2018-10-30 PROCEDURE — 80076 HEPATIC FUNCTION PANEL: CPT | Performed by: PREVENTIVE MEDICINE

## 2018-10-30 PROCEDURE — 90686 IIV4 VACC NO PRSV 0.5 ML IM: CPT | Performed by: PREVENTIVE MEDICINE

## 2018-10-30 PROCEDURE — 80048 BASIC METABOLIC PNL TOTAL CA: CPT | Performed by: PREVENTIVE MEDICINE

## 2018-10-30 PROCEDURE — 84443 ASSAY THYROID STIM HORMONE: CPT | Performed by: PREVENTIVE MEDICINE

## 2018-10-30 PROCEDURE — 99214 OFFICE O/P EST MOD 30 MIN: CPT | Mod: 25 | Performed by: PREVENTIVE MEDICINE

## 2018-10-30 PROCEDURE — 36415 COLL VENOUS BLD VENIPUNCTURE: CPT | Performed by: PREVENTIVE MEDICINE

## 2018-10-30 PROCEDURE — 83036 HEMOGLOBIN GLYCOSYLATED A1C: CPT | Performed by: PREVENTIVE MEDICINE

## 2018-10-30 PROCEDURE — G0008 ADMIN INFLUENZA VIRUS VAC: HCPCS | Performed by: PREVENTIVE MEDICINE

## 2018-10-30 PROCEDURE — 83721 ASSAY OF BLOOD LIPOPROTEIN: CPT | Performed by: PREVENTIVE MEDICINE

## 2018-10-30 PROCEDURE — 82043 UR ALBUMIN QUANTITATIVE: CPT | Performed by: PREVENTIVE MEDICINE

## 2018-10-30 PROCEDURE — 81001 URINALYSIS AUTO W/SCOPE: CPT | Performed by: PREVENTIVE MEDICINE

## 2018-10-30 RX ORDER — CITALOPRAM HYDROBROMIDE 40 MG/1
TABLET ORAL
Qty: 90 TABLET | Refills: 1 | Status: SHIPPED | OUTPATIENT
Start: 2018-10-30 | End: 2019-06-04

## 2018-10-30 RX ORDER — BUPROPION HYDROCHLORIDE 100 MG/1
100 TABLET, EXTENDED RELEASE ORAL 2 TIMES DAILY
Qty: 180 TABLET | Refills: 1 | Status: SHIPPED | OUTPATIENT
Start: 2018-10-30 | End: 2019-06-04

## 2018-10-30 RX ORDER — ATORVASTATIN CALCIUM 40 MG/1
40 TABLET, FILM COATED ORAL DAILY
Qty: 90 TABLET | Refills: 1 | Status: SHIPPED | OUTPATIENT
Start: 2018-10-30 | End: 2019-06-04

## 2018-10-30 ASSESSMENT — ANXIETY QUESTIONNAIRES
1. FEELING NERVOUS, ANXIOUS, OR ON EDGE: MORE THAN HALF THE DAYS
3. WORRYING TOO MUCH ABOUT DIFFERENT THINGS: MORE THAN HALF THE DAYS
2. NOT BEING ABLE TO STOP OR CONTROL WORRYING: MORE THAN HALF THE DAYS
IF YOU CHECKED OFF ANY PROBLEMS ON THIS QUESTIONNAIRE, HOW DIFFICULT HAVE THESE PROBLEMS MADE IT FOR YOU TO DO YOUR WORK, TAKE CARE OF THINGS AT HOME, OR GET ALONG WITH OTHER PEOPLE: VERY DIFFICULT
5. BEING SO RESTLESS THAT IT IS HARD TO SIT STILL: SEVERAL DAYS
GAD7 TOTAL SCORE: 14
7. FEELING AFRAID AS IF SOMETHING AWFUL MIGHT HAPPEN: SEVERAL DAYS
6. BECOMING EASILY ANNOYED OR IRRITABLE: NEARLY EVERY DAY

## 2018-10-30 ASSESSMENT — PAIN SCALES - GENERAL: PAINLEVEL: WORST PAIN (10)

## 2018-10-30 ASSESSMENT — PATIENT HEALTH QUESTIONNAIRE - PHQ9
5. POOR APPETITE OR OVEREATING: NEARLY EVERY DAY
SUM OF ALL RESPONSES TO PHQ QUESTIONS 1-9: 16

## 2018-10-30 NOTE — PROGRESS NOTES

## 2018-10-30 NOTE — MR AVS SNAPSHOT
After Visit Summary   10/30/2018    Melody Ferguson    MRN: [id number]           Patient Information     Date Of Birth          1960        Visit Information        Provider Department      10/30/2018 10:40 AM Elizabeth Lamas MD [facility]        Today's Diagnoses     Type 2 diabetes mellitus with diabetic nephropathy, without long-term current use of insulin (H)    -  1    Major depressive disorder, recurrent episode, moderate (H)        Morbid obesity with BMI of 45.0-49.9, adult (H)        Abdominal pain, left lower quadrant        Hyperlipidemia LDL goal <100        Need for prophylactic vaccination and inoculation against influenza          Care Instructions    At [facility], we strive to deliver an exceptional experience to you, every time we see you.  If you receive a survey in the mail, please send us back your thoughts. We really do value your feedback.    Your care team:                            Family Medicine Internal Medicine   MD Kip Estrella MD Shantel Branch-Fleming, MD Katya Georgiev PA-C Megan Hill, APRN CNP    David Mandujano MD Pediatrics   MARLIN De La Garza, MD Tammie Manzo APRN CNP   MD Monica Hu MD Deborah Mielke, MD Kim Thein, APRN [facility]      Clinic hours: Monday - Thursday 7 am-7 pm; Fridays 7 am-5 pm.   Urgent care: Monday - Friday 11 am-9 pm; Saturday and Sunday 9 am-5 pm.  Pharmacy : Monday -Thursday 8 am-8 pm; Friday 8 am-6 pm; Saturday and Sunday 9 am-5 pm.     Clinic: [phone]   Pharmacy: [phone]                Follow-ups after your visit        Follow-up notes from your care team     Return in about 6 months (around 4/30/2019) for Routine Visit, Lab Work, Physical Exam, BP Recheck.      Your next 10 appointments already scheduled     Nov 06, 2018 11:15 AM CST   Radiology Injections with Carlos Roche MD   [facility]  "[place] ([facility] [place])    [address]  [place] [address] [phone]              Who to contact     If you have questions or need follow up information about today's clinic visit or your schedule please contact [facility] directly at [phone].  Normal or non-critical lab and imaging results will be communicated to you by MyChart, letter or phone within 4 business days after the clinic has received the results. If you do not hear from us within 7 days, please contact the clinic through [employer] or phone. If you have a critical or abnormal lab result, we will notify you by phone as soon as possible.  Submit refill requests through [employer] or call your pharmacy and they will forward the refill request to us. Please allow 3 business days for your refill to be completed.          Additional Information About Your Visit        [employer] Information     [employer] gives you secure access to your electronic health record. If you see a primary care provider, you can also send messages to your care team and make appointments. If you have questions, please call your primary care clinic.  If you do not have a primary care provider, please call [phone] and they will assist you.        Care EveryWhere ID     This is your Care EveryWhere ID. This could be used by other organizations to access your [place] medical records  [id number]        Your Vitals Were     Pulse Temperature Height Pulse Oximetry Breastfeeding? BMI (Body Mass Index)    71 98.3  F (36.8  C) (Oral) 5' 7\" (1.702 m) 95% No 46.99 kg/m2       Blood Pressure from Last 3 Encounters:   10/30/18 135/87   10/23/18 125/86   09/11/18 90/57    Weight from Last 3 Encounters:   10/30/18 300 lb (136.1 kg)   10/23/18 300 lb (136.1 kg)   09/11/18 300 lb (136.1 kg)              We Performed the Following     **TSH with free T4 reflex FUTURE anytime     Albumin Random Urine Quantitative with Creat Ratio     Basic " metabolic panel     FLU VACCINE, SPLIT VIRUS, IM (QUADRIVALENT) [29996]- >3 YRS     Hemoglobin A1c     Hepatic panel (Albumin, ALT, AST, Bili, Alk Phos, TP)     LDL cholesterol direct     UA reflex to Microscopic and Culture     Urine Microscopic     Vaccine Administration, Initial [46011]          Today's Medication Changes          These changes are accurate as of 10/30/18 12:23 PM.  If you have any questions, ask your nurse or doctor.               These medicines have changed or have updated prescriptions.        Dose/Directions    buPROPion 100 MG 12 hr tablet   Commonly known as:  WELLBUTRIN SR   This may have changed:  Another medication with the same name was removed. Continue taking this medication, and follow the directions you see here.   Used for:  Major depressive disorder, recurrent episode, moderate (H)   Changed by:  Elizabeth Lamas MD        Dose:  100 mg   Take 1 tablet (100 mg) by mouth 2 times daily   Quantity:  180 tablet   Refills:  1            Where to get your medicines      These medications were sent to [facility] [address] [place] [address] - [address] AT [facility] [address], [facility] [address]     Phone:  [phone]     atorvastatin 40 MG tablet    buPROPion 100 MG 12 hr tablet    citalopram 40 MG tablet                Primary Care Provider Office Phone # Fax #    Elizabeth Lamas [phone]       [address]  [facility] [address]        Equal Access to Services     [facility] AH: Hadii aad ku hadasho Soomaali, waaxda luqadaha, qaybta kaalmada adeegyada, waxay idiin hayaan brittney hernandezaramillie la'barbara ah. So [facility] [phone].    ATENCIÓN: Si habla español, tiene a turner disposición servicios gratuitos de asistencia lingüística. Llame al [phone].    We comply with applicable federal civil rights laws and [place] laws. We do not discriminate on the basis of race, color, national origin, age, disability, sex, sexual  orientation, or gender identity.            Thank you!     Thank you for choosing [facility]  for your care. Our goal is always to provide you with excellent care. Hearing back from our patients is one way we can continue to improve our services. Please take a few minutes to complete the written survey that you may receive in the mail after your visit with us. Thank you!             Your Updated Medication List - Protect others around you: Learn how to safely use, store and throw away your medicines at www.disposemymeds.org.          This list is accurate as of 10/30/18 12:23 PM.  Always use your most recent med list.                   Brand Name Dispense Instructions for use Diagnosis    * ACCU-CHEK DAMIEN PLUS test strip   Generic drug:  blood glucose monitoring     200 strip    TEST BLOOD SUGARS TWICE DAILY    Type 2 diabetes mellitus with diabetic nephropathy (H)       * blood glucose monitoring test strip    no brand specified    180 strip    Use to test blood sugars 2 times daily or as directed    Type 2 diabetes mellitus with diabetic nephropathy, without long-term current use of insulin (H)       ACE NOT PRESCRIBED (INTENTIONAL)     0 each    Reported on 5/10/2017    Type 2 diabetes mellitus without complication (H)       albuterol 108 (90 Base) MCG/ACT inhaler    PROAIR HFA/PROVENTIL HFA/VENTOLIN HFA    1 Inhaler    Inhale 2 puffs into the lungs every 6 hours    Mild intermittent asthma without complication       amoxicillin 500 MG capsule    AMOXIL    4 capsule    TAKE 4 CAPSULES(2000 MG) BY MOUTH 1 TIME FOR 1 DOSE    History of total knee arthroplasty, left       ASPIRIN PO      Take 81 mg by mouth daily        atorvastatin 40 MG tablet    LIPITOR    90 tablet    Take 1 tablet (40 mg) by mouth daily    Hyperlipidemia LDL goal <100       blood glucose monitoring meter device kit    no brand specified    1 kit    Use to test blood sugar 1 times daily or as directed. Please dispense  insurance preferred.    Type 2 diabetes mellitus with diabetic nephropathy, without long-term current use of insulin (H)       buPROPion 100 MG 12 hr tablet    WELLBUTRIN SR    180 tablet    Take 1 tablet (100 mg) by mouth 2 times daily    Major depressive disorder, recurrent episode, moderate (H)       citalopram 40 MG tablet    celeXA    90 tablet    TAKE 1 TABLET(40 MG) BY MOUTH DAILY    Major depressive disorder, recurrent episode, moderate (H)       cyclobenzaprine 5 MG tablet    FLEXERIL    60 tablet    TAKE 1 TABLET(5 MG) BY MOUTH TWICE DAILY AS NEEDED FOR MUSCLE SPASMS    Left sided sciatica, Back muscle spasm       diclofenac 50 MG EC tablet    VOLTAREN    180 tablet    TAKE 1 TABLET(50 MG) BY MOUTH TWICE DAILY AS NEEDED FOR MODERATE PAIN    Chronic pain syndrome       esomeprazole 40 MG CR capsule    nexIUM    90 capsule    TAKE 1 CAPSULE(40 MG) BY MOUTH EVERY MORNING 30 TO 60 MINUTES BEFORE BREAKFAST    Dyspepsia       fluticasone 27.5 MCG/SPRAY spray    VERAMYST    10 g    Spray 2 sprays into both nostrils daily    Chronic seasonal allergic rhinitis, unspecified trigger       gabapentin 300 MG capsule    NEURONTIN    180 capsule    Take 1 capsule (300 mg) by mouth 2 times daily    Acute left-sided low back pain with left-sided sciatica       glipiZIDE-metFORMIN 5-500 MG per tablet    METAGLIP    90 tablet    TAKE 1 TABLET BY MOUTH TONCE DAILY BEFORE MEALS    Type 2 diabetes mellitus with diabetic nephropathy, without long-term current use of insulin (H)       insulin pen needle 32G X 6 MM    NOVOFINE    100 each    Use once daily or as directed.    Type 2 diabetes mellitus with diabetic nephropathy, without long-term current use of insulin (H)       liraglutide 18 MG/3ML soln    VICTOZA    30 mL    Inject 1.8 mg Subcutaneous daily    Type 2 diabetes mellitus with diabetic nephropathy, without long-term current use of insulin (H)       losartan 50 MG tablet    COZAAR    90 tablet    Take 1 tablet (50 mg)  by mouth daily    HTN, goal below 140/90, Type 2 diabetes mellitus with diabetic nephropathy, without long-term current use of insulin (H)       melatonin 3 MG tablet     30 tablet    TAKE 1 TABLET BY MOUTH ONCE AT BEDTIME AS NEEDED FOR SLEEP    Major depressive disorder, recurrent episode, moderate (H)       metoprolol succinate 25 MG 24 hr tablet    TOPROL XL    90 tablet    Take 1 tablet (25 mg) by mouth daily    Old myocardial infarction       montelukast 10 MG tablet    SINGULAIR    90 tablet    TAKE 1 TABLET BY MOUTH EVERY NIGHT AT BEDTIME    Mild intermittent asthma without complication       nystatin 449916 UNIT/GM Powd    MYCOSTATIN    60 g    Apply topically 2 times daily    Morbid obesity with body mass index of 45.0-49.9 in adult (H)       order for DME     3 Month    Diabetic test strips and lancets per insurance formulary Check blood sugar 2 times daily    Type 2 diabetes mellitus with diabetic nephropathy (H)       polyethylene glycol powder    MIRALAX/GLYCOLAX    510 g    Take 17 g by mouth daily DISSOLVE 17 GRAMS IN LIQUID AND DRINK DAILY AS DIRECTED    Chronic pain syndrome       solifenacin 10 MG tablet    VESICARE    90 tablet    Take 1 tablet (10 mg) by mouth daily    Mixed incontinence       traMADol 50 MG tablet    ULTRAM    30 tablet    TAKE 1 TABLET BY MOUTH EVERY 8 HOURS AS NEEDED FOR MODERATE PAIN    Chronic pain syndrome       * Notice:  This list has 2 medication(s) that are the same as other medications prescribed for you. Read the directions carefully, and ask your doctor or other care provider to review them with you.

## 2018-10-30 NOTE — PATIENT INSTRUCTIONS
At [facility], we strive to deliver an exceptional experience to you, every time we see you.  If you receive a survey in the mail, please send us back your thoughts. We really do value your feedback.    Your care team:                            Family Medicine Internal Medicine   MD Kip Estrella MD Shantel Branch-Fleming, MD Katya Georgiev PA-C Megan Hill, APRN JELANI Mandujano MD Pediatrics   Dipak Khanna, MARLIN Bach, MD Tammie Manzo APRN CNP   MD Monica Hu MD Deborah Mielke, MD Trisha Cast, APRN [facility]      Clinic hours: Monday - Thursday 7 am-7 pm; Fridays 7 am-5 pm.   Urgent care: Monday - Friday 11 am-9 pm; Saturday and Sunday 9 am-5 pm.  Pharmacy : Monday -Thursday 8 am-8 pm; Friday 8 am-6 pm; Saturday and Sunday 9 am-5 pm.     Clinic: [phone]   Pharmacy: [phone]

## 2018-10-30 NOTE — PROGRESS NOTES
Melody,     Urine sample does not show a definite infection.  Three month glucose value is at goal at 6.8.  Other labs are pending.     Please do not hesitate to call us at [phone] if you have any questions or concerns.    Thank you,    Elizabeth Lamas MD MPH

## 2018-10-30 NOTE — PROGRESS NOTES
SUBJECTIVE:   Melody Ferguson is a 58 year old female who presents to clinic today for the following health issues:      Diabetes Follow-up    Patient is checking blood sugars: twice daily.    Results are as follows:         am -          suppertime - 115    Diabetic concerns: None     Symptoms of hypoglycemia (low blood sugar): none     Paresthesias (numbness or burning in feet) or sores: No     Date of last diabetic eye exam: DUE    BP Readings from Last 2 Encounters:   10/30/18 135/87   10/23/18 125/86     Hemoglobin A1C (%)   Date Value   10/30/2018 6.8 (H)   04/12/2018 6.3 (A)     LDL Cholesterol Calculated (mg/dL)   Date Value   04/12/2018 139 (A)   01/05/2018 109 (H)       Diabetes Management Resources      Depression Followup    Status since last visit: Worsened     See PHQ-9 for current symptoms.  Other associated symptoms: None    Complicating factors:   Significant life event:  No   Current substance abuse:  None  Anxiety or Panic symptoms:  Yes    PHQ 8/10/2017 2/28/2018 10/30/2018   PHQ-9 Total Score 10 13 16   Q9: Suicide Ideation Not at all Not at all Not at all     In the past two weeks have you had thoughts of suicide or self-harm?  No.    Do you have concerns about your personal safety or the safety of others?   No  PHQ-9  English  PHQ-9   Any Language  Suicide Assessment Five-step Evaluation and Treatment (SAFE-T)  Asthma Follow-Up    Was ACT completed today?    Yes    ACT Total Scores 10/30/2018   ACT TOTAL SCORE (Goal Greater than or Equal to 20) 24   In the past 12 months, how many times did you visit the emergency room for your asthma without being admitted to the hospital? 0   In the past 12 months, how many times were you hospitalized overnight because of your asthma? 0       Recent asthma triggers that patient is dealing with: smoke and allergies        Amount of exercise or physical activity: None    Problems taking medications regularly: No    Medication side effects: none    Diet:  diabetic        Musculoskeletal problem/pain      Duration: x 2 weeks    Description  Location: left hip area    Intensity:  10/10    Accompanying signs and symptoms: none    History  Previous similar problem: no   Previous evaluation:  none    Precipitating or alleviating factors:  Trauma or overuse: no   Aggravating factors include: everything    Therapies tried and outcome: rest/inactivity, heat and Tramadol    Better in the morning    No rash or bruising    No falls    No heavy lifting       Problem list and histories reviewed & adjusted, as indicated.  Additional history: as documented    Patient Active Problem List   Diagnosis     Primary osteoarthritis of both knees     PTSD (post-traumatic stress disorder)     Chronic pain syndrome     Fibromyalgia     History of TIA (transient ischemic attack) and stroke     Major depressive disorder, recurrent episode, moderate (H)     Urge incontinence of urine     Prolapse of vaginal wall     Mild intermittent asthma without complication     HTN, goal below 140/90     Hyperlipidemia LDL goal <100     Type 2 diabetes mellitus with diabetic nephropathy (H)     Allergic rhinitis     Morbid obesity with BMI of 45.0-49.9, adult (H)     History of colonic polyps     Vaginal high risk HPV DNA test positive     Hiatal hernia     DDD (degenerative disc disease), lumbar     Diabetes mellitus, type 2 (H)     History of total knee arthroplasty, left     Type 2 diabetes mellitus without retinopathy (H)     Primary osteoarthritis of right knee     Past Surgical History:   Procedure Laterality Date     ABDOMEN SURGERY  05/20/2005     BIOPSY  1984     BREAST SURGERY  1984     C TOTAL KNEE ARTHROPLASTY Left 04/26/2017    DML at [facility]     CHOLECYSTECTOMY  2000     COLONOSCOPY       ENT SURGERY  2008    tonsils removed     GENITOURINARY SURGERY  2005    a & p repair     REPAIR PTOSIS       SLING TRANSVAGINAL N/A 7/14/2017    Procedure: SLING TRANSVAGINAL;  Sling ([place]);  Surgeon: Sterling  Talon ABRAHAM MD;  Location: [facility] OR       Social History   Substance Use Topics     Smoking status: Former Smoker     Packs/day: 0.50     Years: 25.00     Types: Cigarettes     Quit date: 10/15/1991     Smokeless tobacco: Never Used     Alcohol use 0.0 oz/week      Comment: occassionally     Family History   Problem Relation Age of Onset     Thyroid Disease Sister      Cancer Brother      Cancer Sister      breast cancer     Cerebrovascular Disease Sister 62     Other - See Comments Sister      Angioplasty 8/2016     Hypertension Father      Hyperlipidemia Father      Prostate Cancer Father      Glaucoma Maternal Grandmother      Cancer Maternal Grandfather      Cancer Paternal Grandmother      Breast Cancer Paternal Grandmother      Cerebrovascular Disease Paternal Grandfather      Cerebrovascular Disease Maternal Half-Sister      4/08/2016     Breast Cancer Maternal Half-Sister      Asthma Maternal Half-Sister      Other Cancer Brother      Passed from cancer unsure what type     Anesthesia Reaction Daughter      Diabetes No family hx of      Macular Degeneration No family hx of          Current Outpatient Prescriptions   Medication Sig Dispense Refill     ACCU-CHEK DAMIEN PLUS test strip TEST BLOOD SUGARS TWICE DAILY 200 strip 0     ACE NOT PRESCRIBED, INTENTIONAL, Reported on 5/10/2017 0 each 0     albuterol (PROAIR HFA/PROVENTIL HFA/VENTOLIN HFA) 108 (90 Base) MCG/ACT inhaler Inhale 2 puffs into the lungs every 6 hours 1 Inhaler 0     amoxicillin (AMOXIL) 500 MG capsule TAKE 4 CAPSULES(2000 MG) BY MOUTH 1 TIME FOR 1 DOSE 4 capsule 0     ASPIRIN PO Take 81 mg by mouth daily       atorvastatin (LIPITOR) 40 MG tablet Take 1 tablet (40 mg) by mouth daily 90 tablet 1     blood glucose monitoring (NO BRAND SPECIFIED) meter device kit Use to test blood sugar 1 times daily or as directed. Please dispense insurance preferred. 1 kit 0     blood glucose monitoring (NO BRAND SPECIFIED) test strip Use to test blood sugars 2 times  daily or as directed 180 strip 3     buPROPion (WELLBUTRIN SR) 100 MG 12 hr tablet Take 1 tablet (100 mg) by mouth 2 times daily 180 tablet 1     citalopram (CELEXA) 40 MG tablet TAKE 1 TABLET(40 MG) BY MOUTH DAILY 90 tablet 1     cyclobenzaprine (FLEXERIL) 5 MG tablet TAKE 1 TABLET(5 MG) BY MOUTH TWICE DAILY AS NEEDED FOR MUSCLE SPASMS 60 tablet 1     diclofenac (VOLTAREN) 50 MG EC tablet TAKE 1 TABLET(50 MG) BY MOUTH TWICE DAILY AS NEEDED FOR MODERATE PAIN 180 tablet 0     esomeprazole (NEXIUM) 40 MG CR capsule TAKE 1 CAPSULE(40 MG) BY MOUTH EVERY MORNING 30 TO 60 MINUTES BEFORE BREAKFAST 90 capsule 0     fluticasone (VERAMYST) 27.5 MCG/SPRAY spray Spray 2 sprays into both nostrils daily 10 g 11     gabapentin (NEURONTIN) 300 MG capsule Take 1 capsule (300 mg) by mouth 2 times daily 180 capsule 1     glipiZIDE-metFORMIN (METAGLIP) 5-500 MG per tablet TAKE 1 TABLET BY MOUTH TONCE DAILY BEFORE MEALS 90 tablet 0     insulin pen needle (NOVOFINE) 32G X 6 MM Use once daily or as directed. 100 each prn     liraglutide (VICTOZA) 18 MG/3ML soln Inject 1.8 mg Subcutaneous daily 30 mL 0     losartan (COZAAR) 50 MG tablet Take 1 tablet (50 mg) by mouth daily 90 tablet 0     melatonin 3 MG tablet TAKE 1 TABLET BY MOUTH ONCE AT BEDTIME AS NEEDED FOR SLEEP 30 tablet 0     metoprolol succinate (TOPROL XL) 25 MG 24 hr tablet Take 1 tablet (25 mg) by mouth daily 90 tablet 1     montelukast (SINGULAIR) 10 MG tablet TAKE 1 TABLET BY MOUTH EVERY NIGHT AT BEDTIME 90 tablet 0     nystatin (MYCOSTATIN) 818349 UNIT/GM POWD Apply topically 2 times daily 60 g 2     order for DME Diabetic test strips and lancets per insurance formulary Check blood sugar 2 times daily 3 Month 3     polyethylene glycol (MIRALAX/GLYCOLAX) powder Take 17 g by mouth daily DISSOLVE 17 GRAMS IN LIQUID AND DRINK DAILY AS DIRECTED 510 g 6     solifenacin (VESICARE) 10 MG tablet Take 1 tablet (10 mg) by mouth daily 90 tablet 0     traMADol (ULTRAM) 50 MG tablet TAKE  1 TABLET BY MOUTH EVERY 8 HOURS AS NEEDED FOR MODERATE PAIN 30 tablet 0     [DISCONTINUED] atorvastatin (LIPITOR) 40 MG tablet Take 1 tablet (40 mg) by mouth daily 90 tablet 1     [DISCONTINUED] buPROPion (WELLBUTRIN SR) 100 MG 12 hr tablet Take 1 tablet (100 mg) by mouth 2 times daily 180 tablet 1     [DISCONTINUED] BUPROPION HCL PO Take 50 mg by mouth 2 times daily        [DISCONTINUED] citalopram (CELEXA) 40 MG tablet TAKE 1 TABLET(40 MG) BY MOUTH DAILY 90 tablet 0     [DISCONTINUED] montelukast (SINGULAIR) 10 MG tablet TAKE 1 TABLET(10 MG) BY MOUTH AT BEDTIME 90 tablet 0     Allergies   Allergen Reactions     Milk Protein Extract GI Disturbance     lactose intolerance.  Can tolerate milk products if uses lactaid     Bee Venom Swelling     Latex      Lisinopril Cough     Onion GI Disturbance     Vomiting, feels like throat is clogged     Aloe Rash     pain     Chlorine Rash     Recent Labs   Lab Test  10/30/18   1126 04/12/18 01/05/18   0822  07/11/17   0841   11/01/16   0752   A1C  6.8*  6.3*  6.9*   --    < >  6.7*   LDL   --   139*  109*   --    --   75   HDL   --   51  59   --    --   61   TRIG   --   119  126   --    --   159*   ALT   --    --   22   --    --   27   CR   --    --   0.51*  0.43*   < >  0.47*   GFRESTIMATED   --    --   >90  >90  Non African American GFR Calc     < >  >90  Non  GFR Calc     GFRESTBLACK   --    --   >90  >90  African American GFR Calc     < >  >90   GFR Calc     POTASSIUM   --    --   3.9  3.8   < >  3.8   TSH   --    --    --    --    --   1.20    < > = values in this interval not displayed.      BP Readings from Last 3 Encounters:   10/30/18 135/87   10/23/18 125/86   09/11/18 90/57    Wt Readings from Last 3 Encounters:   10/30/18 300 lb (136.1 kg)   10/23/18 300 lb (136.1 kg)   09/11/18 300 lb (136.1 kg)                  Labs reviewed in EPIC    Reviewed and updated as needed this visit by clinical staff  Tobacco  Allergies  Meds  Med Hx  " Surg Hx       Reviewed and updated as needed this visit by Provider  Med Hx  Surg Hx         ROS:  Constitutional, neuro, ENT, endocrine, pulmonary, cardiac, gastrointestinal, genitourinary, musculoskeletal, integument and psychiatric systems are negative, except as otherwise noted.    OBJECTIVE:                                                    /87  Pulse 71  Temp 98.3  F (36.8  C) (Oral)  Ht 5' 7\" (1.702 m)  Wt 300 lb (136.1 kg)  SpO2 95%  Breastfeeding? No  BMI 46.99 kg/m2  Body mass index is 46.99 kg/(m^2).  GENERAL APPEARANCE: healthy, alert and over weight  EYES: Eyes grossly normal to inspection  NECK: trachea midline and normal to palpation  RESP: lungs clear to auscultation - no rales, rhonchi or wheezes  CV: regular rates and rhythm, normal S1 S2, no S3 or S4 and no murmur, click or rub  ABDOMEN: soft, and mild tenderness in the LLQ, no rebound or guarding   MS: extremities normal- no gross deformities noted  SKIN: no suspicious lesions or rashes  NEURO: Normal strength and tone, mentation intact and speech normal  PSYCH: mentation appears normal    Diagnostic test results:  Diagnostic Test Results:  Results for orders placed or performed in visit on 10/30/18 (from the past 24 hour(s))   Hemoglobin A1c   Result Value Ref Range    Hemoglobin A1C 6.8 (H) 0 - 5.6 %   UA reflex to Microscopic and Culture   Result Value Ref Range    Color Urine Yellow     Appearance Urine Clear     Glucose Urine Negative NEG^Negative mg/dL    Bilirubin Urine Negative NEG^Negative    Ketones Urine Negative NEG^Negative mg/dL    Specific Gravity Urine 1.015 1.003 - 1.035    Blood Urine Negative NEG^Negative    pH Urine 7.5 (H) 5.0 - 7.0 pH    Protein Albumin Urine Negative NEG^Negative mg/dL    Urobilinogen Urine 2.0 (H) 0.2 - 1.0 EU/dL    Nitrite Urine Negative NEG^Negative    Leukocyte Esterase Urine Trace (A) NEG^Negative    Source Midstream Urine    Urine Microscopic   Result Value Ref Range    WBC Urine 0 " - 5 OTO5^0 - 5 /HPF    RBC Urine O - 2 OTO2^O - 2 /HPF    Squamous Epithelial /LPF Urine Few FEW^Few /LPF        ASSESSMENT/PLAN:                                                    1. Type 2 diabetes mellitus with diabetic nephropathy, without long-term current use of insulin (H)  -HbA1C is at goal  - **TSH with free T4 reflex FUTURE anytime  - Albumin Random Urine Quantitative with Creat Ratio  - Hemoglobin A1c  - Basic metabolic panel  - LDL cholesterol direct  Continue medications the same.    Periodic blood sugar testing.  Regular foot checks  Limit carbohydrates and sweets in diet  Update eye exam annually   Regular exercise, 150 minutes per week  Hypoglycemia precautions       2. Major depressive disorder, recurrent episode, moderate (H)  -refills on medication provided   - buPROPion (WELLBUTRIN SR) 100 MG 12 hr tablet; Take 1 tablet (100 mg) by mouth 2 times daily  Dispense: 180 tablet; Refill: 1  - citalopram (CELEXA) 40 MG tablet; TAKE 1 TABLET(40 MG) BY MOUTH DAILY  Dispense: 90 tablet; Refill: 1    3. Morbid obesity with BMI of 45.0-49.9, adult (H)  -Weight stable  -minimal physical activity due to chronic pain issues     4. Abdominal pain, left lower quadrant  -Await labs   - UA reflex to Microscopic and Culture  - Hepatic panel (Albumin, ALT, AST, Bili, Alk Phos, TP)  - Urine Microscopic  -ER precautions reviewed in detail. If increased abdominal pain, fever over 101 F, emesis, rectal bleeding, melena, then needs to be seen in ER, patient expressed comprehension of this.   -If pain is not better in 1 week then proceed with CT scan     5. Hyperlipidemia LDL goal <100  -Last LDL was not at goal   - atorvastatin (LIPITOR) 40 MG tablet; Take 1 tablet (40 mg) by mouth daily  Dispense: 90 tablet; Refill: 1    6. Need for prophylactic vaccination and inoculation against influenza  - FLU VACCINE, SPLIT VIRUS, IM (QUADRIVALENT) [43158]- >3 YRS  - Vaccine Administration, Initial [25151]      Follow up with  Provider - 6 months      Elizabeth Lamas MD MPH    [facility]    Injectable Influenza Immunization Documentation    1.  Is the person to be vaccinated sick today?   No    2. Does the person to be vaccinated have an allergy to a component   of the vaccine?   No  Egg Allergy Algorithm Link    3. Has the person to be vaccinated ever had a serious reaction   to influenza vaccine in the past?   No    4. Has the person to be vaccinated ever had Guillain-Barré syndrome?   No    Form completed by Arianne ROBERTS  '

## 2018-10-31 RX ORDER — ALBUTEROL SULFATE 90 UG/1
2 AEROSOL, METERED RESPIRATORY (INHALATION) EVERY 6 HOURS
Qty: 1 INHALER | Refills: 2 | Status: SHIPPED | OUTPATIENT
Start: 2018-10-31 | End: 2019-01-04

## 2018-10-31 ASSESSMENT — ASTHMA QUESTIONNAIRES: ACT_TOTALSCORE: 24

## 2018-10-31 ASSESSMENT — ANXIETY QUESTIONNAIRES: GAD7 TOTAL SCORE: 14

## 2018-10-31 NOTE — PROGRESS NOTES
Melody,     Liver function tests are normal.  Urine sample did not show any abnormal protein.  Electrolytes, thyroid function and kidney function are normal.  LDL cholesterol is at goal for you.   Plan of care and follow up as discussed in clinic.     Please do not hesitate to call us at [phone] if you have any questions or concerns.    Thank you,    Elizabeth Lamas MD MPH

## 2018-10-31 NOTE — TELEPHONE ENCOUNTER
ACT Total Scores 4/18/2017 2/27/2018 10/30/2018   ACT TOTAL SCORE (Goal Greater than or Equal to 20) 25 22 24   In the past 12 months, how many times did you visit the emergency room for your asthma without being admitted to the hospital? 0 0 0   In the past 12 months, how many times were you hospitalized overnight because of your asthma? 0 0 0     Patient seen in clinic yesterday (10/30/18) for chronic disease follow up.    Prescription approved per [facility] Refill Protocol.    Susan Heath RN  [facility]

## 2018-11-05 DIAGNOSIS — M54.32 LEFT SIDED SCIATICA: ICD-10-CM

## 2018-11-05 DIAGNOSIS — M62.830 BACK MUSCLE SPASM: ICD-10-CM

## 2018-11-05 RX ORDER — CYCLOBENZAPRINE HCL 5 MG
TABLET ORAL
Qty: 60 TABLET | Refills: 0 | Status: SHIPPED | OUTPATIENT
Start: 2018-11-05 | End: 2019-06-03

## 2018-11-05 NOTE — TELEPHONE ENCOUNTER
Requested Prescriptions   Pending Prescriptions Disp Refills     cyclobenzaprine (FLEXERIL) 5 MG tablet [Pharmacy Med Name: CYCLOBENZAPRINE 5MG TABLETS]  Last Written Prescription Date:  4/10/18  Last Fill Quantity: 60,  # refills: 1   Last office visit: 10/30/2018 with prescribing provider:  Adams   Future Office Visit:     60 tablet 0     Sig: TAKE 1 TABLET(5 MG) BY MOUTH TWICE DAILY AS NEEDED FOR MUSCLE SPASMS    There is no refill protocol information for this order

## 2018-11-05 NOTE — TELEPHONE ENCOUNTER
Routing refill request to provider for review/approval because:  Drug not on the [facility] refill protocol   Nicki Hernandez RN

## 2018-11-05 NOTE — TELEPHONE ENCOUNTER
Chart review: 10/29/18 MD ordered MBB to RFA series  Scheduling Instructions   Interventional Evaluation:    Interventional Injection Only - Type of Injection: lumbar medial branch block followed by radiofrequency ablation if indicated Radiology? Yes         Tiana CORNEJON, RN Care Coordinator  [facility]

## 2018-11-06 ENCOUNTER — RADIANT APPOINTMENT (OUTPATIENT)
Dept: RADIOLOGY | Facility: CLINIC | Age: 58
End: 2018-11-06
Attending: PAIN MEDICINE
Payer: MEDICARE

## 2018-11-06 ENCOUNTER — RADIOLOGY INJECTION OFFICE VISIT (OUTPATIENT)
Dept: PALLIATIVE MEDICINE | Facility: CLINIC | Age: 58
End: 2018-11-06
Payer: MEDICARE

## 2018-11-06 VITALS — DIASTOLIC BLOOD PRESSURE: 77 MMHG | SYSTOLIC BLOOD PRESSURE: 132 MMHG | OXYGEN SATURATION: 95 % | HEART RATE: 76 BPM

## 2018-11-06 DIAGNOSIS — M47.817 LUMBOSACRAL SPONDYLOSIS WITHOUT MYELOPATHY: Primary | ICD-10-CM

## 2018-11-06 DIAGNOSIS — M47.817 LUMBOSACRAL SPONDYLOSIS WITHOUT MYELOPATHY: ICD-10-CM

## 2018-11-06 PROCEDURE — 64493 INJ PARAVERT F JNT L/S 1 LEV: CPT | Mod: 50 | Performed by: PAIN MEDICINE

## 2018-11-06 PROCEDURE — 64494 INJ PARAVERT F JNT L/S 2 LEV: CPT | Mod: 50 | Performed by: PAIN MEDICINE

## 2018-11-06 ASSESSMENT — PAIN SCALES - GENERAL: PAINLEVEL: MODERATE PAIN (5)

## 2018-11-06 NOTE — NURSING NOTE
Discharge Information    IV Discontiued Time:  NA    Amount of Fluid Infused:  NA    Discharge Criteria = When patient returns to baseline or as per MD order    Consciousness:  Pt is fully awake    Circulation:  BP +/- 20% of pre-procedure level    Respiration:  Patient is able to breathe deeply    O2 Sat:  Patient is able to maintain O2 Sat >92% on room air    Activity:  Moves 4 extremities on command    Ambulation:  Patient is able to stand and walk or stand and pivot into wheelchair    Dressing:  Clean/dry or No Dressing    Notes:   Discharge instructions and AVS given to patient    Patient meets criteria for discharge?  YES    Admitted to PCU?  No    Responsible adult present to accompany patient home?  Yes    Signature/Title:    yash barrios RN Care Coordinator  [facility]

## 2018-11-06 NOTE — MR AVS SNAPSHOT
After Visit Summary   11/6/2018    Melody Ferguson    MRN: [id number]           Patient Information     Date Of Birth          1960        Visit Information        Provider Department      11/6/2018 11:15 AM Carlos Roche MD [facility]        Care Instructions    [facility]   Medial Branch Block Discharge Instructions      Your procedure was performed by: Dr. Carlos Roche    Medications used include:  Lidocaine  Bupivicaine    You will need to complete the Pain Scale Log form and return it to us as soon as possible.  Once we have received the form, we will review it and call you to determine the next steps.     The form can be faxed to [phone] or mailed to:   [facility]   [address]   [place] [address]      You may resume your regular medications    You may resume your regular activities    Be cautious with walking as numbness and/or weakness in the lower extremities may occur for up to 6-8 hours due to the effects of the anesthetic.    Avoid driving for 6 hours. The local anesthetic could slow your reflexes.     You may shower, however no swimming or tub baths or hot tubs for 24 hours following your procedure.    Your pain will return after the numbing medications have worn off.  You may use your current pain medications as needed.    You may use anti-inflammatory medications (such as ibuprofen, aleve, or advil) or Tylenol for pain control if necessary.  Some people find it helpful to alternate ibuprofen and Tylenol every 3 hours for a couple of days.    You may use ice packs 10-15 minutes three to four times a day at the injection site for comfort.     Do not use heat to painful areas for 6 to 8 hours. This will give the local anesthetic time to wear off and prevent you from accidentally burning your skin.     If you experience any of the following, call the Pain Clinic during work hours at [phone] or  the Provider Line after hours at [phone]:  -Fever over 100 degree F  -Swelling, bleeding, redness, drainage, warmth at the injection site  -Progressive weakness or numbness in your legs or arms  -If lumbar, call if you have a loss of bowel or bladder function  -If cervical, call if you have any unusual headache that is not relieved by Tylenol  -Unusual new onset of pain that is not improving            Follow-ups after your visit        Who to contact     If you have questions or need follow up information about today's clinic visit or your schedule please contact [facility] directly at [phone].  Normal or non-critical lab and imaging results will be communicated to you by MyChart, letter or phone within 4 business days after the clinic has received the results. If you do not hear from us within 7 days, please contact the clinic through [facility] or phone. If you have a critical or abnormal lab result, we will notify you by phone as soon as possible.  Submit refill requests through [facility] or call your pharmacy and they will forward the refill request to us. Please allow 3 business days for your refill to be completed.          Additional Information About Your Visit        [facility] Information     [facility] gives you secure access to your electronic health record. If you see a primary care provider, you can also send messages to your care team and make appointments. If you have questions, please call your primary care clinic.  If you do not have a primary care provider, please call [phone] and they will assist you.        Care EveryWhere ID     This is your Care EveryWhere ID. This could be used by other organizations to access your [facility] medical records  [id number]        Your Vitals Were     Pulse                   74            Blood Pressure from Last 3 Encounters:   11/06/18 131/80   10/30/18 135/87   10/23/18 125/86    Weight from Last 3 Encounters:   10/30/18 136.1 kg (300 lb)    10/23/18 136.1 kg (300 lb)   09/11/18 136.1 kg (300 lb)              Today, you had the following     No orders found for display       Primary Care Provider Office Phone # Fax #    Elizabeth Lamas [phone]       [address]  [place] [facility] [address]        Equal Access to Services     [facility]: Hadii aad ku hadasho Soomaali, waaxda luqadaha, qaybta kaalmada adeegyada, waxay idiin hayaan adeeg kharash la'aan . So [facility] [phone].    ATENCIÓN: Si habla español, tiene a turner disposición servicios gratuitos de asistencia lingüística. Llame al [phone].    We comply with applicable federal civil rights laws and [place] laws. We do not discriminate on the basis of race, color, national origin, age, disability, sex, sexual orientation, or gender identity.            Thank you!     Thank you for choosing [facility]  for your care. Our goal is always to provide you with excellent care. Hearing back from our patients is one way we can continue to improve our services. Please take a few minutes to complete the written survey that you may receive in the mail after your visit with us. Thank you!             Your Updated Medication List - Protect others around you: Learn how to safely use, store and throw away your medicines at www.disposemymeds.org.          This list is accurate as of 11/6/18 11:36 AM.  Always use your most recent med list.                   Brand Name Dispense Instructions for use Diagnosis    * ACCU-CHEK DAMIEN PLUS test strip   Generic drug:  blood glucose monitoring     200 strip    TEST BLOOD SUGARS TWICE DAILY    Type 2 diabetes mellitus with diabetic nephropathy (H)       * blood glucose monitoring test strip    no brand specified    180 strip    Use to test blood sugars 2 times daily or as directed    Type 2 diabetes mellitus with diabetic nephropathy, without long-term current use of insulin (H)       ACE NOT PRESCRIBED (INTENTIONAL)     0 each     Reported on 5/10/2017    Type 2 diabetes mellitus without complication (H)       albuterol 108 (90 Base) MCG/ACT inhaler    PROAIR HFA/PROVENTIL HFA/VENTOLIN HFA    1 Inhaler    Inhale 2 puffs into the lungs every 6 hours    Mild intermittent asthma without complication       amoxicillin 500 MG capsule    AMOXIL    4 capsule    TAKE 4 CAPSULES(2000 MG) BY MOUTH 1 TIME FOR 1 DOSE    History of total knee arthroplasty, left       ASPIRIN PO      Take 81 mg by mouth daily        atorvastatin 40 MG tablet    LIPITOR    90 tablet    Take 1 tablet (40 mg) by mouth daily    Hyperlipidemia LDL goal <100       blood glucose monitoring meter device kit    no brand specified    1 kit    Use to test blood sugar 1 times daily or as directed. Please dispense insurance preferred.    Type 2 diabetes mellitus with diabetic nephropathy, without long-term current use of insulin (H)       buPROPion 100 MG 12 hr tablet    WELLBUTRIN SR    180 tablet    Take 1 tablet (100 mg) by mouth 2 times daily    Major depressive disorder, recurrent episode, moderate (H)       citalopram 40 MG tablet    celeXA    90 tablet    TAKE 1 TABLET(40 MG) BY MOUTH DAILY    Major depressive disorder, recurrent episode, moderate (H)       cyclobenzaprine 5 MG tablet    FLEXERIL    60 tablet    TAKE 1 TABLET(5 MG) BY MOUTH TWICE DAILY AS NEEDED FOR MUSCLE SPASMS    Left sided sciatica, Back muscle spasm       diclofenac 50 MG EC tablet    VOLTAREN    180 tablet    TAKE 1 TABLET(50 MG) BY MOUTH TWICE DAILY AS NEEDED FOR MODERATE PAIN    Chronic pain syndrome       esomeprazole 40 MG CR capsule    nexIUM    90 capsule    TAKE 1 CAPSULE(40 MG) BY MOUTH EVERY MORNING 30 TO 60 MINUTES BEFORE BREAKFAST    Dyspepsia       fluticasone 27.5 MCG/SPRAY spray    VERAMYST    10 g    Spray 2 sprays into both nostrils daily    Chronic seasonal allergic rhinitis, unspecified trigger       gabapentin 300 MG capsule    NEURONTIN    180 capsule    Take 1 capsule (300 mg) by mouth  2 times daily    Acute left-sided low back pain with left-sided sciatica       glipiZIDE-metFORMIN 5-500 MG per tablet    METAGLIP    90 tablet    TAKE 1 TABLET BY MOUTH TONCE DAILY BEFORE MEALS    Type 2 diabetes mellitus with diabetic nephropathy, without long-term current use of insulin (H)       insulin pen needle 32G X 6 MM    NOVOFINE    100 each    Use once daily or as directed.    Type 2 diabetes mellitus with diabetic nephropathy, without long-term current use of insulin (H)       liraglutide 18 MG/3ML soln    VICTOZA    30 mL    Inject 1.8 mg Subcutaneous daily    Type 2 diabetes mellitus with diabetic nephropathy, without long-term current use of insulin (H)       losartan 50 MG tablet    COZAAR    90 tablet    Take 1 tablet (50 mg) by mouth daily    HTN, goal below 140/90, Type 2 diabetes mellitus with diabetic nephropathy, without long-term current use of insulin (H)       melatonin 3 MG tablet     30 tablet    TAKE 1 TABLET BY MOUTH ONCE AT BEDTIME AS NEEDED FOR SLEEP    Major depressive disorder, recurrent episode, moderate (H)       metoprolol succinate 25 MG 24 hr tablet    TOPROL XL    90 tablet    Take 1 tablet (25 mg) by mouth daily    Old myocardial infarction       montelukast 10 MG tablet    SINGULAIR    90 tablet    TAKE 1 TABLET BY MOUTH EVERY NIGHT AT BEDTIME    Mild intermittent asthma without complication       nystatin 957143 UNIT/GM Powd    MYCOSTATIN    60 g    Apply topically 2 times daily    Morbid obesity with body mass index of 45.0-49.9 in adult (H)       order for DME     3 Month    Diabetic test strips and lancets per insurance formulary Check blood sugar 2 times daily    Type 2 diabetes mellitus with diabetic nephropathy (H)       polyethylene glycol powder    MIRALAX/GLYCOLAX    510 g    Take 17 g by mouth daily DISSOLVE 17 GRAMS IN LIQUID AND DRINK DAILY AS DIRECTED    Chronic pain syndrome       solifenacin 10 MG tablet    VESICARE    90 tablet    Take 1 tablet (10 mg) by  mouth daily    Mixed incontinence       traMADol 50 MG tablet    ULTRAM    30 tablet    TAKE 1 TABLET BY MOUTH EVERY 8 HOURS AS NEEDED FOR MODERATE PAIN    Chronic pain syndrome       * Notice:  This list has 2 medication(s) that are the same as other medications prescribed for you. Read the directions carefully, and ask your doctor or other care provider to review them with you.

## 2018-11-06 NOTE — NURSING NOTE
Pre-procedure Intake    Have you been fasting? NA    If yes, for how long? No     Are you taking a prescribed blood thinner such as coumadin, Plavix, Xarelto?    No    If yes, when did you take your last dose? No     Do you take aspirin?  Yes     If cervical procedure, have you held aspirin for 6 days?   No     Do you have any allergies to contrast dye, iodine, steroid and/or numbing medications?  NO    Are you currently taking antibiotics or have an active infection?  NO    Have you had a fever/elevated temperature within the past week? NO    Are you currently taking oral steroids? Yes     Do you have a ? Yes       Are you pregnant or breastfeeding?  NO    Are the vital signs normal?  Yes    Aroldo Howell MA

## 2018-11-12 DIAGNOSIS — N39.46 MIXED INCONTINENCE: ICD-10-CM

## 2018-11-12 NOTE — PROGRESS NOTES
Pre procedure Diagnosis: facet arthropathy, lumbosacral spondylosis   Post procedure Diagnosis: Same  Procedure performed: Bilateral L4-S1 medial branch block  Indication:  Diagnostic   Anesthesia: none  Complications: None  Operators: Carlos Roche MD   Indications:   Melody Ferguson is a 58 year old female. The patient has a history of bilateral low back pain.  Of note patient has had significant benefit with facet joint injections but have been short-lived.  She denies any radiation to her lower extremity.  Exam shows +++ and pain with extension/rotation, and they have tried conservative treatment including PHYSICAL THERAPY  and meds.    Options/alternatives, benefits and risks were discussed with the patient including but not limited to bleeding, infection, tissue trauma, exposure to radiation, reaction to medications, spinal cord injury, weakness, numbness and paralysis.  Questions were answered to her satisfaction and she agrees to proceed. Voluntary informed consent was obtained and signed.     Vitals were reviewed: Yes  Allergies were reviewed:  Yes   Medications were reviewed:  Yes   Pre-procedure pain score: 5/10    Procedure:  After obtaining signed informed consent, the patient was brought into the procedure suite and was placed in a prone position on the procedure table.   A Pause for the Cause was performed.  The patient was prepped and draped in the usual sterile fashion.     Under AP fluoroscopic guidance the L3, L4, L5 vertebral bodies were identified. The C-arm was rotated to the oblique view to afford optimal visualization the pedicles.  Lidocaine 1% was used to anesthetize the skin at each level.  Under intermittent fluoroscopy, 25G 3.5inch spinal needles were positioned inferior and lateral to the intersection of the transverse process and pedicle at the Bilateral L4 & L5 levels and sacral ala. The needle positions were verified and optimized from the AP view.    The anatomic targets for the  L3 & L4 medial nerve and L5 dorsal ramus (which functionally incorporates the medial branch) were the  L4 & L5 transverse processes and sacral alar notch, with laterality as described above, resulting in blockade of the L4/5 and L5/S1 facet joints.    Bupivacaine 0.25% 1 ml was injected at each location.  The needles were removed.      Hemostasis was achieved, the area was cleaned, and bandaids were placed when appropriate.  The patient tolerated the procedure well, and was taken to the recovery room.    Images were saved to PACS.     The patient will continue to monitor progress, and they were given a pain diary to complete at home.  They will either fax or mail this back to us or bring it to their next appointment. We will determine the treatment plan after we review the diary.      Post-procedure pain score: 0/10  Follow-up includes:   -f/u phone call in one week  -will await diary for further planning.    Carlos Roche MD  [facility]

## 2018-11-12 NOTE — TELEPHONE ENCOUNTER
"Requested Prescriptions   Pending Prescriptions Disp Refills     solifenacin (VESICARE) 10 MG tablet  Last Written Prescription Date:  02/07/18  Last Fill Quantity: 90,  # refills: 0   Last Office Visit with G, P or [facility] prescribing provider:  10/30/18-Adams   Future Office Visit:    90 tablet 0     Sig: Take 1 tablet (10 mg) by mouth daily    Muscarinic Antagonists (Urinary Incontinence Agents) Passed    11/12/2018  7:50 AM       Passed - Recent (12 mo) or future (30 days) visit within the authorizing provider's specialty    Patient had office visit in the last 12 months or has a visit in the next 30 days with authorizing provider or within the authorizing provider's specialty.  See \"Patient Info\" tab in inbasket, or \"Choose Columns\" in Meds & Orders section of the refill encounter.             Passed - Patient does not have a diagnosis of glaucoma on the problem list    If glaucoma diagnosis is new, refer refill to physician.         Passed - Patient is 18 years of age or older          "

## 2018-11-14 RX ORDER — SOLIFENACIN SUCCINATE 10 MG/1
10 TABLET, FILM COATED ORAL DAILY
Qty: 90 TABLET | Refills: 0 | Status: SHIPPED | OUTPATIENT
Start: 2018-11-14 | End: 2018-12-27

## 2018-11-14 NOTE — TELEPHONE ENCOUNTER
Routing refill request to provider for review/approval because:  A break in medication    Sammie Hartmann RN

## 2018-11-20 DIAGNOSIS — G89.4 CHRONIC PAIN SYNDROME: ICD-10-CM

## 2018-11-20 DIAGNOSIS — R10.13 DYSPEPSIA: ICD-10-CM

## 2018-11-21 RX ORDER — TRAMADOL HYDROCHLORIDE 50 MG/1
TABLET ORAL
Qty: 30 TABLET | Refills: 0 | Status: SHIPPED | OUTPATIENT
Start: 2018-11-21 | End: 2019-06-03

## 2018-11-21 RX ORDER — ESOMEPRAZOLE MAGNESIUM 40 MG/1
CAPSULE, DELAYED RELEASE ORAL
Qty: 90 CAPSULE | Refills: 0 | Status: SHIPPED | OUTPATIENT
Start: 2018-11-21 | End: 2019-02-07

## 2018-11-21 NOTE — TELEPHONE ENCOUNTER
"Requested Prescriptions   Pending Prescriptions Disp Refills     traMADol (ULTRAM) 50 MG tablet [Pharmacy Med Name: TRAMADOL 50MG TABLETS]    Last Written Prescription Date:  10/3/18  Last Fill Quantity: 30,  # refills: 0   Last Office Visit with [facility] or [facility] prescribing provider:  10/30/18   Future Office Visit:    Next 5 appointments (look out 90 days)     Dec 04, 2018  9:00 AM CST   [facility]    [address] [address]   [phone]                  30 tablet 0     Sig: TAKE 1 TABLET BY MOUTH EVERY 8 HOURS AS NEEDED FOR MODERATE PAIN    There is no refill protocol information for this order        esomeprazole (NEXIUM) 40 MG CR capsule [Pharmacy Med Name: ESOMEPRAZOLE MAGNESIUM 40MG DR CAPS]    Last Written Prescription Date:  9/12/18  Last Fill Quantity: 90,  # refills: 0   Last Office Visit with [facility] or [facility] prescribing provider:  10/30/18   Future Office Visit:    Next 5 appointments (look out 90 days)     Dec 04, 2018  9:00 AM CST   Return Visit with Armond George MD   [facility] ([address] [address]   [phone]                  90 capsule 0     Sig: TAKE 1 CAPSULE(40 MG) BY MOUTH EVERY MORNING 30 TO 60 MINUTES BEFORE BREAKFAST    PPI Protocol Passed    11/20/2018  4:17 PM       Passed - Not on Clopidogrel (unless Pantoprazole ordered)       Passed - No diagnosis of osteoporosis on record       Passed - Recent (12 mo) or future (30 days) visit within the authorizing provider's specialty    Patient had office visit in the last 12 months or has a visit in the next 30 days with authorizing provider or within the authorizing provider's specialty.  See \"Patient Info\" tab in inbasket, or \"Choose Columns\" in Meds & Orders section of the refill encounter.             Passed - " Patient is age 18 or older       Passed - No active pregnacy on record       Passed - No positive pregnancy test in past 12 months              Jose Carlos Faarax  [facility]

## 2018-11-21 NOTE — TELEPHONE ENCOUNTER
For tramadol:  Routing refill request to provider for review/approval because:  Drug not on the [facility] refill protocol     For Nexium:  Prescription approved per [facility] Refill Protocol.    Shaheen Head RN, BSN          O-L Flap Text: The defect edges were debeveled with a #15 scalpel blade.  Given the location of the defect, shape of the defect and the proximity to free margins an O-L flap was deemed most appropriate.  Using a sterile surgical marker, an appropriate advancement flap was drawn incorporating the defect and placing the expected incisions within the relaxed skin tension lines where possible.    The area thus outlined was incised deep to adipose tissue with a #15 scalpel blade.  The skin margins were undermined to an appropriate distance in all directions utilizing iris scissors.

## 2018-11-23 ENCOUNTER — RADIANT APPOINTMENT (OUTPATIENT)
Dept: RADIOLOGY | Facility: CLINIC | Age: 58
End: 2018-11-23
Attending: PAIN MEDICINE
Payer: MEDICARE

## 2018-11-23 ENCOUNTER — RADIOLOGY INJECTION OFFICE VISIT (OUTPATIENT)
Dept: PALLIATIVE MEDICINE | Facility: CLINIC | Age: 58
End: 2018-11-23
Payer: MEDICARE

## 2018-11-23 VITALS
SYSTOLIC BLOOD PRESSURE: 129 MMHG | HEART RATE: 75 BPM | OXYGEN SATURATION: 94 % | RESPIRATION RATE: 16 BRPM | DIASTOLIC BLOOD PRESSURE: 65 MMHG

## 2018-11-23 DIAGNOSIS — M47.817 LUMBOSACRAL SPONDYLOSIS WITHOUT MYELOPATHY: Primary | ICD-10-CM

## 2018-11-23 DIAGNOSIS — M47.816 SPONDYLOSIS OF LUMBAR REGION WITHOUT MYELOPATHY OR RADICULOPATHY: ICD-10-CM

## 2018-11-23 PROCEDURE — 64494 INJ PARAVERT F JNT L/S 2 LEV: CPT | Mod: 50 | Performed by: PAIN MEDICINE

## 2018-11-23 PROCEDURE — 64493 INJ PARAVERT F JNT L/S 1 LEV: CPT | Mod: 50 | Performed by: PAIN MEDICINE

## 2018-11-23 ASSESSMENT — PAIN SCALES - GENERAL: PAINLEVEL: WORST PAIN (10)

## 2018-11-23 NOTE — PATIENT INSTRUCTIONS
[facility]   Medial Branch Block Discharge Instructions      Your procedure was performed by:  Dr. Carlos Roche    Medications used include:  Lidocaine  Bupivicaine      You will need to complete the Pain Scale Log form and return it to us as soon as possible.  Once we have received the form, we will review it and call you to determine the next steps.     The form can be faxed to [phone] or mailed to:   [facility]   [address]   [place] [address]      You may resume your regular medications    You may resume your regular activities    Be cautious with walking as numbness and/or weakness in the lower extremities may occur for up to 6-8 hours due to the effects of the anesthetic.    Avoid driving for 6 hours. The local anesthetic could slow your reflexes.     You may shower, however no swimming or tub baths or hot tubs for 24 hours following your procedure.    Your pain will return after the numbing medications have worn off.  You may use your current pain medications as needed.    You may use anti-inflammatory medications (such as ibuprofen, aleve, or advil) or Tylenol for pain control if necessary.  Some people find it helpful to alternate ibuprofen and Tylenol every 3 hours for a couple of days.    You may use ice packs 10-15 minutes three to four times a day at the injection site for comfort.     Do not use heat to painful areas for 6 to 8 hours. This will give the local anesthetic time to wear off and prevent you from accidentally burning your skin.     If you experience any of the following, call the Pain Clinic during work hours at [phone] or the Provider Line after hours at [phone]:  -Fever over 100 degree F  -Swelling, bleeding, redness, drainage, warmth at the injection site  -Progressive weakness or numbness in your legs or arms  -If lumbar, call if you have a loss of bowel or bladder function  -Unusual new onset of pain that is  not improving

## 2018-11-23 NOTE — MR AVS SNAPSHOT
After Visit Summary   11/23/2018    Melody Ferguson    MRN: [id number]           Patient Information     Date Of Birth          1960        Visit Information        Provider Department      11/23/2018 8:15 AM Carlos Roche MD [facility]        Care Instructions    [facility]   Medial Branch Block Discharge Instructions      Your procedure was performed by:  Dr. Carlos Roche    Medications used include:  Lidocaine  Bupivicaine      You will need to complete the Pain Scale Log form and return it to us as soon as possible.  Once we have received the form, we will review it and call you to determine the next steps.     The form can be faxed to [phone] or mailed to:   [facility]   [address]   [place] [address]      You may resume your regular medications    You may resume your regular activities    Be cautious with walking as numbness and/or weakness in the lower extremities may occur for up to 6-8 hours due to the effects of the anesthetic.    Avoid driving for 6 hours. The local anesthetic could slow your reflexes.     You may shower, however no swimming or tub baths or hot tubs for 24 hours following your procedure.    Your pain will return after the numbing medications have worn off.  You may use your current pain medications as needed.    You may use anti-inflammatory medications (such as ibuprofen, aleve, or advil) or Tylenol for pain control if necessary.  Some people find it helpful to alternate ibuprofen and Tylenol every 3 hours for a couple of days.    You may use ice packs 10-15 minutes three to four times a day at the injection site for comfort.     Do not use heat to painful areas for 6 to 8 hours. This will give the local anesthetic time to wear off and prevent you from accidentally burning your skin.     If you experience any of the following, call the Pain Clinic during work hours at  [phone] or the Provider Line after hours at [phone]:  -Fever over 100 degree F  -Swelling, bleeding, redness, drainage, warmth at the injection site  -Progressive weakness or numbness in your legs or arms  -If lumbar, call if you have a loss of bowel or bladder function  -Unusual new onset of pain that is not improving            Follow-ups after your visit        Your next 10 appointments already scheduled     Nov 23, 2018  8:15 AM CST   Radiology Injections with Carlos Roche MD   [facility] [place] ([facility] [place])    [address]  [place] [address]   [phone]            Nov 23, 2018  8:15 AM CST   XR MEDIAL BRANCH BLOCK LUMBAR BILATERAL with BEPAIN1   [facility] [place] [facility] ([facility] [place])    [address]  [place] [address]   [phone]           How do I prepare for my exam? (Food and drink instructions) Stop all food and drink (including water) 3 hours before your test or treatment.  How do I prepare for my exam? (Other instructions) Stop taking the following medicines (but talk to your doctor first): * If you take blood thinners, you may need to stop taking them a few days before treatment. Talk to your doctor before stopping these medicines. Stop taking Coumadin (warfarin) 3 days before treatment. Restart the day after treatment. * If you take Plavix, Ticlid, Pletal or Persantine, please ask your doctor if you should stop these medicines. You may need extra tests on the morning of your scan. * If you take Xarelto (Rivaroxaban), you may need to stop taking it 24 hours before treatment. Talk to your doctor before stopping this medicine. Restart the day after treatment.  You may take your other medicines as normal.  What should I wear: Wear comfortable clothes.  How long does the exam take: Injections take about 30 to 45 minutes. Most people spend up to 2 hours in the clinic or hospital.  What should I bring:  For nerve root injection, please send or bring copies of any MRIs or other scans you have had. Please bring a list of your current medicines to your exam. Include vitamins, minerals and over-the-counter medicines.  Do I need a :  Plan to have someone drive you home afterward.  What do I need to tell my doctor: Tell your doctor if: * You have ever had an allergic reaction to X-ray dye (contrast fluid). * If there s any chance you are pregnant.  What should I do after the exam: We may ask you to lie down for a short time before you go home. If you had a nerve shot and your arm or leg feels numb, you will stay for up to two hours until the numbness wears off. You may return to your normal activities the next day.  What is this test: A spinal shot is done in or near the spine. You may receive steroid medicine (to reduce swelling) or contrast fluid (dye that makes the area show up more clearly on X-rays).  Who should I call with questions: If you have any questions, please call the Imaging Department where you will have your exam. Directions, parking instructions, and other information is available on our website, [email or web address].            Dec 04, 2018  9:00 AM CST   Return Visit with Armond George MD   [facility] ([facility]    [address] [place] [address] [phone]              Who to contact     If you have questions or need follow up information about today's clinic visit or your schedule please contact [facility] directly at [phone].  Normal or non-critical lab and imaging results will be communicated to you by MyChart, letter or phone within 4 business days after the clinic has received the results. If you do not hear from us within 7 days, please contact the clinic through MyChart or phone. If you have a critical or abnormal lab result, we will notify you by phone as soon as possible.  Submit refill requests  through [employer] or call your pharmacy and they will forward the refill request to us. Please allow 3 business days for your refill to be completed.          Additional Information About Your Visit        [employer] Information     [employer] gives you secure access to your electronic health record. If you see a primary care provider, you can also send messages to your care team and make appointments. If you have questions, please call your primary care clinic.  If you do not have a primary care provider, please call [phone] and they will assist you.        Care EveryWhere ID     This is your Care EveryWhere ID. This could be used by other organizations to access your [facility] medical records  [id number]        Your Vitals Were     Pulse                   73            Blood Pressure from Last 3 Encounters:   11/23/18 133/75   11/06/18 132/77   10/30/18 135/87    Weight from Last 3 Encounters:   10/30/18 136.1 kg (300 lb)   10/23/18 136.1 kg (300 lb)   09/11/18 136.1 kg (300 lb)              Today, you had the following     No orders found for display       Primary Care Provider Office Phone # Fax #    Elizabeth Lamas [phone]       [address] [facility] [address]        Equal Access to Services     ADELA KIRAN : Hadii aad ku hadasho Soomaali, waaxda luqadaha, qaybta kaalmada adeegyada, issac eagle haygerryn brittney reed. So [facility] [phone].    ATENCIÓN: Si habla español, tiene a turner disposición servicios gratuitos de asistencia lingüística. Llame al [phone].    We comply with applicable federal civil rights laws and [place] laws. We do not discriminate on the basis of race, color, national origin, age, disability, sex, sexual orientation, or gender identity.            Thank you!     Thank you for choosing [facility]  for your care. Our goal is always to provide you with excellent care. Hearing back from our patients is one way we can continue to improve our  services. Please take a few minutes to complete the written survey that you may receive in the mail after your visit with us. Thank you!             Your Updated Medication List - Protect others around you: Learn how to safely use, store and throw away your medicines at www.disposemymeds.org.          This list is accurate as of 11/23/18  8:09 AM.  Always use your most recent med list.                   Brand Name Dispense Instructions for use Diagnosis    * ACCU-CHEK DAMIEN PLUS test strip   Generic drug:  blood glucose monitoring     200 strip    TEST BLOOD SUGARS TWICE DAILY    Type 2 diabetes mellitus with diabetic nephropathy (H)       * blood glucose monitoring test strip    no brand specified    180 strip    Use to test blood sugars 2 times daily or as directed    Type 2 diabetes mellitus with diabetic nephropathy, without long-term current use of insulin (H)       ACE NOT PRESCRIBED (INTENTIONAL)     0 each    Reported on 5/10/2017    Type 2 diabetes mellitus without complication (H)       albuterol 108 (90 Base) MCG/ACT inhaler    PROAIR HFA/PROVENTIL HFA/VENTOLIN HFA    1 Inhaler    Inhale 2 puffs into the lungs every 6 hours    Mild intermittent asthma without complication       amoxicillin 500 MG capsule    AMOXIL    4 capsule    TAKE 4 CAPSULES(2000 MG) BY MOUTH 1 TIME FOR 1 DOSE    History of total knee arthroplasty, left       ASPIRIN PO      Take 81 mg by mouth daily        atorvastatin 40 MG tablet    LIPITOR    90 tablet    Take 1 tablet (40 mg) by mouth daily    Hyperlipidemia LDL goal <100       blood glucose monitoring meter device kit    no brand specified    1 kit    Use to test blood sugar 1 times daily or as directed. Please dispense insurance preferred.    Type 2 diabetes mellitus with diabetic nephropathy, without long-term current use of insulin (H)       buPROPion 100 MG 12 hr tablet    WELLBUTRIN SR    180 tablet    Take 1 tablet (100 mg) by mouth 2 times daily    Major depressive  disorder, recurrent episode, moderate (H)       citalopram 40 MG tablet    celeXA    90 tablet    TAKE 1 TABLET(40 MG) BY MOUTH DAILY    Major depressive disorder, recurrent episode, moderate (H)       cyclobenzaprine 5 MG tablet    FLEXERIL    60 tablet    TAKE 1 TABLET(5 MG) BY MOUTH TWICE DAILY AS NEEDED FOR MUSCLE SPASMS    Left sided sciatica, Back muscle spasm       diclofenac 50 MG EC tablet    VOLTAREN    180 tablet    TAKE 1 TABLET(50 MG) BY MOUTH TWICE DAILY AS NEEDED FOR MODERATE PAIN    Chronic pain syndrome       esomeprazole 40 MG CR capsule    nexIUM    90 capsule    TAKE 1 CAPSULE(40 MG) BY MOUTH EVERY MORNING 30 TO 60 MINUTES BEFORE BREAKFAST    Dyspepsia       fluticasone 27.5 MCG/SPRAY spray    VERAMYST    10 g    Spray 2 sprays into both nostrils daily    Chronic seasonal allergic rhinitis, unspecified trigger       gabapentin 300 MG capsule    NEURONTIN    180 capsule    Take 1 capsule (300 mg) by mouth 2 times daily    Acute left-sided low back pain with left-sided sciatica       glipiZIDE-metFORMIN 5-500 MG per tablet    METAGLIP    90 tablet    TAKE 1 TABLET BY MOUTH TONCE DAILY BEFORE MEALS    Type 2 diabetes mellitus with diabetic nephropathy, without long-term current use of insulin (H)       insulin pen needle 32G X 6 MM miscellaneous    NOVOFINE    100 each    Use once daily or as directed.    Type 2 diabetes mellitus with diabetic nephropathy, without long-term current use of insulin (H)       liraglutide 18 MG/3ML solution    VICTOZA    30 mL    Inject 1.8 mg Subcutaneous daily    Type 2 diabetes mellitus with diabetic nephropathy, without long-term current use of insulin (H)       losartan 50 MG tablet    COZAAR    90 tablet    Take 1 tablet (50 mg) by mouth daily    HTN, goal below 140/90, Type 2 diabetes mellitus with diabetic nephropathy, without long-term current use of insulin (H)       melatonin 3 MG tablet     30 tablet    TAKE 1 TABLET BY MOUTH ONCE AT BEDTIME AS NEEDED FOR  SLEEP    Major depressive disorder, recurrent episode, moderate (H)       metoprolol succinate 25 MG 24 hr tablet    TOPROL XL    90 tablet    Take 1 tablet (25 mg) by mouth daily    Old myocardial infarction       montelukast 10 MG tablet    SINGULAIR    90 tablet    TAKE 1 TABLET BY MOUTH EVERY NIGHT AT BEDTIME    Mild intermittent asthma without complication       nystatin 233634 UNIT/GM Powd    MYCOSTATIN    60 g    Apply topically 2 times daily    Morbid obesity with body mass index of 45.0-49.9 in adult (H)       order for DME     3 Month    Diabetic test strips and lancets per insurance formulary Check blood sugar 2 times daily    Type 2 diabetes mellitus with diabetic nephropathy (H)       polyethylene glycol powder    MIRALAX/GLYCOLAX    510 g    Take 17 g by mouth daily DISSOLVE 17 GRAMS IN LIQUID AND DRINK DAILY AS DIRECTED    Chronic pain syndrome       solifenacin 10 MG tablet    VESICARE    90 tablet    Take 1 tablet (10 mg) by mouth daily    Mixed incontinence       traMADol 50 MG tablet    ULTRAM    30 tablet    TAKE 1 TABLET BY MOUTH EVERY 8 HOURS AS NEEDED FOR MODERATE PAIN    Chronic pain syndrome       * Notice:  This list has 2 medication(s) that are the same as other medications prescribed for you. Read the directions carefully, and ask your doctor or other care provider to review them with you.

## 2018-11-23 NOTE — NURSING NOTE
Pre-procedure Intake    Have you been fasting? NA    If yes, for how long? NA    Are you taking a prescribed blood thinner such as coumadin, Plavix, Xarelto?    No    If yes, when did you take your last dose? NA    Do you take aspirin?  No    If cervical procedure, have you held aspirin for 6 days?   NA    Do you have any allergies to contrast dye, iodine, steroid and/or numbing medications?  NO    Are you currently taking antibiotics or have an active infection?  NO    Have you had a fever/elevated temperature within the past week? NO    Are you currently taking oral steroids? NO    Do you have a ? Yes       Are you pregnant or breastfeeding?  NO    Are the vital signs normal?  Yes      Candi Franklin CMA ([facility])

## 2018-11-23 NOTE — PROGRESS NOTES
Pre procedure Diagnosis: facet arthropathy, lumbosacral spondylosis   Post procedure Diagnosis: Same  Procedure performed: Bilateral L4-S1 medial branch block  Indication:  Diagnostic   Anesthesia: none  Complications: None  Operators: Carlos Roche MD       Options/alternatives, benefits and risks were discussed with the patient including but not limited to bleeding, infection, tissue trauma, exposure to radiation, reaction to medications, spinal cord injury, weakness, numbness and paralysis.  Questions were answered to her satisfaction and she agrees to proceed. Voluntary informed consent was obtained and signed. of note patient had significant benefit with first diagnostic medial branch block.    Vitals were reviewed: Yes  Allergies were reviewed:  Yes   Medications were reviewed:  Yes   Pre-procedure pain score: 10/10    Procedure:  After obtaining signed informed consent, the patient was brought into the procedure suite and was placed in a prone position on the procedure table.   A Pause for the Cause was performed.  The patient was prepped and draped in the usual sterile fashion.     Under AP fluoroscopic guidance the L3, L4, L5 vertebral bodies were identified. The C-arm was rotated to the oblique view to afford optimal visualization the pedicles.  Lidocaine 1% was used to anesthetize the skin at each level.  Under intermittent fluoroscopy, 25G 3.5inch spinal needles were positioned inferior and lateral to the intersection of the transverse process and pedicle at the Bilateral L4 & L5 levels and sacral ala. The needle positions were verified and optimized from the AP view.    The anatomic targets for the L3 & L4 medial nerve and L5 dorsal ramus (which functionally incorporates the medial branch) were the  L4 & L5 transverse processes and sacral alar notch, with laterality as described above, resulting in blockade of the L4/5 and L5/S1 facet joints.    Bupivacaine 0.25% 1 ml was injected at each  location.  The needles were removed.      Hemostasis was achieved, the area was cleaned, and bandaids were placed when appropriate.  The patient tolerated the procedure well, and was taken to the recovery room.    Images were saved to PACS.     The patient will continue to monitor progress, and they were given a pain diary to complete at home.  They will either fax or mail this back to us or bring it to their next appointment. We will determine the treatment plan after we review the diary.      Post-procedure pain score: 0/10  Follow-up includes:   -f/u phone call in one week  -will await diary for further planning.    Carlos Roche MD  [facility]

## 2018-11-23 NOTE — NURSING NOTE
Discharge Information    IV Discontiued Time:  NA    Amount of Fluid Infused:  NA    Discharge Criteria = When patient returns to baseline or as per MD order    Consciousness:  Pt is fully awake    Circulation:  BP +/- 20% of pre-procedure level    Respiration:  Patient is able to breathe deeply    O2 Sat:  Patient is able to maintain O2 Sat >92% on room air    Activity:  Moves 4 extremities on command    Ambulation:  Patient is able to stand and walk or stand and pivot into wheelchair    Dressing:  Clean/dry or No Dressing    Notes:   Discharge instructions and AVS given to patient    Patient meets criteria for discharge?  YES    Admitted to PCU?  No    Responsible adult present to accompany patient home?  Yes    Signature/Title:    Castillo Arthur RN Care Coordinator  [facility]

## 2018-11-25 ENCOUNTER — MYC MEDICAL ADVICE (OUTPATIENT)
Dept: PALLIATIVE MEDICINE | Facility: CLINIC | Age: 58
End: 2018-11-25

## 2018-11-28 ENCOUNTER — MYC MEDICAL ADVICE (OUTPATIENT)
Dept: PALLIATIVE MEDICINE | Facility: CLINIC | Age: 58
End: 2018-11-28

## 2018-11-28 DIAGNOSIS — M47.816 LUMBAR FACET ARTHROPATHY: Primary | ICD-10-CM

## 2018-11-28 DIAGNOSIS — M47.817 SPONDYLOSIS OF LUMBOSACRAL REGION WITHOUT MYELOPATHY OR RADICULOPATHY: ICD-10-CM

## 2018-11-28 NOTE — TELEPHONE ENCOUNTER
Pt had a left Lumbar  medial branch block # 2 on 11/23/18.  The post medial branch block form was  received.    Max % of pain relief from blocks:    left Lumbar medial branch block #1:  11/6/18    Max relief from block is:    At rest:                 100%  Left fact load:       100%  Right facet load:  na%  Normal activity:    100%    Average pain relief for block:  100%        Max relief from block #2 is:      Max relief from block is:    At rest:                 100%  Left fact load:       100%  Right facet load:  na%  Normal activity:    100%    Average pain relief for block:  100%      Insurance:  Medicare      Does pt have adequate relief insurance-wise to proceed to radiofrequency ablation?  Yes:      Physical therapy:                  Last done in?:  2016.     How many sessions?:  2.      Where was it done?  [facility]        Called pt and informed him/her that insurance would be checked and will be  called once we get a response.  Done    The pain diary was faxed to Christian Vieyra for insurance review.    Pt was notified that she needs to complete PT prior to having RFA.      Routed to Azalia Gonzales to check insurance coverage.  Dx facet arthropathy, lumbosacral spondylosis       Castillo Arthur, RN  Care Coordinator   [facility]

## 2018-11-30 ENCOUNTER — MYC REFILL (OUTPATIENT)
Dept: FAMILY MEDICINE | Facility: CLINIC | Age: 58
End: 2018-11-30

## 2018-11-30 DIAGNOSIS — N39.46 MIXED INCONTINENCE: ICD-10-CM

## 2018-11-30 DIAGNOSIS — G89.4 CHRONIC PAIN SYNDROME: ICD-10-CM

## 2018-11-30 NOTE — TELEPHONE ENCOUNTER
"Requested Prescriptions   Pending Prescriptions Disp Refills     VESICARE 10 MG tablet [Pharmacy Med Name: VESICARE 10MG TABLETS]  Last Written Prescription Date:  11/14/18  Last Fill Quantity: 90,  # refills: 0   Last Office Visit with [facility], [facility] or [facility] prescribing provider:  10/30/18Va   Future Office Visit:    Next 5 appointments (look out 90 days)     Dec 04, 2018  9:00 AM CST   Return Visit with Armond George MD   [facility]    [address] [place] [address]   [phone]            Dec 11, 2018 12:15 PM CST   Return Visit with Armond Rothman PT   [facility] ([facility])    [address] [address] [phone]            Dec 18, 2018 12:15 PM CST   Return Visit with Armond Rothman PT   [facility] ([facility])    [address] [address]   [phone]                90 tablet 0     Sig: TAKE 1 TABLET(10 MG) BY MOUTH DAILY    Muscarinic Antagonists (Urinary Incontinence Agents) Passed    11/30/2018 12:58 PM       Passed - Recent (12 mo) or future (30 days) visit within the authorizing provider's specialty    Patient had office visit in the last 12 months or has a visit in the next 30 days with authorizing provider or within the authorizing provider's specialty.  See \"Patient Info\" tab in inbasket, or \"Choose Columns\" in Meds & Orders section of the refill encounter.             Passed - Patient does not have a diagnosis of glaucoma on the problem list    If glaucoma diagnosis is new, refer refill to physician.         Passed - Patient is 18 years of age or older          "

## 2018-11-30 NOTE — TELEPHONE ENCOUNTER
Message from [employer]:  Original authorizing provider: Elizabeth Lamas MD    Melody Ferguson would like a refill of the following medications:  polyethylene glycol (MIRALAX/GLYCOLAX) powder [Elizabeth Lamas MD]    Preferred pharmacy: [facility] [id number] [place] [address] - [address] AT [facility] [address]    Comment:  for some reason this prescription was changed to something my insurance doesn't cover please send polyethylene glycol powder not Gavilax Powder 510gm

## 2018-11-30 NOTE — TELEPHONE ENCOUNTER
Per Medicare medical policy-No PA required        RFA  o Dx of arthropathy, spondylosis, or sprain; failed conservative treatment including 4 weeks of PT or an unfavorable evaluation; 2 successful workups resulting in >80-90% pain relief  o Indications:   - Patient must have history of at least 3 months of moderate to severe pain with functional impairment and pain is inadequately responsive to conservative care such as NSAIDs, acetaminophen, physical therapy (as tolerated).  - Pain is predominantly axial and, with the possible exception of facet joint cysts, not associated with radiculopathy or neurogenic claudication.  - There is no non-facet pathology that could explain the source of the patient s pain, such as fracture, tumor, infection, or significant deformity.Clinical assessment implicates the facet joint as the putative source of pain      Okay to schedule Lumbar RFA      Azalia CROWE    [facility]

## 2018-11-30 NOTE — TELEPHONE ENCOUNTER
Received call from patient who states that she has not done PT yet and had been told that she would need to complete that prior to having the RFA. Patient has PT scheduled. Routing for verification.       Latricia Fernando    [facility]

## 2018-11-30 NOTE — TELEPHONE ENCOUNTER
"Requested Prescriptions   Pending Prescriptions Disp Refills     polyethylene glycol (MIRALAX/GLYCOLAX) powder (SEE PATIENT MESSAGE BELOW)  Last Written Prescription Date:  06/19/18  Last Fill Quantity: 510g,  # refills: 6   Last Office Visit with [facility], [facility] or [facility] prescribing provider:  10/30/18Va   Future Office Visit:    Next 5 appointments (look out 90 days)     Dec 04, 2018  9:00 AM CST   Return Visit with Armond George MD   [facility] ([facility])    [address] [place] [address]   [phone]            Dec 11, 2018 12:15 PM CST   Return Visit with Armond Rothman PT   [facility] ([facility])    [address] [address]   [phone]            Dec 18, 2018 12:15 PM CST   Return Visit with Armond Rothman PT   [facility] ([facility])    [address] [address]   [phone]                510 g 6     Sig: Take 17 g by mouth daily DISSOLVE 17 GRAMS IN LIQUID AND DRINK DAILY AS DIRECTED    Laxatives Protocol Passed    11/30/2018  1:38 PM       Passed - Patient is age 6 or older       Passed - Recent (12 mo) or future (30 days) visit within the authorizing provider's specialty    Patient had office visit in the last 12 months or has a visit in the next 30 days with authorizing provider or within the authorizing provider's specialty.  See \"Patient Info\" tab in inbasket, or \"Choose Columns\" in Meds & Orders section of the refill encounter.                "

## 2018-11-30 NOTE — TELEPHONE ENCOUNTER
MARION for patient to schedule Lumbar RFA        Latricia Fernando    [facility]

## 2018-12-03 RX ORDER — POLYETHYLENE GLYCOL 3350 17 G/17G
17 POWDER, FOR SOLUTION ORAL DAILY
Qty: 510 G | Refills: 6 | Status: SHIPPED | OUTPATIENT
Start: 2018-12-03 | End: 2019-09-11

## 2018-12-03 RX ORDER — SOLIFENACIN SUCCINATE 10 MG/1
TABLET, FILM COATED ORAL
Qty: 90 TABLET | Refills: 0
Start: 2018-12-03

## 2018-12-03 NOTE — TELEPHONE ENCOUNTER
90 day supply sent on 11/14/18. Should have enough to last until February 2019. Too soon to refill.        Shaheen Head RN, BSN

## 2018-12-03 NOTE — TELEPHONE ENCOUNTER
Prescription approved per [facility] Refill Protocol.      Shaheen Head RN, BSN

## 2018-12-03 NOTE — TELEPHONE ENCOUNTER
Pre-screening Questions for RFA Procedure      Procedure ordered? LRFA    What insurance are we billing for this procedure?  MEDICARE    Has patient had this injection before? No  Any chance of pregnancy? NO   If YES, do NOT schedule and route to RN pool    Is  Needed?: No  Will patient have a ?  Yes   If pt is given sedation meds, no driving for 24 hours.  Is pt taking a cab or transportation service? NO        If so will need to be accompanied by an adult too (friend/family member) in order for IV sedation to be given.      Per Left Hand Policy:  Outpatients are to have responsible adult or family member to accompany them at discharge and drive them home. A service providing medically trained drivers or attendants would be acceptable. Public transportation would not be acceptable unless the patient is accompanied by a responsible adult or family member.    Is patient taking any blood thinners (plavix, coumadin, jantoven, warfarin, heparin, pradaxa or dabigatran )? No   If YES, do NOT schedule, and route to RN pool    Is patient taking any aspirin products? Yes - Pt takes 81mg daily; instructed to hold 0 day(s) prior to procedure.      If more than 325mg/day do NOT schedule and route to RN pool     For CERVICAL procedures, hold all aspirin products for 6 days.     Does the patient have a bleeding or clotting disorder? No     If YES, it it OKAY to schedule AND route to RN pool    **For any patients with platelet count <100, must be forwarded to provider**    Does patient have an active infection or treated for one within the past week? No   If YES, do NOT schedule and route to RN nurse pool     Is patient currently taking any antibiotics?  No    For patients on chronic, preventative, or prophylactic antibiotics, procedures may be scheduled.     For patients on antibiotics for active or recent infection:    Stanislav Padilla Burton, [place]-antibiotic course must have been completed for 4  days    Is patient currently taking any steroid medications? (i.e. Prednisone, Medrol)  No     For patients on steroid medications:    Maura Beal, [place]-steroid course must have been completed for 4 days    Reviewed with patient:  If you are started on any steroids or antibiotics between now and your appointment, you must contact us because it may affect our ability to perform your procedure.  Yes    Is patient actively being treated for cancer or immunocompromised, including the spleen having been removed? No  If YES, do NOT schedule and route to RN pool     Any history of complications with sedation medications?  NO   If YES, OK to schedule AND route to RN pool     Any history of sleep apnea?  NO   If YES, OK to schedule AND route to RN pool     Any cardiac history?  NO   If YES, OK to schedule AND route to RN pool     Do you have an implanted pacemaker, ICD (implanted cardiac device) or AICD (automatic implanted cardiac device)?  NO    If YES, do NOT schedule AND route to RN pool.     Obtain name of device :       Obtain name of cardiologist:       Do you have an implanted stimulator?  NO    If YES, OK to schedule AND route to nursing.     Instruct patient to bring in the remote to the appointment and it will need to be turned off.        Does patient have an allergy to contrast dye, iodine or shellfish?  No   If YES, OK to schedule. Route to RN pool AND add allergy information to appointment notes    Are you able to get on and off an exam table with minimal or no assistance? Yes   If NO, do NOT schedule and route to RN pool    Are you able to roll over and lay on your stomach with minimal or no assistance? Yes   If NO, do NOT schedule and route to RN pool    Informed patient that s/he needs to be NPO for 6 hours before procedure?  YES   Informed patient that it is OK to take normal medications with sips of water, especially blood pressure medications, before the procedure and  must hold blood thinners as instructed.  Yes  Informed patient to arrive 30 minutes before procedure time to have an IV inserted.  reviewed   Do NOT schedule at 0745, 0815 or 1245.  reviewed   All radiofrequency ablations are in a 90 minute time slot except genicular radiofrequency ablation those are 60 minute time slot.  reviewed

## 2018-12-07 ENCOUNTER — OFFICE VISIT (OUTPATIENT)
Dept: PALLIATIVE MEDICINE | Facility: CLINIC | Age: 58
End: 2018-12-07
Payer: MEDICARE

## 2018-12-07 DIAGNOSIS — I10 HTN, GOAL BELOW 140/90: Chronic | ICD-10-CM

## 2018-12-07 DIAGNOSIS — M47.816 LUMBAR FACET ARTHROPATHY: Primary | ICD-10-CM

## 2018-12-07 DIAGNOSIS — E11.21 TYPE 2 DIABETES MELLITUS WITH DIABETIC NEPHROPATHY, WITHOUT LONG-TERM CURRENT USE OF INSULIN (H): ICD-10-CM

## 2018-12-07 DIAGNOSIS — M47.817 SPONDYLOSIS OF LUMBOSACRAL REGION WITHOUT MYELOPATHY OR RADICULOPATHY: ICD-10-CM

## 2018-12-07 DIAGNOSIS — J45.20 MILD INTERMITTENT ASTHMA WITHOUT COMPLICATION: ICD-10-CM

## 2018-12-07 PROCEDURE — 97161 PT EVAL LOW COMPLEX 20 MIN: CPT | Mod: GP | Performed by: PHYSICAL THERAPIST

## 2018-12-07 PROCEDURE — G8979 MOBILITY GOAL STATUS: HCPCS | Mod: CK | Performed by: PHYSICAL THERAPIST

## 2018-12-07 PROCEDURE — 97112 NEUROMUSCULAR REEDUCATION: CPT | Mod: GP | Performed by: PHYSICAL THERAPIST

## 2018-12-07 PROCEDURE — G8978 MOBILITY CURRENT STATUS: HCPCS | Mod: CL | Performed by: PHYSICAL THERAPIST

## 2018-12-07 NOTE — PROGRESS NOTES
"PHYSICAL THERAPY INITIAL EVALUATION and PLAN OF CARE    Patient Name:  Melody Ferguson  : 1960  MRN:[id number]    SUBJECTIVE:  PRESENTATION AND ETIOLOGY  Chief Complaint: LBP, SI jt pain limiting the ability to perform perform activities of daily living.      Onset / Etiology: longstanding    Pattern Since Onset: Worsened    Pertinent Medical  / Surgical History: Epic Snapshot Reviewed:  Contributing factors to the severity / complexity of the patient's chief complaint include: see providers notes    Symptom Pattern: None    Pain:  Frequency: Constant or Activity Dependent     LEVEL OF FUNCTION AT START OF CARE  Current Aggrevating Activities / Functional Limitations: walk 20', sit 60, stand 5    Home or Community Barriers: stairs at home, are painful if have to use them    Patient's selected goals for physical therapy:  Walk more comfortably and for longer periods, stand longer, tolerate daily routine better    CURRENT / PREVIOUS INTERVENTION(S):     Relieving Activities / Self Care / Exercise: no regular exercise routine, change positions    Previous / Current therapies for current chief complaint: PT in past    DEMOGRAPHICS  Employment Status: on disability    Social Support: lives with roommates    ===============================================================  OBJECTIVE:  POSTURE:  Observation: Difficulty holding neutral spine comfortably while in sitting.      GAIT, LOCOMOTION, and BALANCE:  Gait and Locomotion: Antalgic: note increased pain when cued to walk more slowly.  Exhibits decr hip ext, toe B      RANGE OF MOTION:   Active: lumbar flex 25%, ext 25%    MUSCLE PERFORMANCE:   Strength: able but very guarded with heel walk, toe walk.  SLS on L 2\" and pt declined to try R SLS due to pain and fear of falling.        ===============================================================  Today's Treatment:  Initial evaluation  Neuromuscular Reeducation 10:   Posture  and Kinesthetic Body Awareness - ed on " neutral spine  Ed on chronic pain PT POC, walking program  ===============================================================  ASSESSMENT:  Physical Therapy Diagnosis: Musculoskeletal Patterns    Patient requires PT intervention for the following impairments: Limited knowledge of condition and / or self care - inability to control symptoms, Impaired gait / weight bearing tolerance, Impaired posture / muscle imbalance, Impaired static and / or dynamic balance, Joint hypomobility, Muscle strength and endurance limitations, Pain and Deconditioning    Anticipated Goals and Expected Outcomes:EDUCATION AND SELF CARE  Independent and safe with home exercise / self care program in 6 week(s).  Good working knowledge of posture principles / joint protection in 6 week(s).  FUNCTIONAL MOBILITY  Sit to stand from normal height chair without increased pain or symptoms in 8 week(s).  Ambulation without increased pain or symptoms for community distances on all surfaces in 12 week(s).  ADL'S  Standing tolerance 30 minutes without increased pain or symptoms in 12 week(s).  Homemaking / Yardwork without increased pain or symptoms in 12 week(s).    Rehab potential for achieving goals: fair.    ===============================================================  PLAN:   Patient will benefit from skilled physical therapy consisting of: neuromuscular reeducation of: movement, balance, coordination, kinesthetic sense, posture and proprioception for sitting and / or standing activities, education in self care / home management training to include instruction in: joint protection principles and symptom control techniques, therapeutic activities to achieve improved functional performance in: daily actvities and therapeutic exercise to develop: strength and endurance, joint mobility and joint stability    Assessment will be ongoing with changes in treatment as indicated.  Benefits/risks/alternatives to treatment have been reviewed and the patient has  been instructed to contact this office if there are any questions or concerns.  This plan of care has been discussed with the patient and the patient is in agreement.     Frequency / Duration:  Patient will be seen for a total of 8-12 visits; at a frequency of every 1-3 weeks.    Total Visit Time: 50  minutes   PT eval 35  Neuro re-ed 10  GCodes  Mobility current 60-79%   Goal 40-59%           Armond Rothman          PT                                Date:  12/7/2018    =====================================================  **  Referring Provider Certification: Referring Provider reviewing certifies that the above treatment / plan of care is required and authorized, and that the patient's plan will be reviewed every thirty (30) days **.   ======================================================     PRESENT: NA    MULTIDISCIPLINARY PATIENT / FAMILY EDUCATION RECORD  Department:  Physical Therapy    Readiness to Learn: Ability to understand verbal instructions,Ability to understand written instructions,Knowledge of educational needs / treatment plan  Specific Barriers to Learning: None  Referrals: None  Learning Needs: Rehabilitation techniques to improve functional independence  Who: Patient  How: Demonstration,Verbal instructions,Written instructions  Response: Appropriate verbal response,Asked questions,Demonstrated ability,Verbalized recall / understanding

## 2018-12-07 NOTE — MR AVS SNAPSHOT
After Visit Summary   12/7/2018    Melody Ferguson    MRN: [id number]           Patient Information     Date Of Birth          1960        Visit Information        Provider Department      12/7/2018 12:15 PM Armond Rothman, PT [facility]        Today's Diagnoses     Lumbar facet arthropathy    -  1    Spondylosis of lumbosacral region without myelopathy or radiculopathy           Follow-ups after your visit        Your next 10 appointments already scheduled     Dec 11, 2018 12:15 PM CST   Return Visit with Armond Rothman, PT   [facility] ([facility] [place])    [address] [facility]  [place] [address]   [phone]            Dec 18, 2018 12:15 PM CST   Return Visit with Armond Rothman, PT   [facility] ([facility] [place])    [address] [facility]  [place] [address]   [phone]            Dec 21, 2018  7:45 AM CST   Return Visit with Armond Rothman, PT   [facility] ([facility] [place])    [address] [facility]  [place] [address]   [phone]            Jan 03, 2019  1:45 PM CST   Radiology Injections with Carlos Roche MD   [facility] ([facility] [place])    [address] [facility]  [place] [address]   [phone]            Jan 08, 2019 10:00 AM CST   Return Visit with Armond Rothman, PT   [facility] ([facility] [place])    [address] [facility]  [place] [address]   [phone]              Who to contact     If you have questions or need follow up information about today's clinic visit or your schedule please contact [facility] [place] directly at [phone].  Normal or non-critical lab and imaging results will be communicated to you by MyChart, letter or phone within 4 business days after the clinic has received the results. If you do not hear from us within 7 days, please contact the clinic  through [employer] or phone. If you have a critical or abnormal lab result, we will notify you by phone as soon as possible.  Submit refill requests through [employer] or call your pharmacy and they will forward the refill request to us. Please allow 3 business days for your refill to be completed.          Additional Information About Your Visit        Social[employer]ield[employer] Information     [employer] gives you secure access to your electronic health record. If you see a primary care provider, you can also send messages to your care team and make appointments. If you have questions, please call your primary care clinic.  If you do not have a primary care provider, please call [phone] and they will assist you.        Care EveryWhere ID     This is your Care EveryWhere ID. This could be used by other organizations to access your [facility] medical records  [id number]         Blood Pressure from Last 3 Encounters:   11/23/18 129/65   11/06/18 132/77   10/30/18 135/87    Weight from Last 3 Encounters:   10/30/18 136.1 kg (300 lb)   10/23/18 136.1 kg (300 lb)   09/11/18 136.1 kg (300 lb)              We Performed the Following     C MOBILITY CURRENT STATUS     C MOBILITY GOAL STATUS     HC PT EVAL, LOW COMPLEXITY     NEUROMUSCULAR RE-EDUCATION        Primary Care Provider Office Phone # Fax #    Elizabeth Lamas [phone]       [address]  [facility] [address]        Equal Access to Services     [facility] : Hadii norberto chinoo Socorey, waaxda luqadaha, qaybta kaalmada kassidy, issac claire . So [facility] [phone].    ATENCIÓN: Si habla español, tiene a turner disposición servicios gratuitos de asistencia lingüística. Llame al [phone].    We comply with applicable federal civil rights laws and [place] laws. We do not discriminate on the basis of race, color, national origin, age, disability, sex, sexual orientation, or gender identity.            Thank you!     [facility] you for  [facility]osing [facility]. Our goal is always to provide you with excellent care. Hearing back from our patients is one way we can continue to improve our services. Please take a few minutes to complete the written survey that you may receive in the mail after your visit with us. Thank you!             Your Updated Medication List - Protect others around you: Learn how to safely use, store and throw away your medicines at www.disposemymeds.org.          This list is accurate as of 12/7/18  1:57 PM.  Always use your most recent med list.                   Brand Name Dispense Instructions for use Diagnosis    * ACCU-CHEK DAMIEN PLUS test strip   Generic drug:  blood glucose monitoring     200 strip    TEST BLOOD SUGARS TWICE DAILY    Type 2 diabetes mellitus with diabetic nephropathy (H)       * blood glucose monitoring test strip    NO BRAND SPECIFIED    180 strip    Use to test blood sugars 2 times daily or as directed    Type 2 diabetes mellitus with diabetic nephropathy, without long-term current use of insulin (H)       ACE NOT PRESCRIBED (INTENTIONAL)     0 each    Reported on 5/10/2017    Type 2 diabetes mellitus without complication (H)       albuterol 108 (90 Base) MCG/ACT inhaler    PROAIR HFA/PROVENTIL HFA/VENTOLIN HFA    1 Inhaler    Inhale 2 puffs into the lungs every 6 hours    Mild intermittent asthma without complication       amoxicillin 500 MG capsule    AMOXIL    4 capsule    TAKE 4 CAPSULES(2000 MG) BY MOUTH 1 TIME FOR 1 DOSE    History of total knee arthroplasty, left       ASPIRIN PO      Take 81 mg by mouth daily        atorvastatin 40 MG tablet    LIPITOR    90 tablet    Take 1 tablet (40 mg) by mouth daily    Hyperlipidemia LDL goal <100       blood glucose monitoring meter device kit    NO BRAND SPECIFIED    1 kit    Use to test blood sugar 1 times daily or as directed. Please dispense insurance preferred.    Type 2 diabetes mellitus with diabetic nephropathy, without  long-term current use of insulin (H)       buPROPion 100 MG 12 hr tablet    WELLBUTRIN SR    180 tablet    Take 1 tablet (100 mg) by mouth 2 times daily    Major depressive disorder, recurrent episode, moderate (H)       citalopram 40 MG tablet    celeXA    90 tablet    TAKE 1 TABLET(40 MG) BY MOUTH DAILY    Major depressive disorder, recurrent episode, moderate (H)       cyclobenzaprine 5 MG tablet    FLEXERIL    60 tablet    TAKE 1 TABLET(5 MG) BY MOUTH TWICE DAILY AS NEEDED FOR MUSCLE SPASMS    Left sided sciatica, Back muscle spasm       diclofenac 50 MG EC tablet    VOLTAREN    180 tablet    TAKE 1 TABLET(50 MG) BY MOUTH TWICE DAILY AS NEEDED FOR MODERATE PAIN    Chronic pain syndrome       esomeprazole 40 MG DR capsule    nexIUM    90 capsule    TAKE 1 CAPSULE(40 MG) BY MOUTH EVERY MORNING 30 TO 60 MINUTES BEFORE BREAKFAST    Dyspepsia       fluticasone 27.5 MCG/SPRAY nasal spray    VERAMYST    10 g    Spray 2 sprays into both nostrils daily    Chronic seasonal allergic rhinitis, unspecified trigger       gabapentin 300 MG capsule    NEURONTIN    180 capsule    Take 1 capsule (300 mg) by mouth 2 times daily    Acute left-sided low back pain with left-sided sciatica       glipiZIDE-metFORMIN 5-500 MG tablet    METAGLIP    90 tablet    TAKE 1 TABLET BY MOUTH TONCE DAILY BEFORE MEALS    Type 2 diabetes mellitus with diabetic nephropathy, without long-term current use of insulin (H)       insulin pen needle 32G X 6 MM miscellaneous    NOVOFINE    100 each    Use once daily or as directed.    Type 2 diabetes mellitus with diabetic nephropathy, without long-term current use of insulin (H)       liraglutide 18 MG/3ML solution    VICTOZA    30 mL    Inject 1.8 mg Subcutaneous daily    Type 2 diabetes mellitus with diabetic nephropathy, without long-term current use of insulin (H)       losartan 50 MG tablet    COZAAR    90 tablet    Take 1 tablet (50 mg) by mouth daily    HTN, goal below 140/90, Type 2 diabetes  mellitus with diabetic nephropathy, without long-term current use of insulin (H)       melatonin 3 MG tablet     30 tablet    TAKE 1 TABLET BY MOUTH ONCE AT BEDTIME AS NEEDED FOR SLEEP    Major depressive disorder, recurrent episode, moderate (H)       metoprolol succinate ER 25 MG 24 hr tablet    TOPROL XL    90 tablet    Take 1 tablet (25 mg) by mouth daily    Old myocardial infarction       montelukast 10 MG tablet    SINGULAIR    90 tablet    TAKE 1 TABLET BY MOUTH EVERY NIGHT AT BEDTIME    Mild intermittent asthma without complication       nystatin 657077 UNIT/GM external powder    MYCOSTATIN    60 g    Apply topically 2 times daily    Morbid obesity with body mass index of 45.0-49.9 in adult (H)       order for DME     3 Month    Diabetic test strips and lancets per insurance formulary Check blood sugar 2 times daily    Type 2 diabetes mellitus with diabetic nephropathy (H)       polyethylene glycol powder    MIRALAX/GLYCOLAX    510 g    Take 17 g by mouth daily DISSOLVE 17 GRAMS IN LIQUID AND DRINK DAILY AS DIRECTED    Chronic pain syndrome       solifenacin 10 MG tablet    VESICARE    90 tablet    Take 1 tablet (10 mg) by mouth daily    Mixed incontinence       traMADol 50 MG tablet    ULTRAM    30 tablet    TAKE 1 TABLET BY MOUTH EVERY 8 HOURS AS NEEDED FOR MODERATE PAIN    Chronic pain syndrome       * Notice:  This list has 2 medication(s) that are the same as other medications prescribed for you. Read the directions carefully, and ask your doctor or other care provider to review them with you.

## 2018-12-07 NOTE — TELEPHONE ENCOUNTER
"Requested Prescriptions   Pending Prescriptions Disp Refills     montelukast (SINGULAIR) 10 MG tablet [Pharmacy Med Name: MONTELUKAST 10MG TABLETS]  Last Written Prescription Date:  09/05/18  Last Fill Quantity: 90,  # refills: 0   Last Office Visit with [facility], [facility] or [facility] prescribing provider:  10/30/18-Adams   Future Office Visit:    Next 5 appointments (look out 90 days)     Dec 11, 2018 12:15 PM CST   Return Visit with Armond Rothman, PT   [facility] ([facility])    [address] [address]   [phone]            Dec 18, 2018 12:15 PM CST   Return Visit with Armond Rothman PT   [facility] ([facility])    [address] [address]   [phone]                90 tablet 0     Sig: TAKE 1 TABLET BY MOUTH EVERY NIGHT AT BEDTIME    Leukotriene Inhibitors Protocol Passed    12/7/2018  4:04 AM       Passed - Patient is age 12 or older    If patient is under 16, ok to refill using age based dosing.          Passed - Asthma control assessment score within normal limits in last 6 months    Please review ACT score.          Passed - Recent (6 mo) or future (30 days) visit within the authorizing provider's specialty    Patient had office visit in the last 6 months or has a visit in the next 30 days with authorizing provider or within the authorizing provider's specialty.  See \"Patient Info\" tab in inbasket, or \"Choose Columns\" in Meds & Orders section of the refill encounter.            losartan (COZAAR) 50 MG tablet [Pharmacy Med Name: LOSARTAN 50MG TABLETS]  Last Written Prescription Date:  02/06/18  Last Fill Quantity: 90,  # refills: 0   Last Office Visit with [facility], [facility] or  Health prescribing provider:  10/30/18-Adams   Future Office Visit:    Next 5 appointments (look out 90 days)     Dec 11, 2018 12:15 PM CST   Return Visit with Armond Rothman, PT   [facility] ([facility] " "[place])    [address]  [place] [address]   [phone]            Dec 18, 2018 12:15 PM CST   Return Visit with Armond Rothman, PT   [facility] [place] ([facility] [place])    [address]  [place] [address]   [phone]                90 tablet 0     Sig: TAKE 1 TABLET(50 MG) BY MOUTH DAILY    Angiotensin-II Receptors Failed    12/7/2018  4:04 AM       Failed - Normal serum creatinine on file in past 12 months    Recent Labs   Lab Test  10/30/18   1126   CR  0.46*            Passed - Blood pressure under 140/90 in past 12 months    BP Readings from Last 3 Encounters:   11/23/18 129/65   11/06/18 132/77   10/30/18 135/87                Passed - Recent (12 mo) or future (30 days) visit within the authorizing provider's specialty    Patient had office visit in the last 12 months or has a visit in the next 30 days with authorizing provider or within the authorizing provider's specialty.  See \"Patient Info\" tab in inbasket, or \"Choose Columns\" in Meds & Orders section of the refill encounter.             Passed - Patient is age 18 or older       Passed - No active pregnancy on record       Passed - Normal serum potassium on file in past 12 months    Recent Labs   Lab Test  10/30/18   1126   POTASSIUM  3.9                   Passed - No positive pregnancy test in past 12 months          "

## 2018-12-11 RX ORDER — MONTELUKAST SODIUM 10 MG/1
TABLET ORAL
Qty: 90 TABLET | Refills: 0 | Status: SHIPPED | OUTPATIENT
Start: 2018-12-11 | End: 2019-02-07

## 2018-12-11 RX ORDER — LOSARTAN POTASSIUM 50 MG/1
TABLET ORAL
Qty: 90 TABLET | Refills: 0 | Status: SHIPPED | OUTPATIENT
Start: 2018-12-11 | End: 2019-02-07

## 2018-12-11 NOTE — TELEPHONE ENCOUNTER
For Losartan:  Routing refill request to provider for review/approval because:  Labs out of range:  creatinine    For montelukast:  Prescription approved per [facility] Refill Protocol.          Shaheen Head RN, BSN

## 2018-12-12 ENCOUNTER — OFFICE VISIT (OUTPATIENT)
Dept: PALLIATIVE MEDICINE | Facility: CLINIC | Age: 58
End: 2018-12-12
Payer: MEDICARE

## 2018-12-12 DIAGNOSIS — M47.816 LUMBAR FACET ARTHROPATHY: Primary | ICD-10-CM

## 2018-12-12 DIAGNOSIS — M47.816 SPONDYLOSIS OF LUMBAR REGION WITHOUT MYELOPATHY OR RADICULOPATHY: ICD-10-CM

## 2018-12-12 PROCEDURE — 97110 THERAPEUTIC EXERCISES: CPT | Mod: GP | Performed by: PHYSICAL THERAPIST

## 2018-12-12 PROCEDURE — 97112 NEUROMUSCULAR REEDUCATION: CPT | Mod: GP | Performed by: PHYSICAL THERAPIST

## 2018-12-12 NOTE — PROGRESS NOTES
"Patient Name: Melody Ferguson      YOB: 1960     Medical Record Number: [id number]  Diagnosis:    Lumbar facet arthropathy  Spondylosis of lumbar region without myelopathy or radiculopathy  Visit Info Length of Visit: 45  Arrived: On Time   Exercise/Activity Education Instruction:  Postural Training/Anatomy  Pacing/Energy Conservation  Home Program  Self Care  Activity:  Therapeutic Exercise 25':  Aerobic Conditioning (*see \"Strength/Functional Actitivities Record for details of exercises performed)  UBE x 9'  Marrero walk with postural correction.  Amb with clinic walker for pt to experience less stress on jts, body with the 4 points of stability  Stretching:  Upper Ext  bilateral, Lower Ext  Bilateral  Added stretches for KTC, thor ext  Postural Training:  standing, sitting    Neuro re-ed 15':  Ed on neutral spine in all positions  Diaphragm breathing ed in supine  Ed on sequence of 'breathe, stabilize, move' concept  Initiated self care ed   Notes: Janet session well. Pt likes the chronic pain PT approach.   Demonstrates/Verbalizes Technique: 2, 3 (1= poor technique-difficulty performing exercises,significant cues required; 5= good technique-performs exercises without cues)  Body Awareness: 2, 3 (1=low awareness; 5=high awareness)  Posture/Stability: 2, 3 (1= poor posture, stability; 5= good posture, stability)   Motivational Level:   Ask questions, Eager to learn, Cooperative and Distracted, in pain Participation:  Full   Patient Satisfaction:  satisfied   Response to Teaching:   cooperative and needs reinforcement  Factors that affect learning: None   Plan of Care  Cont PT to support reactivation and integration of self regulation pain management skills.   Therapist: Armond Rothman PT                 Date: 12/12/2018                                                                                               "

## 2018-12-18 ENCOUNTER — OFFICE VISIT (OUTPATIENT)
Dept: ORTHOPEDICS | Facility: CLINIC | Age: 58
End: 2018-12-18
Payer: MEDICARE

## 2018-12-18 ENCOUNTER — OFFICE VISIT (OUTPATIENT)
Dept: PALLIATIVE MEDICINE | Facility: CLINIC | Age: 58
End: 2018-12-18
Payer: MEDICARE

## 2018-12-18 VITALS
DIASTOLIC BLOOD PRESSURE: 77 MMHG | RESPIRATION RATE: 16 BRPM | WEIGHT: 293 LBS | HEART RATE: 78 BPM | BODY MASS INDEX: 45.99 KG/M2 | OXYGEN SATURATION: 94 % | HEIGHT: 67 IN | SYSTOLIC BLOOD PRESSURE: 123 MMHG

## 2018-12-18 DIAGNOSIS — M47.816 LUMBAR FACET ARTHROPATHY: Primary | ICD-10-CM

## 2018-12-18 DIAGNOSIS — M47.816 SPONDYLOSIS OF LUMBAR REGION WITHOUT MYELOPATHY OR RADICULOPATHY: ICD-10-CM

## 2018-12-18 DIAGNOSIS — M17.11 PRIMARY OSTEOARTHRITIS OF RIGHT KNEE: Primary | ICD-10-CM

## 2018-12-18 PROCEDURE — 20610 DRAIN/INJ JOINT/BURSA W/O US: CPT | Mod: RT | Performed by: ORTHOPAEDIC SURGERY

## 2018-12-18 PROCEDURE — 97110 THERAPEUTIC EXERCISES: CPT | Mod: GP | Performed by: PHYSICAL THERAPIST

## 2018-12-18 PROCEDURE — 97112 NEUROMUSCULAR REEDUCATION: CPT | Mod: GP | Performed by: PHYSICAL THERAPIST

## 2018-12-18 RX ORDER — METHYLPREDNISOLONE ACETATE 80 MG/ML
80 INJECTION, SUSPENSION INTRA-ARTICULAR; INTRALESIONAL; INTRAMUSCULAR; SOFT TISSUE ONCE
Qty: 1 ML | Refills: 0 | OUTPATIENT
Start: 2018-12-18 | End: 2019-06-03

## 2018-12-18 ASSESSMENT — MIFFLIN-ST. JEOR: SCORE: 1963.42

## 2018-12-18 NOTE — PROGRESS NOTES
The patient's right knee was prepped with betadine solution after verification of allergies. Area approximately 10 cm x 10 cm prepped in a sterile fashion. After injection, betadine removed with soap and water and band-aids applied.    1ml depo-medrol with 1% lidocaine plain injected into patient's right knee by Dr. Armond George  LOT# [id number]  Exp. 11/19    Talon Fenton PA-C  Supervising physician: Armond George MD  Dept. of Orthopedics  [facility]

## 2018-12-18 NOTE — LETTER
12/18/2018         RE: Melody Ferguson  [address]  [place] [address]        Dear Colleague,    Thank you for referring your patient, Melody Ferguson, to the [facility]. Please see a copy of my visit note below.    Follow up right knee primary osteoarthritis.  Last injection 9/11/18  Range of motion 5-115.    She  desires injection today of right knee(s).  Risks, benefits, potential complications and alternatives were discussed.   With the patient's consent, sterile prep was performed of right knee(s).  Right knee was injected with Depo Medrol 80 mg and lidocaine at anteromedial site.  Return to clinic as needed.      The patient's right knee was prepped with betadine solution after verification of allergies. Area approximately 10 cm x 10 cm prepped in a sterile fashion. After injection, betadine removed with soap and water and band-aids applied.    1ml depo-medrol with 1% lidocaine plain injected into patient's right knee by Dr. Armond George  LOT# [id number]  Exp. 11/19    Talon Fenton PA-C  Supervising physician: Armond George MD  Dept. of Orthopedics  [facility]          Again, thank you for allowing me to participate in the care of your patient.        Sincerely,        Armond George MD    
right normal/left normal

## 2018-12-18 NOTE — PROGRESS NOTES
Follow up right knee primary osteoarthritis.  Last injection 9/11/18  Range of motion 5-115.    She  desires injection today of right knee(s).  Risks, benefits, potential complications and alternatives were discussed.   With the patient's consent, sterile prep was performed of right knee(s).  Right knee was injected with Depo Medrol 80 mg and lidocaine at anteromedial site.  Return to clinic as needed.

## 2018-12-19 NOTE — PROGRESS NOTES
"Patient Name: Melody Ferguson      YOB: 1960     Medical Record Number: [id number]  Diagnosis:    Lumbar facet arthropathy  Spondylosis of lumbar region without myelopathy or radiculopathy       Visit Info Length of Visit: 45  Arrived: On Time   Exercise/Activity Education Instruction:  Postural Training/Anatomy  Pacing/Energy Conservation  Home Program  Self Care  Activity:  Therapeutic Exercise 30':  Aerobic Conditioning (*see \"Strength/Functional Actitivities Record for details of exercises performed)  UBE x 9'  Marrero walk with mindful walking cues  Stretching:  Upper Ext  bilateral, Lower Ext  Bilateral  Added stretch for standing calf  Postural Training:  static  Added standing mid range heel-toe raises     Neuro re-ed 15':  Worked on 'finding' neutral spine in prep for movement  Standing in front of mirror with cues for tripod feet, soft knees, neutral spine, soft chin  Diaphragm breathing cues throughout  Emphasis on 'breathe, stabilize, move' concept  Self care ed    Notes:  Pt reports she likes the chronic pain PT approach and feels more confident with how she moves now. Progressing appropriately toward goals.  Reports pain mgmt ability at 7.5-8/10 (pre pain program 0/10)   Demonstrates/Verbalizes Technique:  3 (1= poor technique-difficulty performing exercises,significant cues required; 5= good technique-performs exercises without cues)  Body Awareness:  3 (1=low awareness; 5=high awareness)  Posture/Stability: 2, 3 (1= poor posture, stability; 5= good posture, stability)   Motivational Level:   Ask questions, Eager to learn, Cooperative and Distracted, in pain Participation:  Full   Patient Satisfaction:  satisfied    Response to Teaching:   cooperative and needs reinforcement  Factors that affect learning: None   Plan of Care  Cont PT to support reactivation and integration of self regulation pain management skills.   Therapist: Armond Rothman PT                 Date: 12/18/2018       "

## 2018-12-21 ENCOUNTER — OFFICE VISIT (OUTPATIENT)
Dept: PALLIATIVE MEDICINE | Facility: CLINIC | Age: 58
End: 2018-12-21
Payer: MEDICARE

## 2018-12-21 DIAGNOSIS — M47.816 LUMBAR FACET ARTHROPATHY: Primary | ICD-10-CM

## 2018-12-21 DIAGNOSIS — E66.01 MORBID OBESITY WITH BODY MASS INDEX OF 45.0-49.9 IN ADULT (H): ICD-10-CM

## 2018-12-21 DIAGNOSIS — M47.816 SPONDYLOSIS OF LUMBAR REGION WITHOUT MYELOPATHY OR RADICULOPATHY: ICD-10-CM

## 2018-12-21 PROCEDURE — 97112 NEUROMUSCULAR REEDUCATION: CPT | Mod: GP | Performed by: PHYSICAL THERAPIST

## 2018-12-21 PROCEDURE — 97110 THERAPEUTIC EXERCISES: CPT | Mod: GP | Performed by: PHYSICAL THERAPIST

## 2018-12-21 NOTE — PROGRESS NOTES
"Patient Name: Melody Ferguson      YOB: 1960     Medical Record Number: [id number]  Diagnosis:    Lumbar facet arthropathy  Spondylosis of lumbar region without myelopathy or radiculopathy          Visit Info Length of Visit: 50  Arrived:  5' late   Exercise/Activity Education Instruction:  Postural Training/Anatomy  Pacing/Energy Conservation  Home Program  Self Care  Activity:  Therapeutic Exercise 30':  Aerobic Conditioning (*see \"Strength/Functional Actitivities Record for details of exercises performed)  Bike x 5'  UBE x 9'  Marrero walk with mindful walking cues, soft heel strike  Stretching:  Upper Ext  bilateral, Lower Ext  Bilateral  Performed stretch routine  Postural Training:  dynamic  Partial SLS with small fw heel tap, bw toe tap  Added lat stepping w return to home base, most stable emphasized     Neuro re-ed 15':  Ongoing work on 'finding' neutral spine in prep for movement, cues needed for sequencing  Standing tripod feet, soft knees, neutral spine, soft chin  Diaphragm breathing cues throughout  'Breathe, stabilize, move' concept ongoing  Self care ed #3 with h/o and rec for hourly checks  Ed on components of aerobic exercise #4 with h/o   Notes:  Pt reports she likes the chronic pain PT approach and feels more confident with how she moves now. Progressing appropriately toward goals.  Reports pain mgmt ability at 7.5-8/10 (pre pain program 0/10)   Demonstrates/Verbalizes Technique:  3, 4 (1= poor technique-difficulty performing exercises,significant cues required; 5= good technique-performs exercises without cues)  Body Awareness:  3, 4 (1=low awareness; 5=high awareness)  Posture/Stability: 2, 3 (1= poor posture, stability; 5= good posture, stability)   Motivational Level:   Ask questions, Eager to learn, Cooperative and Distracted, in pain Participation:  Full   Patient Satisfaction:  satisfied    Response to Teaching:   cooperative and needs reinforcement  Factors that affect " learning: None   Plan of Care  Pt is actively engaged in her PT HEP, self care efforts and likes the chronic pain PT approach.  Recommend to continue PT to support reactivation and integration of self regulation pain management skills.   Therapist: Armond Rothman PT                 Date: 12/21/2018

## 2018-12-21 NOTE — TELEPHONE ENCOUNTER
"Requested Prescriptions   Pending Prescriptions Disp Refills     NYSTOP 852402 UNIT/GM powder [Pharmacy Med Name: NYSTOP 100,000 U/GM TOP POWDER 60GM]  Last Written Prescription Date:  06/19/18  Last Fill Quantity: 60g,  # refills: 2   Last Office Visit with [facility], [facility] or [facility] prescribing provider:  10/30/18-Adams   Future Office Visit:    Next 5 appointments (look out 90 days)    Jan 08, 2019 10:00 AM CST  Return Visit with Armond Rothman, PT  [facility] ([facility]) [address] [address]  [phone]        60 g 0     Sig: APPLY TOPICALLY TWICE DAILY.    Antifungal Agents Passed - 12/21/2018  4:03 AM       Passed - Recent (12 mo) or future (30 days) visit within the authorizing provider's specialty    Patient had office visit in the last 12 months or has a visit in the next 30 days with authorizing provider or within the authorizing provider's specialty.  See \"Patient Info\" tab in inbasket, or \"Choose Columns\" in Meds & Orders section of the refill encounter.             Passed - Not Fluconazole or Terconazole     If oral Fluconazole or Terconazole, may refill if indicated in progress notes.               "

## 2018-12-22 DIAGNOSIS — E11.9 TYPE 2 DIABETES MELLITUS WITHOUT RETINOPATHY (H): ICD-10-CM

## 2018-12-22 DIAGNOSIS — M54.32 LEFT SIDED SCIATICA: ICD-10-CM

## 2018-12-22 DIAGNOSIS — R10.13 DYSPEPSIA: ICD-10-CM

## 2018-12-22 DIAGNOSIS — N39.46 MIXED INCONTINENCE: ICD-10-CM

## 2018-12-22 DIAGNOSIS — Z96.652 HISTORY OF TOTAL KNEE ARTHROPLASTY, LEFT: ICD-10-CM

## 2018-12-22 DIAGNOSIS — M62.830 BACK MUSCLE SPASM: ICD-10-CM

## 2018-12-22 NOTE — TELEPHONE ENCOUNTER
Requested Prescriptions   Pending Prescriptions Disp Refills     cyclobenzaprine (FLEXERIL) 5 MG tablet [Pharmacy Med Name: CYCLOBENZAPRINE 5MG TABLETS] 60 tablet 0          Last Written Prescription Date:  11/5/18  Last Fill Quantity: 60,  # refills: 0   Last Office Visit with [facility], [facility] or [facility] prescribing provider:  2/27/18   Future Office Visit:    Next 5 appointments (look out 90 days)    Jan 08, 2019 10:00 AM CST  Return Visit with Armond Rothman, PT  [facility] ([facility]) [address] [address]  [phone]          Sig: TAKE 1 TABLET(5 MG) BY MOUTH TWICE DAILY AS NEEDED FOR MUSCLE SPASMS    There is no refill protocol information for this order        amoxicillin (AMOXIL) 500 MG capsule [Pharmacy Med Name: AMOXICILLIN 500MG CAPSULES] 4 capsule 0            Last Written Prescription Date:  10/10/18  Last Fill Quantity: 4,  # refills: 0   Last Office Visit with [facility], [facility] or [facility] prescribing provider:  2/27/18   Future Office Visit:    Next 5 appointments (look out 90 days)    Jan 08, 2019 10:00 AM CST  Return Visit with Aromnd Rothman, PT  [facility] ([facility]) [address] [address]  [phone]          Sig: TAKE 4 CAPSULES(2000 MG) BY MOUTH 1 TIME FOR 1 DOSE    Oral Acne/Rosacea Medications Protocol Failed - 12/22/2018  8:31 AM       Failed - Confirmation of diagnosis is required    Please confirm diagnosis is acne or rosacea.     If NOT acne or rosacea; refer request to provider for further evaluation.    If diagnosis IS acne or rosacea, OK to refill BASED ON PREVIOUS REFILL CLINICAL NOTE RECOMMENDATION.         Passed - Patient is 12 years of age or older       Passed - Recent (12 mo) or future (30 days) visit within the authorizing provider's specialty    Patient had office visit in the last 12 months or has a visit in the next 30 days with authorizing provider or within the  "authorizing provider's specialty.  See \"Patient Info\" tab in inbasket, or \"Choose Columns\" in Meds & Orders section of the refill encounter.             Passed - No active pregnancy on record       Passed - No positive prenancy test is past 12 months        esomeprazole (NEXIUM) 40 MG DR capsule [Pharmacy Med Name: ESOMEPRAZOLE MAGNESIUM 40MG DR CAPS] 30 capsule 0    Last Written Prescription Date:  11/21/18  Last Fill Quantity: 90,  # refills: 0   Last Office Visit with [facility] or  Health prescribing provider:  2/27/18   Future Office Visit:    Next 5 appointments (look out 90 days)    Jan 08, 2019 10:00 AM CST  Return Visit with Armond Rothman, PT  [facility] [address]  [place] [address]  [phone]          Sig: TAKE 1 CAPSULE BY MOUTH EVERY DAY    PPI Protocol Passed - 12/22/2018  8:31 AM       Passed - Not on Clopidogrel (unless Pantoprazole ordered)       Passed - No diagnosis of osteoporosis on record       Passed - Recent (12 mo) or future (30 days) visit within the authorizing provider's specialty    Patient had office visit in the last 12 months or has a visit in the next 30 days with authorizing provider or within the authorizing provider's specialty.  See \"Patient Info\" tab in inbasket, or \"Choose Columns\" in Meds & Orders section of the refill encounter.             Passed - Patient is age 18 or older       Passed - No active pregnacy on record       Passed - No positive pregnancy test in past 12 months        pioglitazone (ACTOS) 45 MG tablet [Pharmacy Med Name: PIOGLITAZONE 45MG TABLETS] 90 tablet 0        Last Written Prescription Date:  7/25/17  Last Fill Quantity: 90,  # refills: 1   Last Office Visit with [facility], [facility] or  Health prescribing provider:  2/27/18   Future Office Visit:    Next 5 appointments (look out 90 days)    Jan 08, 2019 10:00 AM CST  Return Visit with Armond Rothman, PT  [facility] [place] ([facility]" "[facility] [place]) [address]  [place] [address] [phone]          Sig: TAKE 1 TABLET(45 MG) BY MOUTH DAILY    Thiazolidinedione Agents (TZDs)  Failed - 12/22/2018  8:31 AM       Failed - Patient has a normal serum Creatinine in the past 12 months    Recent Labs   Lab Test 10/30/18  1126   CR 0.46*            Passed - Blood pressure less than 140/90 in past 6 months    BP Readings from Last 3 Encounters:   12/18/18 123/77   11/23/18 129/65   11/06/18 132/77                Passed - Patient has documented LDL within the past 12 mos.    Recent Labs   Lab Test 10/30/18  1126   LDL 88            Passed - Patient has a normal ALT within the past 12 mos.    Recent Labs   Lab Test 10/30/18  1126   ALT 25            Passed - Patient has a normal AST within the past 12 mos.     Recent Labs   Lab Test 10/30/18  1126   AST 13            Passed - Patient has had a Microalbumin in the past 12 mos.    Recent Labs   Lab Test 10/30/18  1138   MICROL 6   UMALCR 11.37            Passed - Patient has documented A1c within the specified period of time.    If HgbA1C is 8 or greater, it needs to be on file within the past 3 months.  If less than 8, must be on file within the past 6 months.     Recent Labs   Lab Test 10/30/18  1126   A1C 6.8*            Passed - Diagnosis not CHF       Passed - Patient is age 18 or older       Passed - Patient is not pregnant       Passed - Patient has not had a positive pregnancy test within the past 12 mos.       Passed - Recent (6 mo) or future (30 days) visit within the authorizing provider's specialty    Patient had office visit in the last 6 months or has a visit in the next 30 days with authorizing provider or within the authorizing provider's specialty.  See \"Patient Info\" tab in inbasket, or \"Choose Columns\" in Meds & Orders section of the refill encounter.                  Jose Carlos Faarax  [facility]  "

## 2018-12-23 DIAGNOSIS — Z96.652 HISTORY OF TOTAL KNEE ARTHROPLASTY, LEFT: ICD-10-CM

## 2018-12-23 NOTE — TELEPHONE ENCOUNTER
"Requested Prescriptions   Pending Prescriptions Disp Refills     amoxicillin (AMOXIL) 500 MG capsule [Pharmacy Med Name: AMOXICILLIN 500MG CAPSULES] 4 capsule 0          Last Written Prescription Date:  10/10/18  Last Fill Quantity: 4,  # refills: 0   Last Office Visit with [facility], [facility] or  Health prescribing provider:  10/30/18   Future Office Visit:    Next 5 appointments (look out 90 days)    Jan 08, 2019 10:00 AM CST  Return Visit with Armond Rothman, PT  [facility] ([facility]) [address] [address] [phone]          Sig: TAKE 4 CAPSULES(2000 MG) BY MOUTH 1 TIME FOR 1 DOSE    Oral Acne/Rosacea Medications Protocol Failed - 12/23/2018  4:03 AM       Failed - Confirmation of diagnosis is required    Please confirm diagnosis is acne or rosacea.     If NOT acne or rosacea; refer request to provider for further evaluation.    If diagnosis IS acne or rosacea, OK to refill BASED ON PREVIOUS REFILL CLINICAL NOTE RECOMMENDATION.         Passed - Patient is 12 years of age or older       Passed - Recent (12 mo) or future (30 days) visit within the authorizing provider's specialty    Patient had office visit in the last 12 months or has a visit in the next 30 days with authorizing provider or within the authorizing provider's specialty.  See \"Patient Info\" tab in inbasket, or \"Choose Columns\" in Meds & Orders section of the refill encounter.             Passed - No active pregnancy on record       Passed - No positive prenancy test is past 12 months              Jose Carlos Faarax  [facility]  "

## 2018-12-24 DIAGNOSIS — Z96.652 HISTORY OF TOTAL KNEE ARTHROPLASTY, LEFT: ICD-10-CM

## 2018-12-24 NOTE — TELEPHONE ENCOUNTER
Routing refill request to provider for review/approval because:  RN can not fill with the associated diagnosis.    Tiana Young RN, [facility]

## 2018-12-24 NOTE — TELEPHONE ENCOUNTER
Routing refill request to provider for review/approval because:  RN can not fill for the associated diagnosis.    Tiana Young RN, [facility]

## 2018-12-25 DIAGNOSIS — Z96.652 HISTORY OF TOTAL KNEE ARTHROPLASTY, LEFT: ICD-10-CM

## 2018-12-25 RX ORDER — AMOXICILLIN 500 MG/1
CAPSULE ORAL
Qty: 4 CAPSULE | Refills: 0 | Status: SHIPPED | OUTPATIENT
Start: 2018-12-25 | End: 2019-02-07

## 2018-12-25 RX ORDER — AMOXICILLIN 500 MG/1
CAPSULE ORAL
Qty: 4 CAPSULE | Refills: 0 | Status: SHIPPED | OUTPATIENT
Start: 2018-12-25 | End: 2019-06-03

## 2018-12-25 RX ORDER — AMOXICILLIN 500 MG/1
CAPSULE ORAL
Qty: 4 CAPSULE | Refills: 0 | OUTPATIENT
Start: 2018-12-25

## 2018-12-26 RX ORDER — NYSTATIN 100000 [USP'U]/G
POWDER TOPICAL
Qty: 60 G | Refills: 2 | Status: SHIPPED | OUTPATIENT
Start: 2018-12-26 | End: 2019-08-29

## 2018-12-26 NOTE — TELEPHONE ENCOUNTER
Prescription approved per [facility] Refill Protocol.  Linh Martinez RN

## 2018-12-27 RX ORDER — SOLIFENACIN SUCCINATE 10 MG/1
10 TABLET, FILM COATED ORAL DAILY
Qty: 90 TABLET | Refills: 0 | Status: SHIPPED | OUTPATIENT
Start: 2018-12-27 | End: 2019-05-24

## 2018-12-27 RX ORDER — PIOGLITAZONEHYDROCHLORIDE 45 MG/1
TABLET ORAL
Qty: 90 TABLET | Refills: 0 | OUTPATIENT
Start: 2018-12-27

## 2018-12-27 RX ORDER — AMOXICILLIN 500 MG/1
CAPSULE ORAL
Qty: 4 CAPSULE | Refills: 0
Start: 2018-12-27

## 2018-12-27 RX ORDER — ESOMEPRAZOLE MAGNESIUM 40 MG/1
CAPSULE, DELAYED RELEASE ORAL
Qty: 30 CAPSULE | Refills: 0
Start: 2018-12-27

## 2018-12-27 RX ORDER — CYCLOBENZAPRINE HCL 5 MG
TABLET ORAL
Qty: 60 TABLET | Refills: 0 | Status: SHIPPED | OUTPATIENT
Start: 2018-12-27 | End: 2019-02-07

## 2018-12-27 NOTE — TELEPHONE ENCOUNTER
For cyclobenzaprine:  Routing refill request to provider for review/approval because:  Drug not on the [facility] refill protocol     For Actos:  Routing refill request to provider for review/approval because:  Drug not active on patient's medication list    For amoxicillin:  Duplicate request-sent on 12/25/18 to pharmacy    For Nexium:  90 day supply sent on 11/21/18. Should have enough until 2/2019. Too soon to refill.          Shaheen Head RN, BSN

## 2018-12-31 NOTE — TELEPHONE ENCOUNTER
This writer attempted to contact Melody on 12/31/18    Reason for call refills and left message to return call.    When patient calls back, please contact 1st floor Celina Hoang. routine priority.        Yonathan Keita

## 2019-01-03 ENCOUNTER — ANCILLARY PROCEDURE (OUTPATIENT)
Dept: RADIOLOGY | Facility: CLINIC | Age: 59
End: 2019-01-03
Attending: PAIN MEDICINE
Payer: MEDICARE

## 2019-01-03 ENCOUNTER — RADIOLOGY INJECTION OFFICE VISIT (OUTPATIENT)
Dept: PALLIATIVE MEDICINE | Facility: CLINIC | Age: 59
End: 2019-01-03
Payer: MEDICARE

## 2019-01-03 VITALS — SYSTOLIC BLOOD PRESSURE: 125 MMHG | OXYGEN SATURATION: 94 % | HEART RATE: 74 BPM | DIASTOLIC BLOOD PRESSURE: 65 MMHG

## 2019-01-03 DIAGNOSIS — G89.18 POST PROCEDURE DISCOMFORT: Primary | ICD-10-CM

## 2019-01-03 DIAGNOSIS — M47.816 SPONDYLOSIS OF LUMBAR REGION WITHOUT MYELOPATHY OR RADICULOPATHY: ICD-10-CM

## 2019-01-03 PROCEDURE — 64636 DESTROY L/S FACET JNT ADDL: CPT | Mod: 50 | Performed by: PAIN MEDICINE

## 2019-01-03 PROCEDURE — 99153 MOD SED SAME PHYS/QHP EA: CPT | Performed by: PAIN MEDICINE

## 2019-01-03 PROCEDURE — 99152 MOD SED SAME PHYS/QHP 5/>YRS: CPT | Performed by: PAIN MEDICINE

## 2019-01-03 PROCEDURE — 64635 DESTROY LUMB/SAC FACET JNT: CPT | Mod: 50 | Performed by: PAIN MEDICINE

## 2019-01-03 RX ORDER — OXYCODONE HYDROCHLORIDE 5 MG/1
5 TABLET ORAL EVERY 12 HOURS PRN
Status: DISCONTINUED | OUTPATIENT
Start: 2019-01-03 | End: 2019-06-03

## 2019-01-03 RX ORDER — OXYCODONE HYDROCHLORIDE 5 MG/1
5 TABLET ORAL EVERY 12 HOURS PRN
Qty: 28 TABLET | Refills: 0 | Status: SHIPPED | OUTPATIENT
Start: 2019-01-03 | End: 2019-06-03

## 2019-01-03 ASSESSMENT — PAIN SCALES - GENERAL: PAINLEVEL: SEVERE PAIN (7)

## 2019-01-03 NOTE — PROGRESS NOTES
Pre procedure Diagnosis: lumbosacral facet arthropathy, lumbosacral spondylosis  Post procedure Diagnosis: Same  Procedure performed: bilateral lumbosacral radiofrequency ablation at L4, L5, sacral ala, fluoroscopically guided  Anesthesia:      Medications given: fentanyl 50 mcg IV; versed 1 mg IV    Complications: no  Operators: Carlos Roche MD     Indications:   Melody Ferguson is a 58 year old female. The patient has a history of bilateral low back pain.  Of note patient has had significant benefit with facet joint injections but have been short-lived.  She denies any radiation to her lower extremity.  Exam shows +++ and pain with extension/rotation, and they have tried conservative treatment including PHYSICAL THERAPY  and meds.       The pt had two diagnostic medial branch blocks showing appropriate pain relief, therefore radiofrequency ablation will be done.     Options/alternatives, benefits and risks were discussed with the patient including bleeding, infection, no pain relief, tissue trauma, exposure to radiation, reaction to medications including seizure, spinal cord injury,increased pain after the procedure, weakness, numbness or sensory changes and headache.   We also discussed risks of sedation, including reaction to medications and cardiovascular collapse.    Questions were answered to her satisfaction and she agrees to proceed. Voluntary informed consent was obtained and signed.     Vitals were reviewed: Yes  Allergies were reviewed:  Yes   Medications were reviewed:  Yes   Pre-procedure pain score: 7/10    Procedure:  After obtaining signed, informed consent, the patient was brought into the procedure suite and was placed in a prone position on the procedure table.   A Pause for the Cause was performed.  Patient was prepped and draped in sterile fashion.     Kenia Fowler had an IV line placed prior the procedure.  The C-arm was positioned in the right oblique view to afford optimal view of the  L4-L5 vertebral bodies. Lidocaine 1% was used to anesthetize the skin at each level.  At each level, a 10cm, 20G curved radiofrequency cannula with a 10mm active tip was positioned, overlying the intersection of the transverse process and pedicle at L4 & L5, and was advanced under intermittent fluoroscopy until it contacted the transverse process and notch, and the tip slightly overran that process, just lateral to the mamillary process.  The position of each cannula was verified and optimized in the oblique view and AP views.    In the AP view, another cannula was placed at the sacral alar notch.      Each position was tested for motor and sensory stimulation, and was positioned so that stimulation was negative for stimuli outside the immediate area of the desired lesion.  Sensory stimulation was completed at 50 Hz, with max stimulation up to 0.3V.  Motor stimulation was completed at 2Hz, up to 1.5V.   Bupivacaine 0.5 % 1ml was injected at each level.  After allowing the local anesthetic to set up, a 90 second, 80 degree Celsius lesion was generated at all three levels.    The needles were then rotated within the pathway of the medial branch, and locations were evaluated with repeat imaging.  A second lesion was then generated at each location.    The procedure was then repeated on the Left side, using the same technique as described above.  Sensory stimulation was positive at 0.3 V and motor stimulation was negative at 1.5 V except for multifidus movement.    The needles were flushed with the local anesthetic as they were withdrawn.        Hemostasis was achieved, the area was cleaned, and bandaids were placed when appropriate.  The patient tolerated the procedure well, and was taken to the recovery room.    Images were saved to PACS.    MD Time IN: 1347   Sedation start time:  1348  MD Time OUT:  1430       Post-procedure pain score: 0/10  Follow-up includes:   -f/u phone call in one week  -post-procedure pain  medications: Oxycodone 5 mg every 12 as needed dispense 28 tablets  -f/u with referring provider in 8 weeks  Carlos Roche MD  [facility]

## 2019-01-03 NOTE — NURSING NOTE
Pre-procedure Intake    Have you been fasting? Yes     If yes, for how long? 1 hrs     Are you taking a prescribed blood thinner such as coumadin, Plavix, Xarelto?    No    If yes, when did you take your last dose? No     Do you take aspirin?  Yes     If cervical procedure, have you held aspirin for 6 days?   Yes    Do you have any allergies to contrast dye, iodine, steroid and/or numbing medications?  NO    Are you currently taking antibiotics or have an active infection?  NO    Have you had a fever/elevated temperature within the past week? NO    Are you currently taking oral steroids? NO    Do you have a ? Yes       Are you pregnant or breastfeeding?  NO    Are the vital signs normal?  No    Aroldo Howell MA

## 2019-01-03 NOTE — PATIENT INSTRUCTIONS
[facility]   Radiofrequency Ablation (RFA) Discharge Instructions     Today you saw:      Carlos Rcohe     You should anticipate procedural pain for up to 2 weeks.   You may receive a prescription for pain medication and you should take this as directed.   It may take up to 8 weeks to receive relief from the RFA   Follow up with your provider in 2 weeks.   If you received sedation before, during or after your procedure, for the next 24 hours you shall NOT:    -Drive    -Operate machinery    -Drink alcohol    -Sign any legal documents   You may resume your normal diet   You may resume your regular medications after the procedure   Be cautious with walking. Numbness and/or weakness in the lower extremities may occur for up to 6-8 hours due to effect of local anesthetic  Avoid strenuous activity for the first 24 hours   You may resume your regular activities after 24 hours   You may shower, however no swimming, tub baths or hot tubs for 24 hours following your procedure   You may use ice packs 10-15 minutes three to four times a day at the injection site for comfort   Do not use heat to painful areas for 6 to 8 hours. This will give the local anesthetic time to wear off and prevent you from accidentally burning your skin.   You may use anti-inflammatory medications (such as Ibuprofen or Aleve or Advil) or Tylenol for pain control if necessary   If you experience any of the following, call the Pain Clinic during work hours at [phone] or the Provider Line after hours at [phone]:   -Fever over 100 degree F    -Swelling, bleeding, redness, drainage, warmth at the injection site    -Progressive weakness or numbness on your legs or arms    -Loss of bowel or bladder function    -Unusual headache that is not relieved by Tylenol    -Unusual new onset of pain that is not improving

## 2019-01-03 NOTE — NURSING NOTE
22 gauge Peripheral IV inserted into left anticubital - attempts: 2    MD Time IN: 1347   Sedation start time:  1348  MD Time OUT:  1430    Medications given: fentanyl 50 mcg IV; versed 1 mg IV  Intravenous fluids were administered, normal saline 200 cc's.  Sedation Level Achieved:  Minimal sedation    Discharge Information    IV Discontiued Time:  1500    Amount of Fluid Infused:  200    Discharge Criteria = When patient returns to baseline or as per MD order    Consciousness:  Pt is fully awake    Circulation:  BP +/- 20% of pre-procedure level    Respiration:  Patient is able to breathe deeply    O2 Sat:  Patient is able to maintain O2 Sat >92% on room air    Activity:  Moves 4 extremities on command    Ambulation:  Patient is able to stand and walk or stand and pivot into wheelchair    Dressing:  Clean/dry or No Dressing    Notes:   Discharge instructions and AVS given to patient    Patient meets criteria for discharge?  YES    Admitted to PCU?  No    Responsible adult present to accompany patient home?  Yes      Bhakti Aguayo RN Care Coordinator  [facility] [place]

## 2019-01-08 ENCOUNTER — OFFICE VISIT (OUTPATIENT)
Dept: OPTOMETRY | Facility: CLINIC | Age: 59
End: 2019-01-08
Payer: MEDICARE

## 2019-01-08 ENCOUNTER — APPOINTMENT (OUTPATIENT)
Dept: OPTOMETRY | Facility: CLINIC | Age: 59
End: 2019-01-08
Payer: MEDICARE

## 2019-01-08 ENCOUNTER — OFFICE VISIT (OUTPATIENT)
Dept: PALLIATIVE MEDICINE | Facility: CLINIC | Age: 59
End: 2019-01-08
Payer: MEDICARE

## 2019-01-08 DIAGNOSIS — H52.4 PRESBYOPIA: ICD-10-CM

## 2019-01-08 DIAGNOSIS — H52.12 MYOPIA OF LEFT EYE: ICD-10-CM

## 2019-01-08 DIAGNOSIS — M47.816 SPONDYLOSIS OF LUMBAR REGION WITHOUT MYELOPATHY OR RADICULOPATHY: ICD-10-CM

## 2019-01-08 DIAGNOSIS — H52.223 REGULAR ASTIGMATISM OF BOTH EYES: ICD-10-CM

## 2019-01-08 DIAGNOSIS — E11.9 TYPE 2 DIABETES MELLITUS WITHOUT COMPLICATION, WITHOUT LONG-TERM CURRENT USE OF INSULIN (H): Primary | ICD-10-CM

## 2019-01-08 DIAGNOSIS — M47.816 LUMBAR FACET ARTHROPATHY: Primary | ICD-10-CM

## 2019-01-08 DIAGNOSIS — H25.13 AGE-RELATED NUCLEAR CATARACT OF BOTH EYES: ICD-10-CM

## 2019-01-08 PROCEDURE — 97110 THERAPEUTIC EXERCISES: CPT | Mod: GP | Performed by: PHYSICAL THERAPIST

## 2019-01-08 PROCEDURE — 92341 FIT SPECTACLES BIFOCAL: CPT | Performed by: OPTOMETRIST

## 2019-01-08 PROCEDURE — 97112 NEUROMUSCULAR REEDUCATION: CPT | Mod: GP | Performed by: PHYSICAL THERAPIST

## 2019-01-08 PROCEDURE — 92014 COMPRE OPH EXAM EST PT 1/>: CPT | Performed by: OPTOMETRIST

## 2019-01-08 ASSESSMENT — REFRACTION_MANIFEST
OD_SPHERE: -1.25
OS_CYLINDER: +0.50
OD_ADD: +2.00
OS_AXIS: 115
OD_CYLINDER: +1.25
OS_SPHERE: -1.00
OD_AXIS: 050
OS_ADD: +2.00

## 2019-01-08 ASSESSMENT — VISUAL ACUITY
OS_SC+: -2
OD_PH_SC: 20/40
CORRECTION_TYPE: GLASSES
OS_CC: 20/30-2
OD_SC: 20/50
OD_CC: 20/30-2
METHOD: SNELLEN - LINEAR
OS_SC: 20/30

## 2019-01-08 ASSESSMENT — REFRACTION_WEARINGRX
OD_CYLINDER: SPHERE
OD_SPHERE: -1.00
OS_ADD: +2.00
OS_SPHERE: -0.50
OD_SPHERE: +1.50
OS_CYLINDER: SPHERE
OD_AXIS: 050
OD_CYLINDER: +1.00
OS_SPHERE: +1.50
OD_ADD: +2.00

## 2019-01-08 ASSESSMENT — CONF VISUAL FIELD
OD_NORMAL: 1
OS_NORMAL: 1

## 2019-01-08 ASSESSMENT — TONOMETRY
OS_IOP_MMHG: 13
IOP_METHOD: APPLANATION
OD_IOP_MMHG: 13

## 2019-01-08 ASSESSMENT — SLIT LAMP EXAM - LIDS
COMMENTS: NORMAL
COMMENTS: NORMAL

## 2019-01-08 ASSESSMENT — EXTERNAL EXAM - RIGHT EYE: OD_EXAM: NORMAL

## 2019-01-08 ASSESSMENT — EXTERNAL EXAM - LEFT EYE: OS_EXAM: NORMAL

## 2019-01-08 ASSESSMENT — CUP TO DISC RATIO
OD_RATIO: 0.2
OS_RATIO: 0.2

## 2019-01-08 NOTE — PROGRESS NOTES
Chief Complaint   Patient presents with     Diabetic Eye Exam        Lab Results   Component Value Date    A1C 6.8 10/30/2018    A1C 6.3 04/12/2018    A1C 6.9 01/05/2018    A1C 6.1 04/18/2017    A1C 6.7 11/01/2016       Last Eye Exam: 7-  Dilated Previously: Yes    What are you currently using to see? otc  readers    Distance Vision Acuity: Noticed gradual change in both eyes    Near Vision Acuity: Satisfied with vision while reading  with otc readers    Eye Comfort: good  Do you use eye drops? : No  Occupation or Hobbies: disabled    Sudha Fay Optometric Assistant, A.B.O.C.     Medical, surgical and family histories reviewed and updated 1/8/2019.       OBJECTIVE: See Ophthalmology exam    ASSESSMENT:    ICD-10-CM    1. Type 2 diabetes mellitus without complication, without long-term current use of insulin (H) E11.9    2. Age-related nuclear cataract of both eyes H25.13    3. Regular astigmatism of both eyes H52.223    4. Myopia of left eye H52.12    5. Presbyopia H52.4       PLAN:    Melody Ferguson aware  eye exam results will be sent to [facility].  Patient Instructions   There are not any signs of the diabetes affecting the eyes today.  It is important that you get your eyes dilated once yearly and keep good control of your diabetes.    Diabetes weakens the blood vessels all over the body, including the eyes. Damage to the blood vessels in the eyes can cause swelling or bleeding into part of the eye (called the retina). This is called diabetic retinopathy ([facility]-tin-AH-puh-thee). If not treated, this disease can cause vision loss or blindness.   Symptoms may include blurred or distorted vision, but many people have no symptoms. It's important to see your eye doctor regularly to check for problems.   Early treatment and good control can help protect your vision. Here are the things you can do to help prevent vision loss:      1. Keep your blood sugar levels under tight control.      2. Bring high blood  pressure under control.      3. No smoking.      4. Have yearly dilated eye exams.    You have the start of mild cataracts.  You may notice some blurred vision or glare with night driving.  It is important that you wear good sunglasses to protect your eyes from the ultraviolet light from the sun.  Taking a supplement with at least 300 mg/day of vitamin C appears to help prevent cataract development.  I recommend that you return in 1 year for an eye exam unless there are any sudden changes in your vision.       Eyeglass prescription given.    Return in 1 year for a complete eye exam or sooner if needed.    Mark Crockett, OD                .

## 2019-01-08 NOTE — PROGRESS NOTES
"Patient Name: Melody Ferguson      YOB: 1960     Medical Record Number: [id number]  Diagnosis:    Lumbar facet arthropathy  Spondylosis of lumbar region without myelopathy or radiculopathy          Visit Info Length of Visit: 45  Arrived:  On Time   Exercise/Activity Education Instruction:  Postural Training/Anatomy  Pacing/Energy Conservation  Home Program  Self Care  Activity:  Therapeutic Exercise 30':  Aerobic Conditioning (*see \"Strength/Functional Actitivities Record for details of exercises performed)  Bike x 5'  Marre[place] walk with mindful walking cues, soft heel strike  Stretching:  Upper Ext  bilateral, Lower Ext  Bilateral  Performed stretch routine with good technique on calf  Postural Training:  static  Performed improved SLS with small fw heel tap, bw toe tap  Performed improved lateral stepping with return to home base position  Performed sit to stands with technique emphasis     Neuro re-ed 15':  ..Went through breathing cycle and attention to rib expansion/stretch with cues to expand to comfort level.  Neutral spine emphasis in prep for movement - pt more independent  With decr cues needed, pt performed standing tripod feet, soft knees, neutral spine, soft chin  Diaphragm breathing cues throughout  'Breathe, stabilize, move' concept ongoing  Self care ed with rec for hourly checks, increase frequency as able  Review components of aerobic exercise    Notes:  Continues to like the chronic pain PT approach and pleased with her progress.  Reports pain mgmt ability at 8/10 (pre pain program 0/10)   Demonstrates/Verbalizes Technique:   4 (1= poor technique-difficulty performing exercises,significant cues required; 5= good technique-performs exercises without cues)  Body Awareness:  3, 4 (1=low awareness; 5=high awareness)  Posture/Stability:  3 (1= poor posture, stability; 5= good posture, stability)   Motivational Level:   Ask questions, Eager to learn, Cooperative and Distracted, in pain " Participation:  Full   Patient Satisfaction:  satisfied    Response to Teaching:   cooperative and needs reinforcement  Factors that affect learning: None   Plan of Care  Recommend to continue PT to support reactivation and integration of self regulation pain management skills.   Therapist: Armond Rothman PT                 Date:  1/8/2019

## 2019-01-08 NOTE — PATIENT INSTRUCTIONS
There are not any signs of the diabetes affecting the eyes today.  It is important that you get your eyes dilated once yearly and keep good control of your diabetes.    Diabetes weakens the blood vessels all over the body, including the eyes. Damage to the blood vessels in the eyes can cause swelling or bleeding into part of the eye (called the retina). This is called diabetic retinopathy (HENRRY-tin-AH-puh-thee). If not treated, this disease can cause vision loss or blindness.   Symptoms may include blurred or distorted vision, but many people have no symptoms. It's important to see your eye doctor regularly to check for problems.   Early treatment and good control can help protect your vision. Here are the things you can do to help prevent vision loss:      1. Keep your blood sugar levels under tight control.      2. Bring high blood pressure under control.      3. No smoking.      4. Have yearly dilated eye exams.    You have the start of mild cataracts.  You may notice some blurred vision or glare with night driving.  It is important that you wear good sunglasses to protect your eyes from the ultraviolet light from the sun.  Taking a supplement with at least 300 mg/day of vitamin C appears to help prevent cataract development.  I recommend that you return in 1 year for an eye exam unless there are any sudden changes in your vision.       Eyeglass prescription given.    Return in 1 year for a complete eye exam or sooner if needed.    Mark Crockett, OD     The affects of the dilating drops last for 4- 6 hours.  You will be more sensitive to light and vision will be blurry up close.  Mydriatic sunglasses were given if needed.      Optometry Providers       Clinic Locations                                 Telephone Number   Dr. Keren Dulyn [facility]    [place]   [address] [place] [address] [place]   [place] [phone]     [place] [facility] Hours:                 [facility] Hours:       [facility] Hours:   [address]   [address]     [address] [place] [address]   [place] [address]    [place] [address]  Phone: [phone]                    Phone: [phone]     Phone: [phone]                      Monday 8:00-7:00                          Monday 8:00-7:00                          Monday 8:00-7:00              Tuesday 8:00-6:00                          Tuesday 8:00-7:00                          Tuesday 8:00-7:00              Wednesday 8:00-6:00                  Wednesday 8:00-7:00                   Wednesday 8:00-7:00      Thursday 8:00-6:00                        Thursday 8:00-7:00                         Thursday 8:00-7:00            Friday 8:00-5:00                              Friday 8:00-5:00                              Friday 8:00-5:00    [facility] Hours:   [address] [place] [address]  [phone]    Monday 8:00-7:00  Tuesday 8:00-7:00  Wednesday 8:00-7:00  Thursday 8:00-7:00  Friday 8:00-5:00  Please log on to [email or web address] to order your contact lenses.  The link is found on the [facility] page.  As always, Thank you for trusting us with your health care needs!               .

## 2019-01-15 ENCOUNTER — TELEPHONE (OUTPATIENT)
Dept: PALLIATIVE MEDICINE | Facility: CLINIC | Age: 59
End: 2019-01-15

## 2019-01-15 NOTE — TELEPHONE ENCOUNTER
Patient had a radiofrequency ablation (RFA) on 1/3/19.  Called patient for an update.      Attempted to contact patient. Tried both numbers on file x2. Numbers are disconnected/ could not be completed as dialed.

## 2019-02-07 DIAGNOSIS — Z96.652 HISTORY OF TOTAL KNEE ARTHROPLASTY, LEFT: ICD-10-CM

## 2019-02-07 DIAGNOSIS — I25.2 OLD MYOCARDIAL INFARCTION: ICD-10-CM

## 2019-02-07 DIAGNOSIS — M54.32 LEFT SIDED SCIATICA: ICD-10-CM

## 2019-02-07 DIAGNOSIS — M62.830 BACK MUSCLE SPASM: ICD-10-CM

## 2019-02-07 DIAGNOSIS — I10 HTN, GOAL BELOW 140/90: Chronic | ICD-10-CM

## 2019-02-07 DIAGNOSIS — R10.13 DYSPEPSIA: ICD-10-CM

## 2019-02-07 DIAGNOSIS — J45.20 MILD INTERMITTENT ASTHMA WITHOUT COMPLICATION: ICD-10-CM

## 2019-02-07 DIAGNOSIS — E11.21 TYPE 2 DIABETES MELLITUS WITH DIABETIC NEPHROPATHY, WITHOUT LONG-TERM CURRENT USE OF INSULIN (H): ICD-10-CM

## 2019-02-07 NOTE — TELEPHONE ENCOUNTER
"Requested Prescriptions   Pending Prescriptions Disp Refills     metoprolol succinate ER (TOPROL-XL) 25 MG 24 hr tablet [Pharmacy Med Name: METOPROLOL ER SUCCINATE 25MG TABS] 90 tablet 0          Last Written Prescription Date:  2/6/18  Last Fill Quantity: 90,  # refills: 1   Last Office Visit with [facility], [facility] or  Health prescribing provider:  10/30/18   Future Office Visit:    Next 5 appointments (look out 90 days)    Feb 11, 2019  2:00 PM CST  Nurse Only with [facility]CILL[facility]  [facility] [address] [place] [id number]  [phone]          Sig: TAKE 1 TABLET(25 MG) BY MOUTH DAILY    Beta-Blockers Protocol Passed - 2/7/2019  5:22 PM       Passed - Blood pressure under 140/90 in past 12 months    BP Readings from Last 3 Encounters:   01/03/19 125/65   12/18/18 123/77   11/23/18 129/65                Passed - Patient is age 6 or older       Passed - Recent (12 mo) or future (30 days) visit within the authorizing provider's specialty    Patient had office visit in the last 12 months or has a visit in the next 30 days with authorizing provider or within the authorizing provider's specialty.  See \"Patient Info\" tab in inbasket, or \"Choose Columns\" in Meds & Orders section of the refill encounter.             Passed - Medication is active on med list        amoxicillin (AMOXIL) 500 MG capsule [Pharmacy Med Name: AMOXICILLIN 500MG CAPSULES] 4 capsule 0          Last Written Prescription Date:  12/25/18  Last Fill Quantity: 4,  # refills: 0   Last Office Visit with [facility], [facility] or  Health prescribing provider:  10/30/18   Future Office Visit:    Next 5 appointments (look out 90 days)    Feb 11, 2019  2:00 PM CST  Nurse Only with [facility] ANCILLARY  [facility] [address] [facility] [address]  [phone]          Sig: TAKE 4 CAPSULES(2000 MG) BY MOUTH 1 TIME FOR 1 DOSE    Oral Acne/Rosacea " "Medications Protocol Failed - 2/7/2019  5:22 PM       Failed - Confirmation of diagnosis is required    Please confirm diagnosis is acne or rosacea.     If NOT acne or rosacea; refer request to provider for further evaluation.    If diagnosis IS acne or rosacea, OK to refill BASED ON PREVIOUS REFILL CLINICAL NOTE RECOMMENDATION.         Passed - Patient is 12 years of age or older       Passed - Recent (12 mo) or future (30 days) visit within the authorizing provider's specialty    Patient had office visit in the last 12 months or has a visit in the next 30 days with authorizing provider or within the authorizing provider's specialty.  See \"Patient Info\" tab in inbasket, or \"Choose Columns\" in Meds & Orders section of the refill encounter.             Passed - Medication is active on med list       Passed - No active pregnancy on record       Passed - No positive prenancy test is past 12 months        montelukast (SINGULAIR) 10 MG tablet [Pharmacy Med Name: MONTELUKAST 10MG TABLETS] 90 tablet 0          Last Written Prescription Date:  12/11/18  Last Fill Quantity: 90,  # refills: 0   Last Office Visit with [facility], [facility] or [facility] prescribing provider:  10/30/18   Future Office Visit:    Next 5 appointments (look out 90 days)    Feb 11, 2019  2:00 PM CST  Nurse Only with BK ANCILLARY  [facility] ([facility]) [address] [place] [id number] [phone]          Sig: TAKE 1 TABLET BY MOUTH EVERY NIGHT AT BEDTIME    Leukotriene Inhibitors Protocol Passed - 2/7/2019  5:22 PM       Passed - Patient is age 12 or older    If patient is under 16, ok to refill using age based dosing.          Passed - Asthma control assessment score within normal limits in last 6 months    Please review ACT score.          Passed - Medication is active on med list       Passed - Recent (6 mo) or future (30 days) visit within the authorizing provider's specialty    Patient had office " "visit in the last 6 months or has a visit in the next 30 days with authorizing provider or within the authorizing provider's specialty.  See \"Patient Info\" tab in inbasket, or \"Choose Columns\" in Meds & Orders section of the refill encounter.            esomeprazole (NEXIUM) 40 MG DR capsule [Pharmacy Med Name: ESOMEPRAZOLE MAGNESIUM 40MG DR CAPS] 90 capsule 0        Last Written Prescription Date:  11/21/18  Last Fill Quantity: 90,  # refills: 0   Last Office Visit with [facility], [facility] or [facility] prescribing provider:  10/30/18   Future Office Visit:    Next 5 appointments (look out 90 days)    Feb 11, 2019  2:00 PM CST  Nurse Only with BK ANCILLARY  [facility]) [address] [place] [id number] [phone]          Sig: TAKE 1 CAPSULE(40 MG) BY MOUTH EVERY MORNING 30 TO 60 MINUTES BEFORE BREAKFAST    PPI Protocol Passed - 2/7/2019  5:22 PM       Passed - Not on Clopidogrel (unless Pantoprazole ordered)       Passed - No diagnosis of osteoporosis on record       Passed - Recent (12 mo) or future (30 days) visit within the authorizing provider's specialty    Patient had office visit in the last 12 months or has a visit in the next 30 days with authorizing provider or within the authorizing provider's specialty.  See \"Patient Info\" tab in inPassadosket, or \"Choose Columns\" in Meds & Orders section of the refill encounter.             Passed - Medication is active on med list       Passed - Patient is age 18 or older       Passed - No active pregnacy on record       Passed - No positive pregnancy test in past 12 months        cyclobenzaprine (FLEXERIL) 5 MG tablet [Pharmacy Med Name: CYCLOBENZAPRINE 5MG TABLETS] 60 tablet 0          Last Written Prescription Date:  12/27/18  Last Fill Quantity: 60,  # refills: 0   Last Office Visit with [facility], [facility] or [facility] prescribing provider:  10/30/18   Future Office Visit:    Next 5 appointments (look out 90 days)  " "  Feb 11, 2019  2:00 PM CST  Nurse Only with BK ANCILLARY  [facility] [address] [facility] [address]  [phone]          Sig: TAKE 1 TABLET(5 MG) BY MOUTH TWICE DAILY AS NEEDED FOR MUSCLE SPASMS    There is no refill protocol information for this order        losartan (COZAAR) 50 MG tablet [Pharmacy Med Name: LOSARTAN 50MG TABLETS] 90 tablet 0          Last Written Prescription Date:  12/11/18  Last Fill Quantity: 90,  # refills: 0   Last Office Visit with [facility] or [facility] prescribing provider:  10/30/18   Future Office Visit:    Next 5 appointments (look out 90 days)    Feb 11, 2019  2:00 PM CST  Nurse Only with BK ANCILLARY  [facility] [address] [facility] [address]  [phone]          Sig: TAKE 1 TABLET(50 MG) BY MOUTH DAILY    Angiotensin-II Receptors Failed - 2/7/2019  5:22 PM       Failed - Normal serum creatinine on file in past 12 months    Recent Labs   Lab Test 10/30/18  1126   CR 0.46*            Passed - Blood pressure under 140/90 in past 12 months    BP Readings from Last 3 Encounters:   01/03/19 125/65   12/18/18 123/77   11/23/18 129/65                Passed - Recent (12 mo) or future (30 days) visit within the authorizing provider's specialty    Patient had office visit in the last 12 months or has a visit in the next 30 days with authorizing provider or within the authorizing provider's specialty.  See \"Patient Info\" tab in inbasket, or \"Choose Columns\" in Meds & Orders section of the refill encounter.             Passed - Medication is active on med list       Passed - Patient is age 18 or older       Passed - No active pregnancy on record       Passed - Normal serum potassium on file in past 12 months    Recent Labs   Lab Test 10/30/18  1126   POTASSIUM 3.9                   Passed - No positive pregnancy test in past 12 months    "           Jose Carlos Lancerax  [facility]

## 2019-02-07 NOTE — TELEPHONE ENCOUNTER
"Requested Prescriptions   Pending Prescriptions Disp Refills     metoprolol succinate ER (TOPROL-XL) 25 MG 24 hr tablet [Pharmacy Med Name: METOPROLOL ER SUCCINATE 25MG TABS] 90 tablet 0     Sig: TAKE 1 TABLET(25 MG) BY MOUTH DAILY    Beta-Blockers Protocol Passed - 2/7/2019  3:00 PM       Passed - Blood pressure under 140/90 in past 12 months    BP Readings from Last 3 Encounters:   01/03/19 125/65   12/18/18 123/77   11/23/18 129/65                Passed - Patient is age 6 or older       Passed - Recent (12 mo) or future (30 days) visit within the authorizing provider's specialty    Patient had office visit in the last 12 months or has a visit in the next 30 days with authorizing provider or within the authorizing provider's specialty.  See \"Patient Info\" tab in inbasket, or \"Choose Columns\" in Meds & Orders section of the refill encounter.             Passed - Medication is active on med list        amoxicillin (AMOXIL) 500 MG capsule [Pharmacy Med Name: AMOXICILLIN 500MG CAPSULES] 4 capsule 0     Sig: TAKE 4 CAPSULES(2000 MG) BY MOUTH 1 TIME FOR 1 DOSE    Oral Acne/Rosacea Medications Protocol Failed - 2/7/2019  3:00 PM       Failed - Confirmation of diagnosis is required    Please confirm diagnosis is acne or rosacea.     If NOT acne or rosacea; refer request to provider for further evaluation.    If diagnosis IS acne or rosacea, OK to refill BASED ON PREVIOUS REFILL CLINICAL NOTE RECOMMENDATION.         Passed - Patient is 12 years of age or older       Passed - Recent (12 mo) or future (30 days) visit within the authorizing provider's specialty    Patient had office visit in the last 12 months or has a visit in the next 30 days with authorizing provider or within the authorizing provider's specialty.  See \"Patient Info\" tab in inbasket, or \"Choose Columns\" in Meds & Orders section of the refill encounter.             Passed - Medication is active on med list       Passed - No active pregnancy on record       " "Passed - No positive prenancy test is past 12 months        montelukast (SINGULAIR) 10 MG tablet [Pharmacy Med Name: MONTELUKAST 10MG TABLETS] 90 tablet 0     Sig: TAKE 1 TABLET BY MOUTH EVERY NIGHT AT BEDTIME    Leukotriene Inhibitors Protocol Passed - 2/7/2019  3:00 PM       Passed - Patient is age 12 or older    If patient is under 16, ok to refill using age based dosing.          Passed - Asthma control assessment score within normal limits in last 6 months    Please review ACT score.          Passed - Medication is active on med list       Passed - Recent (6 mo) or future (30 days) visit within the authorizing provider's specialty    Patient had office visit in the last 6 months or has a visit in the next 30 days with authorizing provider or within the authorizing provider's specialty.  See \"Patient Info\" tab in inbasket, or \"Choose Columns\" in Meds & Orders section of the refill encounter.            esomeprazole (NEXIUM) 40 MG DR capsule [Pharmacy Med Name: ESOMEPRAZOLE MAGNESIUM 40MG DR CAPS] 90 capsule 0     Sig: TAKE 1 CAPSULE(40 MG) BY MOUTH EVERY MORNING 30 TO 60 MINUTES BEFORE BREAKFAST    PPI Protocol Passed - 2/7/2019  3:00 PM       Passed - Not on Clopidogrel (unless Pantoprazole ordered)       Passed - No diagnosis of osteoporosis on record       Passed - Recent (12 mo) or future (30 days) visit within the authorizing provider's specialty    Patient had office visit in the last 12 months or has a visit in the next 30 days with authorizing provider or within the authorizing provider's specialty.  See \"Patient Info\" tab in inCertpoint Systemset, or \"Choose Columns\" in Meds & Orders section of the refill encounter.             Passed - Medication is active on med list       Passed - Patient is age 18 or older       Passed - No active pregnacy on record       Passed - No positive pregnancy test in past 12 months        cyclobenzaprine (FLEXERIL) 5 MG tablet [Pharmacy Med Name: CYCLOBENZAPRINE 5MG TABLETS] 60 tablet " "0     Sig: TAKE 1 TABLET(5 MG) BY MOUTH TWICE DAILY AS NEEDED FOR MUSCLE SPASMS    There is no refill protocol information for this order        losartan (COZAAR) 50 MG tablet [Pharmacy Med Name: LOSARTAN 50MG TABLETS] 90 tablet 0     Sig: TAKE 1 TABLET(50 MG) BY MOUTH DAILY    Angiotensin-II Receptors Failed - 2/7/2019  3:00 PM       Failed - Normal serum creatinine on file in past 12 months    Recent Labs   Lab Test 10/30/18  1126   CR 0.46*            Passed - Blood pressure under 140/90 in past 12 months    BP Readings from Last 3 Encounters:   01/03/19 125/65   12/18/18 123/77   11/23/18 129/65                Passed - Recent (12 mo) or future (30 days) visit within the authorizing provider's specialty    Patient had office visit in the last 12 months or has a visit in the next 30 days with authorizing provider or within the authorizing provider's specialty.  See \"Patient Info\" tab in inbasket, or \"Choose Columns\" in Meds & Orders section of the refill encounter.             Passed - Medication is active on med list       Passed - Patient is age 18 or older       Passed - No active pregnancy on record       Passed - Normal serum potassium on file in past 12 months    Recent Labs   Lab Test 10/30/18  1126   POTASSIUM 3.9                   Passed - No positive pregnancy test in past 12 months          "

## 2019-02-11 ENCOUNTER — ALLIED HEALTH/NURSE VISIT (OUTPATIENT)
Dept: NURSING | Facility: CLINIC | Age: 59
End: 2019-02-11
Payer: MEDICARE

## 2019-02-11 VITALS — TEMPERATURE: 98.9 F

## 2019-02-11 DIAGNOSIS — Z23 NEED FOR VACCINATION: Primary | ICD-10-CM

## 2019-02-11 PROCEDURE — 99207 ZZC NO CHARGE LOS: CPT

## 2019-02-11 PROCEDURE — 90471 IMMUNIZATION ADMIN: CPT

## 2019-02-11 PROCEDURE — 90750 HZV VACC RECOMBINANT IM: CPT

## 2019-02-11 NOTE — NURSING NOTE
1.  Has the patient received the information for the Zostavax vaccine? YES    2.  Does the patient have any of the following contraindications?     Allergy to Neomycin or Gelatin? No       Current moderate or severe illness? No  Temp = 98.9  If temp > 99.0 degrees orally or patient has above allergies, vaccine is deferred    3.  The vaccine has been administered in the usual fashion and the patient was instructed to wait 15 minutes before leaving the building in the event of an allergic reaction: YES    Vaccination given by PEDRO Hernandez MA    Recorded by Georgia Hernandez

## 2019-02-12 DIAGNOSIS — E11.21 TYPE 2 DIABETES MELLITUS WITH DIABETIC NEPHROPATHY, WITHOUT LONG-TERM CURRENT USE OF INSULIN (H): ICD-10-CM

## 2019-02-12 RX ORDER — METOPROLOL SUCCINATE 25 MG/1
TABLET, EXTENDED RELEASE ORAL
Qty: 90 TABLET | Refills: 0 | Status: SHIPPED | OUTPATIENT
Start: 2019-02-12 | End: 2019-05-24

## 2019-02-12 RX ORDER — ESOMEPRAZOLE MAGNESIUM 40 MG/1
CAPSULE, DELAYED RELEASE ORAL
Qty: 90 CAPSULE | Refills: 1 | Status: SHIPPED | OUTPATIENT
Start: 2019-02-12 | End: 2019-08-29

## 2019-02-12 RX ORDER — MONTELUKAST SODIUM 10 MG/1
TABLET ORAL
Qty: 30 TABLET | Refills: 0 | Status: SHIPPED | OUTPATIENT
Start: 2019-02-12 | End: 2019-06-04

## 2019-02-12 RX ORDER — LOSARTAN POTASSIUM 50 MG/1
TABLET ORAL
Qty: 90 TABLET | Refills: 0 | Status: SHIPPED | OUTPATIENT
Start: 2019-02-12 | End: 2019-06-04

## 2019-02-12 RX ORDER — AMOXICILLIN 500 MG/1
CAPSULE ORAL
Qty: 4 CAPSULE | Refills: 0 | Status: SHIPPED | OUTPATIENT
Start: 2019-02-12 | End: 2019-06-04

## 2019-02-12 RX ORDER — CYCLOBENZAPRINE HCL 5 MG
TABLET ORAL
Qty: 60 TABLET | Refills: 0 | Status: SHIPPED | OUTPATIENT
Start: 2019-02-12 | End: 2019-06-03

## 2019-02-12 NOTE — TELEPHONE ENCOUNTER
"Requested Prescriptions   Pending Prescriptions Disp Refills     VICTOZA PEN 18 MG/3ML soln [Pharmacy Med Name: VICTOZA 18MG/3ML INJ PEN 3ML] 30 mL 0          Last Written Prescription Date:  8/27/18  Last Fill Quantity: 30 ml,  # refills: 0   Last Office Visit with [facility], [facility] or [facility] prescribing provider:  10/30/18   Future Office Visit:    Next 5 appointments (look out 90 days)    Feb 19, 2019  9:00 AM CST  Return Visit with Armond George MD  [facility] [address] [place] [phone]  [phone]          Sig: ADMINISTER 1.8 MG UNDER THE SKIN DAILY    GLP-1 Agonists Protocol Failed - 2/12/2019 10:54 AM       Failed - Normal serum creatinine on file in past 12 months    Recent Labs   Lab Test 10/30/18  1126   CR 0.46*            Passed - Blood pressure less than 140/90 in past 6 months    BP Readings from Last 3 Encounters:   01/03/19 125/65   12/18/18 123/77   11/23/18 129/65                Passed - LDL on file in past 12 months    Recent Labs   Lab Test 10/30/18  1126   LDL 88            Passed - Microalbumin on file in past 12 months    Recent Labs   Lab Test 10/30/18  1138   MICROL 6   UMALCR 11.37            Passed - HgbA1C in past 3 or 6 months    If HgbA1C is 8 or greater, it needs to be on file within the past 3 months.  If less than 8, must be on file within the past 6 months.     Recent Labs   Lab Test 10/30/18  1126   A1C 6.8*            Passed - Medication is active on med list       Passed - Patient is age 18 or older       Passed - No active pregnancy on record       Passed - No positive pregnancy test in past 12 months       Passed - Recent (6 mo) or future (30 days) visit within the authorizing provider's specialty    Patient had office visit in the last 6 months or has a visit in the next 30 days with authorizing provider.  See \"Patient Info\" tab in inbasket, or \"Choose Columns\" in Meds & Orders section of the " refill encounter.                  Jose Carlos Faarax  [facility]

## 2019-02-12 NOTE — TELEPHONE ENCOUNTER
Routing refill request to provider for review/approval because:  Drug not on the [facility] refill protocol - Flexeril and Amoxicillin  (FYI- two active flexeril and amoxicillin Rxs for each medication on med list)     A break in medication - Metoprolol should have ran out 8/18 (6 months)    Labs out of range: Creat- Losartan    Radha refill given: Singulair - due for appointment on or around 4/30/19 Needs appointment for further refills.     Prescription approved per [facility] Refill Protocol. - Nexium     Linh Martinez RN

## 2019-02-15 RX ORDER — LIRAGLUTIDE 6 MG/ML
INJECTION SUBCUTANEOUS
Qty: 30 ML | Refills: 0 | Status: SHIPPED | OUTPATIENT
Start: 2019-02-15 | End: 2019-06-04

## 2019-02-19 ENCOUNTER — OFFICE VISIT (OUTPATIENT)
Dept: ORTHOPEDICS | Facility: CLINIC | Age: 59
End: 2019-02-19
Payer: MEDICARE

## 2019-02-19 VITALS
SYSTOLIC BLOOD PRESSURE: 144 MMHG | WEIGHT: 293 LBS | HEIGHT: 67 IN | OXYGEN SATURATION: 95 % | DIASTOLIC BLOOD PRESSURE: 60 MMHG | HEART RATE: 71 BPM | RESPIRATION RATE: 13 BRPM | BODY MASS INDEX: 45.99 KG/M2

## 2019-02-19 DIAGNOSIS — M17.11 PRIMARY OSTEOARTHRITIS OF RIGHT KNEE: Primary | ICD-10-CM

## 2019-02-19 PROCEDURE — 20610 DRAIN/INJ JOINT/BURSA W/O US: CPT | Mod: RT | Performed by: ORTHOPAEDIC SURGERY

## 2019-02-19 RX ORDER — METHYLPREDNISOLONE ACETATE 80 MG/ML
80 INJECTION, SUSPENSION INTRA-ARTICULAR; INTRALESIONAL; INTRAMUSCULAR; SOFT TISSUE ONCE
Qty: 1 ML | Refills: 0 | OUTPATIENT
Start: 2019-02-19 | End: 2019-06-03

## 2019-02-19 ASSESSMENT — MIFFLIN-ST. JEOR: SCORE: 1963.42

## 2019-02-19 NOTE — PROGRESS NOTES
Follow up right knee primary osteoarthritis.  Last injection 12/18/18  Range of motion 5-115.    She  desires injection today of right knee(s).  Risks, benefits, potential complications and alternatives were discussed.   With the patient's consent, sterile prep was performed of right knee(s).  Right knee was injected with Depo Medrol 80 mg and lidocaine at anteromedial site.  Return to clinic as needed.

## 2019-02-19 NOTE — LETTER
2/19/2019         RE: Melody Ferguson  [address]  [facility] [address]        Dear Colleague,    Thank you for referring your patient, Melody Ferguson, to the [facility]. Please see a copy of my visit note below.    The patient's right knee was prepped with betadine solution after verification of allergies. Area approximately 10 cm x 10 cm prepped in a sterile fashion. After injection, betadine removed with soap and water and band-aids applied.    1ml depo-medrol with 1% lidocaine plain injected into patient's right knee by Dr. Armond George  LOT# [id number]  Exp. 01/20    CRESCENCIO PadillaC  Supervising physician: Armond George MD  Dept. of Orthopedics  [facility]          Follow up right knee primary osteoarthritis.  Last injection 12/18/18  Range of motion 5-115.    She  desires injection today of right knee(s).  Risks, benefits, potential complications and alternatives were discussed.   With the patient's consent, sterile prep was performed of right knee(s).  Right knee was injected with Depo Medrol 80 mg and lidocaine at anteromedial site.  Return to clinic as needed.      Again, thank you for allowing me to participate in the care of your patient.        Sincerely,        Armond George MD

## 2019-02-19 NOTE — PROGRESS NOTES
The patient's right knee was prepped with betadine solution after verification of allergies. Area approximately 10 cm x 10 cm prepped in a sterile fashion. After injection, betadine removed with soap and water and band-aids applied.    1ml depo-medrol with 1% lidocaine plain injected into patient's right knee by Dr. Armond George  LOT# [id number]  Exp. 01/20    Talon Fenton PA-C  Supervising physician: Armond George MD  Dept. of Orthopedics  [facility]

## 2019-02-26 DIAGNOSIS — E11.21 TYPE 2 DIABETES MELLITUS WITH DIABETIC NEPHROPATHY, WITHOUT LONG-TERM CURRENT USE OF INSULIN (H): ICD-10-CM

## 2019-02-26 NOTE — TELEPHONE ENCOUNTER
"Requested Prescriptions   Pending Prescriptions Disp Refills     insulin pen needle (NOVOFINE) 32G X 6 MM miscellaneous [Pharmacy Med Name: NOVOFINE [id number] PEN NEEDLES 100'S] 100 each 0      Last Written Prescription Date:  01/12/18  Last Fill Quantity: 100,  # refills: prn   Last [facility] Visit with [facility] or [facility] prescribing provider:  10/30/18Va   Future Office Visit:    Sig: USE ONCE DAILY AS DIRECTED    Diabetic Supplies Protocol Passed - 2/26/2019  1:59 PM       Passed - Medication is active on med list       Passed - Patient is 18 years of age or older       Passed - Recent (6 mo) or future (30 days) visit within the authorizing provider's specialty    Patient had office visit in the last 6 months or has a visit in the next 30 days with authorizing provider.  See \"Patient Info\" tab in inbasket, or \"Choose Columns\" in Meds & Orders section of the refill encounter.              "

## 2019-02-28 ENCOUNTER — MYC MEDICAL ADVICE (OUTPATIENT)
Dept: FAMILY MEDICINE | Facility: CLINIC | Age: 59
End: 2019-02-28

## 2019-02-28 NOTE — TELEPHONE ENCOUNTER
Panel Management Review   One phone call and send letter if unable to reach them or MyChart message and send letter if not read after 2 weeks (You will get a message to your inbasket)      BP Readings from Last 1 Encounters:   02/19/19 144/60      Last Office Visit with this department: 10/30/2018    Fail List measure:     Depression / Dysthymia review    Measure:  Needs PHQ-9 score of 4 or less during index window.  Administer PHQ-9 and if score is 5 or more, send encounter to provider for next steps.    5 - 7 month window range: 2/28/19-6/30/19    PHQ-9 SCORE 8/10/2017 2/28/2018 10/30/2018   PHQ-9 Total Score MyChart 10 (Moderate depression) - -   PHQ-9 Total Score 10 13 16       If PHQ-9 recheck is 5 or more, route to provider for next steps.    Patient is due for:  PHQ9      Patient is due/failing the following:   PHQ9    Action needed:   Patient needs to do PHQ9.    Type of outreach:    Sent [employer] message.    Questions for provider review:    None                                                                                                                                    Rama Fernando MA      Chart routed to Care Team .

## 2019-03-02 ASSESSMENT — PATIENT HEALTH QUESTIONNAIRE - PHQ9
SUM OF ALL RESPONSES TO PHQ QUESTIONS 1-9: 13
10. IF YOU CHECKED OFF ANY PROBLEMS, HOW DIFFICULT HAVE THESE PROBLEMS MADE IT FOR YOU TO DO YOUR WORK, TAKE CARE OF THINGS AT HOME, OR GET ALONG WITH OTHER PEOPLE: SOMEWHAT DIFFICULT
SUM OF ALL RESPONSES TO PHQ QUESTIONS 1-9: 13

## 2019-03-03 ASSESSMENT — PATIENT HEALTH QUESTIONNAIRE - PHQ9: SUM OF ALL RESPONSES TO PHQ QUESTIONS 1-9: 13

## 2019-03-05 NOTE — TELEPHONE ENCOUNTER
Noted, better than her baseline. Nothing further to do at this time. Will discuss at her next appointment.  Thanks,  Elizabeth Lamas MD MPH

## 2019-04-12 DIAGNOSIS — E11.21 TYPE 2 DIABETES MELLITUS WITH DIABETIC NEPHROPATHY, WITHOUT LONG-TERM CURRENT USE OF INSULIN (H): ICD-10-CM

## 2019-04-12 NOTE — TELEPHONE ENCOUNTER
"Requested Prescriptions   Pending Prescriptions Disp Refills     VICTOZA PEN 18 MG/3ML soln [Pharmacy Med Name: VICTOZA 18MG/3ML INJ PEN 3ML]  Last Written Prescription Date:  02/15/19  Last Fill Quantity: 30ml,  # refills: 0   Last Office Visit with [facility], [facility] or [facility] prescribing provider:  10/30/18Va   Future Office Visit:    18 mL 0     Sig: ADMINISTER 1.2 MG UNDER THE SKIN DAILY       GLP-1 Agonists Protocol Failed - 4/12/2019 11:13 AM        Failed - Blood pressure less than 140/90 in past 6 months     BP Readings from Last 3 Encounters:   02/19/19 144/60   01/03/19 125/65   12/18/18 123/77                 Failed - Normal serum creatinine on file in past 12 months     Recent Labs   Lab Test 10/30/18  1126   CR 0.46*             Passed - LDL on file in past 12 months     Recent Labs   Lab Test 10/30/18  1126   LDL 88             Passed - Microalbumin on file in past 12 months     Recent Labs   Lab Test 10/30/18  1138   MICROL 6   UMALCR 11.37             Passed - HgbA1C in past 3 or 6 months     If HgbA1C is 8 or greater, it needs to be on file within the past 3 months.  If less than 8, must be on file within the past 6 months.     Recent Labs   Lab Test 10/30/18  1126   A1C 6.8*             Passed - Medication is active on med list        Passed - Patient is age 18 or older        Passed - No active pregnancy on record        Passed - No positive pregnancy test in past 12 months        Passed - Recent (6 mo) or future (30 days) visit within the authorizing provider's specialty     Patient had office visit in the last 6 months or has a visit in the next 30 days with authorizing provider.  See \"Patient Info\" tab in inbasket, or \"Choose Columns\" in Meds & Orders section of the refill encounter.              "

## 2019-04-15 NOTE — TELEPHONE ENCOUNTER
Routing refill request to provider for review/approval because:  Labs out of range:  creatinine  BP failed    Tiana Young RN, [facility]

## 2019-04-16 RX ORDER — LIRAGLUTIDE 6 MG/ML
INJECTION SUBCUTANEOUS
Qty: 18 ML | Refills: 0 | Status: SHIPPED | OUTPATIENT
Start: 2019-04-16 | End: 2019-06-03 | Stop reason: DRUGHIGH

## 2019-05-23 DIAGNOSIS — E11.21 TYPE 2 DIABETES MELLITUS WITH DIABETIC NEPHROPATHY, WITHOUT LONG-TERM CURRENT USE OF INSULIN (H): ICD-10-CM

## 2019-05-23 DIAGNOSIS — G89.4 CHRONIC PAIN SYNDROME: ICD-10-CM

## 2019-05-23 DIAGNOSIS — J45.20 MILD INTERMITTENT ASTHMA WITHOUT COMPLICATION: ICD-10-CM

## 2019-05-24 DIAGNOSIS — N39.46 MIXED INCONTINENCE: ICD-10-CM

## 2019-05-24 DIAGNOSIS — I25.2 OLD MYOCARDIAL INFARCTION: ICD-10-CM

## 2019-05-24 NOTE — TELEPHONE ENCOUNTER
"Requested Prescriptions   Pending Prescriptions Disp Refills     montelukast (SINGULAIR) 10 MG tablet [Pharmacy Med Name: MONTELUKAST 10MG TABLETS]  Last Written Prescription Date:  02/12/19  Last Fill Quantity: 30,  # refills: 0   Last Office Visit [place] [facility] or  Health prescribing provider:  10/30/18Fabi   Future Office Visit:    Next 5 appointments (look out 90 days)    Jun 03, 2019  9:00 AM CDT  Return Visit with Armond George MD  [address] [address]  [phone]        30 tablet 0     Sig: TAKE 1 TABLET BY MOUTH EVERY NIGHT AT BEDTIME       Leukotriene Inhibitors Protocol Failed - 5/23/2019 11:54 PM        Failed - Asthma control assessment score within normal limits in last 6 months     Please review ACT score.           Failed - Recent (6 mo) or future (30 days) visit within the authorizing provider's specialty     Patient had office visit in the last 6 months or has a visit in the next 30 days with authorizing provider or within the authorizing provider's specialty.  See \"Patient Info\" tab in inbasket, or \"Choose Columns\" in Meds & Orders section of the refill encounter.            Passed - Patient is age 12 or older     If patient is under 16, ok to refill using age based dosing.           Passed - Medication is active on med list        diclofenac (VOLTAREN) 50 MG EC tablet [Pharmacy Med Name: DICLOFENAC SODIUM 50MG DR TABLETS]  Last Written Prescription Date:  01/09/19  Last Fill Quantity: 180,  # refills: 0   Last [facility] Visit [place] [facility] or  Health prescribing provider:  10/30/18Va   Future Office Visit:    Next 5 appointments (look out 90 days)    Jun 03, 2019  9:00 AM CDT  Return Visit with Armond George MD  [facility] ([address] [address]  [phone]        180 tablet 0     Sig: TAKE 1 TABLET(50 MG) BY MOUTH TWICE DAILY AS NEEDED " "FOR MODERATE PAIN       NSAID Medications Failed - 5/23/2019 11:54 PM        Failed - Blood pressure under 140/90 in past 12 months     BP Readings from Last 3 Encounters:   02/19/19 144/60   01/03/19 125/65   12/18/18 123/77                 Failed - Normal CBC on file in past 12 months     Recent Labs   Lab Test 07/11/17  0841   WBC 8.6   RBC 4.01   HGB 10.9*   HCT 34.9*                    Failed - Normal serum creatinine on file in past 12 months     Recent Labs   Lab Test 10/30/18  1126   CR 0.46*             Passed - Normal ALT on file in past 12 months     Recent Labs   Lab Test 10/30/18  1126   ALT 25             Passed - Normal AST on file in past 12 months     Recent Labs   Lab Test 10/30/18  1126   AST 13             Passed - Recent (12 mo) or future (30 days) visit within the authorizing provider's specialty     Patient had office visit in the last 12 months or has a visit in the next 30 days with authorizing provider or within the authorizing provider's specialty.  See \"Patient Info\" tab in inbasket, or \"Choose Columns\" in Meds & Orders section of the refill encounter.              Passed - Patient is age 6-64 years        Passed - Medication is active on med list        Passed - No active pregnancy on record        Passed - No positive pregnancy test in past 12 months        glipiZIDE-metFORMIN (METAGLIP) 5-500 MG tablet [Pharmacy Med Name: GLIPIZIDE-METFORMIN 5MG/500MG TABS]  Last Written Prescription Date:  01/08/19  Last Fill Quantity: 90,  # refills: 0   Last Office Visit with [facility] or [facility] prescribing provider:  10/30/18Va   Future Office Visit:    Next 5 appointments (look out 90 days)    Jun 03, 2019  9:00 AM CDT  Return Visit with Armond George MD  [facility] ([facility]) [address]  [place] [id number] [phone]        90 tablet 0     Sig: TAKE 1 TABLET BY MOUTH ONCE DAILY BEFORE A MEAL       Combination Oral Antihyperglycemic " "Agents Failed - 5/23/2019 11:54 PM        Failed - Blood pressure under 140/90 in past 12 months     BP Readings from Last 3 Encounters:   02/19/19 144/60   01/03/19 125/65   12/18/18 123/77                 Failed - Patient has documented A1c within the specified period of time.     If HgbA1C is 8 or greater, it needs to be on file within the past 3 months.  If less than 8, must be on file within the past 6 months.     Recent Labs   Lab Test 10/30/18  1126   A1C 6.8*             Failed - Recent (6 mo) or future (30 days) visit within the authorizing provider's specialty     Patient had office visit in the last 6 months or has a visit in the next 30 days with authorizing provider or within the authorizing provider's specialty.  See \"Patient Info\" tab in inbasket, or \"Choose Columns\" in Meds & Orders section of the refill encounter.            Passed - Patient has a documented LDL level within past 12 mos.     Recent Labs   Lab Test 10/30/18  1126   LDL 88             Passed - Patient has a documented Microalbumin level within past 12 mos.     Recent Labs   Lab Test 10/30/18  1138   MICROL 6   UMALCR 11.37             Passed - Patient's CR is NOT>1.4 OR Patient's EGFR is NOT<45 within past 12 mos.     Recent Labs   Lab Test 10/30/18  1126   GFRESTIMATED >90   GFRESTBLACK >90       Recent Labs   Lab Test 10/30/18  1126   CR 0.46*             Passed - Patient does not have a diagnosis of CHF.        Passed - Medication is active on med list        Passed - Patient is 18 years old or older.        Passed - Patient is not pregnant        Passed - Patient has not had a positive pregnancy test within the past 12 mos.        traMADol (ULTRAM) 50 MG tablet [Pharmacy Med Name: TRAMADOL 50MG TABLETS]        Last Written Prescription Date:  11/21/18  Last Fill Quantity: 30,   # refills: 0  Last Office Visit: 10/30/18Va  Future Office visit:    Next 5 appointments (look out 90 days)    Jun 03, 2019  9:00 AM CDT  Return Visit " with Armond George MD  [facility] [place] ([facility]) [address]  [place] [address] [phone]           Routing refill request to provider for review/approval because:  Drug not on the [facility], [facility] or  Health refill protocol or controlled substance 30 tablet 0     Sig: TAKE 1 TABLET BY MOUTH EVERY 8 HOURS AS NEEDED FOR MODERATE PAIN       There is no refill protocol information for this order

## 2019-05-24 NOTE — TELEPHONE ENCOUNTER
"Requested Prescriptions   Pending Prescriptions Disp Refills     metoprolol succinate ER (TOPROL-XL) 25 MG 24 hr tablet [Pharmacy Med Name: METOPROLOL ER SUCCINATE 25MG TABS]  Last Written Prescription Date:  02/12/19  Last Fill Quantity: 90,  # refills: 0   [place] [facility] [place] [facility] or [facility] prescribing provider:  10/30/18Adams   Future Office Visit:    Next 5 appointments (look out 90 days)    Jun 03, 2019  9:00 AM CDT  [facility] Visit [facility]  [facility] [facility] [address]  [phone]        90 tablet 0     Sig: TAKE 1 TABLET(25 MG) BY MOUTH DAILY       Beta-Blockers Protocol Failed - 5/24/2019  8:28 AM        Failed - Blood pressure under 140/90 in past 12 months     BP Readings from Last 3 Encounters:   02/19/19 144/60   01/03/19 125/65   12/18/18 123/77                 Passed - Patient is age 6 or older        Passed - Recent (12 mo) or future (30 days) visit within the authorizing provider's specialty     Patient had office visit in the last 12 months or has a visit in the next 30 days with authorizing provider or within the authorizing provider's specialty.  See \"Patient Info\" tab in inbasket, or \"Choose Columns\" in Meds & Orders section of the refill encounter.              Passed - Medication is active on med list        solifenacin (VESICARE) 10 MG tablet [Pharmacy Med Name: SOLIFENACIN 10MG TABLETS]  Last Written Prescription Date:  12/27/18  Last Fill Quantity: 90,  # refills: 0   Last [place] Visit [place] [facility] [facility] or [facility] prescribing provider:  10/30/18Va   Future Office Visit:    Next 5 appointments (look out 90 days)    Jun 03, 2019  9:00 AM CDT  Return Visit with Armond George MD  [facility] [place] ([facility]) [address] [place] [address]  [place] [address]  [phone]        90 tablet 0     Sig: TAKE 1 TABLET(10 MG) BY MOUTH DAILY       Muscarinic Antagonists (Urinary " "Incontinence Agents) Passed - 5/24/2019  8:28 AM        Passed - Recent (12 mo) or future (30 days) visit within the authorizing provider's specialty     Patient had office visit in the last 12 months or has a visit in the next 30 days with authorizing provider or within the authorizing provider's specialty.  See \"Patient Info\" tab in inbasket, or \"Choose Columns\" in Meds & Orders section of the refill encounter.              Passed - Patient does not have a diagnosis of glaucoma on the problem list     If glaucoma diagnosis is new, refer refill to physician.          Passed - Medication is active on med list        Passed - Patient is 18 years of age or older          "

## 2019-05-28 RX ORDER — MONTELUKAST SODIUM 10 MG/1
TABLET ORAL
Qty: 30 TABLET | Refills: 0 | OUTPATIENT
Start: 2019-05-28

## 2019-05-28 RX ORDER — SOLIFENACIN SUCCINATE 10 MG/1
TABLET, FILM COATED ORAL
Qty: 90 TABLET | Refills: 0 | Status: SHIPPED | OUTPATIENT
Start: 2019-05-28 | End: 2019-06-04

## 2019-05-28 RX ORDER — GLIPIZIDE AND METFORMIN HCL 5; 500 MG/1; MG/1
TABLET, FILM COATED ORAL
Qty: 90 TABLET | Refills: 0 | OUTPATIENT
Start: 2019-05-28

## 2019-05-28 RX ORDER — METOPROLOL SUCCINATE 25 MG/1
TABLET, EXTENDED RELEASE ORAL
Qty: 90 TABLET | Refills: 0 | Status: SHIPPED | OUTPATIENT
Start: 2019-05-28 | End: 2019-06-04

## 2019-05-28 RX ORDER — TRAMADOL HYDROCHLORIDE 50 MG/1
TABLET ORAL
Qty: 30 TABLET | Refills: 0 | OUTPATIENT
Start: 2019-05-28

## 2019-05-28 NOTE — TELEPHONE ENCOUNTER
For tramadol:  Routing refill request to provider for review/approval because:  Drug not on the [facility] refill protocol     For montelukast:  Routing refill request to provider for review/approval because:  Patient needs to be seen because:  Due for asthma recheck    For diclofenac:  Routing refill request to provider for review/approval because:  Labs out of range:  creatinine  Labs not current:  CBC    Routing refill request to provider for review/approval because:  Labs not current:  A1C  Patient is due for diabetic recheck          Shaheen Head RN, BSN, PHN

## 2019-05-28 NOTE — TELEPHONE ENCOUNTER
Called and spoke to the patient and scheduled an appt with Dr Lamas on 5/31/19.  Yuli iGang MA/  [employer]

## 2019-05-28 NOTE — TELEPHONE ENCOUNTER
Prescription approved per [facility] Refill Protocol.        Shaheen Head RN, BSN, PHN

## 2019-06-03 ENCOUNTER — ANCILLARY PROCEDURE (OUTPATIENT)
Dept: GENERAL RADIOLOGY | Facility: CLINIC | Age: 59
End: 2019-06-03
Attending: NURSE PRACTITIONER
Payer: MEDICARE

## 2019-06-03 ENCOUNTER — OFFICE VISIT (OUTPATIENT)
Dept: FAMILY MEDICINE | Facility: CLINIC | Age: 59
End: 2019-06-03
Payer: MEDICARE

## 2019-06-03 VITALS
HEIGHT: 67 IN | BODY MASS INDEX: 45.99 KG/M2 | SYSTOLIC BLOOD PRESSURE: 120 MMHG | HEART RATE: 73 BPM | DIASTOLIC BLOOD PRESSURE: 60 MMHG | TEMPERATURE: 97.9 F | OXYGEN SATURATION: 96 % | WEIGHT: 293 LBS

## 2019-06-03 DIAGNOSIS — Z90.722 STATUS POST TOTAL ABDOMINAL HYSTERECTOMY AND BILATERAL SALPINGO-OOPHORECTOMY (TAH-BSO): ICD-10-CM

## 2019-06-03 DIAGNOSIS — Z90.710 STATUS POST TOTAL ABDOMINAL HYSTERECTOMY AND BILATERAL SALPINGO-OOPHORECTOMY (TAH-BSO): ICD-10-CM

## 2019-06-03 DIAGNOSIS — R60.0 BILATERAL LOWER EXTREMITY EDEMA: ICD-10-CM

## 2019-06-03 DIAGNOSIS — M25.842 CYST OF JOINT OF LEFT HAND: ICD-10-CM

## 2019-06-03 DIAGNOSIS — M54.2 NECK PAIN: Primary | ICD-10-CM

## 2019-06-03 DIAGNOSIS — Z85.41 HISTORY OF CERVICAL CANCER: ICD-10-CM

## 2019-06-03 DIAGNOSIS — Z90.79 STATUS POST TOTAL ABDOMINAL HYSTERECTOMY AND BILATERAL SALPINGO-OOPHORECTOMY (TAH-BSO): ICD-10-CM

## 2019-06-03 PROCEDURE — 99214 OFFICE O/P EST MOD 30 MIN: CPT | Performed by: NURSE PRACTITIONER

## 2019-06-03 PROCEDURE — 73130 X-RAY EXAM OF HAND: CPT | Mod: LT

## 2019-06-03 RX ORDER — CYCLOBENZAPRINE HCL 5 MG
10 TABLET ORAL 3 TIMES DAILY PRN
Qty: 60 TABLET | Refills: 0 | Status: SHIPPED | OUTPATIENT
Start: 2019-06-03 | End: 2019-08-30

## 2019-06-03 RX ORDER — HYDROCODONE BITARTRATE AND ACETAMINOPHEN 5; 325 MG/1; MG/1
TABLET ORAL
Refills: 0 | COMMUNITY
Start: 2019-03-25 | End: 2019-06-04

## 2019-06-03 ASSESSMENT — PATIENT HEALTH QUESTIONNAIRE - PHQ9
5. POOR APPETITE OR OVEREATING: SEVERAL DAYS
SUM OF ALL RESPONSES TO PHQ QUESTIONS 1-9: 11

## 2019-06-03 ASSESSMENT — ANXIETY QUESTIONNAIRES
2. NOT BEING ABLE TO STOP OR CONTROL WORRYING: MORE THAN HALF THE DAYS
3. WORRYING TOO MUCH ABOUT DIFFERENT THINGS: SEVERAL DAYS
5. BEING SO RESTLESS THAT IT IS HARD TO SIT STILL: NOT AT ALL
IF YOU CHECKED OFF ANY PROBLEMS ON THIS QUESTIONNAIRE, HOW DIFFICULT HAVE THESE PROBLEMS MADE IT FOR YOU TO DO YOUR WORK, TAKE CARE OF THINGS AT HOME, OR GET ALONG WITH OTHER PEOPLE: SOMEWHAT DIFFICULT
6. BECOMING EASILY ANNOYED OR IRRITABLE: MORE THAN HALF THE DAYS
1. FEELING NERVOUS, ANXIOUS, OR ON EDGE: SEVERAL DAYS
GAD7 TOTAL SCORE: 7
7. FEELING AFRAID AS IF SOMETHING AWFUL MIGHT HAPPEN: NOT AT ALL

## 2019-06-03 ASSESSMENT — MIFFLIN-ST. JEOR: SCORE: 1999.71

## 2019-06-03 ASSESSMENT — PAIN SCALES - GENERAL: PAINLEVEL: MILD PAIN (2)

## 2019-06-03 NOTE — PATIENT INSTRUCTIONS
For the neck:  Can do flexeril up to three times a day as needed- causes drowsiness so make sure you do not drive while taking.  Also do the exercises below.  If still in pain in 2-3 weeks, call for physical therapy referral    For feet, recommend compression stockings (fill at medical supply store) and elevation, continue walking    For cyst on left hand, will see what xray shows and follow up accordingly.      Patient Education     Reach and Hold Exercise       Do this exercise on your hands and knees. Keep your knees under your hips and your hands under your shoulders. Keep your spine in a neutral position (not arched or sagging). Keep your ears in line with your shoulders. Hold for a few seconds before starting the exercise:  1. Tighten your belly muscles and raise one arm straight in front of you, palm down. Hold for 5 seconds, then lower. Repeat 5 times.  2. Do the exercise again, this time lifting your arm to the side. Repeat 5 times.  3. Do the exercise again, this time lifting your arm backward, palm up. Repeat 5 times.  Switch sides and do each exercise with the other arm.  Date Last Reviewed: 11/1/2017 2000-2018 [employer] [address] [place] [address]. All rights reserved. This information is not intended as a substitute for professional medical care. Always follow your healthcare professional's instructions.           Patient Education     Shoulder and Upper Back Stretch  To start, stand tall with your ears, shoulders, and hips in line. Your feet should be slightly apart, positioned just under your hips. Focus your eyes directly in front of you.  this position for a few seconds before starting your exercise. This helps increase your awareness of proper posture.          Reach overhead and slightly back with both arms. Keep your shoulders and neck aligned and your elbows behind your shoulders:    With your palms facing the ceiling, turn your fingers inward.    Take  a deep breath. Breathe out, and lower your elbows toward your buttocks. Hold for 5 seconds, then return to starting position.    Repeat 3 times.  Date Last Reviewed: 11/1/2017 2000-2018 [employer] [address] [place] [address]. All rights reserved. This information is not intended as a substitute for professional medical care. Always follow your healthcare professional's instructions.           Patient Education     Shoulder Clock Exercise    To start, stand tall with your ears, shoulders, and hips in line. Your feet should be slightly apart, positioned just under your hips. Focus your eyes directly in front of you.  this position for a few seconds before starting your exercise. This helps increase your awareness of proper posture.    Imagine that your right shoulder is the center of a clock. With the outer point of your shoulder, roll it around to slowly trace the outer edge of the clock.    Move clockwise first, then counterclockwise.    Repeat 3 to 5 times. Switch shoulders.  Date Last Reviewed: 3/1/2018    4737-3777 [employer] [address] [place] [address]. All rights reserved. This information is not intended as a substitute for professional medical care. Always follow your healthcare professional's instructions.           Patient Education     Shoulder Shrug Exercise    To start, sit in a chair with your feet flat on the floor. Shift your weight slightly forward to avoid rounding your back. Relax. Keep your ears, shoulders, and hips aligned:    Raise both of your shoulders as high as you can, as if you were trying to touch them to your ears. Keep your head and neck still and relaxed.    Hold for a count of 10. Release.    Repeat 5 times.  For your safety, check with your healthcare provider before starting an exercise program.   Date Last Reviewed: 11/1/2017 2000-2018 [employer] [address] [place] [address]. All  rights reserved. This information is not intended as a substitute for professional medical care. Always follow your healthcare professional's instructions.           Patient Education     Shoulder Squeeze Exercise    To start, sit in a chair with your feet flat on the floor. Shift your weight slightly forward to avoid rounding your back. Relax. Keep your ears, shoulders, and hips aligned:    Raise your arms to shoulder height, elbows bent and palms forward.    Move your arms back, squeezing your shoulder blades together.    Hold for 10 seconds. Return to starting position.     Repeat 5 times.   For your safety, check with your healthcare provider before starting an exercise program.   Date Last Reviewed: 11/1/2017 2000-2018 [employer]. [address] [place] [address]. All rights reserved. This information is not intended as a substitute for professional medical care. Always follow your healthcare professional's instructions.           Patient Education     Shoulder Exercises    To start, sit in a chair with your feet flat on the floor. Your weight should be slightly forward so that you re balanced evenly on your buttocks. Relax your shoulders and keep your head level. Avoid arching your back or rounding your shoulders. Using a chair with arms may help you keep your balance.    Raise your arms, elbows bent, to shoulder height.    Slowly move your forearms together. Hold for 5 seconds.    Return to starting position. Repeat 5 times.  Date Last Reviewed: 3/1/2018    [id number] [employer]. [address] [place] [address]. All rights reserved. This information is not intended as a substitute for professional medical care. Always follow your healthcare professional's instructions.           Patient Education     Neck Exercises: Neck Flex  To start, sit in a chair with your feet flat on the floor. Your weight should be slightly forward so that you re balanced evenly on  your buttocks. Relax your shoulders and keep your head level. Avoid arching your back or rounding your shoulders. Using a chair with arms may help you keep your balance:    Rest the back of your left hand against your lower back. Place your right palm on the top of your head.    Gently pull your head forward and down until you feel a stretch in the muscles in the back of your neck. Don t force the motion.    Hold for 20 seconds, then return to starting position. Switch arms.    Repeat 5 to 10 times.    Date Last Reviewed: 3/1/2018    [id number] [employer]. [address] [place] [address]. All rights reserved. This information is not intended as a substitute for professional medical care. Always follow your healthcare professional's instructions.

## 2019-06-03 NOTE — PROGRESS NOTES
"Subjective     Melody Ferguson is a 58 year old female who presents to clinic today for the following health issues:    HPI   Joint Pain    Onset: about 1 week ago    Description:   Location: Neck, Head & Left Foot  Character: Dull ache neck    Intensity: 2/10    Progression of Symptoms: same    Accompanying Signs & Symptoms:  Other symptoms: swelling Left foot    History:   Previous similar pain: no       Precipitating factors:   Trauma or overuse: YES- fell about 1 week ago    Alleviating factors:  Improved by: nothing    Therapies Tried and outcome: none    Slipped and fell coming out of shower  Towel rack came down and hit her in head  Left foot swollen per patient.  But is not painful.  Able to ambulate normally  Still with neck pain- hurts when turns to right.  Neck feels \"tight\"  Headache after she fell for short amount of time.  No bump on head, no residual tenderness.  No vision changes, nausea, confusion, did not lose consciousness.        Cyst on palm hand since a month ago, growing, not painful        Patient Active Problem List   Diagnosis     Primary osteoarthritis of both knees     PTSD (post-traumatic stress disorder)     Chronic pain syndrome     Fibromyalgia     History of TIA (transient ischemic attack) and stroke     Major depressive disorder, recurrent episode, moderate (H)     Urge incontinence of urine     Prolapse of vaginal wall     Mild intermittent asthma without complication     HTN, goal below 140/90     Hyperlipidemia LDL goal <100     Type 2 diabetes mellitus with diabetic nephropathy (H)     Allergic rhinitis     Morbid obesity with BMI of 45.0-49.9, adult (H)     History of colonic polyps     Vaginal high risk HPV DNA test positive     Hiatal hernia     DDD (degenerative disc disease), lumbar     Diabetes mellitus, type 2 (H)     History of total knee arthroplasty, left     Type 2 diabetes mellitus without retinopathy (H)     Primary osteoarthritis of right knee     Past Surgical " History:   Procedure Laterality Date     ABDOMEN SURGERY  2005     BIOPSY  1984     BREAST SURGERY  1984     C TOTAL KNEE ARTHROPLASTY Left 2017    DML at [facility]     CHOLECYSTECTOMY       COLONOSCOPY       ENT SURGERY      tonsils removed     GENITOURINARY SURGERY      a & p repair     HYSTERECTOMY, PAP STILL INDICATED      for cervical cancer     REPAIR PTOSIS       SLING TRANSVAGINAL N/A 2017    Procedure: SLING TRANSVAGINAL;  Sling ([facility]);  Surgeon: Talon Katz MD;  Location: [facility] OR       Social History     Tobacco Use     Smoking status: Former Smoker     Packs/day: 0.50     Years: 25.00     Pack years: 12.50     Types: Cigarettes     Last attempt to quit: 10/15/1991     Years since quittin.6     Smokeless tobacco: Never Used   Substance Use Topics     Alcohol use: Yes     Alcohol/week: 0.0 oz     Comment: occassionally     Family History   Problem Relation Age of Onset     Thyroid Disease Sister      Cancer Brother      Cancer Sister         breast cancer     Cerebrovascular Disease Sister 62     Other - See Comments Sister         Angioplasty 2016     Hypertension Father      Hyperlipidemia Father      Prostate Cancer Father      Cancer Father      Glaucoma Maternal Grandmother      Cancer Maternal Grandfather      Cancer Paternal Grandmother      Breast Cancer Paternal Grandmother      Cerebrovascular Disease Paternal Grandfather      Cerebrovascular Disease Maternal Half-Sister         2016     Breast Cancer Maternal Half-Sister      Asthma Maternal Half-Sister      Other Cancer Brother         Passed from cancer unsure what type     Anesthesia Reaction Daughter      Diabetes No family hx of      Macular Degeneration No family hx of          Current Outpatient Medications   Medication Sig Dispense Refill     ACCU-CHEK DAMIEN PLUS test strip TEST BLOOD SUGARS TWICE DAILY 200 strip 0     ACE NOT PRESCRIBED, INTENTIONAL, Reported on 5/10/2017 0 each 0      ASPIRIN PO Take 81 mg by mouth daily       atorvastatin (LIPITOR) 40 MG tablet Take 1 tablet (40 mg) by mouth daily 90 tablet 1     blood glucose monitoring (NO BRAND SPECIFIED) meter device kit Use to test blood sugar 1 times daily or as directed. Please dispense insurance preferred. 1 kit 0     buPROPion (WELLBUTRIN SR) 100 MG 12 hr tablet Take 1 tablet (100 mg) by mouth 2 times daily 180 tablet 1     citalopram (CELEXA) 40 MG tablet TAKE 1 TABLET(40 MG) BY MOUTH DAILY 90 tablet 1     cyclobenzaprine (FLEXERIL) 5 MG tablet Take 2 tablets (10 mg) by mouth 3 times daily as needed for muscle spasms 60 tablet 0     diclofenac (VOLTAREN) 50 MG EC tablet TAKE 1 TABLET(50 MG) BY MOUTH TWICE DAILY AS NEEDED FOR MODERATE PAIN 180 tablet 0     esomeprazole (NEXIUM) 40 MG DR capsule TAKE 1 CAPSULE(40 MG) BY MOUTH EVERY MORNING 30 TO 60 MINUTES BEFORE BREAKFAST 90 capsule 1     fluticasone (VERAMYST) 27.5 MCG/SPRAY spray Spray 2 sprays into both nostrils daily 10 g 11     gabapentin (NEURONTIN) 300 MG capsule Take 1 capsule (300 mg) by mouth 2 times daily 180 capsule 1     glipiZIDE-metFORMIN (METAGLIP) 5-500 MG tablet TAKE 1 TABLET BY MOUTH ONCE DAILY BEFORE A MEAL 90 tablet 0     HYDROcodone-acetaminophen (NORCO) 5-325 MG tablet TK 1 T PO Q 4 TO 6 H  0     insulin pen needle (NOVOFINE) 32G X 6 MM miscellaneous USE ONCE DAILY AS DIRECTED 100 each 3     losartan (COZAAR) 50 MG tablet TAKE 1 TABLET(50 MG) BY MOUTH DAILY 90 tablet 0     melatonin 3 MG tablet TAKE 1 TABLET BY MOUTH ONCE AT BEDTIME AS NEEDED FOR SLEEP 30 tablet 0     metoprolol succinate ER (TOPROL-XL) 25 MG 24 hr tablet TAKE 1 TABLET(25 MG) BY MOUTH DAILY 90 tablet 0     montelukast (SINGULAIR) 10 MG tablet TAKE 1 TABLET BY MOUTH EVERY NIGHT AT BEDTIME 30 tablet 0     NYSTOP 253301 UNIT/GM powder APPLY TOPICALLY TWICE DAILY. 60 g 2     order for DME Equipment being ordered: compression stockings 15-20 mm hg knee high 2 each 1     order for DME Diabetic test  "strips and lancets per insurance formulary Check blood sugar 2 times daily 3 Month 3     polyethylene glycol (MIRALAX/GLYCOLAX) powder Take 17 g by mouth daily DISSOLVE 17 GRAMS IN LIQUID AND DRINK DAILY AS DIRECTED 510 g 6     solifenacin (VESICARE) 10 MG tablet TAKE 1 TABLET(10 MG) BY MOUTH DAILY 90 tablet 0     VENTOLIN  (90 Base) MCG/ACT inhaler INHALE 2 PUFFS INTO THE LUNGS EVERY 6 HOURS 18 g 0     VICTOZA PEN 18 MG/3ML soln ADMINISTER 1.8 MG UNDER THE SKIN DAILY 30 mL 0     amoxicillin (AMOXIL) 500 MG capsule TAKE 4 CAPSULES(2000 MG) BY MOUTH 1 TIME FOR 1 DOSE (Patient not taking: Reported on 6/3/2019) 4 capsule 0     Allergies   Allergen Reactions     Milk Protein Extract GI Disturbance     lactose intolerance.  Can tolerate milk products if uses lactaid     Bee Venom Swelling     Latex      Lisinopril Cough     Onion GI Disturbance     Vomiting, feels like throat is clogged     Aloe Rash     pain     Chlorine Rash         Reviewed and updated as needed this visit by Provider  Tobacco  Allergies  Meds  Med Hx  Surg Hx  Fam Hx  Soc Hx        Review of Systems   ROS COMP: Constitutional, HEENT, cardiovascular, pulmonary, GI, , musculoskeletal, neuro, skin, endocrine and psych systems are negative, except as otherwise noted.      Objective    /60 (BP Location: Left arm, Patient Position: Chair, Cuff Size: Adult Large)   Pulse 73   Temp 97.9  F (36.6  C) (Oral)   Ht 1.702 m (5' 7\")   Wt 139.7 kg (308 lb)   SpO2 96%   BMI 48.24 kg/m    Body mass index is 48.24 kg/m .  Physical Exam   GENERAL: healthy, alert and no distress  EYES: Eyes grossly normal to inspection, PERRL and conjunctivae and sclerae normal  HENT: ear canals and TM's normal, nose and mouth without ulcers or lesions  NECK: no adenopathy, no asymmetry, masses, or scars and thyroid normal to palpation  RESP: lungs clear to auscultation - no rales, rhonchi or wheezes  CV: regular rate and rhythm, normal S1 S2, no S3 or S4, no " "murmur, click or rub, no peripheral edema and peripheral pulses strong  ABDOMEN: soft, nontender, no hepatosplenomegaly, no masses and bowel sounds normal  MS: bilateral 2+ pitting LE edema to mid shin with some hyperpigmentation noted.  No unilateral swelling, redness, or erythema.  ROM of BLE normal and painless.  LEFT HAND:  Firm, nonmobile nodule noted on palmar aspect of hand at base of 4th MCP.  Non tender  NECK:  Normal ROM, mild right cervical paraspinal muscle spasm  SKIN: no suspicious lesions or rashes  NEURO: Normal strength and tone, mentation intact and speech normal  BACK: no CVA tenderness, no paralumbar tenderness    Diagnostic Test Results:  Xray - pending of left hand        Assessment & Plan     1. Neck pain  With mild muscle spasm.  Can try below.  If still in pain in 2 weeks, recommend physical therapy.    - cyclobenzaprine (FLEXERIL) 5 MG tablet; Take 2 tablets (10 mg) by mouth 3 times daily as needed for muscle spasms  Dispense: 60 tablet; Refill: 0    2. Bilateral lower extremity edema  Likely related to venous insufficiency.  Advised below, elevation, exercise, weight loss.    - order for DME; Equipment being ordered: compression stockings 15-20 mm hg knee high  Dispense: 2 each; Refill: 1    3. Cyst of joint of left hand  Further plan pending results below.   - XR Hand Left G/E 3 Views; Future     BMI:   Estimated body mass index is 48.24 kg/m  as calculated from the following:    Height as of this encounter: 1.702 m (5' 7\").    Weight as of this encounter: 139.7 kg (308 lb).   Weight management plan: Discussed healthy diet and exercise guidelines    Patient aware of need for physical/pap- scheduled at visit.  Has history of ALVINO (including cervix) for cervical cancer.  Still needs pap.      Patient Instructions     For the neck:  Can do flexeril up to three times a day as needed- causes drowsiness so make sure you do not drive while taking.  Also do the exercises below.  If still in pain " in 2-3 weeks, call for physical therapy referral    For feet, recommend compression stockings (fill at [employer]) and elevation, continue walking    For cyst on left hand, will see what xray shows and follow up accordingly.      Patient Education     Reach and Hold Exercise       Do this exercise on your hands and knees. Keep your knees under your hips and your hands under your shoulders. Keep your spine in a neutral position (not arched or sagging). Keep your ears in line with your shoulders. Hold for a few seconds before starting the exercise:  1. Tighten your belly muscles and raise one arm straight in front of you, palm down. Hold for 5 seconds, then lower. Repeat 5 times.  2. Do the exercise again, this time lifting your arm to the side. Repeat 5 times.  3. Do the exercise again, this time lifting your arm backward, palm up. Repeat 5 times.  Switch sides and do each exercise with the other arm.  Date Last Reviewed: 11/1/2017 2000-2018 [employer]. [address] [place] [address]. All rights reserved. This information is not intended as a substitute for professional medical care. Always follow your healthcare professional's instructions.           Patient Education     Shoulder and Upper Back Stretch  To start, stand tall with your ears, shoulders, and hips in line. Your feet should be slightly apart, positioned just under your hips. Focus your eyes directly in front of you.  this position for a few seconds before starting your exercise. This helps increase your awareness of proper posture.          Reach overhead and slightly back with both arms. Keep your shoulders and neck aligned and your elbows behind your shoulders:    With your palms facing the ceiling, turn your fingers inward.    Take a deep breath. Breathe out, and lower your elbows toward your buttocks. Hold for 5 seconds, then return to starting position.    Repeat 3 times.  Date Last Reviewed:  11/1/2017 2000-2018 [employer]. [address] [place] [address]. All rights reserved. This information is not intended as a substitute for professional medical care. Always follow your healthcare professional's instructions.           Patient Education     Shoulder Clock Exercise    To start, stand tall with your ears, shoulders, and hips in line. Your feet should be slightly apart, positioned just under your hips. Focus your eyes directly in front of you.  this position for a few seconds before starting your exercise. This helps increase your awareness of proper posture.    Imagine that your right shoulder is the center of a clock. With the outer point of your shoulder, roll it around to slowly trace the outer edge of the clock.    Move clockwise first, then counterclockwise.    Repeat 3 to 5 times. Switch shoulders.  Date Last Reviewed: 3/1/2018    5152-2542 [employer]. [address] [place] [address]. All rights reserved. This information is not intended as a substitute for professional medical care. Always follow your healthcare professional's instructions.           Patient Education     Shoulder Shrug Exercise    To start, sit in a chair with your feet flat on the floor. Shift your weight slightly forward to avoid rounding your back. Relax. Keep your ears, shoulders, and hips aligned:    Raise both of your shoulders as high as you can, as if you were trying to touch them to your ears. Keep your head and neck still and relaxed.    Hold for a count of 10. Release.    Repeat 5 times.  For your safety, check with your healthcare provider before starting an exercise program.   Date Last Reviewed: 11/1/2017 2000-2018 [employer]. [address] [place] [address]. All rights reserved. This information is not intended as a substitute for professional medical care. Always follow your healthcare professional's instructions.            Patient Education     Shoulder Squeeze Exercise    To start, sit in a chair with your feet flat on the floor. Shift your weight slightly forward to avoid rounding your back. Relax. Keep your ears, shoulders, and hips aligned:    Raise your arms to shoulder height, elbows bent and palms forward.    Move your arms back, squeezing your shoulder blades together.    Hold for 10 seconds. Return to starting position.     Repeat 5 times.   For your safety, check with your healthcare provider before starting an exercise program.   Date Last Reviewed: 11/1/2017 2000-2018 [employer]. [address] [place] [address]. All rights reserved. This information is not intended as a substitute for professional medical care. Always follow your healthcare professional's instructions.           Patient Education     Shoulder Exercises    To start, sit in a chair with your feet flat on the floor. Your weight should be slightly forward so that you re balanced evenly on your buttocks. Relax your shoulders and keep your head level. Avoid arching your back or rounding your shoulders. Using a chair with arms may help you keep your balance.    Raise your arms, elbows bent, to shoulder height.    Slowly move your forearms together. Hold for 5 seconds.    Return to starting position. Repeat 5 times.  Date Last Reviewed: 3/1/2018    7397-7884 [employer]. [address] [place] [address]. All rights reserved. This information is not intended as a substitute for professional medical care. Always follow your healthcare professional's instructions.           Patient Education     Neck Exercises: Neck Flex  To start, sit in a chair with your feet flat on the floor. Your weight should be slightly forward so that you re balanced evenly on your buttocks. Relax your shoulders and keep your head level. Avoid arching your back or rounding your shoulders. Using a chair with arms may help you keep your  balance:    Rest the back of your left hand against your lower back. Place your right palm on the top of your head.    Gently pull your head forward and down until you feel a stretch in the muscles in the back of your neck. Don t force the motion.    Hold for 20 seconds, then return to starting position. Switch arms.    Repeat 5 to 10 times.    Date Last Reviewed: 3/1/2018    0415-0374 [employer]. [address] [place] [address], [place] [address]. All rights reserved. This information is not intended as a substitute for professional medical care. Always follow your healthcare professional's instructions.               Return in about 3 weeks (around 6/24/2019), or if symptoms worsen or fail to improve.    CARMEN Bueno [facility]

## 2019-06-04 ENCOUNTER — OFFICE VISIT (OUTPATIENT)
Dept: FAMILY MEDICINE | Facility: CLINIC | Age: 59
End: 2019-06-04
Payer: MEDICARE

## 2019-06-04 ENCOUNTER — DOCUMENTATION ONLY (OUTPATIENT)
Dept: FAMILY MEDICINE | Facility: CLINIC | Age: 59
End: 2019-06-04

## 2019-06-04 ENCOUNTER — OFFICE VISIT (OUTPATIENT)
Dept: ORTHOPEDICS | Facility: CLINIC | Age: 59
End: 2019-06-04
Payer: MEDICARE

## 2019-06-04 VITALS
WEIGHT: 293 LBS | BODY MASS INDEX: 44.41 KG/M2 | OXYGEN SATURATION: 99 % | DIASTOLIC BLOOD PRESSURE: 58 MMHG | SYSTOLIC BLOOD PRESSURE: 119 MMHG | HEIGHT: 68 IN | HEART RATE: 80 BPM

## 2019-06-04 VITALS
RESPIRATION RATE: 16 BRPM | HEART RATE: 80 BPM | SYSTOLIC BLOOD PRESSURE: 119 MMHG | WEIGHT: 293 LBS | BODY MASS INDEX: 44.41 KG/M2 | TEMPERATURE: 97.9 F | HEIGHT: 68 IN | DIASTOLIC BLOOD PRESSURE: 58 MMHG | OXYGEN SATURATION: 99 %

## 2019-06-04 DIAGNOSIS — Z11.4 ENCOUNTER FOR SCREENING FOR HIV: ICD-10-CM

## 2019-06-04 DIAGNOSIS — N39.46 MIXED INCONTINENCE: ICD-10-CM

## 2019-06-04 DIAGNOSIS — I25.2 OLD MYOCARDIAL INFARCTION: ICD-10-CM

## 2019-06-04 DIAGNOSIS — Z00.01 ENCOUNTER FOR ROUTINE ADULT MEDICAL EXAM WITH ABNORMAL FINDINGS: Primary | ICD-10-CM

## 2019-06-04 DIAGNOSIS — J45.20 MILD INTERMITTENT ASTHMA WITHOUT COMPLICATION: ICD-10-CM

## 2019-06-04 DIAGNOSIS — Z12.4 SCREENING FOR MALIGNANT NEOPLASM OF CERVIX: ICD-10-CM

## 2019-06-04 DIAGNOSIS — E11.9 TYPE 2 DIABETES MELLITUS WITHOUT COMPLICATION, WITHOUT LONG-TERM CURRENT USE OF INSULIN (H): Chronic | ICD-10-CM

## 2019-06-04 DIAGNOSIS — E78.5 HYPERLIPIDEMIA LDL GOAL <100: Chronic | ICD-10-CM

## 2019-06-04 DIAGNOSIS — I10 HTN, GOAL BELOW 140/90: Chronic | ICD-10-CM

## 2019-06-04 DIAGNOSIS — M17.11 PRIMARY OSTEOARTHRITIS OF RIGHT KNEE: Primary | ICD-10-CM

## 2019-06-04 DIAGNOSIS — F33.1 MAJOR DEPRESSIVE DISORDER, RECURRENT EPISODE, MODERATE (H): ICD-10-CM

## 2019-06-04 DIAGNOSIS — E11.21 TYPE 2 DIABETES MELLITUS WITH DIABETIC NEPHROPATHY, WITHOUT LONG-TERM CURRENT USE OF INSULIN (H): ICD-10-CM

## 2019-06-04 DIAGNOSIS — L98.9 LESION OF SKIN OF FACE: ICD-10-CM

## 2019-06-04 DIAGNOSIS — K59.09 CHRONIC CONSTIPATION: ICD-10-CM

## 2019-06-04 LAB
CHOLEST SERPL-MCNC: 159 MG/DL
HBA1C MFR BLD: 6.7 % (ref 0–5.6)
HDLC SERPL-MCNC: 56 MG/DL
HIV 1+2 AB+HIV1 P24 AG SERPL QL IA: NONREACTIVE
LDLC SERPL CALC-MCNC: 78 MG/DL
NONHDLC SERPL-MCNC: 103 MG/DL
TRIGL SERPL-MCNC: 126 MG/DL

## 2019-06-04 PROCEDURE — 87389 HIV-1 AG W/HIV-1&-2 AB AG IA: CPT | Performed by: NURSE PRACTITIONER

## 2019-06-04 PROCEDURE — 83036 HEMOGLOBIN GLYCOSYLATED A1C: CPT | Performed by: NURSE PRACTITIONER

## 2019-06-04 PROCEDURE — G0476 HPV COMBO ASSAY CA SCREEN: HCPCS | Performed by: NURSE PRACTITIONER

## 2019-06-04 PROCEDURE — 87624 HPV HI-RISK TYP POOLED RSLT: CPT | Performed by: NURSE PRACTITIONER

## 2019-06-04 PROCEDURE — 20610 DRAIN/INJ JOINT/BURSA W/O US: CPT | Mod: RT | Performed by: ORTHOPAEDIC SURGERY

## 2019-06-04 PROCEDURE — G0145 SCR C/V CYTO,THINLAYER,RESCR: HCPCS | Performed by: NURSE PRACTITIONER

## 2019-06-04 PROCEDURE — 80061 LIPID PANEL: CPT | Performed by: NURSE PRACTITIONER

## 2019-06-04 PROCEDURE — 99396 PREV VISIT EST AGE 40-64: CPT | Performed by: NURSE PRACTITIONER

## 2019-06-04 PROCEDURE — 36415 COLL VENOUS BLD VENIPUNCTURE: CPT | Performed by: NURSE PRACTITIONER

## 2019-06-04 RX ORDER — DOCUSATE SODIUM 100 MG/1
100 CAPSULE, LIQUID FILLED ORAL 2 TIMES DAILY
Qty: 60 CAPSULE | Refills: 3 | Status: SHIPPED | OUTPATIENT
Start: 2019-06-04 | End: 2019-10-06

## 2019-06-04 RX ORDER — BUPROPION HYDROCHLORIDE 100 MG/1
100 TABLET, EXTENDED RELEASE ORAL 2 TIMES DAILY
Qty: 180 TABLET | Refills: 1 | Status: SHIPPED | OUTPATIENT
Start: 2019-06-04 | End: 2019-12-05

## 2019-06-04 RX ORDER — SOLIFENACIN SUCCINATE 10 MG/1
10 TABLET, FILM COATED ORAL DAILY
Qty: 90 TABLET | Refills: 1 | Status: SHIPPED | OUTPATIENT
Start: 2019-06-04 | End: 2020-01-27

## 2019-06-04 RX ORDER — MONTELUKAST SODIUM 10 MG/1
1 TABLET ORAL AT BEDTIME
Qty: 90 TABLET | Refills: 1 | Status: SHIPPED | OUTPATIENT
Start: 2019-06-04 | End: 2019-11-15

## 2019-06-04 RX ORDER — LOSARTAN POTASSIUM 50 MG/1
50 TABLET ORAL DAILY
Qty: 90 TABLET | Refills: 1 | Status: SHIPPED | OUTPATIENT
Start: 2019-06-04 | End: 2019-11-15

## 2019-06-04 RX ORDER — LIRAGLUTIDE 6 MG/ML
1.8 INJECTION SUBCUTANEOUS DAILY
Qty: 30 ML | Refills: 3 | Status: SHIPPED | OUTPATIENT
Start: 2019-06-04 | End: 2019-12-05

## 2019-06-04 RX ORDER — CITALOPRAM HYDROBROMIDE 40 MG/1
TABLET ORAL
Qty: 90 TABLET | Refills: 1 | Status: SHIPPED | OUTPATIENT
Start: 2019-06-04 | End: 2019-11-15

## 2019-06-04 RX ORDER — METHYLPREDNISOLONE ACETATE 80 MG/ML
80 INJECTION, SUSPENSION INTRA-ARTICULAR; INTRALESIONAL; INTRAMUSCULAR; SOFT TISSUE
Status: DISCONTINUED | OUTPATIENT
Start: 2019-06-04 | End: 2019-12-05

## 2019-06-04 RX ORDER — LIDOCAINE HYDROCHLORIDE 10 MG/ML
5 INJECTION, SOLUTION INFILTRATION; PERINEURAL
Status: DISCONTINUED | OUTPATIENT
Start: 2019-06-04 | End: 2019-12-05

## 2019-06-04 RX ORDER — ATORVASTATIN CALCIUM 40 MG/1
40 TABLET, FILM COATED ORAL DAILY
Qty: 90 TABLET | Refills: 1 | Status: SHIPPED | OUTPATIENT
Start: 2019-06-04 | End: 2019-12-05

## 2019-06-04 RX ORDER — GLIPIZIDE AND METFORMIN HCL 5; 500 MG/1; MG/1
TABLET, FILM COATED ORAL
Qty: 90 TABLET | Refills: 1 | Status: SHIPPED | OUTPATIENT
Start: 2019-06-04 | End: 2019-11-15

## 2019-06-04 RX ORDER — METOPROLOL SUCCINATE 25 MG/1
25 TABLET, EXTENDED RELEASE ORAL DAILY
Qty: 90 TABLET | Refills: 1 | Status: SHIPPED | OUTPATIENT
Start: 2019-06-04 | End: 2019-12-05

## 2019-06-04 RX ADMIN — LIDOCAINE HYDROCHLORIDE 5 ML: 10 INJECTION, SOLUTION INFILTRATION; PERINEURAL at 08:22

## 2019-06-04 RX ADMIN — METHYLPREDNISOLONE ACETATE 80 MG: 80 INJECTION, SUSPENSION INTRA-ARTICULAR; INTRALESIONAL; INTRAMUSCULAR; SOFT TISSUE at 08:22

## 2019-06-04 ASSESSMENT — ANXIETY QUESTIONNAIRES
6. BECOMING EASILY ANNOYED OR IRRITABLE: NEARLY EVERY DAY
1. FEELING NERVOUS, ANXIOUS, OR ON EDGE: MORE THAN HALF THE DAYS
3. WORRYING TOO MUCH ABOUT DIFFERENT THINGS: SEVERAL DAYS
GAD7 TOTAL SCORE: 11
5. BEING SO RESTLESS THAT IT IS HARD TO SIT STILL: SEVERAL DAYS
7. FEELING AFRAID AS IF SOMETHING AWFUL MIGHT HAPPEN: SEVERAL DAYS
2. NOT BEING ABLE TO STOP OR CONTROL WORRYING: SEVERAL DAYS
GAD7 TOTAL SCORE: 7
IF YOU CHECKED OFF ANY PROBLEMS ON THIS QUESTIONNAIRE, HOW DIFFICULT HAVE THESE PROBLEMS MADE IT FOR YOU TO DO YOUR WORK, TAKE CARE OF THINGS AT HOME, OR GET ALONG WITH OTHER PEOPLE: SOMEWHAT DIFFICULT

## 2019-06-04 ASSESSMENT — ASTHMA QUESTIONNAIRES: ACT_TOTALSCORE: 19

## 2019-06-04 ASSESSMENT — PAIN SCALES - GENERAL: PAINLEVEL: NO PAIN (0)

## 2019-06-04 ASSESSMENT — MIFFLIN-ST. JEOR
SCORE: 1988.41
SCORE: 1988.46

## 2019-06-04 ASSESSMENT — PATIENT HEALTH QUESTIONNAIRE - PHQ9
SUM OF ALL RESPONSES TO PHQ QUESTIONS 1-9: 16
5. POOR APPETITE OR OVEREATING: MORE THAN HALF THE DAYS

## 2019-06-04 NOTE — PROGRESS NOTES
"  SUBJECTIVE:   Melody Ferguson is a 58 year old female who presents for Preventive Visit.  click delete button to remove this line now  click delete button to remove this line now  Are you in the first 12 months of your Medicare Part B coverage?  No    Physical Health:    In general, how would you rate your overall physical health? poor    Outside of work, how many days during the week do you exercise? 2-3 days/week    Outside of work, approximately how many minutes a day do you exercise?30-45 minutes    If you drink alcohol do you typically have >3 drinks per day or >7 drinks per week? No    Do you usually eat at least 4 servings of fruit and vegetables a day, include whole grains & fiber and avoid regularly eating high fat or \"junk\" foods? Yes    Do you have any problems taking medications regularly?  No    Do you have any side effects from medications? none    Needs assistance for the following daily activities: no assistance needed    Which of the following safety concerns are present in your home?  none identified     Hearing impairment: No    In the past 6 months, have you been bothered by leaking of urine? yes    Mental Health:    In general, how would you rate your overall mental or emotional health? fair  PHQ-2 Score:      Do you feel safe in your environment? Yes    Do you have a Health Care Directive? No: Advance care planning reviewed with patient; information given to patient to review.    Additional concerns to address?  No    Fall risk:  Fallen 2 or more times in the past year?: No  Any fall with injury in the past year?: Yes  Timed Up and Go Test (>13.5 is fall risk; contact physician) : 11  click delete button to remove this line now  Cognitive Screenin) Repeat 3 items (Leader, Season, Table)    2) Clock draw: NORMAL  3) 3 item recall: Recalls 1 object   Results: NORMAL clock, 1-2 items recalled: COGNITIVE IMPAIRMENT LESS LIKELY    Mini-CogTM Copyright S Stephen. Licensed by the author for use " in [facility]; reprinted with permission (soob@[facility]). All rights reserved.      Do you have sleep apnea, excessive snoring or daytime drowsiness?: yes      Reviewed and updated as needed this visit by clinical staff  Tobacco  Allergies  Meds         Reviewed and updated as needed this visit by Provider        Social History     Tobacco Use     Smoking status: Former Smoker     Packs/day: 0.50     Years: 25.00     Pack years: 12.50     Types: Cigarettes     Last attempt to quit: 10/15/1991     Years since quittin.6     Smokeless tobacco: Never Used   Substance Use Topics     Alcohol use: Yes     Alcohol/week: 0.0 oz     Comment: occassionally                           Current providers sharing in care for this patient include:   Patient Care Team:  Elizabeth Lamas MD as PCP - General (Family Practice)  Tanner Dave MD as MD (Family Medicine - Sports Medicine)  Patrick Rahman DPM (Podiatry)  Elizabeth Lamas MD as Assigned PCP    The following health maintenance items are reviewed in Epic and correct as of today:  Health Maintenance   Topic Date Due     ASTHMA CONTROL TEST  1960     ADVANCED DIRECTIVE PLANNING  1960     DEPRESSION ACTION PLAN  1960     HIV SCREENING  1973     PAP  2018     DIABETIC FOOT EXAM  2019     URINE DRUG SCREEN  2019     ZOSTER IMMUNIZATION (2 of 2) 2019     LIPID  2019     A1C  2019     BMP  10/30/2019     MICROALBUMIN  10/30/2019     DANY ASSESSMENT  10/30/2019     PHQ-9  2019     COLONOSCOPY  2020     DIABETIC EYE EXAM  2020     MAMMO SCREENING  2020     TSH W/FREE T4 REFLEX  10/30/2020     MEDICARE ANNUAL WELLNESS VISIT  2023     DTAP/TDAP/TD IMMUNIZATION (2 - Td) 08/15/2026     HEPATITIS C SCREENING  Completed     ASTHMA ACTION PLAN  Completed     INFLUENZA VACCINE  Completed     IPV IMMUNIZATION  Aged Out     MENINGITIS IMMUNIZATION  Aged Out     BP Readings from  Last 3 Encounters:   06/04/19 119/58   06/04/19 119/58   06/03/19 120/60    Wt Readings from Last 3 Encounters:   06/04/19 137.4 kg (303 lb)   06/04/19 137.4 kg (303 lb)   06/03/19 139.7 kg (308 lb)                  Patient Active Problem List   Diagnosis     Primary osteoarthritis of both knees     PTSD (post-traumatic stress disorder)     Chronic pain syndrome     Fibromyalgia     History of TIA (transient ischemic attack) and stroke     Major depressive disorder, recurrent episode, moderate (H)     Urge incontinence of urine     Prolapse of vaginal wall     Mild intermittent asthma without complication     HTN, goal below 140/90     Hyperlipidemia LDL goal <100     Type 2 diabetes mellitus with diabetic nephropathy (H)     Allergic rhinitis     Morbid obesity with BMI of 45.0-49.9, adult (H)     History of colonic polyps     Vaginal high risk HPV DNA test positive     Hiatal hernia     DDD (degenerative disc disease), lumbar     Diabetes mellitus, type 2 (H)     History of total knee arthroplasty, left     Type 2 diabetes mellitus without retinopathy (H)     Primary osteoarthritis of right knee     History of cervical cancer     Status post total abdominal hysterectomy and bilateral salpingo-oophorectomy (ALVINO-BSO)     Past Surgical History:   Procedure Laterality Date     ABDOMEN SURGERY  05/20/2005     BIOPSY  1984     BREAST SURGERY  1984     C [place]THROPLASTY [place]26[place]2017    [place] [facility] [place]LECYSTECTOMY  2000     COLONOSCOPY       ENT SURGERY  2008    tonsils removed     GENITOURINARY SURGERY  2005    a & p repair     HYSTERECTOMY, PAP STILL INDICATED  2005    for cervical cancer     REPAIR PTOSIS       SLING TRANSVAGINAL N/A 7/14/2017    Procedure: SLING TRANSVAGINAL;  Sling ([place]);  Surgeon: Talon Katz MD;  Location: [facility] OR       Social History     Tobacco Use     Smoking status: Former Smoker     Packs/day: 0.50     Years: 25.00     Pack years: 12.50     Types: Cigarettes     Last  attempt to quit: 10/15/1991     Years since quittin.6     Smokeless tobacco: Never Used   Substance Use Topics     Alcohol use: Yes     Alcohol/week: 0.0 oz     Comment: occassionally     Family History   Problem Relation Age of Onset     Thyroid Disease Sister      Cancer Brother      Cancer Sister         breast cancer     Cerebrovascular Disease Sister 62     Other - See Comments Sister         Angioplasty 2016     Hypertension Father      Hyperlipidemia Father      Prostate Cancer Father      Cancer Father      Glaucoma Maternal Grandmother      Cancer Maternal Grandfather      Cancer Paternal Grandmother      Breast Cancer Paternal Grandmother      Cerebrovascular Disease Paternal Grandfather      Cerebrovascular Disease Maternal Half-Sister         2016     Breast Cancer Maternal Half-Sister      Asthma Maternal Half-Sister      Other Cancer Brother         Passed from cancer unsure what type     Anesthesia Reaction Daughter      Diabetes No family hx of      Macular Degeneration No family hx of          Current Outpatient Medications   Medication Sig Dispense Refill     ACCU-CHEK DAMIEN PLUS test strip TEST BLOOD SUGARS TWICE DAILY 200 strip 0     ACE NOT PRESCRIBED, INTENTIONAL, Reported on 5/10/2017 0 each 0     ASPIRIN PO Take 81 mg by mouth daily       atorvastatin (LIPITOR) 40 MG tablet Take 1 tablet (40 mg) by mouth daily 90 tablet 1     blood glucose monitoring (NO BRAND SPECIFIED) meter device kit Use to test blood sugar 1 times daily or as directed. Please dispense insurance preferred. 1 kit 0     buPROPion (WELLBUTRIN SR) 100 MG 12 hr tablet Take 1 tablet (100 mg) by mouth 2 times daily 180 tablet 1     citalopram (CELEXA) 40 MG tablet TAKE 1 TABLET(40 MG) BY MOUTH DAILY 90 tablet 1     cyclobenzaprine (FLEXERIL) 5 MG tablet Take 2 tablets (10 mg) by mouth 3 times daily as needed for muscle spasms 60 tablet 0     diclofenac (VOLTAREN) 50 MG EC tablet TAKE 1 TABLET(50 MG) BY MOUTH TWICE  DAILY AS NEEDED FOR MODERATE PAIN 180 tablet 0     esomeprazole (NEXIUM) 40 MG DR capsule TAKE 1 CAPSULE(40 MG) BY MOUTH EVERY MORNING 30 TO 60 MINUTES BEFORE BREAKFAST 90 capsule 1     fluticasone (VERAMYST) 27.5 MCG/SPRAY spray Spray 2 sprays into both nostrils daily 10 g 11     gabapentin (NEURONTIN) 300 MG capsule Take 1 capsule (300 mg) by mouth 2 times daily 180 capsule 1     glipiZIDE-metFORMIN (METAGLIP) 5-500 MG tablet TAKE 1 TABLET BY MOUTH ONCE DAILY BEFORE A MEAL 90 tablet 1     insulin pen needle (NOVOFINE) 32G X 6 MM miscellaneous USE ONCE DAILY AS DIRECTED 100 each 3     liraglutide (VICTOZA PEN) 18 MG/3ML solution Inject 1.8 mg Subcutaneous daily 30 mL 3     losartan (COZAAR) 50 MG tablet Take 1 tablet (50 mg) by mouth daily 90 tablet 1     melatonin 3 MG tablet TAKE 1 TABLET BY MOUTH ONCE AT BEDTIME AS NEEDED FOR SLEEP 30 tablet 0     metoprolol succinate ER (TOPROL-XL) 25 MG 24 hr tablet Take 1 tablet (25 mg) by mouth daily 90 tablet 1     montelukast (SINGULAIR) 10 MG tablet Take 1 tablet (10 mg) by mouth At Bedtime 90 tablet 1     NYSTOP 428883 UNIT/GM powder APPLY TOPICALLY TWICE DAILY. 60 g 2     order for DME Equipment being ordered: compression stockings 15-20 mm hg knee high 2 each 1     order for DME Diabetic test strips and lancets per insurance formulary Check blood sugar 2 times daily 3 Month 3     polyethylene glycol (MIRALAX/GLYCOLAX) powder Take 17 g by mouth daily DISSOLVE 17 GRAMS IN LIQUID AND DRINK DAILY AS DIRECTED 510 g 6     solifenacin (VESICARE) 10 MG tablet Take 1 tablet (10 mg) by mouth daily 90 tablet 1     VENTOLIN  (90 Base) MCG/ACT inhaler INHALE 2 PUFFS INTO THE LUNGS EVERY 6 HOURS 18 g 0     Allergies   Allergen Reactions     Milk Protein Extract GI Disturbance     lactose intolerance.  Can tolerate milk products if uses lactaid     Bee Venom Swelling     Latex      Lisinopril Cough     Onion GI Disturbance     Vomiting, feels like throat is clogged     Aloe  "Rash     pain     Chlorine Rash     Mammogram Screening: Mammogram Screening: Patient over age 50, mutual decision to screen reflected in health maintenance.  Last 3 Pap and HPV Results:      ROS:  Constitutional, HEENT, cardiovascular, pulmonary, GI, , musculoskeletal, neuro, skin, endocrine and psych systems are negative, except as otherwise noted.    OBJECTIVE:   /58 (BP Location: Left arm, Patient Position: Sitting, Cuff Size: Adult Large)   Pulse 80   Temp 97.9  F (36.6  C) (Oral)   Resp 16   Ht 1.72 m (5' 7.72\")   Wt 137.4 kg (303 lb)   SpO2 99%   BMI 46.45 kg/m   Estimated body mass index is 46.45 kg/m  as calculated from the following:    Height as of this encounter: 1.72 m (5' 7.72\").    Weight as of this encounter: 137.4 kg (303 lb).  EXAM:   GENERAL: healthy, alert and no distress  EYES: Eyes grossly normal to inspection, PERRL and conjunctivae and sclerae normal  HENT: ear canals and TM's normal, nose and mouth without ulcers or lesions  NECK: no adenopathy, no asymmetry, masses, or scars and thyroid normal to palpation  RESP: lungs clear to auscultation - no rales, rhonchi or wheezes  BREAST: normal without masses, tenderness or nipple discharge and no palpable axillary masses or adenopathy  CV: regular rate and rhythm, normal S1 S2, no S3 or S4, no murmur, click or rub, no peripheral edema and peripheral pulses strong  ABDOMEN: soft, nontender, no hepatosplenomegaly, no masses and bowel sounds normal   (female): normal female external genitalia, normal urethral meatus, vaginal mucosa pink, no ruggae noted, very tight vaginal musculature, vaginal cuff in place and normal- pap taken  MS: no gross musculoskeletal defects noted, no edema  SKIN: no suspicious lesions or rashes  NEURO: Normal strength and tone, mentation intact and speech normal  PSYCH: mentation appears normal, affect normal/bright    Diagnostic Test Results:  Results for orders placed or performed in visit on 06/04/19 " (from the past 24 hour(s))   HEMOGLOBIN A1C   Result Value Ref Range    Hemoglobin A1C 6.7 (H) 0 - 5.6 %       ASSESSMENT / PLAN:   1. Encounter for routine adult medical exam with abnormal findings    2. Type 2 diabetes mellitus without complication, without long-term current use of insulin (H)  Stable, continue current medications.  Refills provided.  Follow up 6 months.   - HEMOGLOBIN A1C    3. Type 2 diabetes mellitus with diabetic nephropathy, without long-term current use of insulin (H)  - glipiZIDE-metFORMIN (METAGLIP) 5-500 MG tablet; TAKE 1 TABLET BY MOUTH ONCE DAILY BEFORE A MEAL  Dispense: 90 tablet; Refill: 1  - losartan (COZAAR) 50 MG tablet; Take 1 tablet (50 mg) by mouth daily  Dispense: 90 tablet; Refill: 1  - liraglutide (VICTOZA PEN) 18 MG/3ML solution; Inject 1.8 mg Subcutaneous daily  Dispense: 30 mL; Refill: 3    4. Hyperlipidemia LDL goal <100  Refilled.   - Lipid panel reflex to direct LDL Fasting  - atorvastatin (LIPITOR) 40 MG tablet; Take 1 tablet (40 mg) by mouth daily  Dispense: 90 tablet; Refill: 1    5. Major depressive disorder, recurrent episode, moderate (H)  Refilled.  Stable.   - buPROPion (WELLBUTRIN SR) 100 MG 12 hr tablet; Take 1 tablet (100 mg) by mouth 2 times daily  Dispense: 180 tablet; Refill: 1  - citalopram (CELEXA) 40 MG tablet; TAKE 1 TABLET(40 MG) BY MOUTH DAILY  Dispense: 90 tablet; Refill: 1    6. HTN, goal below 140/90  At goal.  Refilled.  Follow up 6 months.   - losartan (COZAAR) 50 MG tablet; Take 1 tablet (50 mg) by mouth daily  Dispense: 90 tablet; Refill: 1    7. Old myocardial infarction  Refilled.  No symptoms.    - metoprolol succinate ER (TOPROL-XL) 25 MG 24 hr tablet; Take 1 tablet (25 mg) by mouth daily  Dispense: 90 tablet; Refill: 1    8. Mild intermittent asthma without complication  Refilled.  Doing well.    - montelukast (SINGULAIR) 10 MG tablet; Take 1 tablet (10 mg) by mouth At Bedtime  Dispense: 90 tablet; Refill: 1    9. Mixed  "incontinence  Working well.  Refilled.   - solifenacin (VESICARE) 10 MG tablet; Take 1 tablet (10 mg) by mouth daily  Dispense: 90 tablet; Refill: 1    10. Screening for malignant neoplasm of cervix  Still needs pap as had hysterectomy for cervical cancer ([place])  - Pap imaged thin layer screen with HPV - recommended age 30 - 65 years (select HPV order below)  - HPV High Risk Types DNA Cervical    11. Encounter for screening for HIV  - HIV Screening    End of Life Planning:  Patient currently has an advanced directive: No.  I have verified the patient's ablity to prepare an advanced directive/make health care decisions.  Literature was provided to assist patient in preparing an advanced directive.    COUNSELING:  Reviewed preventive health counseling, as reflected in patient instructions       Regular exercise       Healthy diet/nutrition       Vision screening       Bladder control       Fall risk prevention       Immunizations    Discussed Shingrix which we do not have available at clinic currently.            HIV screening for high risk patient       (Allyssa)menopause management    Estimated body mass index is 46.45 kg/m  as calculated from the following:    Height as of this encounter: 1.72 m (5' 7.72\").    Weight as of this encounter: 137.4 kg (303 lb).    Weight management plan: Discussed healthy diet and exercise guidelines     reports that she quit smoking about 27 years ago. Her smoking use included cigarettes. She has a 12.50 pack-year smoking history. She has never used smokeless tobacco.      Appropriate preventive services were discussed with this patient, including applicable screening as appropriate for cardiovascular disease, diabetes, osteopenia/osteoporosis, and glaucoma.  As appropriate for age/gender, discussed screening for colorectal cancer, prostate cancer, breast cancer, and cervical cancer. Checklist reviewing preventive services available has been given to the " patient.    Reviewed patients plan of care and provided an AVS. The Intermediate Care Plan ( asthma action plan, low back pain action plan, and migraine action plan) for Melody meets the Care Plan requirement. This Care Plan has been established and reviewed with the Patient.    Counseling Resources:  ATP IV Guidelines  Pooled Cohorts Equation Calculator  Breast Cancer Risk Calculator  FRAX Risk Assessment  ICSI Preventive Guidelines  Dietary Guidelines for Americans, 2010  USDA's MyPlate  ASA Prophylaxis  Lung CA Screening    CARMEN Bueno CNP  [facility]

## 2019-06-04 NOTE — PROGRESS NOTES
Large Joint Injection/Arthocentesis: R knee joint  Date/Time: 6/4/2019 8:22 AM  Performed by: Armond George MD  Authorized by: Armond George MD     Indications:  Osteoarthritis  Needle Size:  22 G  Guidance: landmark guided    Approach:  Anteromedial  Location:  Knee      Medications:  80 mg methylPREDNISolone 80 MG/ML; 5 mL lidocaine 1 %  Outcome:  Tolerated well, no immediate complications  Procedure discussed: discussed risks, benefits, and alternatives    Consent Given by:  Patient     The patient's right knee was prepped with betadine solution after verification of allergies. Area approximately 10 cm x 10 cm prepped in a sterile fashion. After injection, betadine removed with soap and water and band-aids applied.

## 2019-06-04 NOTE — PATIENT INSTRUCTIONS
Your A1c levels:  Lab Results   Component Value Date    A1C 6.7 06/04/2019    A1C 6.8 10/30/2018    A1C 6.3 04/12/2018    A1C 6.9 01/05/2018    A1C 6.1 04/18/2017   Still looks good- it is at goal so let's keep your medications the same.  We will get back to you about the cholesterol panel when it comes back later today.        Patient Education   Personalized Prevention Plan  You are due for the preventive services outlined below.  Your care team is available to assist you in scheduling these services.  If you have already completed any of these items, please share that information with your care team to update in your medical record.  Health Maintenance Due   Topic Date Due     Asthma Control Test  1960     Discuss Advance Directive Planning  1960     Depression Action Plan  1960     One-time HIV Screening  09/25/1973     PAP  12/01/2018     Diabetic Foot Exam  02/27/2019     URINE DRUG SCREEN  02/28/2019     Zoster (Shingles) Vaccine (2 of 2) 04/08/2019     Cholesterol Lab  04/12/2019     A1C Lab  04/30/2019       evangelista

## 2019-06-04 NOTE — PROGRESS NOTES
Follow up right knee primary osteoarthritis.  Last injection 2/19/19  Range of motion 5-115.    She  desires injection today of right knee(s).  Risks, benefits, potential complications and alternatives were discussed.   With the patient's consent, sterile prep was performed of right knee(s).  Right knee was injected with Depo Medrol 80 mg and lidocaine at anteromedial site.  Return to clinic as needed.

## 2019-06-04 NOTE — LETTER
My Depression Action Plan  Name: Melody Ferguson   Date of Birth 1960  Date: 6/4/2019    My doctor: Elizabeth Lamas   My clinic: [address] [id number] [phone]          GREEN    ZONE   Good Control    What it looks like:     Things are going generally well. You have normal up s and down s. You may even feel depressed from time to time, but bad moods usually last less than a day.   What you need to do:  1. Continue to care for yourself (see self care plan)  2. Check your depression survival kit and update it as needed  3. Follow your physician s recommendations including any medication.  4. Do not stop taking medication unless you consult with your physician first.           YELLOW         ZONE Getting Worse    What it looks like:     Depression is starting to interfere with your life.     It may be hard to get out of bed; you may be starting to isolate yourself from others.    Symptoms of depression are starting to last most all day and this has happened for several days.     You may have suicidal thoughts but they are not constant.   What you need to do:     1. Call your care team, your response to treatment will improve if you keep your care team informed of your progress. Yellow periods are signs an adjustment may need to be made.     2. Continue your self-care, even if you have to fake it!    3. Talk to someone in your support network    4. Open up your depression survival kit           RED    ZONE Medical Alert - Get Help    What it looks like:     Depression is seriously interfering with your life.     You may experience these or other symptoms: You can t get out of bed most days, can t work or engage in other necessary activities, you have trouble taking care of basic hygiene, or basic responsibilities, thoughts of suicide or death that will not go away, self-injurious behavior.     What you need to do:  1. Call your care team  and request a same-day appointment. If they are not available (weekends or after hours) call your local crisis line, emergency room or 911.            Depression Self Care Plan / Survival Kit    Self-Care for Depression  Here s the deal. Your body and mind are really not as separate as most people think.  What you do and think affects how you feel and how you feel influences what you do and think. This means if you do things that people who feel good do, it will help you feel better.  Sometimes this is all it takes.  There is also a place for medication and therapy depending on how severe your depression is, so be sure to consult with your medical provider and/ or Behavioral Health Consultant if your symptoms are worsening or not improving.     In order to better manage my stress, I will:    Exercise  Get some form of exercise, every day. This will help reduce pain and release endorphins, the  feel good  chemicals in your brain. This is almost as good as taking antidepressants!  This is not the same as joining a gym and then never going! (they count on that by the way ) It can be as simple as just going for a walk or doing some gardening, anything that will get you moving.      Hygiene   Maintain good hygiene (Get out of bed in the morning, Make your bed, Brush your teeth, Take a shower, and Get dressed like you were going to work, even if you are unemployed).  If your clothes don't fit try to get ones that do.    Diet  I will strive to eat foods that are good for me, drink plenty of water, and avoid excessive sugar, caffeine, alcohol, and other mood-altering substances.  Some foods that are helpful in depression are: complex carbohydrates, B vitamins, flaxseed, fish or fish oil, fresh fruits and vegetables.    Psychotherapy  I agree to participate in Individual Therapy (if recommended).    Medication  If prescribed medications, I agree to take them.  Missing doses can result in serious side effects.  I understand  that drinking alcohol, or other illicit drug use, may cause potential side effects.  I will not stop my medication abruptly without first discussing it with my provider.    Staying Connected With Others  I will stay in touch with my friends, family members, and my primary care provider/team.    Use your imagination  Be creative.  We all have a creative side; it doesn t matter if it s oil painting, sand castles, or mud pies! This will also kick up the endorphins.    Witness Beauty  (AKA stop and smell the roses) Take a look outside, even in mid-winter. Notice colors, textures. Watch the squirrels and birds.     Service to others  Be of service to others.  There is always someone else in need.  By helping others we can  get out of ourselves  and remember the really important things.  This also provides opportunities for practicing all the other parts of the program.    Humor  Laugh and be silly!  Adjust your TV habits for less news and crime-drama and more comedy.    Control your stress  Try breathing deep, massage therapy, biofeedback, and meditation. Find time to relax each day.     My support system    Clinic Contact:  Phone number:    Contact 1:  Phone number:    Contact 2:  Phone number:    Holiness/:  Phone number:    Therapist:  Phone number:    Local crisis center:    Phone number:    Other community support:  Phone number:

## 2019-06-04 NOTE — LETTER
6/4/2019         RE: Melody Ferguson  [address]  [facility] [address]        Dear Colleague,    Thank you for referring your patient, Melody Ferguson, to the [facility]. Please see a copy of my visit note below.    Large Joint Injection/Arthocentesis: R knee joint  Date/Time: 6/4/2019 8:22 AM  Performed by: Armond George MD  Authorized by: Armond George MD     Indications:  Osteoarthritis  Needle Size:  22 G  Guidance: landmark guided    Approach:  Anteromedial  Location:  Knee      Medications:  80 mg methylPREDNISolone 80 MG/ML; 5 mL lidocaine 1 %  Outcome:  Tolerated well, no immediate complications  Procedure discussed: discussed risks, benefits, and alternatives    Consent Given by:  Patient     The patient's right knee was prepped with betadine solution after verification of allergies. Area approximately 10 cm x 10 cm prepped in a sterile fashion. After injection, betadine removed with soap and water and band-aids applied.            Follow up right knee primary osteoarthritis.  Last injection 2/19/19  Range of motion 5-115.    She  desires injection today of right knee(s).  Risks, benefits, potential complications and alternatives were discussed.   With the patient's consent, sterile prep was performed of right knee(s).  Right knee was injected with Depo Medrol 80 mg and lidocaine at anteromedial site.  Return to clinic as needed.      Again, thank you for allowing me to participate in the care of your patient.        Sincerely,        Armond George MD

## 2019-06-05 ASSESSMENT — ANXIETY QUESTIONNAIRES: GAD7 TOTAL SCORE: 11

## 2019-06-05 ASSESSMENT — ASTHMA QUESTIONNAIRES: ACT_TOTALSCORE: 19

## 2019-06-06 LAB
COPATH REPORT: NORMAL
PAP: NORMAL

## 2019-06-07 LAB
FINAL DIAGNOSIS: NORMAL
HPV HR 12 DNA CVX QL NAA+PROBE: NEGATIVE
HPV16 DNA SPEC QL NAA+PROBE: NEGATIVE
HPV18 DNA SPEC QL NAA+PROBE: NEGATIVE
SPECIMEN DESCRIPTION: NORMAL
SPECIMEN SOURCE CVX/VAG CYTO: NORMAL

## 2019-06-25 NOTE — PATIENT INSTRUCTIONS
You have had a steroid injection today.  For the first 2 hours there will likely be some numbing in the joint from the lidocaine.  This is a good sign, indicating that the injection is in the right place.  In 2 hours the lidocaine will wear off, and the joint will hurt like you had a shot.  Each day the cortisone makes it feel better.  It reaches peak effect in 2 weeks.  We expect it to last for 3 months.  You may resume regular activity when you feel ready.  If you are diabetic, your glucoses will be quite high for several days.    
To stay in bed

## 2019-07-16 DIAGNOSIS — M54.42 ACUTE LEFT-SIDED LOW BACK PAIN WITH LEFT-SIDED SCIATICA: ICD-10-CM

## 2019-07-16 RX ORDER — GABAPENTIN 300 MG/1
CAPSULE ORAL
Qty: 180 CAPSULE | Refills: 0 | Status: SHIPPED | OUTPATIENT
Start: 2019-07-16 | End: 2019-10-06

## 2019-07-16 NOTE — TELEPHONE ENCOUNTER
Requested Prescriptions   Pending Prescriptions Disp Refills     gabapentin (NEURONTIN) 300 MG capsule [Pharmacy Med Name: GABAPENTIN 300MG CAPSULES]        Last Written Prescription Date:  06/19/18  Last Fill Quantity: 180,   # refills: 1  Last Office Visit: 06/04/19-Mikala  Future Office visit:    Next 5 appointments (look out 90 days)    Sep 09, 2019  7:30 AM CDT  Return Visit with Tobi Smallwood [facility]) [address] [place] [id number] [phone]           Routing refill request to provider for review/approval because:  Drug not on the [facility], [facility] or [facility] refill protocol or controlled substance 180 capsule 0     Sig: TAKE 1 CAPSULE(300 MG) BY MOUTH TWICE DAILY       There is no refill protocol information for this order

## 2019-08-06 ENCOUNTER — OFFICE VISIT (OUTPATIENT)
Dept: ORTHOPEDICS | Facility: CLINIC | Age: 59
End: 2019-08-06
Payer: MEDICARE

## 2019-08-06 VITALS
DIASTOLIC BLOOD PRESSURE: 81 MMHG | BODY MASS INDEX: 44.41 KG/M2 | RESPIRATION RATE: 16 BRPM | WEIGHT: 293 LBS | HEIGHT: 68 IN | HEART RATE: 78 BPM | SYSTOLIC BLOOD PRESSURE: 112 MMHG

## 2019-08-06 DIAGNOSIS — M72.0 DUPUYTREN'S CONTRACTURE: ICD-10-CM

## 2019-08-06 DIAGNOSIS — M17.11 PRIMARY OSTEOARTHRITIS OF RIGHT KNEE: Primary | ICD-10-CM

## 2019-08-06 PROCEDURE — 99213 OFFICE O/P EST LOW 20 MIN: CPT | Mod: 25 | Performed by: ORTHOPAEDIC SURGERY

## 2019-08-06 PROCEDURE — 20610 DRAIN/INJ JOINT/BURSA W/O US: CPT | Mod: RT | Performed by: ORTHOPAEDIC SURGERY

## 2019-08-06 RX ORDER — METHYLPREDNISOLONE ACETATE 80 MG/ML
80 INJECTION, SUSPENSION INTRA-ARTICULAR; INTRALESIONAL; INTRAMUSCULAR; SOFT TISSUE ONCE
Qty: 1 ML | Refills: 0 | OUTPATIENT
Start: 2019-08-06 | End: 2019-12-05

## 2019-08-06 ASSESSMENT — MIFFLIN-ST. JEOR: SCORE: 1975.32

## 2019-08-06 NOTE — PROGRESS NOTES
The patient's right knee was prepped with betadine solution after verification of allergies. Area approximately 10 cm x 10 cm prepped in a sterile fashion. After injection, betadine removed with soap and water and band-aids applied.    1ml depo medrol with 1% lidocaine plain injected into patient's right knee by Dr. Armond George  LOT# [id number]  Exp. 1/20

## 2019-08-06 NOTE — LETTER
8/6/2019         RE: Melody Ferguson  [address]  [facility] [address]        Dear Colleague,    Thank you for referring your patient, Melody Ferguson, to the [facility]. Please see a copy of my visit note below.    The patient's right knee was prepped with betadine solution after verification of allergies. Area approximately 10 cm x 10 cm prepped in a sterile fashion. After injection, betadine removed with soap and water and band-aids applied.    1ml depo medrol with 1% lidocaine plain injected into patient's right knee by Dr. Armond George  LOT# [id number]  Exp. 1/20      Follow up right knee primary osteoarthritis.  Last injection 6/4/19  Range of motion 5-115.    She  desires injection today of right knee(s).  Risks, benefits, potential complications and alternatives were discussed.   With the patient's consent, sterile prep was performed of right knee(s).  Right knee was injected with Depo Medrol 80 mg and lidocaine at anteromedial site.  Return to clinic as needed.    She also complains of a lump on her left hand.  She has noted this for about 6 weeks.  Lump is in the palm along the ring finger flexor tendon.  It is pulling at the skin.  This is consistent with Dupuytren's contracture.  She does not have a flexion contracture of the MCP or PIP joints.  No other Dupuytren's bands are noted.  Sensation, motor and circulation are intact.    Assessment;  Left ring Dupuytren's contracture. - mild  Plan: I discussed the causes and anatomy of Dupuytren's contracture.  She may stretch the hand, but we do not need surgery until she cannot get the hand flat on the table.  We will see her back as needed for this.      Again, thank you for allowing me to participate in the care of your patient.        Sincerely,        Armond George MD

## 2019-08-07 NOTE — PROGRESS NOTES
Follow up right knee primary osteoarthritis.  Last injection 6/4/19  Range of motion 5-115.    She  desires injection today of right knee(s).  Risks, benefits, potential complications and alternatives were discussed.   With the patient's consent, sterile prep was performed of right knee(s).  Right knee was injected with Depo Medrol 80 mg and lidocaine at anteromedial site.  Return to clinic as needed.    She also complains of a lump on her left hand.  She has noted this for about 6 weeks.  Lump is in the palm along the ring finger flexor tendon.  It is pulling at the skin.  This is consistent with Dupuytren's contracture.  She does not have a flexion contracture of the MCP or PIP joints.  No other Dupuytren's bands are noted.  Sensation, motor and circulation are intact.    Assessment;  Left ring Dupuytren's contracture. - mild  Plan: I discussed the causes and anatomy of Dupuytren's contracture.  She may stretch the hand, but we do not need surgery until she cannot get the hand flat on the table.  We will see her back as needed for this.

## 2019-08-29 DIAGNOSIS — R10.13 DYSPEPSIA: ICD-10-CM

## 2019-08-29 DIAGNOSIS — J45.20 MILD INTERMITTENT ASTHMA WITHOUT COMPLICATION: Chronic | ICD-10-CM

## 2019-08-29 DIAGNOSIS — E66.01 MORBID OBESITY WITH BODY MASS INDEX OF 45.0-49.9 IN ADULT (H): ICD-10-CM

## 2019-08-29 DIAGNOSIS — M54.42 ACUTE LEFT-SIDED LOW BACK PAIN WITH LEFT-SIDED SCIATICA: ICD-10-CM

## 2019-08-29 NOTE — TELEPHONE ENCOUNTER
"Requested Prescriptions   Pending Prescriptions Disp Refills     VENTOLIN  (90 Base) MCG/ACT inhaler [Pharmacy Med Name: VENTOLIN HFA INH W/DOS CTR 200PUFFS]  Last Written Prescription Date:  1/8/19  Last Fill Quantity: 18,  # refills: 0   Last Office Visit with [facility], [facility] or  Health prescribing provider:  6/4/19   Future Office Visit:    Next 5 appointments (look out 90 days)    Sep 09, 2019  7:30 AM CDT  Return Visit with Tobi Smallwood, [address] [address] [phone]          18 g 0     Sig: INHALE 2 PUFFS INTO THE LUNGS EVERY 6 HOURS       Asthma Maintenance Inhalers - Anticholinergics Failed - 8/29/2019  5:10 PM        Failed - Asthma control assessment score within normal limits in last 6 months     Please review ACT score.           Passed - Patient is age 12 years or older        Passed - Medication is active on med list        Passed - Recent (6 mo) or future (30 days) visit within the authorizing provider's specialty     Patient had office visit in the last 6 months or has a visit in the next 30 days with authorizing provider or within the authorizing provider's specialty.  See \"Patient Info\" tab in inbasket, or \"Choose Columns\" in Meds & Orders section of the refill encounter.            esomeprazole (NEXIUM) 40 MG DR capsule [Pharmacy Med Name: ESOMEPRAZOLE MAGNESIUM 40MG DR CAPS]  Last Written Prescription Date:  2/12/19  Last Fill Quantity: 90,  # refills: 1   Last Office Visit with [facility], [facility] or  Health prescribing provider:  6/4/19   Future Office Visit:    Next 5 appointments (look out 90 days)    Sep 09, 2019  7:30 AM CDT  Return Visit with Tobi Smallwood, [facility] ([address] [id number] [phone]          90 capsule 0     Sig: TAKE 1 CAPSULE(40 MG) BY MOUTH EVERY MORNING 30 TO 60 MINUTES " "BEFORE BREAKFAST       PPI Protocol Passed - 8/29/2019  5:10 PM        Passed - Not on Clopidogrel (unless Pantoprazole ordered)        Passed - No diagnosis of osteoporosis on record        Passed - Recent (12 mo) or future (30 days) visit within the authorizing provider's specialty     Patient had office visit in the last 12 months or has a visit in the next 30 days with authorizing provider or within the authorizing provider's specialty.  See \"Patient Info\" tab in inbasket, or \"Choose Columns\" in Meds & Orders section of the refill encounter.              Passed - Medication is active on med list        Passed - Patient is age 18 or older        Passed - No active pregnacy on record        Passed - No positive pregnancy test in past 12 months        NYSTOP [id number] UNIT/GM powder [Pharmacy Med Name: NYSTOP 100,000 U/GM TOP POWDER 60GM]  Last Written Prescription Date:  12/26/18  Last Fill Quantity: 60,  # refills: 2   Last Office Visit with [facility] or [facility] prescribing provider:  6/4/19   Future Office Visit:    Next 5 appointments (look out 90 days)    Sep 09, 2019  7:30 AM CDT  Return Visit with Tobi Smallwood, [facility] ([facility]) [address] [place] [id number] [phone]          60 g 0     Sig: APPLY TOPICALLY TWICE DAILY.       Antifungal Agents Passed - 8/29/2019  5:10 PM        Passed - Recent (12 mo) or future (30 days) visit within the authorizing provider's specialty     Patient had office visit in the last 12 months or has a visit in the next 30 days with authorizing provider or within the authorizing provider's specialty.  See \"Patient Info\" tab in inbasket, or \"Choose Columns\" in Meds & Orders section of the refill encounter.              Passed - Not Fluconazole or Terconazole      If oral Fluconazole or Terconazole, may refill if indicated in progress notes.           Passed - Medication is active on med list          "

## 2019-08-30 DIAGNOSIS — M54.2 NECK PAIN: ICD-10-CM

## 2019-08-30 RX ORDER — CYCLOBENZAPRINE HCL 5 MG
10 TABLET ORAL 3 TIMES DAILY PRN
Qty: 60 TABLET | Refills: 0 | Status: SHIPPED | OUTPATIENT
Start: 2019-08-30 | End: 2019-10-06

## 2019-08-30 RX ORDER — GABAPENTIN 300 MG/1
CAPSULE ORAL
Qty: 180 CAPSULE | Refills: 0
Start: 2019-08-30

## 2019-08-30 NOTE — TELEPHONE ENCOUNTER
Requested Prescriptions   Pending Prescriptions Disp Refills     cyclobenzaprine (FLEXERIL) 5 MG tablet 60 tablet 0     Sig: Take 2 tablets (10 mg) by mouth 3 times daily as needed for muscle spasms   Last Written Prescription Date:  6/3/19  Last Fill Quantity: 60 tab,  # refills: 0   Last office visit: 6/4/2019 with prescribing provider:  LUCIAN Bach   Future Office Visit:   Next 5 appointments (look out 90 days)    Sep 09, 2019  7:30 AM CDT  [facility]  [facility] ([facility]) [address] [place] [id number] [phone]             There is no refill protocol information for this order

## 2019-08-30 NOTE — TELEPHONE ENCOUNTER
Not due for a refill.    Requested Prescriptions   Pending Prescriptions Disp Refills     gabapentin (NEURONTIN) 300 MG capsule [Pharmacy Med Name: GABAPENTIN 300MG CAPSULES] 180 capsule 0     Sig: TAKE 1 CAPSULE(300 MG) BY MOUTH TWICE DAILY       There is no refill protocol information for this order        Last Written Prescription Date:  7/16/19  Last Fill Quantity: 180,  # refills: 0   Last Office Visit with [facility], [facility] or [facility] prescribing provider:  6/4/19   Future Office Visit:    Next 5 appointments (look out 90 days)    Sep 09, 2019  7:30 AM CDT  Return Visit with Tobi Smallwood [facility] ([facility]) [address] [place] [id number] [phone]

## 2019-08-30 NOTE — TELEPHONE ENCOUNTER
90 day supply sent on 7/16/19. Should have enough until 10/2019. Too soon to refill.      Shaheen Head RN, BSN, PHN

## 2019-09-04 RX ORDER — NYSTATIN 100000 [USP'U]/G
POWDER TOPICAL
Qty: 60 G | Refills: 0 | Status: SHIPPED | OUTPATIENT
Start: 2019-09-04 | End: 2019-10-06

## 2019-09-04 RX ORDER — ALBUTEROL SULFATE 90 UG/1
AEROSOL, METERED RESPIRATORY (INHALATION)
Qty: 18 G | Refills: 0 | Status: SHIPPED | OUTPATIENT
Start: 2019-09-04 | End: 2019-10-06

## 2019-09-04 RX ORDER — ESOMEPRAZOLE MAGNESIUM 40 MG/1
CAPSULE, DELAYED RELEASE ORAL
Qty: 90 CAPSULE | Refills: 0 | Status: SHIPPED | OUTPATIENT
Start: 2019-09-04 | End: 2019-11-14

## 2019-09-04 NOTE — TELEPHONE ENCOUNTER
Routing refill request to provider for review/approval because:  Failed ACT, other medications approved per protocol.    Tiana Young RN, [facility]

## 2019-09-09 DIAGNOSIS — G89.4 CHRONIC PAIN SYNDROME: ICD-10-CM

## 2019-09-09 NOTE — TELEPHONE ENCOUNTER
"Requested Prescriptions   Pending Prescriptions Disp Refills     polyethylene glycol (MIRALAX/GLYCOLAX) powder  Last Written Prescription Date:  12/03/18  Last Fill Quantity: 510g,  # refills: 6   Last Office Visit with G, P or [facility] prescribing provider:  06/04/19-Mikala   Future Office Visit:    510 g 6     Sig: Take 17 g by mouth daily DISSOLVE 17 GRAMS IN LIQUID AND DRINK DAILY AS DIRECTED       Laxatives Protocol Passed - 9/9/2019  8:54 AM        Passed - Patient is age 6 or older        Passed - Recent (12 mo) or future (30 days) visit within the authorizing provider's specialty     Patient had office visit in the last 12 months or has a visit in the next 30 days with authorizing provider or within the authorizing provider's specialty.  See \"Patient Info\" tab in inbasket, or \"Choose Columns\" in Meds & Orders section of the refill encounter.              Passed - Medication is active on med list          "

## 2019-09-11 RX ORDER — POLYETHYLENE GLYCOL 3350 17 G/17G
17 POWDER, FOR SOLUTION ORAL DAILY
Qty: 510 G | Refills: 1 | Status: SHIPPED | OUTPATIENT
Start: 2019-09-11 | End: 2019-11-22

## 2019-09-11 NOTE — TELEPHONE ENCOUNTER
Prescription approved per [facility] Refill Protocol.  Celina Stuart RN

## 2019-09-19 DIAGNOSIS — I25.2 OLD MYOCARDIAL INFARCTION: ICD-10-CM

## 2019-09-19 RX ORDER — METOPROLOL SUCCINATE 25 MG/1
TABLET, EXTENDED RELEASE ORAL
Qty: 90 TABLET | Refills: 0
Start: 2019-09-19

## 2019-09-19 NOTE — TELEPHONE ENCOUNTER
"Requested Prescriptions   Refused Prescriptions Disp Refills     metoprolol succinate ER (TOPROL-XL) 25 MG 24 hr tablet [Pharmacy Med Name: METOPROLOL ER SUCCINATE 25MG TABS] 90 tablet 0     Sig: TAKE 1 TABLET(25 MG) BY MOUTH DAILY       Beta-Blockers Protocol Passed - 9/19/2019  3:07 PM        Passed - Blood pressure under 140/90 in past 12 months     BP Readings from Last 3 Encounters:   08/06/19 112/81   06/04/19 119/58   06/04/19 119/58                 Passed - Patient is age 6 or older        Passed - Recent (12 mo) or future (30 days) visit within the authorizing provider's specialty     Patient had office visit in the last 12 months or has a visit in the next 30 days with authorizing provider or within the authorizing provider's specialty.  See \"Patient Info\" tab in inbasket, or \"Choose Columns\" in Meds & Orders section of the refill encounter.              Passed - Medication is active on med list        90 day supply with 1 refills sent on 6/4/19. Should have refills on file at pharmacy. Too soon to refill.       Shaheen Head RN, BSN, PHN    "

## 2019-09-30 ENCOUNTER — MYC MEDICAL ADVICE (OUTPATIENT)
Dept: FAMILY MEDICINE | Facility: CLINIC | Age: 59
End: 2019-09-30

## 2019-10-06 ENCOUNTER — MYC REFILL (OUTPATIENT)
Dept: FAMILY MEDICINE | Facility: CLINIC | Age: 59
End: 2019-10-06

## 2019-10-06 DIAGNOSIS — J45.20 MILD INTERMITTENT ASTHMA WITHOUT COMPLICATION: Chronic | ICD-10-CM

## 2019-10-06 DIAGNOSIS — F33.1 MAJOR DEPRESSIVE DISORDER, RECURRENT EPISODE, MODERATE (H): ICD-10-CM

## 2019-10-06 DIAGNOSIS — E11.21 TYPE 2 DIABETES MELLITUS WITH DIABETIC NEPHROPATHY, WITHOUT LONG-TERM CURRENT USE OF INSULIN (H): ICD-10-CM

## 2019-10-06 DIAGNOSIS — N39.46 MIXED INCONTINENCE: ICD-10-CM

## 2019-10-06 DIAGNOSIS — J30.1 SEASONAL ALLERGIC RHINITIS DUE TO POLLEN: ICD-10-CM

## 2019-10-06 DIAGNOSIS — J45.20 MILD INTERMITTENT ASTHMA WITHOUT COMPLICATION: ICD-10-CM

## 2019-10-06 DIAGNOSIS — E11.9 TYPE 2 DIABETES MELLITUS WITHOUT COMPLICATION (H): Primary | ICD-10-CM

## 2019-10-06 DIAGNOSIS — R60.0 BILATERAL LOWER EXTREMITY EDEMA: ICD-10-CM

## 2019-10-06 DIAGNOSIS — I25.2 OLD MYOCARDIAL INFARCTION: ICD-10-CM

## 2019-10-06 DIAGNOSIS — E78.5 HYPERLIPIDEMIA LDL GOAL <100: Chronic | ICD-10-CM

## 2019-10-06 DIAGNOSIS — K59.09 CHRONIC CONSTIPATION: ICD-10-CM

## 2019-10-06 DIAGNOSIS — M54.42 ACUTE LEFT-SIDED LOW BACK PAIN WITH LEFT-SIDED SCIATICA: ICD-10-CM

## 2019-10-06 DIAGNOSIS — R10.13 DYSPEPSIA: ICD-10-CM

## 2019-10-06 DIAGNOSIS — E11.21 TYPE 2 DIABETES MELLITUS WITH DIABETIC NEPHROPATHY (H): ICD-10-CM

## 2019-10-06 DIAGNOSIS — E66.01 MORBID OBESITY WITH BODY MASS INDEX OF 45.0-49.9 IN ADULT (H): ICD-10-CM

## 2019-10-06 DIAGNOSIS — G89.4 CHRONIC PAIN SYNDROME: ICD-10-CM

## 2019-10-06 DIAGNOSIS — M54.2 NECK PAIN: ICD-10-CM

## 2019-10-06 DIAGNOSIS — I10 HTN, GOAL BELOW 140/90: Chronic | ICD-10-CM

## 2019-10-06 RX ORDER — LIRAGLUTIDE 6 MG/ML
1.8 INJECTION SUBCUTANEOUS DAILY
Qty: 30 ML | Refills: 3 | Status: CANCELLED | OUTPATIENT
Start: 2019-10-06

## 2019-10-06 RX ORDER — BUPROPION HYDROCHLORIDE 100 MG/1
100 TABLET, EXTENDED RELEASE ORAL 2 TIMES DAILY
Qty: 180 TABLET | Refills: 1 | Status: CANCELLED | OUTPATIENT
Start: 2019-10-06

## 2019-10-06 RX ORDER — ESOMEPRAZOLE MAGNESIUM 40 MG/1
CAPSULE, DELAYED RELEASE ORAL
Qty: 90 CAPSULE | Refills: 0 | Status: CANCELLED | OUTPATIENT
Start: 2019-10-06

## 2019-10-06 RX ORDER — POLYETHYLENE GLYCOL 3350 17 G/17G
17 POWDER, FOR SOLUTION ORAL DAILY
Qty: 510 G | Refills: 1 | Status: CANCELLED | OUTPATIENT
Start: 2019-10-06

## 2019-10-06 RX ORDER — LOSARTAN POTASSIUM 50 MG/1
50 TABLET ORAL DAILY
Qty: 90 TABLET | Refills: 1 | Status: CANCELLED | OUTPATIENT
Start: 2019-10-06

## 2019-10-06 RX ORDER — METOPROLOL SUCCINATE 25 MG/1
25 TABLET, EXTENDED RELEASE ORAL DAILY
Qty: 90 TABLET | Refills: 1 | Status: CANCELLED | OUTPATIENT
Start: 2019-10-06

## 2019-10-06 RX ORDER — MONTELUKAST SODIUM 10 MG/1
1 TABLET ORAL AT BEDTIME
Qty: 90 TABLET | Refills: 1 | Status: CANCELLED | OUTPATIENT
Start: 2019-10-06

## 2019-10-06 RX ORDER — GLIPIZIDE AND METFORMIN HCL 5; 500 MG/1; MG/1
TABLET, FILM COATED ORAL
Qty: 90 TABLET | Refills: 1 | Status: CANCELLED | OUTPATIENT
Start: 2019-10-06

## 2019-10-06 RX ORDER — SOLIFENACIN SUCCINATE 10 MG/1
10 TABLET, FILM COATED ORAL DAILY
Qty: 90 TABLET | Refills: 1 | Status: CANCELLED | OUTPATIENT
Start: 2019-10-06

## 2019-10-06 RX ORDER — CITALOPRAM HYDROBROMIDE 40 MG/1
TABLET ORAL
Qty: 90 TABLET | Refills: 1 | Status: CANCELLED | OUTPATIENT
Start: 2019-10-06

## 2019-10-06 RX ORDER — ATORVASTATIN CALCIUM 40 MG/1
40 TABLET, FILM COATED ORAL DAILY
Qty: 90 TABLET | Refills: 1 | Status: CANCELLED | OUTPATIENT
Start: 2019-10-06

## 2019-10-07 NOTE — TELEPHONE ENCOUNTER
Requested Prescriptions   Pending Prescriptions Disp Refills     melatonin 3 MG tablet        Last Written Prescription Date:  10/13/16  Last Fill Quantity: 30,   # refills: 0  Last Office Visit: 06/04/19-Mikala  Future Office visit:    Next 5 appointments (look out 90 days)    Oct 21, 2019 11:30 AM CDT  Return Visit with Candice Reich  [facility] ([facility]) [address]  [facility] [address] [phone]           Routing refill request to provider for review/approval because:  Drug not on the [facility], P or  Health refill protocol or controlled substance 30 tablet 0       There is no refill protocol information for this order

## 2019-10-07 NOTE — TELEPHONE ENCOUNTER
"Requested Prescriptions   Pending Prescriptions Disp Refills     blood glucose (ACCU-CHEK DAMIEN PLUS) test strip  Last Written Prescription Date:  12/07/17  Last Fill Quantity: 200,  # refills: 0   Last Office Visit with CRIST[facility], P or [facility] prescribing provider:  06/04/19-Mikala   Future Office Visit:    Next 5 appointments (look out 90 days)    Oct 21, 2019 11:30 AM CDT  Return Visit with Candice Reich  [facility] [address]  [facility] [address]  [phone]        200 strip 0     Sig: Use to test blood sugar * times daily or as directed.       Diabetic Supplies Protocol Passed - 10/7/2019  8:22 AM        Passed - Medication is active on med list        Passed - Patient is 18 years of age or older        Passed - Recent (6 mo) or future (30 days) visit within the authorizing provider's specialty     Patient had office visit in the last 6 months or has a visit in the next 30 days with authorizing provider.  See \"Patient Info\" tab in inbasket, or \"Choose Columns\" in Meds & Orders section of the refill encounter.              "

## 2019-10-07 NOTE — TELEPHONE ENCOUNTER
"Requested Prescriptions   Pending Prescriptions Disp Refills     blood glucose monitoring (NO BRAND SPECIFIED) meter device kit  Last Written Prescription Date:  04/11/18  Last Fill Quantity: 1,  # refills: 0   Last Office Visit with [facility], [facility] or [facility] prescribing provider:  06/04/19-[facility]   Future Office Visit:    Next 5 appointments (look out 90 days)    Oct 21, 2019 11:30 AM CDT  Return Visit with Candice Reich  [facility] ([facility]) [address]  [facility] [address] [phone]        1 kit 0     Sig: Use to test blood sugar 1 times daily or as directed. Please dispense insurance preferred.       Diabetic Supplies Protocol Passed - 10/6/2019  4:37 PM        Passed - Medication is active on med list        Passed - Patient is 18 years of age or older        Passed - Recent (6 mo) or future (30 days) visit within the authorizing provider's specialty     Patient had office visit in the last 6 months or has a visit in the next 30 days with authorizing provider.  See \"Patient Info\" tab in inbasket, or \"Choose Columns\" in Meds & Orders section of the refill encounter.            diclofenac (VOLTAREN) 50 MG EC tablet  Last Written Prescription Date:  01/09/19  Last Fill Quantity: 180,  # refills: 0   Last Office Visit with [facility], [facility] or [facility] prescribing provider:  06/04/19-[facility]   Future Office Visit:    Next 5 appointments (look out 90 days)    Oct 21, 2019 11:30 AM CDT  Return Visit with Candice Reich  [facility] ([facility]) [address]  [facility] [address] [phone]        180 tablet 0       NSAID Medications Failed - 10/6/2019  4:37 PM        Failed - Normal CBC on file in past 12 months     Recent Labs   Lab Test 07/11/17  0841   WBC 8.6   RBC 4.01   HGB 10.9*   HCT 34.9*                    Failed - Normal serum creatinine on file in past 12 months     Recent Labs   Lab Test 10/30/18  1126   CR 0.46*         " "    Passed - Blood pressure under 140/90 in past 12 months     BP Readings from Last 3 Encounters:   08/06/19 112/81   06/04/19 119/58   06/04/19 119/58                 Passed - Normal ALT on file in past 12 months     Recent Labs   Lab Test 10/30/18  1126   ALT 25             Passed - Normal AST on file in past 12 months     Recent Labs   Lab Test 10/30/18  1126   AST 13             Passed - Recent (12 mo) or future (30 days) visit within the authorizing provider's specialty     Patient has had an office visit with the authorizing provider or a provider within the authorizing providers department within the previous 12 mos or has a future within next 30 days. See \"Patient Info\" tab in inbasket, or \"Choose Columns\" in Meds & Orders section of the refill encounter.              Passed - Patient is age 6-64 years        Passed - Medication is active on med list        Passed - No active pregnancy on record        Passed - No positive pregnancy test in past 12 months        insulin pen needle (NOVOFINE) 32G X 6 MM miscellaneous  Last Written Prescription Date:  02/26/18  Last Fill Quantity: 100,  # refills: 3   Last Office Visit with [facility] or [facility] prescribing provider:  06/04/19-[facility]   Future Office Visit:    Next 5 appointments (look out 90 days)    Oct 21, 2019 11:30 AM CDT  Return Visit with Candice Reich  [facility] ([facility]) [address]  [facility] [id number] [phone]        100 each 3     Sig: USE ONCE DAILY AS DIRECTED       Diabetic Supplies Protocol Passed - 10/6/2019  4:37 PM        Passed - Medication is active on med list        Passed - Patient is 18 years of age or older        Passed - Recent (6 mo) or future (30 days) visit within the authorizing provider's specialty     Patient had office visit in the last 6 months or has a visit in the next 30 days with authorizing provider.  See \"Patient Info\" tab in inbasket, or \"Choose Columns\" in Meds & " "Orders section of the refill encounter.            gabapentin (NEURONTIN) 300 MG capsule        Last Written Prescription Date:  07/16/19  Last Fill Quantity: 180,   # refills: 0  Last Office Visit: 06/04/19-[facility]  Future Office visit:    Next 5 appointments (look out 90 days)    Oct 21, 2019 11:30 AM CDT  Return Visit with Candice Reich  [facility] ([facility]) [address]  [facility] [address]  [phone]           Routing refill request to provider for review/approval because:  Drug not on the [facility], [facility] or [facility] refill protocol or controlled substance 180 capsule 0       There is no refill protocol information for this order        albuterol (VENTOLIN HFA) 108 (90 Base) MCG/ACT inhaler  Last Written Prescription Date:  09/04/19  Last Fill Quantity: 18g,  # refills: 0   Last Office Visit with [facility], [facility] or [facility] prescribing provider:  [facility]   Future Office Visit:    Next 5 appointments (look out 90 days)    Oct 21, 2019 11:30 AM CDT  Return Visit with Candice Reich  [facility] ([facility]) [address]  [facility] [address] [phone]        18 g 0       Asthma Maintenance Inhalers - Anticholinergics Failed - 10/6/2019  4:37 PM        Failed - Asthma control assessment score within normal limits in last 6 months     Please review ACT score.           Passed - Patient is age 12 years or older        Passed - Medication is active on med list        Passed - Recent (6 mo) or future (30 days) visit within the authorizing provider's specialty     Patient had office visit in the last 6 months or has a visit in the next 30 days with authorizing provider or within the authorizing provider's specialty.  See \"Patient Info\" tab in inbasket, or \"Choose Columns\" in Meds & Orders section of the refill encounter.            esomeprazole (NEXIUM) 40 MG DR capsule  Last Written Prescription Date:  09/04/19  Last Fill Quantity: 90, " " # refills: 0   Last Office Visit with [facility], [facility] or  Health prescribing provider:  06/04/19-[facility]   Future Office Visit:    Next 5 appointments (look out 90 days)    Oct 21, 2019 11:30 AM CDT  Return Visit with Candice Reich  [facility] ([facility]) [address]  [facility] [address] [phone]        90 capsule 0     Sig: Take 30-60 minutes before eating.       PPI Protocol Passed - 10/6/2019  4:37 PM        Passed - Not on Clopidogrel (unless Pantoprazole ordered)        Passed - No diagnosis of osteoporosis on record        Passed - Recent (12 mo) or future (30 days) visit within the authorizing provider's specialty     Patient has had an office visit with the authorizing provider or a provider within the authorizing providers department within the previous 12 mos or has a future within next 30 days. See \"Patient Info\" tab in inbasket, or \"Choose Columns\" in Meds & Orders section of the refill encounter.              Passed - Medication is active on med list        Passed - Patient is age 18 or older        Passed - No active pregnacy on record        Passed - No positive pregnancy test in past 12 months        nystatin (NYSTOP) 059249 UNIT/GM external powder  Last Written Prescription Date:  09/04/19  Last Fill Quantity: 60,  # refills: 0   Last Office Visit with [facility], [facility] or [facility] prescribing provider:  06/04/19-   Future Office Visit:    Next 5 appointments (look out 90 days)    Oct 21, 2019 11:30 AM CDT  Return Visit with Candice Reich  [facility] ([facility]) [address]  [facility] [address] [phone]        60 g 0       Antifungal Agents Passed - 10/6/2019  4:37 PM        Passed - Recent (12 mo) or future (30 days) visit within the authorizing provider's specialty     Patient has had an office visit with the authorizing provider or a provider within the authorizing providers department within the previous 12 mos or " "has a future within next 30 days. See \"Patient Info\" tab in inbasket, or \"Choose Columns\" in Meds & Orders section of the refill encounter.              Passed - Not Fluconazole or Terconazole      If oral Fluconazole or Terconazole, may refill if indicated in progress notes.           Passed - Medication is active on med list        cyclobenzaprine (FLEXERIL) 5 MG tablet        Last Written Prescription Date:  08/30/19  Last Fill Quantity: 60,   # refills: 0  [facility]  Future Office visit:    Next 5 appointments (look out 90 days)    Oct 21, 2019 11:30 AM CDT  [facility] [address]  [facility] [address] [phone]           Routing refill request to provider for review/approval because:  Drug not on the [facility], [facility] or [facility] refill protocol or controlled substance 60 tablet 0     Sig: Take 2 tablets (10 mg) by mouth 3 times daily as needed for muscle spasms       There is no refill protocol information for this order        polyethylene glycol (MIRALAX/GLYCOLAX) powder  Last Written Prescription Date:  09/11/19  Last Fill Quantity: 510g,  # refills: 1   Last Office Visit with [facility], [facility] or [facility] prescribing provider:  06/04/19-[facility]   Future Office Visit:    Next 5 appointments (look out 90 days)    Oct 21, 2019 11:30 AM CDT  Return Visit with Candice Reich  [facility] ([facility]) [address]  [facility] [address] [phone]        510 g 1     Sig: Take 17 g by mouth daily DISSOLVE 17 GRAMS IN LIQUID AND DRINK DAILY AS DIRECTED       Laxatives Protocol Passed - 10/6/2019  4:37 PM        Passed - Patient is age 6 or older        Passed - Recent (12 mo) or future (30 days) visit within the authorizing provider's specialty     Patient has had an office visit with the authorizing provider or a provider within the authorizing providers department within the " "previous 12 mos or has a future within next 30 days. See \"Patient Info\" tab in inbasket, or \"Choose Columns\" in Meds & Orders section of the refill encounter.              Passed - Medication is active on med list          "

## 2019-10-07 NOTE — TELEPHONE ENCOUNTER
Requested Prescriptions   Pending Prescriptions Disp Refills     ACE NOT PRESCRIBED, INTENTIONAL, 0 each 0     Sig: Reported on 5/10/2017       There is no refill protocol information for this order        fluticasone (VERAMYST) 27.5 MCG/SPRAY nasal spray  Last Written Prescription Date:  05/14/18  Last Fill Quantity: 10g,  # refills: 11   Last [facility] or [facility] prescribing provider:  06/04/19[facility]   Future Office Visit:    Next 5 appointments (look out 90 days)    Oct 21, 2019 11:30 AM CDT  [facility] [address]  [facility] [address] [phone]        10 g 11     Sig: Spray 2 sprays into both nostrils daily       There is no refill protocol information for this order        order for DME        Last Written Prescription Date:  06/03/19  Last Fill Quantity: 2,   # refills: 1  Last Office Visit: 06/04/19[facility]  Future Office visit:    Next 5 appointments (look out 90 days)    Oct 21, 2019 11:30 AM CDT  Return Visit with Candice EPSTEIN [facility] ([facility]) [address]  [facility] [address] [phone]           Routing refill request to provider for review/approval because:  Drug not on the [facility] or [facility] refill protocol or controlled substance 2 each 1     Sig: Equipment being ordered: compression stockings 15-20 mm hg knee high       There is no refill protocol information for this order        atorvastatin (LIPITOR) 40 MG tablet  Last Written Prescription Date:  06/04/19  Last Fill Quantity: 90,  # refills: 1   Last Office Visit [facility] or [facility] prescribing provider:  06/04/19   Future Office Visit:    Next 5 appointments (look out 90 days)    Oct 21, 2019 11:30 AM CDT  Return Visit with Velma B [facility] ([facility]) [address]  [facility] [address] [phone]        90 tablet 1     Sig:  "Take 1 tablet (40 mg) by mouth daily       Statins Protocol Passed - 10/6/2019  4:37 PM        Passed - LDL on file in past 12 months     Recent Labs   Lab Test 06/04/19  0738   LDL 78             Passed - No abnormal creatine kinase in past 12 months     No lab results found.             Passed - Recent (12 mo) or future (30 days) visit within the authorizing provider's specialty     Patient has had an office visit with the authorizing provider or a provider within the authorizing providers department within the previous 12 mos or has a future within next 30 days. See \"Patient Info\" tab in inbasket, or \"Choose Columns\" in Meds & Orders section of the refill encounter.              Passed - Medication is active on med list        Passed - Patient is age 18 or older        Passed - No active pregnancy on record        Passed - No positive pregnancy test in past 12 months        buPROPion (WELLBUTRIN SR) 100 MG 12 hr tablet  Last Written Prescription Date:  06/04/19  Last Fill Quantity: 180,  # refills: 1   Last Office Visit with G, P or [facility] prescribing provider:  06/04//19   Future Office Visit:    Next 5 appointments (look out 90 days)    Oct 21, 2019 11:30 AM CDT  Return Visit with Candice Reich  [facility] ([facility]) [address]  [facility] [address] [phone]        180 tablet 1     Sig: Take 1 tablet (100 mg) by mouth 2 times daily       SSRIs Protocol Failed - 10/6/2019  4:37 PM        Failed - PHQ-9 score less than 5 in past 6 months     Please review last PHQ-9 score.           Passed - Medication is Bupropion     If the medication is Bupropion (Wellbutrin), and the patient is taking for smoking cessation; OK to refill.          Passed - Medication is active on med list        Passed - Patient is age 18 or older        Passed - No active pregnancy on record        Passed - No positive pregnancy test in last 12 months        Passed - Recent (6 mo) or " "future (30 days) visit within the authorizing provider's specialty     Patient had office visit in the last 6 months or has a visit in the next 30 days with authorizing provider or within the authorizing provider's specialty.  See \"Patient Info\" tab in inbasket, or \"Choose Columns\" in Meds & Orders section of the refill encounter.            citalopram (CELEXA) 40 MG tablet  Last Written Prescription Date:  06/04/19  Last Fill Quantity: 90,  # refills: 1   Last Office Visit with [facility] or  Health prescribing provider:  06/04/19   Future Office Visit:    Next 5 appointments (look out 90 days)    Oct 21, 2019 11:30 AM CDT  Return Visit with Candice Reich  [facility] ([facility]) [address]  [facility] [address] [phone]        90 tablet 1     Sig: TAKE 1 TABLET(40 MG) BY MOUTH DAILY       SSRIs Protocol Failed - 10/6/2019  4:37 PM        Failed - PHQ-9 score less than 5 in past 6 months     Please review last PHQ-9 score.           Passed - Medication is Bupropion     If the medication is Bupropion (Wellbutrin), and the patient is taking for smoking cessation; OK to refill.          Passed - Medication is active on med list        Passed - Patient is age 18 or older        Passed - No active pregnancy on record        Passed - No positive pregnancy test in last 12 months        Passed - Recent (6 mo) or future (30 days) visit within the authorizing provider's specialty     Patient had office visit in the last 6 months or has a visit in the next 30 days with authorizing provider or within the authorizing provider's specialty.  See \"Patient Info\" tab in inbasket, or \"Choose Columns\" in Meds & Orders section of the refill encounter.            glipiZIDE-metFORMIN (METAGLIP) 5-500 MG tablet  Last Written Prescription Date:  06/04/19  Last Fill Quantity: 90,  # refills: 1   Last Office Visit with [facility], [facility] or  Health prescribing provider:  06/04/19   Future Office Visit:  " "  Next 5 appointments (look out 90 days)    Oct 21, 2019 11:30 AM CDT  Return Visit with Candice Reich  [facility] ([facility]) [address]  [facility] [address] [phone]        90 tablet 1     Sig: TAKE 1 TABLET BY MOUTH ONCE DAILY BEFORE A MEAL       Combination Oral Antihyperglycemic Agents Passed - 10/6/2019  4:37 PM        Passed - Blood pressure under 140/90 in past 12 months     BP Readings from Last 3 Encounters:   08/06/19 112/81   06/04/19 119/58   06/04/19 119/58                 Passed - Patient has a documented LDL level within past 12 mos.     Recent Labs   Lab Test 06/04/19  0738   LDL 78             Passed - Patient has a documented Microalbumin level within past 12 mos.     Recent Labs   Lab Test 10/30/18  1138   MICROL 6   UMALCR 11.37             Passed - Patient has documented A1c within the specified period of time.     If HgbA1C is 8 or greater, it needs to be on file within the past 3 months.  If less than 8, must be on file within the past 6 months.     Recent Labs   Lab Test 06/04/19  0738   A1C 6.7*             Passed - Patient's CR is NOT>1.4 OR Patient's EGFR is NOT<45 within past 12 mos.     Recent Labs   Lab Test 10/30/18  1126   GFRESTIMATED >90   GFRESTBLACK >90       Recent Labs   Lab Test 10/30/18  1126   CR 0.46*             Passed - Patient does not have a diagnosis of CHF.        Passed - Medication is active on med list        Passed - Patient is 18 years old or older.        Passed - Patient is not pregnant        Passed - Patient has not had a positive pregnancy test within the past 12 mos.        Passed - Recent (6 mo) or future (30 days) visit within the authorizing provider's specialty     Patient had office visit in the last 6 months or has a visit in the next 30 days with authorizing provider or within the authorizing provider's specialty.  See \"Patient Info\" tab in inbasket, or \"Choose Columns\" in Meds & Orders section of the " "refill encounter.            losartan (COZAAR) 50 MG tablet  Last Written Prescription Date:  06/04/19  Last Fill Quantity: 90,  # refills: 1   Last Office Visit with CRISTI, PIEDAD or [facility] prescribing provider:  06/04/19   Future Office Visit:    Next 5 appointments (look out 90 days)    Oct 21, 2019 11:30 AM CDT  Return Visit with Candice Reich  [facility] ([facility]) [address]  [facility] [address] [phone]        90 tablet 1     Sig: Take 1 tablet (50 mg) by mouth daily       Angiotensin-II Receptors Failed - 10/6/2019  4:37 PM        Failed - Normal serum creatinine on file in past 12 months     Recent Labs   Lab Test 10/30/18  1126   CR 0.46*             Passed - Last blood pressure under 140/90 in past 12 months     BP Readings from Last 3 Encounters:   08/06/19 112/81   06/04/19 119/58   06/04/19 119/58                 Passed - Recent (12 mo) or future (30 days) visit within the authorizing provider's specialty     Patient has had an office visit with the authorizing provider or a provider within the authorizing providers department within the previous 12 mos or has a future within next 30 days. See \"Patient Info\" tab in inbasket, or \"Choose Columns\" in Meds & Orders section of the refill encounter.              Passed - Medication is active on med list        Passed - Patient is age 18 or older        Passed - No active pregnancy on record        Passed - Normal serum potassium on file in past 12 months     Recent Labs   Lab Test 10/30/18  1126   POTASSIUM 3.9                    Passed - No positive pregnancy test in past 12 months        metoprolol succinate ER (TOPROL-XL) 25 MG 24 hr tablet  Last Written Prescription Date:  06/04/19  Last Fill Quantity: 90,  # refills: 1   Last Office Visit with PIEDAD COLIN or JAR[facility] prescribing provider:  06/04/19   Future Office Visit:    Next 5 appointments (look out 90 days)    Oct 21, 2019 11:30 AM CDT  Return Visit with " "Candice [facility]aieg  [facility] ([facility]) [address]  [facility] [address] [phone]        90 tablet 1     Sig: Take 1 tablet (25 mg) by mouth daily       Beta-Blockers Protocol Passed - 10/6/2019  4:37 PM        Passed - Blood pressure under 140/90 in past 12 months     BP Readings from Last 3 Encounters:   08/06/19 112/81   06/04/19 119/58   06/04/19 119/58                 Passed - Patient is age 6 or older        Passed - Recent (12 mo) or future (30 days) visit within the authorizing provider's specialty     Patient has had an office visit with the authorizing provider or a provider within the authorizing providers department within the previous 12 mos or has a future within next 30 days. See \"Patient Info\" tab in inbasket, or \"Choose Columns\" in Meds & Orders section of the refill encounter.              Passed - Medication is active on med list        montelukast (SINGULAIR) 10 MG tablet  Last Written Prescription Date:  06/04/19  Last Fill Quantity: 90,  # refills: 1   Last Office Visit with [facility], [facility] or [facility] prescribing provider:  06/04/19   Future Office Visit:    Next 5 appointments (look out 90 days)    Oct 21, 2019 11:30 AM CDT  Return Visit with Candice Reich  [facility] ([facility]) [address]  [facility] [address] [phone]        90 tablet 1     Sig: Take 1 tablet (10 mg) by mouth At Bedtime       Leukotriene Inhibitors Protocol Failed - 10/6/2019  4:37 PM        Failed - Asthma control assessment score within normal limits in last 6 months     Please review ACT score.           Passed - Patient is age 12 or older     If patient is under 16, ok to refill using age based dosing.           Passed - Medication is active on med list        Passed - Recent (6 mo) or future (30 days) visit within the authorizing provider's specialty     Patient had office visit in the last 6 months or has a visit in the next 30 " "days with authorizing provider or within the authorizing provider's specialty.  See \"Patient Info\" tab in inbasket, or \"Choose Columns\" in Meds & Orders section of the refill encounter.            solifenacin (VESICARE) 10 MG tablet  Last Written Prescription Date:  06/04/19  Last Fill Quantity: 90,  # refills: 1   Last Office Visit with [facility], [facility] or [facility] prescribing provider:  06/04/19   Future Office Visit:    Next 5 appointments (look out 90 days)    Oct 21, 2019 11:30 AM CDT  [facility] [address]  [facility] [address] [phone]        90 tablet 1     Sig: Take 1 tablet (10 mg) by mouth daily       Muscarinic Antagonists (Urinary Incontinence Agents) Passed - 10/6/2019  4:37 PM        Passed - Recent (12 mo) or future (30 days) visit within the authorizing provider's specialty     Patient has had an office visit with the authorizing provider or a provider within the authorizing providers department within the previous 12 mos or has a future within next 30 days. See \"Patient Info\" tab in inbasket, or \"Choose Columns\" in Meds & Orders section of the refill encounter.              Passed - Patient does not have a diagnosis of glaucoma on the problem list     If glaucoma diagnosis is new, refer refill to physician.          Passed - Medication is active on med list        Passed - Patient is 18 years of age or older        liraglutide (VICTOZA PEN) 18 MG/3ML solution  Last Written Prescription Date:  06/04/19  Last Fill Quantity: 30ml,  # refills: 3   Last Office Visit with [facility], [facility] or [facility] prescribing provider:  06/04/19-   Future Office Visit:    Next 5 appointments (look out 90 days)    Oct 21, 2019 11:30 AM CDT  Return Visit with Candice Reich  [facility] ([facility]) [address]  [facility] [address] [phone]        30 mL 3     Sig: Inject 1.8 mg Subcutaneous daily    " "   GLP-1 Agonists Protocol Failed - 10/6/2019  4:37 PM        Failed - Normal serum creatinine on file in past 12 months     Recent Labs   Lab Test 10/30/18  1126   CR 0.46*             Passed - Blood pressure less than 140/90 in past 6 months     BP Readings from Last 3 Encounters:   08/06/19 112/81   06/04/19 119/58   06/04/19 119/58                 Passed - LDL on file in past 12 months     Recent Labs   Lab Test 06/04/19  0738   LDL 78             Passed - Microalbumin on file in past 12 months     Recent Labs   Lab Test 10/30/18  1138   MICROL 6   UMALCR 11.37             Passed - HgbA1C in past 3 or 6 months     If HgbA1C is 8 or greater, it needs to be on file within the past 3 months.  If less than 8, must be on file within the past 6 months.     Recent Labs   Lab Test 06/04/19  0738   A1C 6.7*             Passed - Medication is active on med list        Passed - Patient is age 18 or older        Passed - No active pregnancy on record        Passed - No positive pregnancy test in past 12 months        Passed - Recent (6 mo) or future (30 days) visit within the authorizing provider's specialty     Patient had office visit in the last 6 months or has a visit in the next 30 days with authorizing provider.  See \"Patient Info\" tab in inbasket, or \"Choose Columns\" in Meds & Orders section of the refill encounter.            docusate sodium (COLACE) 100 MG capsule  Last Written Prescription Date:  06/04/19  Last Fill Quantity: 60,  # refills: 3   Last Office Visit with G, P or [facility] prescribing provider:  06/04/19-Mikala   Future Office Visit:    Next 5 appointments (look out 90 days)    Oct 21, 2019 11:30 AM CDT  Return Visit with Candice Reich  [facility] ([facility]) [address]  [facility] [address] [phone]        60 capsule 3     Sig: Take 1 capsule (100 mg) by mouth 2 times daily       Laxatives Protocol Passed - 10/6/2019  4:37 PM        Passed - Patient " "is age 6 or older        Passed - Recent (12 mo) or future (30 days) visit within the authorizing provider's specialty     Patient has had an office visit with the authorizing provider or a provider within the authorizing providers department within the previous 12 mos or has a future within next 30 days. See \"Patient Info\" tab in inbasket, or \"Choose Columns\" in Meds & Orders section of the refill encounter.              Passed - Medication is active on med list          "

## 2019-10-09 RX ORDER — NYSTATIN 100000 [USP'U]/G
POWDER TOPICAL 2 TIMES DAILY
Qty: 60 G | Refills: 0 | Status: SHIPPED | OUTPATIENT
Start: 2019-10-09 | End: 2020-01-27

## 2019-10-09 RX ORDER — ALBUTEROL SULFATE 90 UG/1
2 AEROSOL, METERED RESPIRATORY (INHALATION) EVERY 6 HOURS PRN
Qty: 18 G | Refills: 0 | Status: SHIPPED | OUTPATIENT
Start: 2019-10-09 | End: 2020-01-27

## 2019-10-09 RX ORDER — LANOLIN ALCOHOL/MO/W.PET/CERES
3 CREAM (GRAM) TOPICAL
Qty: 30 TABLET | Refills: 0 | Status: SHIPPED | OUTPATIENT
Start: 2019-10-09 | End: 2019-11-22

## 2019-10-09 RX ORDER — BLOOD-GLUCOSE METER
KIT MISCELLANEOUS
Qty: 1 KIT | Refills: 0 | Status: SHIPPED | OUTPATIENT
Start: 2019-10-09

## 2019-10-09 NOTE — TELEPHONE ENCOUNTER
Refills for Miralax powder and Nexium not needed, refilled in September.    Prescriptions for glucose meter, test strips, Nystatin powder, albuterol inhaler, pen needles  approved per [facility] Refill Protocol.    Routing refill request to provider for review/approval because:  Drug not on the G refill protocol   Labs not current:  CBC, Creatinine    Susan Heath RN

## 2019-10-09 NOTE — TELEPHONE ENCOUNTER
Prescription approved per [facility] Refill Protocol.      Shaheen Head RN, BSN, PHN

## 2019-10-09 NOTE — TELEPHONE ENCOUNTER
Majority of refills requested are not needed at this time.   Patient was given 6 month supply of most of her medications on 6/4/19, with plan to follow up by 12/4/19 for check up and additional refills.  Medications/requests that were due were sent to pharmacy today.  Spacebar message sent to patient asking her to please verify with her pharmacy regarding refills as she should have till 12/4/19.    Routing refill request to provider for review/approval because:  **RN unable to sign for Docusate and Veramyst. System is not allowing, due to old diagnosis codes. Unsuccessful at attempting to change.    Susan Heath RN

## 2019-10-09 NOTE — TELEPHONE ENCOUNTER
For melatonin:  Routing refill request to provider for review/approval because:  A break in medication      Shaheen Head RN, BSN, PHN

## 2019-10-10 RX ORDER — DOCUSATE SODIUM 100 MG/1
100 CAPSULE, LIQUID FILLED ORAL 2 TIMES DAILY
Qty: 60 CAPSULE | Refills: 2 | Status: SHIPPED | OUTPATIENT
Start: 2019-10-10 | End: 2019-11-22

## 2019-10-10 RX ORDER — GABAPENTIN 300 MG/1
300 CAPSULE ORAL 2 TIMES DAILY
Qty: 180 CAPSULE | Refills: 0 | Status: SHIPPED | OUTPATIENT
Start: 2019-10-10 | End: 2019-11-15

## 2019-10-10 RX ORDER — CYCLOBENZAPRINE HCL 5 MG
10 TABLET ORAL 3 TIMES DAILY PRN
Qty: 60 TABLET | Refills: 0 | Status: SHIPPED | OUTPATIENT
Start: 2019-10-10 | End: 2019-12-05

## 2019-10-10 NOTE — TELEPHONE ENCOUNTER
Closing this encounter as this has another encounter regarding appt due in December.  Yuli Giang [facility]  2nd Floor  Primary Care

## 2019-10-10 NOTE — TELEPHONE ENCOUNTER
Postponing message to the middle of November to inform patient of this.  Yuli Giang MA  [facility]  2nd Floor  Primary Care

## 2019-10-22 ENCOUNTER — ANCILLARY PROCEDURE (OUTPATIENT)
Dept: GENERAL RADIOLOGY | Facility: CLINIC | Age: 59
End: 2019-10-22
Attending: PHYSICIAN ASSISTANT
Payer: MEDICARE

## 2019-10-22 ENCOUNTER — TELEPHONE (OUTPATIENT)
Dept: ORTHOPEDICS | Facility: CLINIC | Age: 59
End: 2019-10-22

## 2019-10-22 ENCOUNTER — OFFICE VISIT (OUTPATIENT)
Dept: ORTHOPEDICS | Facility: CLINIC | Age: 59
End: 2019-10-22
Payer: MEDICARE

## 2019-10-22 VITALS
DIASTOLIC BLOOD PRESSURE: 63 MMHG | HEIGHT: 68 IN | WEIGHT: 293 LBS | SYSTOLIC BLOOD PRESSURE: 90 MMHG | BODY MASS INDEX: 44.41 KG/M2 | HEART RATE: 80 BPM | RESPIRATION RATE: 16 BRPM

## 2019-10-22 DIAGNOSIS — M17.11 PRIMARY OSTEOARTHRITIS OF RIGHT KNEE: ICD-10-CM

## 2019-10-22 DIAGNOSIS — M17.11 PRIMARY OSTEOARTHRITIS OF RIGHT KNEE: Primary | ICD-10-CM

## 2019-10-22 PROCEDURE — 99214 OFFICE O/P EST MOD 30 MIN: CPT | Mod: 25 | Performed by: ORTHOPAEDIC SURGERY

## 2019-10-22 PROCEDURE — 73562 X-RAY EXAM OF KNEE 3: CPT | Mod: RT

## 2019-10-22 PROCEDURE — 20610 DRAIN/INJ JOINT/BURSA W/O US: CPT | Mod: RT | Performed by: ORTHOPAEDIC SURGERY

## 2019-10-22 RX ORDER — LIDOCAINE HYDROCHLORIDE 10 MG/ML
5 INJECTION, SOLUTION INFILTRATION; PERINEURAL
Status: DISCONTINUED | OUTPATIENT
Start: 2019-10-22 | End: 2019-12-05

## 2019-10-22 RX ORDER — METHYLPREDNISOLONE ACETATE 80 MG/ML
80 INJECTION, SUSPENSION INTRA-ARTICULAR; INTRALESIONAL; INTRAMUSCULAR; SOFT TISSUE
Status: DISCONTINUED | OUTPATIENT
Start: 2019-10-22 | End: 2019-12-05

## 2019-10-22 RX ADMIN — METHYLPREDNISOLONE ACETATE 80 MG: 80 INJECTION, SUSPENSION INTRA-ARTICULAR; INTRALESIONAL; INTRAMUSCULAR; SOFT TISSUE at 09:00

## 2019-10-22 RX ADMIN — LIDOCAINE HYDROCHLORIDE 5 ML: 10 INJECTION, SOLUTION INFILTRATION; PERINEURAL at 09:00

## 2019-10-22 ASSESSMENT — MIFFLIN-ST. JEOR: SCORE: 1970.32

## 2019-10-22 NOTE — PROGRESS NOTES
Large Joint Injection/Arthocentesis: R knee joint  Date/Time: 10/22/2019 9:00 AM  Performed by: Terrance Bush PA-C  Authorized by: Terrance Bush PA-C     Needle Size:  22 G  Guidance: landmark guided    Approach:  Anterolateral  Location:  Knee      Medications:  80 mg methylPREDNISolone 80 MG/ML; 5 mL lidocaine 1 %  Outcome:  Tolerated well, no immediate complications  Procedure discussed: discussed risks, benefits, and alternatives    Timeout: timeout called immediately prior to procedure    Prep: patient was prepped and draped in usual sterile fashion    Prep comment:  Betadine

## 2019-10-22 NOTE — PROGRESS NOTES
HISTORY OF PRESENT ILLNESS    Melody Ferguson is a 59 year old female who is seen as self referral for evaluation of  right knee pain that has been present approximately 10 years.      Present symptoms: pain anteriorly, pain sharp, dull/achy , severe pain, swelling, +catching/popping, no locking, +giving way.    Symptoms occur walking, kneeling, pivoting, standing for long periods, getting up from seated or lying-down position , going up stairs, going down stairs and at night.    The symptoms occur frequently.  The symptoms are increasing  Limitations of activities of daily living:  Walking, gardening, showering, getting in and out of shower,   Fall risk?: Yes    Treatments tried to this point: NSAIDs, Glucosamine, Corticosteroid injections and Physical Therapy  Response to treatment:   NSAIDs: Cant take them anymore   Glucosamine: Limited relief   Corticosteroid injections: Limited relief   viscous supplementation injection: Not done   Arthroscopy: Not sure   Physical Therapy: hurts more after therapy.       Past Medical History:   Diagnosis Date     Arthritis 2000     Cancer (H) 1983     Cerebral infarction (H) 2005     COPD (chronic obstructive pulmonary disease) (H) 1990     Depressive disorder 1990     DM type 2 (diabetes mellitus, type 2) (H)      History of TIA (transient ischemic attack)      HTN (hypertension)      Hyperlipidemia      Uncomplicated asthma 1990       Past Surgical History:   Procedure Laterality Date     ABDOMEN SURGERY  05/20/2005     BIOPSY  1984     BREAST SURGERY  1984     C TOTAL KNEE ARTHROPLASTY Left 04/26/2017    [place] [facility] [place]     CHOLECYSTECTOMY  2000     COLONOSCOPY       ENT SURGERY  2008    tonsils removed     GENITOURINARY SURGERY  2005    a & p repair     HYSTERECTOMY, PAP STILL INDICATED  2005    for cervical cancer     REPAIR PTOSIS       SLING TRANSVAGINAL N/A 7/14/2017    Procedure: SLING TRANSVAGINAL;  Sling ([place]);  Surgeon: Talon Katz MD;  Location: [facility] OR        Family History   Problem Relation Age of Onset     Thyroid Disease Sister      Cancer Brother      Cancer Sister         breast cancer     Cerebrovascular Disease Sister 62     Other - See Comments Sister         Angioplasty 2016     Hypertension Father      Hyperlipidemia Father      Prostate Cancer Father      Cancer Father      Glaucoma Maternal Grandmother      Cancer Maternal Grandfather      Cancer Paternal Grandmother      Breast Cancer Paternal Grandmother      Cerebrovascular Disease Paternal Grandfather      Cerebrovascular Disease Maternal Half-Sister         2016     Breast Cancer Maternal Half-Sister      Asthma Maternal Half-Sister      Other Cancer Brother         Passed from cancer unsure what type     Anesthesia Reaction Daughter      Diabetes No family hx of      Macular Degeneration No family hx of        Social History     Socioeconomic History     Marital status: Single     Spouse name: Not on file     Number of children: Not on file     Years of education: Not on file     Highest education level: Not on file   Occupational History     Employer: DISABLED   Social Needs     Financial resource strain: Not on file     Food insecurity:     Worry: Not on file     Inability: Not on file     Transportation needs:     Medical: Not on file     Non-medical: Not on file   Tobacco Use     Smoking status: Former Smoker     Packs/day: 0.50     Years: 25.00     Pack years: 12.50     Types: Cigarettes     Last attempt to quit: 10/15/1991     Years since quittin.0     Smokeless tobacco: Never Used   Substance and Sexual Activity     Alcohol use: Yes     Alcohol/week: 0.0 standard drinks     Comment: occassionally     Drug use: No     Sexual activity: Yes     Partners: Male     Birth control/protection: Other     Comment: hystro   Lifestyle     Physical activity:     Days per week: Not on file     Minutes per session: Not on file     Stress: Not on file   Relationships     Social connections:      Talks on phone: Not on file     Gets together: Not on file     Attends Holiness service: Not on file     Active member of club or organization: Not on file     Attends meetings of clubs or organizations: Not on file     Relationship status: Not on file     Intimate partner violence:     Fear of current or ex partner: Not on file     Emotionally abused: Not on file     Physically abused: Not on file     Forced sexual activity: Not on file   Other Topics Concern     Parent/sibling w/ CABG, MI or angioplasty before 65F 55M? Yes     Comment: Mirna Angioplasty 2015   Social History Narrative     Not on file       Current Outpatient Medications   Medication Sig Dispense Refill     ACE NOT PRESCRIBED, INTENTIONAL, Reported on 5/10/2017 0 each 0     albuterol (VENTOLIN HFA) 108 (90 Base) MCG/ACT inhaler Inhale 2 puffs into the lungs every 6 hours as needed for shortness of breath / dyspnea or wheezing 18 g 0     ASPIRIN PO Take 81 mg by mouth daily       atorvastatin (LIPITOR) 40 MG tablet Take 1 tablet (40 mg) by mouth daily 90 tablet 1     blood glucose (ACCU-CHEK DAMIEN PLUS) test strip Use to test blood sugar 1 times daily or as directed. 200 strip 3     blood glucose (FREESTYLE LITE) test strip Use to test blood sugar 2 times daily or as directed. 200 each 1     blood glucose monitoring (FREESTYLE FREEDOM LITE) meter device kit Use to test blood sugar 2 times daily or as directed. 1 kit 0     blood glucose monitoring (NO BRAND SPECIFIED) meter device kit Use to test blood sugar 1 times daily or as directed. Please dispense insurance preferred. 1 kit 0     buPROPion (WELLBUTRIN SR) 100 MG 12 hr tablet Take 1 tablet (100 mg) by mouth 2 times daily 180 tablet 1     citalopram (CELEXA) 40 MG tablet TAKE 1 TABLET(40 MG) BY MOUTH DAILY 90 tablet 1     cyclobenzaprine (FLEXERIL) 5 MG tablet Take 2 tablets (10 mg) by mouth 3 times daily as needed for muscle spasms 60 tablet 0     diclofenac (VOLTAREN) 50 MG EC tablet Take 1  tablet (50 mg) by mouth 2 times daily as needed for moderate pain 180 tablet 0     docusate sodium (COLACE) 100 MG capsule Take 1 capsule (100 mg) by mouth 2 times daily 60 capsule 2     esomeprazole (NEXIUM) 40 MG DR capsule TAKE 1 CAPSULE(40 MG) BY MOUTH EVERY MORNING 30 TO 60 MINUTES BEFORE BREAKFAST 90 capsule 0     fluticasone (VERAMYST) 27.5 MCG/SPRAY nasal spray Spray 2 sprays into both nostrils daily 10 g 2     fluticasone (VERAMYST) 27.5 MCG/SPRAY spray Spray 2 sprays into both nostrils daily 10 g 11     gabapentin (NEURONTIN) 300 MG capsule Take 1 capsule (300 mg) by mouth 2 times daily 180 capsule 0     gabapentin (NEURONTIN) 300 MG capsule Take 1 capsule (300 mg) by mouth 2 times daily 180 capsule 1     glipiZIDE-metFORMIN (METAGLIP) 5-500 MG tablet TAKE 1 TABLET BY MOUTH ONCE DAILY BEFORE A MEAL 90 tablet 1     insulin pen needle (NOVOFINE) 32G X 6 MM miscellaneous USE ONCE DAILY AS DIRECTED 100 each 0     liraglutide (VICTOZA PEN) 18 MG/3ML solution Inject 1.8 mg Subcutaneous daily 30 mL 3     losartan (COZAAR) 50 MG tablet Take 1 tablet (50 mg) by mouth daily 90 tablet 1     melatonin 3 MG tablet Take 1 tablet (3 mg) by mouth nightly as needed for sleep 30 tablet 0     metoprolol succinate ER (TOPROL-XL) 25 MG 24 hr tablet Take 1 tablet (25 mg) by mouth daily 90 tablet 1     montelukast (SINGULAIR) 10 MG tablet Take 1 tablet (10 mg) by mouth At Bedtime 90 tablet 1     nystatin (NYSTOP) 978303 UNIT/GM external powder Apply topically 2 times daily 60 g 0     order for DME Equipment being ordered: compression stockings 15-20 mm hg knee high 2 each 1     order for DME Diabetic test strips and lancets per insurance formulary Check blood sugar 2 times daily 3 Month 3     polyethylene glycol (MIRALAX/GLYCOLAX) powder Take 17 g by mouth daily DISSOLVE 17 GRAMS IN LIQUID AND DRINK DAILY AS DIRECTED 510 g 1     solifenacin (VESICARE) 10 MG tablet Take 1 tablet (10 mg) by mouth daily 90 tablet 1       Allergies  "  Allergen Reactions     Milk Protein Extract GI Disturbance     lactose intolerance.  Can tolerate milk products if uses lactaid     Bee Venom Swelling     Latex      Lisinopril Cough     Onion GI Disturbance     Vomiting, feels like throat is clogged     Aloe Rash     pain     Chlorine Rash       REVIEW OF SYSTEMS:  CONSTITUTIONAL:  NEGATIVE for fever, chills, change in weight, not feeling tired  SKIN:  NEGATIVE for worrisome rashes, no skin lumps, no skin ulcers and no non-healing wounds  EYES:  NEGATIVE for vision changes or irritation  ENT/MOUTH:  NEGATIVE  No hearing loss, no hoarseness, no difficulty swallowing.  RESP:  NEGATIVE for significant cough or SOB  BREAST:  NEGATIVE for masses, tenderness or discharge  CV:  NEGATIVE for chest pain, palpitations or peripheral edema  GI:  NEGATIVE for nausea, abdominal pain, heartburn, or change in bowel habits  :  Negative   MUSCULOSKELETAL:  See HPI above  NEURO:  NEGATIVE for weakness, dizziness or paresthesias  ENDOCRINE:  NEGATIVE for temperature intolerance, skin/hair changes  HEME/ALLERGY/IMMUNE:  NEGATIVE for bleeding problems  PSYCHIATRIC:  NEGATIVE for changes in mood or affect    PHYSICAL EXAM:  Vitals: BP 90/63   Pulse 80   Resp 16   Ht 1.721 m (5' 7.75\")   Wt 136.1 kg (300 lb)   BMI 45.95 kg/m    BMI= Body mass index is 45.95 kg/m .  Constitutional:  healthy, alert and no distress  Neuro: Alert and Oriented x 3, Sensation grossly WNL.  HEENT:  Atraumatic, EOMI  Neck:  Neck supple with no tenderness.  Psych: Affect normal   Respiratory: Breathing not labored.  Cardiovascular: normal peripheral pulses  Lymph: no adenopathy  Skin: No rashes,worrisome lesions or skin problems  Spine: straight, no straight leg raising pain.  Hips show full range of motion.  There is no tenderness over the sacro-iliac joints, sciatic notch, or greater trochanters.     KNEE EXAM:   Gait: walks with antalgic gait favoring right side  Alignment: Right: moderate varus, Left: "  normal   ROM: Right knee: 5 extension to 115 flexion, Left knee: 0 extension to 115 flexion  Effusion: Right: none, Left: none  Tender: Right: medial joint line, Left: none  Ligaments:  Lachman's Stable, Anterior and posterior drawer stable.  Right medial collateral ligament:  no laxity,  Lateral collateral ligament  no laxity.    Left medial collateral ligament:  no laxity, lateral collateral ligament:  no laxity.   mild pain with Varus/Valgus stress testing.    Patellofemoral joint: moderate crepitations in the right patellofemoral joint.    Apprehension: negative      X-RAY:  From today 10/22/2019: shows severe medial joint space narrowing  There is bone-on-bone medially  by x-ray.    Oxford score: 9     Impression: Right Knee: Osteoarthritis severe.  Left Knee: total knee arthroplasty doing well.    Plan: she wants injection now and then total knee arthroplasty in December.    Total Knee Arthroplasty: We talked about the patient's condition and diagnosis.  Because of severe arthritis, and failure of conservative measures, I have suggested total knee arthroplasty of the right  knee(s).  I've talked in depth about the procedure and the risks, which include, but are not limited to blood loss requiring transfusion, infection, neurovascular injury, DVT, PE, continued pain, (both perioperative and persistent post-recovery pain), numbness around the wound, instability, and potential anesthetic and perioperative medical complications, and the possibility of needing additional procedures. We also talked about recovery time, which differs from patient to patient.  We've encouraged attendance at the arthroplasty class at the hospital.  Medical clearance will need to be obtained.      Special considerations: left total knee arthroplasty  On 4/26/17 used E femur, % trabecular metal tibia with 10 spacer, 35 patella.

## 2019-10-22 NOTE — LETTER
10/22/2019         RE: Melody Ferguson  [address]  [facility] [address]        Dear Colleague,    Thank you for referring your patient, Melody Ferguson, to the [facility]. Please see a copy of my visit note below.    Large Joint Injection/Arthocentesis: R knee joint  Date/Time: 10/22/2019 9:00 AM  Performed by: eTrrance Bush PA-C  Authorized by: Terrance Bush PA-C     Needle Size:  22 G  Guidance: landmark guided    Approach:  Anterolateral  Location:  Knee      Medications:  80 mg methylPREDNISolone 80 MG/ML; 5 mL lidocaine 1 %  Outcome:  Tolerated well, no immediate complications  Procedure discussed: discussed risks, benefits, and alternatives    Timeout: timeout called immediately prior to procedure    Prep: patient was prepped and draped in usual sterile fashion    Prep comment:  Betadine          HISTORY OF PRESENT ILLNESS    Melody Ferguson is a 59 year old female who is seen as self referral for evaluation of  right knee pain that has been present approximately 10 years.      Present symptoms: pain anteriorly, pain sharp, dull/achy , severe pain, swelling, +catching/popping, no locking, +giving way.    Symptoms occur walking, kneeling, pivoting, standing for long periods, getting up from seated or lying-down position , going up stairs, going down stairs and at night.    The symptoms occur frequently.  The symptoms are increasing  Limitations of activities of daily living:  Walking, gardening, showering, getting in and out of shower,   Fall risk?: Yes    Treatments tried to this point: NSAIDs, Glucosamine, Corticosteroid injections and Physical Therapy  Response to treatment:   NSAIDs: Cant take them anymore   Glucosamine: Limited relief   Corticosteroid injections: Limited relief   viscous supplementation injection: Not done   Arthroscopy: Not sure   Physical Therapy: hurts more after therapy.       Past Medical History:   Diagnosis Date     Arthritis 2000      Cancer (H) 1983     Cerebral infarction (H) 2005     COPD (chronic obstructive pulmonary disease) (H) 1990     Depressive disorder 1990     DM type 2 (diabetes mellitus, type 2) (H)      History of TIA (transient ischemic attack)      HTN (hypertension)      Hyperlipidemia      Uncomplicated asthma 1990       Past Surgical History:   Procedure Laterality Date     ABDOMEN SURGERY  05/20/2005     BIOPSY  1984     BREAST SURGERY  1984     C TOTAL KNEE ARTHROPLASTY Left 04/26/2017    [facility]ML at [facility]     CHOLECYSTECTOMY  2000     COLONOSCOPY       ENT SURGERY  2008    tonsils removed     GENITOURINARY SURGERY  2005    a & p repair     HYSTERECTOMY, PAP STILL INDICATED  2005    for cervical cancer     REPAIR PTOSIS       SLING TRANSVAGINAL N/A 7/14/2017    Procedure: SLING TRANSVAGINAL;  Sling ([place]);  Surgeon: Talon Katz MD;  Location: [facility] OR       Family History   Problem Relation Age of Onset     Thyroid Disease Sister      Cancer Brother      Cancer Sister         breast cancer     Cerebrovascular Disease Sister 62     Other - See Comments Sister         Angioplasty 8/2016     Hypertension Father      Hyperlipidemia Father      Prostate Cancer Father      Cancer Father      Glaucoma Maternal Grandmother      Cancer Maternal Grandfather      Cancer Paternal Grandmother      Breast Cancer Paternal Grandmother      Cerebrovascular Disease Paternal Grandfather      Cerebrovascular Disease Maternal Half-Sister         4/08/2016     Breast Cancer Maternal Half-Sister      Asthma Maternal Half-Sister      Other Cancer Brother         Passed from cancer unsure what type     Anesthesia Reaction Daughter      Diabetes No family hx of      Macular Degeneration No family hx of        Social History     Socioeconomic History     Marital status: Single     Spouse name: Not on file     Number of children: Not on file     Years of education: Not on file     Highest education level: Not on file   Occupational History      Employer: DISABLED   Social Needs     Financial resource strain: Not on file     Food insecurity:     Worry: Not on file     Inability: Not on file     Transportation needs:     Medical: Not on file     Non-medical: Not on file   Tobacco Use     Smoking status: Former Smoker     Packs/day: 0.50     Years: 25.00     Pack years: 12.50     Types: Cigarettes     Last attempt to quit: 10/15/1991     Years since quittin.0     Smokeless tobacco: Never Used   Substance and Sexual Activity     Alcohol use: Yes     Alcohol/week: 0.0 standard drinks     Comment: occassionally     Drug use: No     Sexual activity: Yes     Partners: Male     Birth control/protection: Other     Comment: hystro   Lifestyle     Physical activity:     Days per week: Not on file     Minutes per session: Not on file     Stress: Not on file   Relationships     Social connections:     Talks on phone: Not on file     Gets together: Not on file     Attends Caodaism service: Not on file     Active member of club or organization: Not on file     Attends meetings of clubs or organizations: Not on file     Relationship status: Not on file     Intimate partner violence:     Fear of current or ex partner: Not on file     Emotionally abused: Not on file     Physically abused: Not on file     Forced sexual activity: Not on file   Other Topics Concern     Parent/sibling w/ CABG, MI or angioplasty before 65F 55M? Yes     Comment: Mirna Angioplasty    Social History Narrative     Not on file       Current Outpatient Medications   Medication Sig Dispense Refill     ACE NOT PRESCRIBED, INTENTIONAL, Reported on 5/10/2017 0 each 0     albuterol (VENTOLIN HFA) 108 (90 Base) MCG/ACT inhaler Inhale 2 puffs into the lungs every 6 hours as needed for shortness of breath / dyspnea or wheezing 18 g 0     ASPIRIN PO Take 81 mg by mouth daily       atorvastatin (LIPITOR) 40 MG tablet Take 1 tablet (40 mg) by mouth daily 90 tablet 1     blood glucose (ACCU-CHEK DAMIEN  PLUS) test strip Use to test blood sugar 1 times daily or as directed. 200 strip 3     blood glucose (FREESTYLE LITE) test strip Use to test blood sugar 2 times daily or as directed. 200 each 1     blood glucose monitoring (FREESTYLE FREEDOM LITE) meter device kit Use to test blood sugar 2 times daily or as directed. 1 kit 0     blood glucose monitoring (NO BRAND SPECIFIED) meter device kit Use to test blood sugar 1 times daily or as directed. Please dispense insurance preferred. 1 kit 0     buPROPion (WELLBUTRIN SR) 100 MG 12 hr tablet Take 1 tablet (100 mg) by mouth 2 times daily 180 tablet 1     citalopram (CELEXA) 40 MG tablet TAKE 1 TABLET(40 MG) BY MOUTH DAILY 90 tablet 1     cyclobenzaprine (FLEXERIL) 5 MG tablet Take 2 tablets (10 mg) by mouth 3 times daily as needed for muscle spasms 60 tablet 0     diclofenac (VOLTAREN) 50 MG EC tablet Take 1 tablet (50 mg) by mouth 2 times daily as needed for moderate pain 180 tablet 0     docusate sodium (COLACE) 100 MG capsule Take 1 capsule (100 mg) by mouth 2 times daily 60 capsule 2     esomeprazole (NEXIUM) 40 MG DR capsule TAKE 1 CAPSULE(40 MG) BY MOUTH EVERY MORNING 30 TO 60 MINUTES BEFORE BREAKFAST 90 capsule 0     fluticasone (VERAMYST) 27.5 MCG/SPRAY nasal spray Spray 2 sprays into both nostrils daily 10 g 2     fluticasone (VERAMYST) 27.5 MCG/SPRAY spray Spray 2 sprays into both nostrils daily 10 g 11     gabapentin (NEURONTIN) 300 MG capsule Take 1 capsule (300 mg) by mouth 2 times daily 180 capsule 0     gabapentin (NEURONTIN) 300 MG capsule Take 1 capsule (300 mg) by mouth 2 times daily 180 capsule 1     glipiZIDE-metFORMIN (METAGLIP) 5-500 MG tablet TAKE 1 TABLET BY MOUTH ONCE DAILY BEFORE A MEAL 90 tablet 1     insulin pen needle (NOVOFINE) 32G X 6 MM miscellaneous USE ONCE DAILY AS DIRECTED 100 each 0     liraglutide (VICTOZA PEN) 18 MG/3ML solution Inject 1.8 mg Subcutaneous daily 30 mL 3     losartan (COZAAR) 50 MG tablet Take 1 tablet (50 mg) by mouth  daily 90 tablet 1     melatonin 3 MG tablet Take 1 tablet (3 mg) by mouth nightly as needed for sleep 30 tablet 0     metoprolol succinate ER (TOPROL-XL) 25 MG 24 hr tablet Take 1 tablet (25 mg) by mouth daily 90 tablet 1     montelukast (SINGULAIR) 10 MG tablet Take 1 tablet (10 mg) by mouth At Bedtime 90 tablet 1     nystatin (NYSTOP) 439364 UNIT/GM external powder Apply topically 2 times daily 60 g 0     order for DME Equipment being ordered: compression stockings 15-20 mm hg knee high 2 each 1     order for DME Diabetic test strips and lancets per insurance formulary Check blood sugar 2 times daily 3 Month 3     polyethylene glycol (MIRALAX/GLYCOLAX) powder Take 17 g by mouth daily DISSOLVE 17 GRAMS IN LIQUID AND DRINK DAILY AS DIRECTED 510 g 1     solifenacin (VESICARE) 10 MG tablet Take 1 tablet (10 mg) by mouth daily 90 tablet 1       Allergies   Allergen Reactions     Milk Protein Extract GI Disturbance     lactose intolerance.  Can tolerate milk products if uses lactaid     Bee Venom Swelling     Latex      Lisinopril Cough     Onion GI Disturbance     Vomiting, feels like throat is clogged     Aloe Rash     pain     Chlorine Rash       REVIEW OF SYSTEMS:  CONSTITUTIONAL:  NEGATIVE for fever, chills, change in weight, not feeling tired  SKIN:  NEGATIVE for worrisome rashes, no skin lumps, no skin ulcers and no non-healing wounds  EYES:  NEGATIVE for vision changes or irritation  ENT/MOUTH:  NEGATIVE  No hearing loss, no hoarseness, no difficulty swallowing.  RESP:  NEGATIVE for significant cough or SOB  BREAST:  NEGATIVE for masses, tenderness or discharge  CV:  NEGATIVE for chest pain, palpitations or peripheral edema  GI:  NEGATIVE for nausea, abdominal pain, heartburn, or change in bowel habits  :  Negative   MUSCULOSKELETAL:  See HPI above  NEURO:  NEGATIVE for weakness, dizziness or paresthesias  ENDOCRINE:  NEGATIVE for temperature intolerance, skin/hair changes  HEME/ALLERGY/IMMUNE:  NEGATIVE for  "bleeding problems  PSYCHIATRIC:  NEGATIVE for changes in mood or affect    PHYSICAL EXAM:  Vitals: BP 90/63   Pulse 80   Resp 16   Ht 1.721 m (5' 7.75\")   Wt 136.1 kg (300 lb)   BMI 45.95 kg/m     BMI= Body mass index is 45.95 kg/m .  Constitutional:  healthy, alert and no distress  Neuro: Alert and Oriented x 3, Sensation grossly WNL.  HEENT:  Atraumatic, EOMI  Neck:  Neck supple with no tenderness.  Psych: Affect normal   Respiratory: Breathing not labored.  Cardiovascular: normal peripheral pulses  Lymph: no adenopathy  Skin: No rashes,worrisome lesions or skin problems  Spine: straight, no straight leg raising pain.  Hips show full range of motion.  There is no tenderness over the sacro-iliac joints, sciatic notch, or greater trochanters.     KNEE EXAM:   Gait: walks with antalgic gait favoring right side  Alignment: Right: moderate varus, Left:  normal   ROM: Right knee: 5 extension to 115 flexion, Left knee: 0 extension to 115 flexion  Effusion: Right: none, Left: none  Tender: Right: medial joint line, Left: none  Ligaments:  Lachman's Stable, Anterior and posterior drawer stable.  Right medial collateral ligament:  no laxity,  Lateral collateral ligament  no laxity.    Left medial collateral ligament:  no laxity, lateral collateral ligament:  no laxity.   mild pain with Varus/Valgus stress testing.    Patellofemoral joint: moderate crepitations in the right patellofemoral joint.    Apprehension: negative      X-RAY:  From today 10/22/2019: shows severe medial joint space narrowing  There is bone-on-bone medially  by x-ray.    Oxford score: 9     Impression: Right Knee: Osteoarthritis severe.  Left Knee: total knee arthroplasty doing well.    Plan: she wants injection now and then total knee arthroplasty in December.    Total Knee Arthroplasty: We talked about the patient's condition and diagnosis.  Because of severe arthritis, and failure of conservative measures, I have suggested total knee " arthroplasty of the right  knee(s).  I've talked in depth about the procedure and the risks, which include, but are not limited to blood loss requiring transfusion, infection, neurovascular injury, DVT, PE, continued pain, (both perioperative and persistent post-recovery pain), numbness around the wound, instability, and potential anesthetic and perioperative medical complications, and the possibility of needing additional procedures. We also talked about recovery time, which differs from patient to patient.  We've encouraged attendance at the arthroplasty class at the hospital.  Medical clearance will need to be obtained.      Special considerations: left total knee arthroplasty  On 4/26/17 used E femur, % trabecular metal tibia with 10 spacer, 35 patella.       Again, thank you for allowing me to participate in the care of your patient.        Sincerely,        Armond George MD

## 2019-10-22 NOTE — PATIENT INSTRUCTIONS
Ms. Ferguson and I talked about her condition and diagnosis.  Because of severe arthritis, and failure of conservative measures, we have decided to proceed with  total knee arthroplasty.      We have talked in depth about the procedure and the risks, which include, but are not limited to:  * blood loss possibly requiring transfusion,   * blood clots with possible pulmonary embolism  * infection or wound healing problems  * nerve damage - there will always be a bit of numbness just to outside of knee,  * vascular circulation problems  * continued pain  * instability of the knee.  * Loosening or wear  * anesthetic risks  * The possibility of needing additional procedures.      We also talked about recovery time, which differs from patient to patient.    Average 2 nights in the hospital.  Most people can return home at that point.  You will be in Physical Therapy for 1-2 months until you have gained full range of motion.    We've encouraged attendance at the joint replacement class at the hospital.    Once you are placed on the surgery schedule, the [facility] will call you with the joint replacement class dates and times.  If you wish to schedule this yourself, or have additional questions about the class, you may call them at [phone].  Preop xrays have been ordered, and medical clearance will need to be obtained.    Preop physical with primary physician is needed within 30 days of the surgery.  Nothing to eat or drink for 8 hours before surgery.  Stop blood thinners 5 days before surgery (aspirin, warfarin, anti-inflammatories).      Call [phone] to schedule your surgery.

## 2019-10-23 NOTE — TELEPHONE ENCOUNTER
Patient contacted.  Patient reports she has enough medication until May 26th. Patient is currently taking the Metaglip TAKE 1 TABLETS BY MOUTH TWICE DAILY BEFORE MEALS and Victoza 1.2 mg Subcutaneous daily. Patient reports she was told by PCP that when it comes time to renew her Metaglip and Victoza, that they would adjust the dosing.    Patient will need a refill of 1) Metaglip adjusted dose,  2) Victoza adjusted dose, and 3) Gabapentin adjusted dose - Dr. Roche wanted her to take 300 mg gabapentin 1 capsule, BID. She said he would like you to take over prescribing the Rx. She will see him next week and also get a MRI done.     Patient is asking for PCP to review this upon her return, and did not think she was due for OV quite yet.     Routing to provider to review and advise.   Linh Martinez RN           Price (Do Not Change): 0.00 Instructions: This plan will send the code FBSE to the PM system.  DO NOT or CHANGE the price. Detail Level: Generalized

## 2019-11-07 DIAGNOSIS — E11.21 TYPE 2 DIABETES MELLITUS WITH DIABETIC NEPHROPATHY, WITHOUT LONG-TERM CURRENT USE OF INSULIN (H): ICD-10-CM

## 2019-11-08 RX ORDER — GLIPIZIDE AND METFORMIN HCL 5; 500 MG/1; MG/1
TABLET, FILM COATED ORAL
Qty: 90 TABLET | Refills: 1
Start: 2019-11-08

## 2019-11-08 NOTE — TELEPHONE ENCOUNTER
Requested Prescriptions   Pending Prescriptions Disp Refills     glipiZIDE-metFORMIN (METAGLIP) 5-500 MG tablet  Last Written Prescription Date:  6/4/19  Last Fill Quantity: 90,  # refills: 1   Last Office Visit with [facility], P or [facility] prescribing provider:  6/4/19   Future Office Visit:    Next 5 appointments (look out 90 days)    Jan 07, 2020  9:00 AM CST  [facility] CAR[facility] C[facility] [address] [facility] [address]  [phone]   Jan 28, 2020 10:00 AM CST  [facility] Visit with Armond George MD  [facility] [address] [facility] [address]  [phone]          90 tablet 1     Sig: TAKE 1 TABLET BY MOUTH ONCE DAILY BEFORE A MEAL       Combination Oral Antihyperglycemic Agents Failed - 11/7/2019  7:56 PM        Failed - Patient's CR is NOT>1.4 OR Patient's EGFR is NOT<45 within past 12 mos.     Recent Labs   Lab Test 10/30/18  1126   GFRESTIMATED >90   GFRESTBLACK >90       Recent Labs   Lab Test 10/30/18  1126   CR 0.46*             Passed - Blood pressure under 140/90 in past 12 months     BP Readings from Last 3 Encounters:   10/22/19 90/63   08/06/19 112/81   06/04/19 119/58                 Passed - Patient has a documented LDL level within past 12 mos.     Recent Labs   Lab Test 06/04/19  0738   LDL 78             Passed - Patient has a documented Microalbumin level within past 12 mos.     Recent Labs   Lab Test 10/30/18  1138   MICROL 6   UMALCR 11.37             Passed - Patient has documented A1c within the specified period of time.     If HgbA1C is 8 or greater, it needs to be on file within the past 3 months.  If less than 8, must be on file within the past 6 months.     Recent Labs   Lab Test 06/04/19  0738   A1C 6.7*             Passed - Patient does not have a diagnosis of CHF.        Passed - Medication is  "active on med list        Passed - Patient is 18 years old or older.        Passed - Patient is not pregnant        Passed - Patient has not had a positive pregnancy test within the past 12 mos.        Passed - Recent (6 mo) or future (30 days) visit within the authorizing provider's specialty     Patient had office visit in the last 6 months or has a visit in the next 30 days with authorizing provider or within the authorizing provider's specialty.  See \"Patient Info\" tab in inbasket, or \"Choose Columns\" in Meds & Orders section of the refill encounter.              "

## 2019-11-08 NOTE — TELEPHONE ENCOUNTER
90 day supply with 1 refills sent on 6/4/19. Should have enough to last until 12/19. Too soon to refill.      Shaheen Head RN, BSN, PHN

## 2019-11-09 ENCOUNTER — MYC MEDICAL ADVICE (OUTPATIENT)
Dept: FAMILY MEDICINE | Facility: CLINIC | Age: 59
End: 2019-11-09

## 2019-11-09 DIAGNOSIS — E11.9 TYPE 2 DIABETES MELLITUS WITHOUT COMPLICATION, WITHOUT LONG-TERM CURRENT USE OF INSULIN (H): Primary | Chronic | ICD-10-CM

## 2019-11-11 NOTE — TELEPHONE ENCOUNTER
Routing to provider to advise; patient requesting a Dexcom Continuous Glucose Monitoring.      Shaheen Head RN, BSN, PHN

## 2019-11-12 ENCOUNTER — TELEPHONE (OUTPATIENT)
Dept: FAMILY MEDICINE | Facility: CLINIC | Age: 59
End: 2019-11-12

## 2019-11-12 DIAGNOSIS — E11.21 TYPE 2 DIABETES MELLITUS WITH DIABETIC NEPHROPATHY, WITHOUT LONG-TERM CURRENT USE OF INSULIN (H): ICD-10-CM

## 2019-11-12 DIAGNOSIS — J45.20 MILD INTERMITTENT ASTHMA WITHOUT COMPLICATION: ICD-10-CM

## 2019-11-12 DIAGNOSIS — G89.4 CHRONIC PAIN SYNDROME: ICD-10-CM

## 2019-11-12 DIAGNOSIS — M54.42 ACUTE LEFT-SIDED LOW BACK PAIN WITH LEFT-SIDED SCIATICA: ICD-10-CM

## 2019-11-12 DIAGNOSIS — F33.1 MAJOR DEPRESSIVE DISORDER, RECURRENT EPISODE, MODERATE (H): ICD-10-CM

## 2019-11-12 DIAGNOSIS — I10 HTN, GOAL BELOW 140/90: Chronic | ICD-10-CM

## 2019-11-12 DIAGNOSIS — J30.1 SEASONAL ALLERGIC RHINITIS DUE TO POLLEN: ICD-10-CM

## 2019-11-12 DIAGNOSIS — R10.13 DYSPEPSIA: ICD-10-CM

## 2019-11-12 DIAGNOSIS — M54.2 NECK PAIN: ICD-10-CM

## 2019-11-12 RX ORDER — PROCHLORPERAZINE 25 MG/1
1 SUPPOSITORY RECTAL CONTINUOUS
Qty: 1 EACH | Refills: 0 | Status: SHIPPED | OUTPATIENT
Start: 2019-11-12 | End: 2019-11-19

## 2019-11-12 RX ORDER — PROCHLORPERAZINE 25 MG/1
1 SUPPOSITORY RECTAL
Qty: 9 EACH | Refills: 3 | Status: SHIPPED | OUTPATIENT
Start: 2019-11-12 | End: 2019-11-19

## 2019-11-12 RX ORDER — PROCHLORPERAZINE 25 MG/1
1 SUPPOSITORY RECTAL CONTINUOUS
Qty: 1 DEVICE | Refills: 0 | Status: SHIPPED | OUTPATIENT
Start: 2019-11-12 | End: 2019-11-19

## 2019-11-12 NOTE — TELEPHONE ENCOUNTER
Diabetes Education Scheduling Outreach #1:    Call to patient to schedule. Left message with phone number to call to schedule.    Plan for 2nd outreach attempt within 1 week.    Aylin Richardson  [facility] [employer]  Diabetes and Nutrition Scheduling

## 2019-11-12 NOTE — TELEPHONE ENCOUNTER
PHARMACY IS ALSO REQUESTING:        traMADol (ULTRAM) 50 MG tablet (Discontinued)  Last Written Prescription Date:  11/21/18  Last Fill Quantity: 30,   # refills: 0  [facility]  Future Office visit:    Next 5 appointments (look out 90 days)    Jan 07, 2020  9:00 AM CST  Pre-Op physical with CARMEN Adkins CNP  [facility] ([facility]) [address] [address]  [phone]   Jan 28, 2020 10:00 AM CST  Return Visit with Armond George MD  [facility] ([facility]) [address] [address]  [phone]           Routing refill request to provider for review/approval because:  Drug not active on patient's medication list          Requested Prescriptions   Pending Prescriptions Disp Refills     fluticasone (VERAMYST) 27.5 MCG/SPRAY nasal spray        Last Written Prescription Date:  10/10/19  Last Fill Quantity: 10g,   # refills: 2  [facility]  Future Office visit:    Next 5 appointments (look out 90 days)    Jan 07, 2020  9:00 AM CST  Pre-Op physical with CARMEN Adkins CNP  [facility] ([facility]) [address] [address]  [phone]   Jan 28, 2020 10:00 AM CST  Return Visit with Armond George MD  [facility] ([facility]) [address] [address]  [phone]           Routing refill request to provider for review/approval because:  Drug not on the G, P or  Health refill protocol or controlled substance 10 g 2     Sig: Spray 2 sprays into both nostrils daily       There is no refill protocol information for this order        cyclobenzaprine (FLEXERIL) 5 MG tablet        Last Written Prescription Date:  10/10/19  Last Fill Quantity: 60,   # refills:  0  Last Office Visit: 0604/19Uma  Future Office visit:    Next 5 appointments (look out 90 days)    Jan 07, 2020  9:00 AM CST  Pre-Op physical with CARMEN Adkins CNP  [facility] [address] [address]  [phone]   Jan 28, 2020 10:00 AM CST  Return Visit with Armond George MD  [facility] [address] [address]  [phone]           Routing refill request to provider for review/approval because:  Drug not on the [facility] or [facility] refill protocol or controlled substance 60 tablet 0     Sig: Take 2 tablets (10 mg) by mouth 3 times daily as needed for muscle spasms       There is no refill protocol information for this order        diclofenac (VOLTAREN) 50 MG EC tablet  Last Written Prescription Date:  10/10/19  Last Fill Quantity: 180,  # refills: 0   Last Office Visit with [facility], [facility] or  Health prescribing provider:  06/04/19U[facility]   Future Office Visit:    Next 5 appointments (look out 90 days)    Jan 07, 2020  9:00 AM CST  Pre-Op physical with CARMEN Adkins CNP  [facility] [address] [address]  [phone]   Jan 28, 2020 10:00 AM CST  Return Visit with Armond George MD  [facility] [address] [address]  [phone]        180 tablet 0     Sig: Take 1 tablet (50 mg) by mouth 2 times daily as needed for moderate pain       NSAID Medications Failed - 11/12/2019  2:32 PM        Failed - Normal ALT on file in past 12 months     Recent Labs   Lab Test 10/30/18  1126   ALT 25             Failed - Normal AST on file in past 12 months     Recent Labs   Lab Test 10/30/18  1126   AST 13             Failed - Normal CBC on file in past 12  "months     Recent Labs   Lab Test 07/11/17  0841   WBC 8.6   RBC 4.01   HGB 10.9*   HCT 34.9*                    Failed - Normal serum creatinine on file in past 12 months     Recent Labs   Lab Test 10/30/18  1126   CR 0.46*             Passed - Blood pressure under 140/90 in past 12 months     BP Readings from Last 3 Encounters:   10/22/19 90/63   08/06/19 112/81   06/04/19 119/58                 Passed - Recent (12 mo) or future (30 days) visit within the authorizing provider's specialty     Patient has had an office visit with the authorizing provider or a provider within the authorizing providers department within the previous 12 mos or has a future within next 30 days. See \"Patient Info\" tab in inbasket, or \"Choose Columns\" in Meds & Orders section of the refill encounter.              Passed - Patient is age 6-64 years        Passed - Medication is active on med list        Passed - No active pregnancy on record        Passed - No positive pregnancy test in past 12 months        citalopram (CELEXA) 40 MG tablet  Last Written Prescription Date:  06/04/19  Last Fill Quantity: 90,  # refills: 1   Last Office Visit with [facility] or [facility] prescribing provider:  06/04/19-Mikala   Future Office Visit:    Next 5 appointments (look out 90 days)    Jan 07, 2020  9:00 AM CST  Pre-Op physical with CARMEN Adkins CNP  [facility] [address] [address] [facility] [address]  [phone]   Jan 28, 2020 10:00 AM CST  Return Visit with Armond George MD  [facility] ([facility]) [address] [address]  [phone]        90 tablet 1     Sig: TAKE 1 TABLET(40 MG) BY MOUTH DAILY       SSRIs Protocol Failed - 11/12/2019  2:32 PM        Failed - PHQ-9 score less than 5 in past 6 months     Please review last PHQ-9 score.           Passed - Medication is active on med " "list        Passed - Patient is age 18 or older        Passed - No active pregnancy on record        Passed - No positive pregnancy test in last 12 months        Passed - Recent (6 mo) or future (30 days) visit within the authorizing provider's specialty     Patient had office visit in the last 6 months or has a visit in the next 30 days with authorizing provider or within the authorizing provider's specialty.  See \"Patient Info\" tab in inbasket, or \"Choose Columns\" in Meds & Orders section of the refill encounter.            esomeprazole (NEXIUM) 40 MG DR capsule  Last Written Prescription Date:  09/04/19  Last Fill Quantity: 90,  # refills: 0   Last Office Visit with [facility], [facility] or [facility] prescribing provider:  06/04/19-Mikala   Future Office Visit:    Next 5 appointments (look out 90 days)    Jan 07, 2020  9:00 AM CST  Pre-Op physical with CARMEN Adkins CNP  [facility]) [address] [address]  [phone]   Jan 28, 2020 10:00 AM CST  Return Visit with Armond George MD  [facility] ([facility]) [address] [address] [facility] [address]  [phone]        90 capsule 0     Sig: Take 30-60 minutes before eating.       PPI Protocol Passed - 11/12/2019  2:32 PM        Passed - Not on Clopidogrel (unless Pantoprazole ordered)        Passed - No diagnosis of osteoporosis on record        Passed - Recent (12 mo) or future (30 days) visit within the authorizing provider's specialty     Patient has had an office visit with the authorizing provider or a provider within the authorizing providers department within the previous 12 mos or has a future within next 30 days. See \"Patient Info\" tab in inbasket, or \"Choose Columns\" in Meds & Orders section of the refill encounter.              Passed - Medication is active on med list        Passed - Patient is age 18 or older "        Passed - No active pregnacy on record        Passed - No positive pregnancy test in past 12 months        gabapentin (NEURONTIN) 300 MG capsule        Last Written Prescription Date:  10/10/19  Last Fill Quantity: 180,   # refills: 0  Last Office Visit: 06/04/19  Future Office visit:    Next 5 appointments (look out 90 days)    Jan 07, 2020  9:00 AM CST  Pre-Op physical with CARMEN Adkins CNP  [facility] ([facility]) [address] [facility] [address]  [phone]   Jan 28, 2020 10:00 AM CST  Return Visit with Armond George MD  [facility] ([facility]) [address] [facility] [address]  [phone]           Routing refill request to provider for review/approval because:  Drug not on the [facility], [facility] or  Health refill protocol or controlled substance 180 capsule 0     Sig: Take 1 capsule (300 mg) by mouth 2 times daily       There is no refill protocol information for this order        glipiZIDE-metFORMIN (METAGLIP) 5-500 MG tablet  Last Written Prescription Date:  06/04/19  Last Fill Quantity: 90,  # refills: 1   Last Office Visit with [facility] or [facility] provider:  06/04/19   Future Office Visit:    Next 5 appointments (look out 90 days)    Jan 07, 2020  9:00 AM CST  Pre-Op physical with CARMEN Adkins CNP  [facility] ([facility]) [address] [address] [facility] [address]  [phone]   Jan 28, 2020 10:00 AM CST  Return Visit with Armond George MD  [facility] ([facility]) [address] [address] [facility] [address]  [phone]        90 tablet 1     Sig: TAKE 1 TABLET BY MOUTH ONCE DAILY BEFORE A MEAL       Combination Oral Antihyperglycemic Agents Failed - 11/12/2019  2:32 PM        Failed - Patient's CR is NOT>1.4 OR Patient's EGFR  "is NOT<45 within past 12 mos.     Recent Labs   Lab Test 10/30/18  1126   GFRESTIMATED >90   GFRESTBLACK >90       Recent Labs   Lab Test 10/30/18  1126   CR 0.46*             Passed - Blood pressure under 140/90 in past 12 months     BP Readings from Last 3 Encounters:   10/22/19 90/63   08/06/19 112/81   06/04/19 119/58                 Passed - Patient has a documented LDL level within past 12 mos.     Recent Labs   Lab Test 06/04/19  0738   LDL 78             Passed - Patient has a documented Microalbumin level within past 12 mos.     Recent Labs   Lab Test 10/30/18  1138   MICROL 6   UMALCR 11.37             Passed - Patient has documented A1c within the specified period of time.     If HgbA1C is 8 or greater, it needs to be on file within the past 3 months.  If less than 8, must be on file within the past 6 months.     Recent Labs   Lab Test 06/04/19  0738   A1C 6.7*             Passed - Patient does not have a diagnosis of CHF.        Passed - Medication is active on med list        Passed - Patient is 18 years old or older.        Passed - Patient is not pregnant        Passed - Patient has not had a positive pregnancy test within the past 12 mos.        Passed - Recent (6 mo) or future (30 days) visit within the authorizing provider's specialty     Patient had office visit in the last 6 months or has a visit in the next 30 days with authorizing provider or within the authorizing provider's specialty.  See \"Patient Info\" tab in inbasket, or \"Choose Columns\" in Meds & Orders section of the refill encounter.            losartan (COZAAR) 50 MG tablet  Last Written Prescription Date:  06/04/19  Last Fill Quantity: 90,  # refills: 1   Last Office Visit with [facility], [facility] or [facility] prescribing provider:  06/04/19   Future Office Visit:    Next 5 appointments (look out 90 days)    Jan 07, 2020  9:00 AM CST  Pre-Op physical with CARMEN Adkins CNP  [facility] ([facility] " "[place]) [address] [address]  [phone]   Jan 28, 2020 10:00 AM CST  Return Visit with Armond George MD  [facility] ([facility]) [address] [address]  [phone]        90 tablet 1     Sig: Take 1 tablet (50 mg) by mouth daily       Angiotensin-II Receptors Failed - 11/12/2019  2:32 PM        Failed - Normal serum creatinine on file in past 12 months     Recent Labs   Lab Test 10/30/18  1126   CR 0.46*             Failed - Normal serum potassium on file in past 12 months     Recent Labs   Lab Test 10/30/18  1126   POTASSIUM 3.9                    Passed - Last blood pressure under 140/90 in past 12 months     BP Readings from Last 3 Encounters:   10/22/19 90/63   08/06/19 112/81   06/04/19 119/58                 Passed - Recent (12 mo) or future (30 days) visit within the authorizing provider's specialty     Patient has had an office visit with the authorizing provider or a provider within the authorizing providers department within the previous 12 mos or has a future within next 30 days. See \"Patient Info\" tab in inbasket, or \"Choose Columns\" in Meds & Orders section of the refill encounter.              Passed - Medication is active on med list        Passed - Patient is age 18 or older        Passed - No active pregnancy on record        Passed - No positive pregnancy test in past 12 months        montelukast (SINGULAIR) 10 MG tablet  Last Written Prescription Date:  06/04/19  Last Fill Quantity: 90,  # refills: 1   Last Office Visit with [facility], [facility] or [facility] prescribing provider:  06/04/19   Future Office Visit:    Next 5 appointments (look out 90 days)    Jan 07, 2020  9:00 AM CST  Pre-Op physical with CARMEN Adkins CNP  [facility] ([facility]) [address] [address]  [phone]   Jan 28, 2020 10:00 AM " "CST  Return Visit with Armond George MD  [facility] ([facility]) [address] [address] [address] [phone]        90 tablet 1     Sig: Take 1 tablet (10 mg) by mouth At Bedtime       Leukotriene Inhibitors Protocol Failed - 11/12/2019  2:32 PM        Failed - Asthma control assessment score within normal limits in last 6 months     Please review ACT score.           Passed - Patient is age 12 or older     If patient is under 16, ok to refill using age based dosing.           Passed - Medication is active on med list        Passed - Recent (6 mo) or future (30 days) visit within the authorizing provider's specialty     Patient had office visit in the last 6 months or has a visit in the next 30 days with authorizing provider or within the authorizing provider's specialty.  See \"Patient Info\" tab in inbasket, or \"Choose Columns\" in Meds & Orders section of the refill encounter.              "

## 2019-11-12 NOTE — TELEPHONE ENCOUNTER
They need more specifics  needs to say     Dexcom continuous monitor    also needs 1. 2.sensor and  3.transmitter  All 3 things need to be included with the orders  Each on a separate order which makes   refills or reorders much clearer    escobar [phone]

## 2019-11-14 RX ORDER — ESOMEPRAZOLE MAGNESIUM 40 MG/1
CAPSULE, DELAYED RELEASE ORAL
Qty: 90 CAPSULE | Refills: 0 | Status: SHIPPED | OUTPATIENT
Start: 2019-11-14 | End: 2019-12-05

## 2019-11-15 RX ORDER — CYCLOBENZAPRINE HCL 5 MG
10 TABLET ORAL 3 TIMES DAILY PRN
Qty: 60 TABLET | Refills: 0 | OUTPATIENT
Start: 2019-11-15

## 2019-11-15 RX ORDER — GLIPIZIDE AND METFORMIN HCL 5; 500 MG/1; MG/1
TABLET, FILM COATED ORAL
Qty: 30 TABLET | Refills: 1 | Status: SHIPPED | OUTPATIENT
Start: 2019-11-15 | End: 2019-12-05

## 2019-11-15 RX ORDER — GABAPENTIN 300 MG/1
300 CAPSULE ORAL 2 TIMES DAILY
Qty: 60 CAPSULE | Refills: 1 | Status: SHIPPED | OUTPATIENT
Start: 2019-11-15 | End: 2019-12-05

## 2019-11-15 RX ORDER — CITALOPRAM HYDROBROMIDE 40 MG/1
TABLET ORAL
Qty: 30 TABLET | Refills: 1 | Status: SHIPPED | OUTPATIENT
Start: 2019-11-15 | End: 2019-12-05

## 2019-11-15 RX ORDER — LOSARTAN POTASSIUM 50 MG/1
50 TABLET ORAL DAILY
Qty: 30 TABLET | Refills: 1 | Status: SHIPPED | OUTPATIENT
Start: 2019-11-15 | End: 2019-12-05

## 2019-11-15 RX ORDER — MONTELUKAST SODIUM 10 MG/1
1 TABLET ORAL AT BEDTIME
Qty: 30 TABLET | Refills: 1 | Status: SHIPPED | OUTPATIENT
Start: 2019-11-15 | End: 2019-12-05

## 2019-11-15 NOTE — TELEPHONE ENCOUNTER
Refills provided on chronic medications.  Declined voltaren and flexeril as was prescribed for acute pain.  Patient needs to be seen for refills on these.  For chronic medications, gave 2 months worth as is due for follow up in December.  Please notify patient. Thanks!  Annabelle

## 2019-11-15 NOTE — TELEPHONE ENCOUNTER
Patient notified that chronic medications were refilled, not the acute ones for pain and muscle spasms.  She stated she will make an appointment if she needs those, also will come in to be seen in January for a pre-op, as was ok'd by Annabelle for her follow up.  If she runs out of prescriptions prior, she will make an appointment then.      Romina Aguilar, CMA

## 2019-11-15 NOTE — TELEPHONE ENCOUNTER
ACT Total Scores 10/30/2018 6/3/2019 6/4/2019   ACT TOTAL SCORE (Goal Greater than or Equal to 20) 24 19 19   In the past 12 months, how many times did you visit the emergency room for your asthma without being admitted to the hospital? 0 0 0   In the past 12 months, how many times were you hospitalized overnight because of your asthma? 0 0 0     PHQ-9 score:    PHQ-9 SCORE 6/4/2019   PHQ-9 Total Score MyChart -   PHQ-9 Total Score 16     Prescription for Nexium approved per [facility] Refill Protocol.    Routing refill request to provider for review/approval because:  Drug not on the [facility] refill protocol   Drug not active on patient's medication list  Labs not current:  ALT, AST, CBC, Creatinine  PHQ 9 >5  ACT <20    Susan Heath RN  [facility]      **See also, request for Tramadol 50 mg (no longer on current med list)  **New pharmacy requesting refills

## 2019-11-16 ENCOUNTER — MYC MEDICAL ADVICE (OUTPATIENT)
Dept: FAMILY MEDICINE | Facility: CLINIC | Age: 59
End: 2019-11-16

## 2019-11-16 DIAGNOSIS — E11.21 TYPE 2 DIABETES MELLITUS WITH DIABETIC NEPHROPATHY, WITHOUT LONG-TERM CURRENT USE OF INSULIN (H): Primary | ICD-10-CM

## 2019-11-18 NOTE — TELEPHONE ENCOUNTER
Patient sent the following Nex[employer] message:      Found out why Im not covered for dexcom Who is covered under Medicare?    According to CMS, therapeutic CGM may be covered by Medicare when all of the following criteria are met:    The beneficiary has diabetes mellitus; and,  The beneficiary has been using a home blood glucose monitor (BGM) and performing frequent (four or more times a day) BGM testing; and,  The beneficiary is insulin-treated with 3 or more daily injections (MDI) of insulin or a continuous subcutaneous insulin infusion (CSII) pump; and,  The beneficiary's insulin treatment regimen requires frequent adjustments by the beneficiary on the basis of therapeutic CGM testing results.  Within six (6) months prior to ordering the CGM, the beneficiary had an in-person visit with the treating practitioner to evaluate their diabetes control and determine that the above criteria are met; and,  Every six (6) months following the initial prescription of the CGM, the beneficiary has an in-person visit with the treating practitioner to assess adherence to their CGM regimen and diabetes treatment plan.  In order to be included in this category, the system must be defined as therapeutic CGM, meaning you can make treatment decisions using the device.   It was suggested that I try   The FreeStyle Rayna 14 day system.  Would you mind sending the script to [facility] in [place]  Thank You Melody Head RN, BSN, PHN

## 2019-11-19 RX ORDER — FLASH GLUCOSE SENSOR
1 KIT MISCELLANEOUS 4 TIMES DAILY
Qty: 1 EACH | Refills: 1 | Status: SHIPPED | OUTPATIENT
Start: 2019-11-19

## 2019-11-19 RX ORDER — FLASH GLUCOSE SCANNING READER
1 EACH MISCELLANEOUS 4 TIMES DAILY
Qty: 1 DEVICE | Refills: 0 | Status: SHIPPED | OUTPATIENT
Start: 2019-11-19

## 2019-11-19 NOTE — TELEPHONE ENCOUNTER
Sent patient a My Chart message of provider's response.  Yuli Giang MA  [facility]  2nd Floor  Primary Care

## 2019-11-20 NOTE — TELEPHONE ENCOUNTER
Diabetes Education Scheduling Outreach #2:    Call to patient to schedule. Left message with phone number to call to schedule.    Aylin Richardson  [place] [facility]  Diabetes and Nutrition Scheduling

## 2019-11-22 ENCOUNTER — MYC REFILL (OUTPATIENT)
Dept: FAMILY MEDICINE | Facility: CLINIC | Age: 59
End: 2019-11-22

## 2019-11-22 DIAGNOSIS — E11.21 TYPE 2 DIABETES MELLITUS WITH DIABETIC NEPHROPATHY, WITHOUT LONG-TERM CURRENT USE OF INSULIN (H): ICD-10-CM

## 2019-11-22 DIAGNOSIS — G89.4 CHRONIC PAIN SYNDROME: ICD-10-CM

## 2019-11-22 DIAGNOSIS — F33.1 MAJOR DEPRESSIVE DISORDER, RECURRENT EPISODE, MODERATE (H): ICD-10-CM

## 2019-11-25 RX ORDER — POLYETHYLENE GLYCOL 3350 17 G/17G
17 POWDER, FOR SOLUTION ORAL DAILY
Qty: 510 G | Refills: 1 | Status: SHIPPED | OUTPATIENT
Start: 2019-11-25 | End: 2020-01-27

## 2019-11-25 RX ORDER — POLYETHYLENE GLYCOL 3350 17 G/17G
17 POWDER, FOR SOLUTION ORAL DAILY
Qty: 510 G | Refills: 1 | Status: SHIPPED | OUTPATIENT
Start: 2019-11-25 | End: 2019-12-05

## 2019-11-25 RX ORDER — LANOLIN ALCOHOL/MO/W.PET/CERES
3 CREAM (GRAM) TOPICAL
Qty: 90 TABLET | Refills: 0 | Status: SHIPPED | OUTPATIENT
Start: 2019-11-25 | End: 2019-12-05

## 2019-11-25 NOTE — TELEPHONE ENCOUNTER
Routing patient's My Chart response to the provider to advise.  Yuli Giang [facility]  2nd Floor  Primary Care

## 2019-11-25 NOTE — TELEPHONE ENCOUNTER
Sent patient a My Chart message regarding appointment due in December. Postponing message.  Yuli Giang [facility]  2nd Floor  Primary Care

## 2019-11-26 NOTE — TELEPHONE ENCOUNTER
If you choose to delay your preop, you'll have to delay your surgery as well.  Pls contact patient and find out what her living arrangements will be.  She needs to have a place to go to after surgery to recover. She may need Care Coordination referral.  Randa MORALES, CNP

## 2019-11-26 NOTE — TELEPHONE ENCOUNTER
Prescription approved per [facility] Refill Protocol.  Samina North RN

## 2019-11-26 NOTE — TELEPHONE ENCOUNTER
Prescription approved per [facility] Refill Protocol.  Samina North RN

## 2019-11-26 NOTE — TELEPHONE ENCOUNTER
Prescription approved per [facility] Refill Protocol.  Samina North RN

## 2019-11-26 NOTE — TELEPHONE ENCOUNTER
Sent a My Chart message of provider's response.  Yuli Giang MA  [facility]  2nd Floor  Primary Care

## 2019-12-02 NOTE — TELEPHONE ENCOUNTER
Trisha sent a response to patient's question.  Yuli Giang [facility]  2nd Floor  Primary Care

## 2019-12-05 ENCOUNTER — OFFICE VISIT (OUTPATIENT)
Dept: FAMILY MEDICINE | Facility: CLINIC | Age: 59
End: 2019-12-05
Payer: MEDICARE

## 2019-12-05 VITALS
BODY MASS INDEX: 44.41 KG/M2 | OXYGEN SATURATION: 95 % | DIASTOLIC BLOOD PRESSURE: 81 MMHG | RESPIRATION RATE: 18 BRPM | HEART RATE: 81 BPM | TEMPERATURE: 97.3 F | SYSTOLIC BLOOD PRESSURE: 128 MMHG | WEIGHT: 293 LBS | HEIGHT: 68 IN

## 2019-12-05 DIAGNOSIS — H66.92 ACUTE OTITIS MEDIA, LEFT: Primary | ICD-10-CM

## 2019-12-05 DIAGNOSIS — G89.4 CHRONIC PAIN SYNDROME: ICD-10-CM

## 2019-12-05 DIAGNOSIS — M54.42 ACUTE LEFT-SIDED LOW BACK PAIN WITH LEFT-SIDED SCIATICA: ICD-10-CM

## 2019-12-05 DIAGNOSIS — I25.2 OLD MYOCARDIAL INFARCTION: ICD-10-CM

## 2019-12-05 DIAGNOSIS — L98.9 LESION OF FACE: ICD-10-CM

## 2019-12-05 DIAGNOSIS — R10.13 DYSPEPSIA: ICD-10-CM

## 2019-12-05 DIAGNOSIS — E11.21 TYPE 2 DIABETES MELLITUS WITH DIABETIC NEPHROPATHY, WITHOUT LONG-TERM CURRENT USE OF INSULIN (H): ICD-10-CM

## 2019-12-05 DIAGNOSIS — J45.20 MILD INTERMITTENT ASTHMA WITHOUT COMPLICATION: ICD-10-CM

## 2019-12-05 DIAGNOSIS — E78.5 HYPERLIPIDEMIA LDL GOAL <100: Chronic | ICD-10-CM

## 2019-12-05 DIAGNOSIS — M54.2 NECK PAIN: ICD-10-CM

## 2019-12-05 DIAGNOSIS — F33.1 MAJOR DEPRESSIVE DISORDER, RECURRENT EPISODE, MODERATE (H): ICD-10-CM

## 2019-12-05 DIAGNOSIS — I10 HTN, GOAL BELOW 140/90: Chronic | ICD-10-CM

## 2019-12-05 PROCEDURE — 99214 OFFICE O/P EST MOD 30 MIN: CPT | Performed by: NURSE PRACTITIONER

## 2019-12-05 RX ORDER — LIRAGLUTIDE 6 MG/ML
1.8 INJECTION SUBCUTANEOUS DAILY
Qty: 30 ML | Refills: 3 | Status: SHIPPED | OUTPATIENT
Start: 2019-12-05 | End: 2020-01-27

## 2019-12-05 RX ORDER — METOPROLOL SUCCINATE 25 MG/1
25 TABLET, EXTENDED RELEASE ORAL DAILY
Qty: 90 TABLET | Refills: 3 | Status: SHIPPED | OUTPATIENT
Start: 2019-12-05

## 2019-12-05 RX ORDER — AMOXICILLIN 875 MG
875 TABLET ORAL 2 TIMES DAILY
Qty: 20 TABLET | Refills: 0 | Status: SHIPPED | OUTPATIENT
Start: 2019-12-05 | End: 2019-12-15

## 2019-12-05 RX ORDER — LOSARTAN POTASSIUM 50 MG/1
50 TABLET ORAL DAILY
Qty: 90 TABLET | Refills: 3 | Status: SHIPPED | OUTPATIENT
Start: 2019-12-05

## 2019-12-05 RX ORDER — ESOMEPRAZOLE MAGNESIUM 40 MG/1
CAPSULE, DELAYED RELEASE ORAL
Qty: 90 CAPSULE | Refills: 3 | Status: SHIPPED | OUTPATIENT
Start: 2019-12-05 | End: 2020-01-27

## 2019-12-05 RX ORDER — CYCLOBENZAPRINE HCL 5 MG
10 TABLET ORAL 3 TIMES DAILY PRN
Qty: 60 TABLET | Refills: 0 | Status: SHIPPED | OUTPATIENT
Start: 2019-12-05 | End: 2020-01-27

## 2019-12-05 RX ORDER — LANOLIN ALCOHOL/MO/W.PET/CERES
3 CREAM (GRAM) TOPICAL
Qty: 90 TABLET | Refills: 3 | Status: SHIPPED | OUTPATIENT
Start: 2019-12-05

## 2019-12-05 RX ORDER — GABAPENTIN 300 MG/1
300 CAPSULE ORAL 2 TIMES DAILY
Qty: 180 CAPSULE | Refills: 3 | Status: SHIPPED | OUTPATIENT
Start: 2019-12-05 | End: 2021-02-17

## 2019-12-05 RX ORDER — GLIPIZIDE AND METFORMIN HCL 5; 500 MG/1; MG/1
TABLET, FILM COATED ORAL
Qty: 180 TABLET | Refills: 3 | Status: SHIPPED | OUTPATIENT
Start: 2019-12-05 | End: 2019-12-05

## 2019-12-05 RX ORDER — CITALOPRAM HYDROBROMIDE 40 MG/1
TABLET ORAL
Qty: 90 TABLET | Refills: 3 | Status: SHIPPED | OUTPATIENT
Start: 2019-12-05

## 2019-12-05 RX ORDER — ATORVASTATIN CALCIUM 40 MG/1
40 TABLET, FILM COATED ORAL DAILY
Qty: 90 TABLET | Refills: 3 | Status: SHIPPED | OUTPATIENT
Start: 2019-12-05

## 2019-12-05 RX ORDER — MONTELUKAST SODIUM 10 MG/1
1 TABLET ORAL AT BEDTIME
Qty: 90 TABLET | Refills: 3 | Status: SHIPPED | OUTPATIENT
Start: 2019-12-05

## 2019-12-05 RX ORDER — BUPROPION HYDROCHLORIDE 100 MG/1
100 TABLET, EXTENDED RELEASE ORAL 2 TIMES DAILY
Qty: 180 TABLET | Refills: 3 | Status: SHIPPED | OUTPATIENT
Start: 2019-12-05

## 2019-12-05 ASSESSMENT — PAIN SCALES - GENERAL: PAINLEVEL: NO PAIN (0)

## 2019-12-05 ASSESSMENT — MIFFLIN-ST. JEOR: SCORE: 1957.62

## 2019-12-05 NOTE — PATIENT INSTRUCTIONS
Take Amoxicillin twice a day for 10 days.    Take your antibiotic with food to avoid stomach upset.  I recommend you also take an over the counter probiotic to avoid antibiotic-associated diarrhea.

## 2019-12-05 NOTE — PROGRESS NOTES
Subjective     Melody Ferguson is a 59 year old female who presents to clinic today for the following health issues:    HPI     RESPIRATORY SYMPTOMS      Duration: 1 week    Description  nasal congestion, rhinorrhea, sore throat, facial pain/pressure, cough, ear pain right and fatigue/malaise    Severity: moderate, worsening    Accompanying signs and symptoms: None    History (predisposing factors):  none    Precipitating or alleviating factors: None    Therapies tried and outcome:  rest and fluids      Patient Active Problem List   Diagnosis     Primary osteoarthritis of both knees     PTSD (post-traumatic stress disorder)     Chronic pain syndrome     Fibromyalgia     History of TIA (transient ischemic attack) and stroke     Major depressive disorder, recurrent episode, moderate (H)     Urge incontinence of urine     Prolapse of vaginal wall     Mild intermittent asthma without complication     HTN, goal below 140/90     Hyperlipidemia LDL goal <100     Type 2 diabetes mellitus with diabetic nephropathy (H)     Allergic rhinitis     Morbid obesity with BMI of 45.0-49.9, adult (H)     History of colonic polyps     Vaginal high risk HPV DNA test positive     Hiatal hernia     DDD (degenerative disc disease), lumbar     Diabetes mellitus, type 2 (H)     History of total knee arthroplasty, left     Type 2 diabetes mellitus without retinopathy (H)     Primary osteoarthritis of right knee     History of cervical cancer     Status post total abdominal hysterectomy and bilateral salpingo-oophorectomy (ALVINO-BSO)     Dupuytren's contracture     Past Surgical History:   Procedure Laterality Date     ABDOMEN SURGERY  05/20/2005     BIOPSY  1984     BREAST SURGERY  1984     C TOTAL KNEE ARTHROPLASTY Left 04/26/2017    DML at [facility]     CHOLECYSTECTOMY  2000     COLONOSCOPY       ENT SURGERY  2008    tonsils removed     GENITOURINARY SURGERY  2005    a & p repair     HYSTERECTOMY, PAP STILL INDICATED  2005    for cervical cancer      REPAIR PTOSIS       SLING TRANSVAGINAL N/A 2017    Procedure: SLING TRANSVAGINAL;  Sling ([place]);  Surgeon: Talon Katz MD;  Location: [facility] OR       Social History     Tobacco Use     Smoking status: Former Smoker     Packs/day: 0.50     Years: 25.00     Pack years: 12.50     Types: Cigarettes     Last attempt to quit: 10/15/1991     Years since quittin.1     Smokeless tobacco: Never Used   Substance Use Topics     Alcohol use: Yes     Alcohol/week: 0.0 standard drinks     Comment: occassionally     Family History   Problem Relation Age of Onset     Thyroid Disease Sister      Cancer Brother      Cancer Sister         breast cancer     Cerebrovascular Disease Sister 62     Other - See Comments Sister         Angioplasty 2016     Hypertension Father      Hyperlipidemia Father      Prostate Cancer Father      Cancer Father      Glaucoma Maternal Grandmother      Cancer Maternal Grandfather      Cancer Paternal Grandmother      Breast Cancer Paternal Grandmother      Cerebrovascular Disease Paternal Grandfather      Cerebrovascular Disease Maternal Half-Sister         2016     Breast Cancer Maternal Half-Sister      Asthma Maternal Half-Sister      Other Cancer Brother         Passed from cancer unsure what type     Anesthesia Reaction Daughter      Diabetes No family hx of      Macular Degeneration No family hx of          Current Outpatient Medications   Medication Sig Dispense Refill     albuterol (VENTOLIN HFA) 108 (90 Base) MCG/ACT inhaler Inhale 2 puffs into the lungs every 6 hours as needed for shortness of breath / dyspnea or wheezing 18 g 0     amoxicillin (AMOXIL) 875 MG tablet Take 1 tablet (875 mg) by mouth 2 times daily for 10 days 20 tablet 0     ASPIRIN PO Take 81 mg by mouth daily       atorvastatin (LIPITOR) 40 MG tablet Take 1 tablet (40 mg) by mouth daily 90 tablet 3     blood glucose (ACCU-CHEK DAMIEN PLUS) test strip Use to test blood sugar 1 times daily or as directed. 200  strip 3     blood glucose (NO BRAND SPECIFIED) lancets standard Use to test blood sugar 4 times daily or as directed. 200 each 0     buPROPion (WELLBUTRIN SR) 100 MG 12 hr tablet Take 1 tablet (100 mg) by mouth 2 times daily 180 tablet 3     citalopram (CELEXA) 40 MG tablet TAKE 1 TABLET(40 MG) BY MOUTH DAILY 90 tablet 3     cyclobenzaprine (FLEXERIL) 5 MG tablet Take 2 tablets (10 mg) by mouth 3 times daily as needed for muscle spasms 60 tablet 0     diclofenac (VOLTAREN) 50 MG EC tablet Take 1 tablet (50 mg) by mouth 2 times daily as needed for moderate pain 180 tablet 0     esomeprazole (NEXIUM) 40 MG DR capsule TAKE 1 CAPSULE(40 MG) BY MOUTH EVERY MORNING 30 TO 60 MINUTES BEFORE BREAKFAST 90 capsule 3     fluticasone (VERAMYST) 27.5 MCG/SPRAY nasal spray Spray 2 sprays into both nostrils daily 10 g 2     gabapentin (NEURONTIN) 300 MG capsule Take 1 capsule (300 mg) by mouth 2 times daily 180 capsule 3     glipiZIDE-metFORMIN (METAGLIP) 5-500 MG tablet TAKE 1 TABLET BY MOUTH ONCE DAILY BEFORE A MEAL 180 tablet 3     insulin pen needle (NOVOFINE) 32G X 6 MM miscellaneous USE ONCE DAILY AS DIRECTED 100 each 0     liraglutide (VICTOZA PEN) 18 MG/3ML solution Inject 1.8 mg Subcutaneous daily 30 mL 3     losartan (COZAAR) 50 MG tablet Take 1 tablet (50 mg) by mouth daily 90 tablet 3     melatonin 3 MG tablet Take 1 tablet (3 mg) by mouth nightly as needed for sleep 90 tablet 3     metoprolol succinate ER (TOPROL-XL) 25 MG 24 hr tablet Take 1 tablet (25 mg) by mouth daily 90 tablet 3     montelukast (SINGULAIR) 10 MG tablet Take 1 tablet (10 mg) by mouth At Bedtime 90 tablet 3     nystatin (NYSTOP) 515601 UNIT/GM external powder Apply topically 2 times daily 60 g 0     order for DME Diabetic test strips and lancets per insurance formulary Check blood sugar 2 times daily 3 Month 3     polyethylene glycol (MIRALAX/GLYCOLAX) powder Take 17 g by mouth daily DISSOLVE 17 GRAMS IN LIQUID AND DRINK DAILY AS DIRECTED 510 g 1      solifenacin (VESICARE) 10 MG tablet Take 1 tablet (10 mg) by mouth daily 90 tablet 1     blood glucose (FREESTYLE LITE) test strip Use to test blood sugar 2 times daily or as directed. (Patient not taking: Reported on 12/5/2019) 200 each 1     blood glucose (NO BRAND SPECIFIED) test strip Use to test blood sugar 4 times daily or as directed. (Patient not taking: Reported on 12/5/2019) 200 strip 0     blood glucose calibration (NO BRAND SPECIFIED) solution Use to calibrate blood glucose monitor as needed as directed. (Patient not taking: Reported on 12/5/2019) 1 each 1     blood glucose monitoring (FREESTYLE FREEDOM LITE) meter device kit Use to test blood sugar 2 times daily or as directed. (Patient not taking: Reported on 12/5/2019) 1 kit 0     blood glucose monitoring (NO BRAND SPECIFIED) meter device kit Use to test blood sugar 4 times daily or as directed. (Patient not taking: Reported on 12/5/2019) 1 kit 0     blood glucose monitoring (NO BRAND SPECIFIED) meter device kit Use to test blood sugar 1 times daily or as directed. Please dispense insurance preferred. (Patient not taking: Reported on 12/5/2019) 1 kit 0     Continuous Blood Gluc  (FREESTYLE GEORGE 14 DAY READER) SEVERO 1 kit 4 times daily (Patient not taking: Reported on 12/5/2019) 1 Device 0     Continuous Blood Gluc Sensor (FREESTYLE GEORGE 14 DAY SENSOR) MISC 1 kit 4 times daily (Patient not taking: Reported on 12/5/2019) 1 each 1     Continuous Glucose Monitor KIT 1 kit continuous (Patient not taking: Reported on 12/5/2019) 1 kit 0     docusate sodium (COLACE) 100 MG capsule Take 1 capsule (100 mg) by mouth 2 times daily (Patient not taking: Reported on 12/5/2019) 60 capsule 5     fluticasone (VERAMYST) 27.5 MCG/SPRAY spray Spray 2 sprays into both nostrils daily (Patient not taking: Reported on 12/5/2019) 10 g 11     gabapentin (NEURONTIN) 300 MG capsule Take 1 capsule (300 mg) by mouth 2 times daily 180 capsule 1     order for DME  "Equipment being ordered: compression stockings 15-20 mm hg knee high (Patient not taking: Reported on 12/5/2019) 2 each 1     Allergies   Allergen Reactions     Milk Protein Extract GI Disturbance     lactose intolerance.  Can tolerate milk products if uses lactaid     Bee Venom Swelling     Latex      Lisinopril Cough     Onion GI Disturbance     Vomiting, feels like throat is clogged     Aloe Rash     pain     Chlorine Rash     BP Readings from Last 3 Encounters:   12/05/19 128/81   10/22/19 90/63   08/06/19 112/81    Wt Readings from Last 3 Encounters:   12/05/19 134.8 kg (297 lb 3.2 oz)   10/22/19 136.1 kg (300 lb)   08/06/19 136.1 kg (300 lb)                 Reviewed and updated as needed this visit by Provider         Review of Systems   ROS COMP: Constitutional, HEENT, cardiovascular, pulmonary, gi and gu systems are negative, except as otherwise noted.      Objective    /81 (BP Location: Left arm, Patient Position: Chair, Cuff Size: Adult Large)   Pulse 81   Temp 97.3  F (36.3  C) (Tympanic)   Resp 18   Ht 1.721 m (5' 7.75\")   Wt 134.8 kg (297 lb 3.2 oz)   SpO2 95%   Breastfeeding No   BMI 45.52 kg/m    Body mass index is 45.52 kg/m .  Physical Exam   GENERAL: healthy, alert and no distress  EYES: Eyes grossly normal to inspection, PERRL and conjunctivae and sclerae normal  HENT: normal cephalic/atraumatic, right ear: erythematous and bulging membrane, nose and mouth without ulcers or lesions, nasal mucosa edematous , rhinorrhea yellow, oropharynx clear, oral mucous membranes moist and sinuses: maxillary tenderness on both sides  NECK: no adenopathy, no asymmetry, masses, or scars and thyroid normal to palpation  RESP: lungs clear to auscultation - no rales, rhonchi or wheezes  CV: regular rate and rhythm, normal S1 S2, no S3 or S4, no murmur, click or rub, no peripheral edema and peripheral pulses strong  ABDOMEN: soft, nontender, no hepatosplenomegaly, no masses and bowel sounds normal  MS: " "no gross musculoskeletal defects noted, no edema    Diagnostic Test Results:  none         Assessment & Plan       ICD-10-CM    1. Acute otitis media, left H66.92 amoxicillin (AMOXIL) 875 MG tablet   2. Lesion of face L98.9 DERMATOLOGY REFERRAL   3. Hyperlipidemia LDL goal <100 E78.5 atorvastatin (LIPITOR) 40 MG tablet     Lipid panel reflex to direct LDL Fasting   4. Major depressive disorder, recurrent episode, moderate (H) F33.1 buPROPion (WELLBUTRIN SR) 100 MG 12 hr tablet     citalopram (CELEXA) 40 MG tablet     melatonin 3 MG tablet   5. Chronic pain syndrome G89.4 diclofenac (VOLTAREN) 50 MG EC tablet   6. Neck pain M54.2 cyclobenzaprine (FLEXERIL) 5 MG tablet   7. Dyspepsia R10.13 esomeprazole (NEXIUM) 40 MG DR capsule   8. Acute left-sided low back pain with left-sided sciatica M54.42 gabapentin (NEURONTIN) 300 MG capsule   9. Type 2 diabetes mellitus with diabetic nephropathy, without long-term current use of insulin (H) E11.21 glipiZIDE-metFORMIN (METAGLIP) 5-500 MG tablet     liraglutide (VICTOZA PEN) 18 MG/3ML solution     losartan (COZAAR) 50 MG tablet     Albumin Random Urine Quantitative with Creat Ratio     Hemoglobin A1c   10. HTN, goal below 140/90 I10 losartan (COZAAR) 50 MG tablet     Basic metabolic panel  (Ca, Cl, CO2, Creat, Gluc, K, Na, BUN)     Albumin Random Urine Quantitative with Creat Ratio   11. Old myocardial infarction I25.2 metoprolol succinate ER (TOPROL-XL) 25 MG 24 hr tablet   12. Mild intermittent asthma without complication J45.20 montelukast (SINGULAIR) 10 MG tablet   treated for AOM this visit.  Refills provided on chronic medications.  Has physical in January.  Scheduled fasting lab appointment today to be done prior to visit.  Re-entered referral to derm for facial lesion as patient lost it.       BMI:   Estimated body mass index is 45.52 kg/m  as calculated from the following:    Height as of this encounter: 1.721 m (5' 7.75\").    Weight as of this encounter: 134.8 kg (297 " lb 3.2 oz).   Weight management plan: Discussed healthy diet and exercise guidelines        Patient Instructions   Take Amoxicillin twice a day for 10 days.    Take your antibiotic with food to avoid stomach upset.  I recommend you also take an over the counter probiotic to avoid antibiotic-associated diarrhea.        Return in about 1 week (around 12/12/2019), or if symptoms worsen or fail to improve.    CARMEN Bueno [facility]

## 2019-12-17 ENCOUNTER — MYC MEDICAL ADVICE (OUTPATIENT)
Dept: DERMATOLOGY | Facility: CLINIC | Age: 59
End: 2019-12-17

## 2019-12-18 ENCOUNTER — MEDICAL CORRESPONDENCE (OUTPATIENT)
Dept: HEALTH INFORMATION MANAGEMENT | Facility: CLINIC | Age: 59
End: 2019-12-18

## 2019-12-29 DIAGNOSIS — R10.13 DYSPEPSIA: ICD-10-CM

## 2019-12-29 NOTE — TELEPHONE ENCOUNTER
"Requested Prescriptions   Pending Prescriptions Disp Refills     esomeprazole (NEXIUM) 40 MG DR capsule [Pharmacy Med Name: ESOMEPRAZOLE MAGNESIUM 40MG DR CAPS]  Last Written Prescription Date:  12/5/19  Last Fill Quantity: 90,  # refills: 3   Last Office Visit with FMCRISTI, BONNIEP or [facility] prescribing provider:  12/5/19   Future Office Visit:    Next 5 appointments (look out 90 days)    Jan 07, 2020  9:00 AM CST  [facility] [address] [address] [facility] [address]  [phone]   Jan 28, 2020 10:00 AM CST  Return Visit with Armond George MD  [facility] [address] [address] [address]  [phone]          90 capsule 3     Sig: TAKE 1 CAPSULE(40 MG) BY MOUTH EVERY MORNING 30 TO 60 MINUTES BEFORE BREAKFAST       PPI Protocol Passed - 12/29/2019  5:24 AM        Passed - Not on Clopidogrel (unless Pantoprazole ordered)        Passed - No diagnosis of osteoporosis on record        Passed - Recent (12 mo) or future (30 days) visit within the authorizing provider's specialty     Patient has had an office visit with the authorizing provider or a provider within the authorizing providers department within the previous 12 mos or has a future within next 30 days. See \"Patient Info\" tab in inbasket, or \"Choose Columns\" in Meds & Orders section of the refill encounter.              Passed - Medication is active on med list        Passed - Patient is age 18 or older        Passed - No active pregnacy on record        Passed - No positive pregnancy test in past 12 months          "

## 2020-01-02 RX ORDER — ESOMEPRAZOLE MAGNESIUM 40 MG/1
CAPSULE, DELAYED RELEASE ORAL
Qty: 90 CAPSULE | Refills: 3 | OUTPATIENT
Start: 2020-01-02

## 2020-01-02 NOTE — TELEPHONE ENCOUNTER
Refill should not be needed.  1 year supply sent on 12/5/19 to [facility] pharmacy in [place].  Current request was from [facility] Denied for reason above.    Susan Heath RN  [facility]/ [facility]

## 2020-01-03 DIAGNOSIS — I10 HTN, GOAL BELOW 140/90: Chronic | ICD-10-CM

## 2020-01-03 DIAGNOSIS — E11.21 TYPE 2 DIABETES MELLITUS WITH DIABETIC NEPHROPATHY, WITHOUT LONG-TERM CURRENT USE OF INSULIN (H): ICD-10-CM

## 2020-01-03 DIAGNOSIS — E78.5 HYPERLIPIDEMIA LDL GOAL <100: Chronic | ICD-10-CM

## 2020-01-03 LAB
ANION GAP SERPL CALCULATED.3IONS-SCNC: 5 MMOL/L (ref 3–14)
BUN SERPL-MCNC: 13 MG/DL (ref 7–30)
CALCIUM SERPL-MCNC: 9.3 MG/DL (ref 8.5–10.1)
CHLORIDE SERPL-SCNC: 102 MMOL/L (ref 94–109)
CHOLEST SERPL-MCNC: 172 MG/DL
CO2 SERPL-SCNC: 33 MMOL/L (ref 20–32)
CREAT SERPL-MCNC: 0.47 MG/DL (ref 0.52–1.04)
CREAT UR-MCNC: 45 MG/DL
GFR SERPL CREATININE-BSD FRML MDRD: >90 ML/MIN/{1.73_M2}
GLUCOSE SERPL-MCNC: 188 MG/DL (ref 70–99)
HBA1C MFR BLD: 7.9 % (ref 0–5.6)
HDLC SERPL-MCNC: 57 MG/DL
LDLC SERPL CALC-MCNC: 92 MG/DL
MICROALBUMIN UR-MCNC: 13 MG/L
MICROALBUMIN/CREAT UR: 28.19 MG/G CR (ref 0–25)
NONHDLC SERPL-MCNC: 115 MG/DL
POTASSIUM SERPL-SCNC: 3.6 MMOL/L (ref 3.4–5.3)
SODIUM SERPL-SCNC: 140 MMOL/L (ref 133–144)
TRIGL SERPL-MCNC: 115 MG/DL

## 2020-01-03 PROCEDURE — 80061 LIPID PANEL: CPT | Performed by: NURSE PRACTITIONER

## 2020-01-03 PROCEDURE — 82043 UR ALBUMIN QUANTITATIVE: CPT | Performed by: NURSE PRACTITIONER

## 2020-01-03 PROCEDURE — 36415 COLL VENOUS BLD VENIPUNCTURE: CPT | Performed by: NURSE PRACTITIONER

## 2020-01-03 PROCEDURE — 83036 HEMOGLOBIN GLYCOSYLATED A1C: CPT | Performed by: NURSE PRACTITIONER

## 2020-01-03 PROCEDURE — 80048 BASIC METABOLIC PNL TOTAL CA: CPT | Performed by: NURSE PRACTITIONER

## 2020-01-07 ENCOUNTER — MYC MEDICAL ADVICE (OUTPATIENT)
Dept: ORTHOPEDICS | Facility: CLINIC | Age: 60
End: 2020-01-07

## 2020-01-07 ENCOUNTER — ANCILLARY PROCEDURE (OUTPATIENT)
Dept: GENERAL RADIOLOGY | Facility: CLINIC | Age: 60
End: 2020-01-07
Attending: NURSE PRACTITIONER
Payer: MEDICARE

## 2020-01-07 ENCOUNTER — OFFICE VISIT (OUTPATIENT)
Dept: FAMILY MEDICINE | Facility: CLINIC | Age: 60
End: 2020-01-07
Payer: MEDICARE

## 2020-01-07 VITALS
HEART RATE: 90 BPM | BODY MASS INDEX: 44.41 KG/M2 | OXYGEN SATURATION: 96 % | TEMPERATURE: 98.4 F | RESPIRATION RATE: 20 BRPM | SYSTOLIC BLOOD PRESSURE: 129 MMHG | DIASTOLIC BLOOD PRESSURE: 83 MMHG | HEIGHT: 68 IN | WEIGHT: 293 LBS

## 2020-01-07 DIAGNOSIS — Z01.818 PREOP GENERAL PHYSICAL EXAM: Primary | ICD-10-CM

## 2020-01-07 DIAGNOSIS — M17.11 PRIMARY OSTEOARTHRITIS OF RIGHT KNEE: ICD-10-CM

## 2020-01-07 DIAGNOSIS — Z86.73 HISTORY OF TIA (TRANSIENT ISCHEMIC ATTACK) AND STROKE: ICD-10-CM

## 2020-01-07 DIAGNOSIS — J45.20 MILD INTERMITTENT ASTHMA WITHOUT COMPLICATION: Chronic | ICD-10-CM

## 2020-01-07 DIAGNOSIS — E11.21 TYPE 2 DIABETES MELLITUS WITH DIABETIC NEPHROPATHY, WITHOUT LONG-TERM CURRENT USE OF INSULIN (H): ICD-10-CM

## 2020-01-07 DIAGNOSIS — R05.9 COUGH: ICD-10-CM

## 2020-01-07 DIAGNOSIS — I10 HTN, GOAL BELOW 140/90: Chronic | ICD-10-CM

## 2020-01-07 DIAGNOSIS — I25.2 OLD MYOCARDIAL INFARCTION: ICD-10-CM

## 2020-01-07 DIAGNOSIS — E66.01 MORBID OBESITY WITH BMI OF 45.0-49.9, ADULT (H): Chronic | ICD-10-CM

## 2020-01-07 LAB — HGB BLD-MCNC: 14.4 G/DL (ref 11.7–15.7)

## 2020-01-07 PROCEDURE — 99215 OFFICE O/P EST HI 40 MIN: CPT | Performed by: NURSE PRACTITIONER

## 2020-01-07 PROCEDURE — 85018 HEMOGLOBIN: CPT | Performed by: NURSE PRACTITIONER

## 2020-01-07 PROCEDURE — 71046 X-RAY EXAM CHEST 2 VIEWS: CPT

## 2020-01-07 PROCEDURE — 93000 ELECTROCARDIOGRAM COMPLETE: CPT | Performed by: NURSE PRACTITIONER

## 2020-01-07 PROCEDURE — 36415 COLL VENOUS BLD VENIPUNCTURE: CPT | Performed by: NURSE PRACTITIONER

## 2020-01-07 ASSESSMENT — MIFFLIN-ST. JEOR: SCORE: 1965.9

## 2020-01-07 ASSESSMENT — PAIN SCALES - GENERAL: PAINLEVEL: SEVERE PAIN (6)

## 2020-01-07 NOTE — PROGRESS NOTES
Faxed pre-op notes, lab and Ekg to [facility], [phone], right fax confirmed at 1:19 pm today, 1/7/2020.  Yuli Giang [facility]  2nd Floor  Primary Care

## 2020-01-07 NOTE — PROGRESS NOTES
[address] [address]  [phone]  Dept: [phone]    PRE-OP EVALUATION:  Today's date: 2020    Melody Ferguson (: 1960) presents for pre-operative evaluation assessment as requested by Dr. George.  She requires evaluation and anesthesia risk assessment prior to undergoing surgery/procedure for treatment of knee replacement on the right side .    Proposed Surgery/ Procedure: right Knee replacement  Date of Surgery/ Procedure: 1/15/20  Time of Surgery/ Procedure: [facility]  Hospital/Surgical Facility: [facility]    Primary Physician: Annabelle Bach  Type of Anesthesia Anticipated: General    Patient has a Health Care Directive or Living Will:  NO    1. YES - Do you have a history of heart attack, stroke, stent, bypass or surgery on an artery in the head, neck, heart or legs? Remote history of MI.  2. NO - Do you ever have any pain or discomfort in your chest?  3. NO - Do you have a history of  Heart Failure?  4. NO - Are you troubled by shortness of breath when: walking on the level, up a slight hill or at night?  5. YES - DO YOU CURRENTLY HAVE A COLD, BRONCHITIS OR OTHER RESPIRATORY INFECTION? Currently has a cough, denies shortness of breath, chest pain, productive cough, nasal congestion, sinus pressure, fever, chills, or ear pain  6. YES - DO YOU HAVE A COUGH, SHORTNESS OF BREATH OR WHEEZING? Cough- as above  7. NO - Do you sometimes get pains in the calves of your legs when you walk?  8. YES - DO YOU OR ANYONE IN YOUR FAMILY HAVE PREVIOUS HISTORY OF BLOOD CLOTS? Dad- is on warfarin  9. NO - Do you or does anyone in your family have a serious bleeding problem such as prolonged bleeding following surgeries or cuts?  10. YES - HAVE YOU EVER HAD PROBLEMS WITH ANEMIA OR BEEN TOLD TO TAKE IRON PILLS? Was told in the 1980s with pregnancy but none since  11. NO - Have you had any abnormal blood loss such as black, tarry or  bloody stools, or abnormal vaginal bleeding?  12. NO - Have you ever had a blood transfusion?  13. NO - Have you or any of your relatives ever had problems with anesthesia?  14. YES - DO YOU HAVE SLEEP APNEA, EXCESSIVE SNORING OR DAYTIME DROWSINESS? Sleep apnea- patient doesn't have a CPAP  15. NO - Do you have any prosthetic heart valves?  16. YES - DO YOU HAVE PROSTHETIC JOINTS? Left knee has been replaced  17. NO - Is there any chance that you may be pregnant? S/p hysterectomy      HPI:     HPI related to upcoming procedure: Right knee osteoarthritis      ASTHMA - Patient has a longstanding history of moderate-severe Asthma . Patient has been doing well overall noting COUGH and continues on medication regimen consisting of albuterol as needed  without adverse reactions or side effects.     DIABETES - Patient has a longstanding history of DiabetesType Type II . Patient is being treated with oral agents and victoza and denies significant side effects. Control has been fair. Complicating factors include but are not limited to: hypertension, hyperlipidemia, CAD/PVD and morbid obesity .     HYPERLIPIDEMIA - Patient has a long history of significant Hyperlipidemia requiring medication for treatment with recent good control. Patient reports no problems or side effects with the medication.     HYPERTENSION - Patient has longstanding history of HTN , currently denies any symptoms referable to elevated blood pressure. Specifically denies chest pain, palpitations, dyspnea, orthopnea, PND or peripheral edema. Blood pressure readings have been in normal range. Current medication regimen is as listed below. Patient denies any side effects of medication.     SLEEP PROBLEM - Patient has a longstanding history of snoring.. Patient has ELIER but is not using CPAP.      MEDICAL HISTORY:     Patient Active Problem List    Diagnosis Date Noted     Dupuytren's contracture 08/06/2019     Priority: Medium     History of cervical cancer  06/03/2019     Priority: Medium     Status post total abdominal hysterectomy and bilateral salpingo-oophorectomy (ALVINO-BSO) 06/03/2019     Priority: Medium     Primary osteoarthritis of right knee 08/22/2017     Priority: Medium     Type 2 diabetes mellitus without retinopathy (H) 07/19/2017     Priority: Medium     History of total knee arthroplasty, left 05/01/2017     Priority: Medium     Diabetes mellitus, type 2 (H) 01/16/2017     Priority: Medium     DDD (degenerative disc disease), lumbar 11/02/2016     Priority: Medium     Hiatal hernia 07/27/2016     Priority: Medium     Primary osteoarthritis of both knees 03/01/2016     Priority: Medium     PTSD (post-traumatic stress disorder) 03/01/2016     Priority: Medium     Chronic pain syndrome 03/01/2016     Priority: Medium       Patient is followed by Elizabeth Lamas MD for ongoing prescription of pain medication.  All refills should be approved by this provider, or covering partner.    Medication(s): Tramadol.   Maximum quantity per month: 30  Clinic visit frequency required: Q 3 months     Controlled substance agreement:  Encounter-Level CSA - 3/1/16:               Controlled Substance Agreement - Scan on 3/7/2016  9:41 AM : CONTROLLED SUBSTANCE AGREEMENT (below)            Pain Clinic evaluation in the past: Yes       Date/Location:  [place] several years ago    DIRE Total Score(s):  No flowsheet data found.    Last [facility] website verification:  done on 12/15/16   [email or web address]         Fibromyalgia 03/01/2016     Priority: Medium     History of TIA (transient ischemic attack) and stroke 03/01/2016     Priority: Medium     In 2005        Major depressive disorder, recurrent episode, moderate (H) 03/01/2016     Priority: Medium     Urge incontinence of urine 03/01/2016     Priority: Medium     Prolapse of vaginal wall 03/01/2016     Priority: Medium     Mild intermittent asthma without complication 03/01/2016     Priority: Medium     HTN, goal below  140/90 03/01/2016     Priority: Medium     Hyperlipidemia LDL goal <100 03/01/2016     Priority: Medium     Type 2 diabetes mellitus with diabetic nephropathy (H) 03/01/2016     Priority: Medium     Allergic rhinitis 03/01/2016     Priority: Medium     Morbid obesity with BMI of 45.0-49.9, adult (H) 03/01/2016     Priority: Medium     History of colonic polyps 03/01/2016     Priority: Medium     Colonoscopy in 2015 in [place]       Vaginal high risk HPV DNA test positive 03/01/2016     Priority: Medium     Last Pap smear 12/2015 in [place]        Past Medical History:   Diagnosis Date     Arthritis 2000     Cancer (H) 1983     Cerebral infarction (H) 2005     COPD (chronic obstructive pulmonary disease) (H) 1990     Depressive disorder 1990     DM type 2 (diabetes mellitus, type 2) (H)      History of TIA (transient ischemic attack)      HTN (hypertension)      Hyperlipidemia      Uncomplicated asthma 1990     Past Surgical History:   Procedure Laterality Date     ABDOMEN SURGERY  05/20/2005     BIOPSY  1984     BREAST SURGERY  1984     C TOTAL KNEE ARTHROPLASTY Left 04/26/2017    DML at [facility]     CHOLECYSTECTOMY  2000     COLONOSCOPY       ENT SURGERY  2008    tonsils removed     GENITOURINARY SURGERY  2005    a & p repair     HYSTERECTOMY, PAP STILL INDICATED  2005    for cervical cancer     REPAIR PTOSIS       SLING TRANSVAGINAL N/A 7/14/2017    Procedure: SLING TRANSVAGINAL;  Sling ([place]);  Surgeon: Talon Katz MD;  Location: [facility] OR     Current Outpatient Medications   Medication Sig Dispense Refill     albuterol (VENTOLIN HFA) 108 (90 Base) MCG/ACT inhaler Inhale 2 puffs into the lungs every 6 hours as needed for shortness of breath / dyspnea or wheezing 18 g 0     ASPIRIN PO Take 81 mg by mouth daily       atorvastatin (LIPITOR) 40 MG tablet Take 1 tablet (40 mg) by mouth daily 90 tablet 3     blood glucose (ACCU-CHEK DAMIEN PLUS) test strip Use to test blood sugar 1 times daily or as directed. 200 strip 3      blood glucose (NO BRAND SPECIFIED) lancets standard Use to test blood sugar 4 times daily or as directed. 200 each 0     blood glucose (NO BRAND SPECIFIED) test strip Use to test blood sugar 4 times daily or as directed. 200 strip 0     blood glucose calibration (NO BRAND SPECIFIED) solution Use to calibrate blood glucose monitor as needed as directed. 1 each 1     buPROPion (WELLBUTRIN SR) 100 MG 12 hr tablet Take 1 tablet (100 mg) by mouth 2 times daily 180 tablet 3     citalopram (CELEXA) 40 MG tablet TAKE 1 TABLET(40 MG) BY MOUTH DAILY 90 tablet 3     cyclobenzaprine (FLEXERIL) 5 MG tablet Take 2 tablets (10 mg) by mouth 3 times daily as needed for muscle spasms 60 tablet 0     diclofenac (VOLTAREN) 50 MG EC tablet Take 1 tablet (50 mg) by mouth 2 times daily as needed for moderate pain 180 tablet 0     esomeprazole (NEXIUM) 40 MG DR capsule TAKE 1 CAPSULE(40 MG) BY MOUTH EVERY MORNING 30 TO 60 MINUTES BEFORE BREAKFAST 90 capsule 3     fluticasone (VERAMYST) 27.5 MCG/SPRAY nasal spray Spray 2 sprays into both nostrils daily 10 g 2     gabapentin (NEURONTIN) 300 MG capsule Take 1 capsule (300 mg) by mouth 2 times daily 180 capsule 3     glipiZIDE-metFORMIN (METAGLIP) 5-500 MG tablet Take 1 tablet by mouth 2 times daily (before meals) 180 tablet 3     insulin pen needle (NOVOFINE) 32G X 6 MM miscellaneous USE ONCE DAILY AS DIRECTED 100 each 0     liraglutide (VICTOZA PEN) 18 MG/3ML solution Inject 1.8 mg Subcutaneous daily 30 mL 3     losartan (COZAAR) 50 MG tablet Take 1 tablet (50 mg) by mouth daily 90 tablet 3     melatonin 3 MG tablet Take 1 tablet (3 mg) by mouth nightly as needed for sleep 90 tablet 3     metoprolol succinate ER (TOPROL-XL) 25 MG 24 hr tablet Take 1 tablet (25 mg) by mouth daily 90 tablet 3     montelukast (SINGULAIR) 10 MG tablet Take 1 tablet (10 mg) by mouth At Bedtime 90 tablet 3     nystatin (NYSTOP) 717388 UNIT/GM external powder Apply topically 2 times daily 60 g 0     polyethylene  glycol (MIRALAX/GLYCOLAX) powder Take 17 g by mouth daily DISSOLVE 17 GRAMS IN LIQUID AND DRINK DAILY AS DIRECTED 510 g 1     solifenacin (VESICARE) 10 MG tablet Take 1 tablet (10 mg) by mouth daily 90 tablet 1     blood glucose (FREESTYLE LITE) test strip Use to test blood sugar 2 times daily or as directed. (Patient not taking: Reported on 12/5/2019) 200 each 1     blood glucose monitoring (FREESTYLE FREEDOM LITE) meter device kit Use to test blood sugar 2 times daily or as directed. (Patient not taking: Reported on 12/5/2019) 1 kit 0     blood glucose monitoring (NO BRAND SPECIFIED) meter device kit Use to test blood sugar 4 times daily or as directed. 1 kit 0     blood glucose monitoring (NO BRAND SPECIFIED) meter device kit Use to test blood sugar 1 times daily or as directed. Please dispense insurance preferred. 1 kit 0     Continuous Blood Gluc  (FREESTYLE GEORGE 14 DAY READER) SEVERO 1 kit 4 times daily (Patient not taking: Reported on 12/5/2019) 1 Device 0     Continuous Blood Gluc Sensor (FREESTYLE GEORGE 14 DAY SENSOR) MISC 1 kit 4 times daily (Patient not taking: Reported on 12/5/2019) 1 each 1     Continuous Glucose Monitor KIT 1 kit continuous (Patient not taking: Reported on 12/5/2019) 1 kit 0     docusate sodium (COLACE) 100 MG capsule Take 1 capsule (100 mg) by mouth 2 times daily (Patient not taking: Reported on 12/5/2019) 60 capsule 5     gabapentin (NEURONTIN) 300 MG capsule Take 1 capsule (300 mg) by mouth 2 times daily 180 capsule 1     order for DME Equipment being ordered: compression stockings 15-20 mm hg knee high (Patient not taking: Reported on 12/5/2019) 2 each 1     order for DME Diabetic test strips and lancets per insurance formulary Check blood sugar 2 times daily 3 Month 3     OTC products: no recent use of OTC ASA, NSAIDS or Steroids and no use of herbal medications or other supplements    Allergies   Allergen Reactions     Milk Protein Extract GI Disturbance     lactose  "intolerance.  Can tolerate milk products if uses lactaid     Bee Venom Swelling     Latex      Lisinopril Cough     Onion GI Disturbance     Vomiting, feels like throat is clogged     Aloe Rash     pain     Chlorine Rash      Latex Allergy: YES: Precautions to take: YES    Social History     Tobacco Use     Smoking status: Former Smoker     Packs/day: 0.50     Years: 25.00     Pack years: 12.50     Types: Cigarettes     Last attempt to quit: 10/15/1991     Years since quittin.2     Smokeless tobacco: Never Used   Substance Use Topics     Alcohol use: Yes     Alcohol/week: 0.0 standard drinks     Comment: occassionally     History   Drug Use No       REVIEW OF SYSTEMS:   CONSTITUTIONAL: NEGATIVE for fever, chills, change in weight  INTEGUMENTARY/SKIN: NEGATIVE for worrisome rashes, moles or lesions  EYES: NEGATIVE for vision changes or irritation  ENT/MOUTH: NEGATIVE for ear, mouth and throat problems  RESP:as above  BREAST: NEGATIVE for masses, tenderness or discharge  CV: NEGATIVE for chest pain, palpitations or peripheral edema  GI: NEGATIVE for nausea, abdominal pain, heartburn, or change in bowel habits  : NEGATIVE for frequency, dysuria, or hematuria  MUSCULOSKELETAL: NEGATIVE for significant arthralgias or myalgia  NEURO: NEGATIVE for weakness, dizziness or paresthesias  ENDOCRINE: NEGATIVE for temperature intolerance, skin/hair changes  HEME: NEGATIVE for bleeding problems  PSYCHIATRIC: NEGATIVE for changes in mood or affect    EXAM:   /83   Pulse 90   Temp 98.4  F (36.9  C) (Tympanic)   Resp 20   Ht 1.72 m (5' 7.7\")   Wt 135.7 kg (299 lb 3.2 oz)   SpO2 96%   BMI 45.90 kg/m      GENERAL APPEARANCE: healthy, alert and no distress     EYES: EOMI, PERRL     HENT: ear canals and TM's normal, nose and mouth without ulcers or lesions and rhinorrhea clear     NECK: no adenopathy, no asymmetry, masses, or scars and thyroid normal to palpation     RESP: lungs clear to auscultation - no rales, " rhonchi or wheezes     CV: regular rates and rhythm, normal S1 S2, no S3 or S4 and no murmur, click or rub     ABDOMEN:  soft, nontender, no HSM or masses and bowel sounds normal     MS: extremities normal- no gross deformities noted, no evidence of inflammation in joints, FROM in all extremities.     SKIN: no suspicious lesions or rashes     NEURO: Normal strength and tone, sensory exam grossly normal, mentation intact and speech normal     PSYCH: mentation appears normal. and affect normal/bright     LYMPHATICS: No cervical adenopathy    DIAGNOSTICS:   EKG: appears normal, NSR, previous anterior infarct, normal axis, normal intervals, no acute ST/T changes c/w ischemia, no LVH by voltage criteria, unchanged from previous tracings  Labs Resulted Today:   Results for orders placed or performed in visit on 01/07/20   Hemoglobin     Status: None   Result Value Ref Range    Hemoglobin 14.4 11.7 - 15.7 g/dL       Recent Labs   Lab Test 01/03/20  0837 06/04/19  0738 10/30/18  1126  07/11/17  0841 04/18/17  0900   HGB  --   --   --   --  10.9* 12.8   PLT  --   --   --   --  290 259     --  139   < > 142 142   POTASSIUM 3.6  --  3.9   < > 3.8 3.9   CR 0.47*  --  0.46*   < > 0.43* 0.47*   A1C 7.9* 6.7* 6.8*   < >  --  6.1*    < > = values in this interval not displayed.      CHEST TWO VIEWS January 7, 2020 10:12 AM      HISTORY: Cough. Mild intermittent asthma without complication.     COMPARISON: None.                                                                      IMPRESSION: No acute cardiopulmonary disease.  IMPRESSION:   Reason for surgery/procedure: right knee osteoarthritis    The proposed surgical procedure is considered INTERMEDIATE risk.    REVISED CARDIAC RISK INDEX  The patient has the following serious cardiovascular risks for perioperative complications such as (MI, PE, VFib and 3  AV Block):  Coronary Artery Disease (MI, positive stress test, angina, Qs on EKG)  Diabetes Mellitus (on  Insulin)  INTERPRETATION: 2 risks: Class III (moderate risk - 6.6% complication rate)    The patient has the following additional risks for perioperative complications:  Morbid obesity      ICD-10-CM    1. Preop general physical exam Z01.818 EKG 12-lead complete w/read - Clinics     Hemoglobin   2. Primary osteoarthritis of right knee M17.11    3. Type 2 diabetes mellitus with diabetic nephropathy, without long-term current use of insulin (H) E11.21    4. HTN, goal below 140/90 I10    5. Mild intermittent asthma without complication J45.20 XR Chest 2 Views   6. Cough R05 XR Chest 2 Views   7. Old myocardial infarction I25.2    8. History of TIA (transient ischemic attack) and stroke Z86.73    9. Morbid obesity with BMI of 45.0-49.9, adult (H) E66.01     Z68.42    Normal morning blood sugar around 150 for the last week  Nighttime blood sugars are around 100  Restarted diabetic medication 2.5 weeks ago.  Was off due to insurance issues.    Otherwise, blood pressure is under good control.  She is on metoprolol for old MI- has no symptoms.  Her cough is mild and likely related to post nasal drip but I will check an xray to be sure today.      RECOMMENDATIONS:       Cardiovascular Risk  Performs 4 METs exercise without symptoms (Light housework (dusting, washing dishes), Climb a flight of stairs and Walk on level ground at 15 minutes per mile (4 miles/hour)) .   Patient is already on a Beta Blocker. Continue Betablocker therapy after surgery, using Beta blocker order set as necessary for NPO status.      Pulmonary Risk  Incentive spirometry post op  Respiratory Therapy (Respiratory Care IP Consult)  post op      Obstructive Sleep Apnea (or suspected sleep apnea)  Hospital staff are advised to monitor for sleep related oxygen desaturations due to suspicion of ELIER      --Patient is to take all scheduled medications on the day of surgery EXCEPT for modifications listed below.    Diabetes Medication Use  -----Hold usual oral  and non-insulin diabetic meds (e.g. Metformin, Actos, Glipizide) while NPO.       Anticoagulant or Antiplatelet Medication Use  ASPIRIN: Discontinue ASA 7-10 days prior to procedure to reduce bleeding risk.  It should be resumed post-operatively.        ACE Inhibitor or Angiotensin Receptor Blocker (ARB) Use  Ace inhibitor or Angiotensin Receptor Blocker (ARB) and should HOLD this medication for the 24 hours prior to surgery.      APPROVAL GIVEN to proceed with proposed procedure, without further diagnostic evaluation       Signed Electronically by: CARMEN Bueno CNP    Copy of this evaluation report is provided to requesting physician.    [facility] Preop Guidelines    Revised Cardiac Risk Index

## 2020-01-07 NOTE — PATIENT INSTRUCTIONS
Patient Instructions     Please avoid fish oil, aspirin,  ibuprofen, motrin, advil, aleve, or naproxen 7 days prior to surgery.    Day of surgery:  Do not take your metformin-glipizide, victoza, or Losartan.    Ok to take your Metoprolol and all other morning medications.    If you develop fever >100.4 or cough come in so we can possibly treat it prior to your surgery.  If not, we may need to delay your surgery.      Before Your Surgery      Call your surgeon if there is any change in your health. This includes signs of a cold or flu (such as a sore throat, runny nose, cough, rash or fever).    Do not smoke, drink alcohol or take over the counter medicine (unless your surgeon or primary care doctor tells you to) for the 24 hours before and after surgery.    If you take prescribed drugs: Follow your doctor s orders about which medicines to take and which to stop until after surgery.    Eating and drinking prior to surgery: follow the instructions from your surgeon    Take a shower or bath the night before surgery. Use the soap your surgeon gave you to gently clean your skin. If you do not have soap from your surgeon, use your regular soap. Do not shave or scrub the surgery site.  Wear clean pajamas and have clean sheets on your bed.

## 2020-01-12 ENCOUNTER — MYC MEDICAL ADVICE (OUTPATIENT)
Dept: ORTHOPEDICS | Facility: CLINIC | Age: 60
End: 2020-01-12

## 2020-01-13 DIAGNOSIS — M17.11 PRIMARY OSTEOARTHRITIS OF RIGHT KNEE: Primary | ICD-10-CM

## 2020-01-15 ENCOUNTER — TRANSFERRED RECORDS (OUTPATIENT)
Dept: HEALTH INFORMATION MANAGEMENT | Facility: CLINIC | Age: 60
End: 2020-01-15

## 2020-01-17 ENCOUNTER — TELEPHONE (OUTPATIENT)
Dept: ORTHOPEDICS | Facility: CLINIC | Age: 60
End: 2020-01-17

## 2020-01-17 NOTE — TELEPHONE ENCOUNTER
Reason for call:  Oxycodone Rx # 80 tabs insurance only covers 6 a day  Patient called regarding (reason for call): prescription  Additional comments: Pharmacy can only fill #[phone] at this time and the remainder of the Rx will be voided. Please call.    Phone number to reach patient:  na    Best Time:  any    Can we leave a detailed message on this number?  YES

## 2020-01-20 ENCOUNTER — MYC MEDICAL ADVICE (OUTPATIENT)
Dept: ORTHOPEDICS | Facility: CLINIC | Age: 60
End: 2020-01-20

## 2020-01-20 ENCOUNTER — TELEPHONE (OUTPATIENT)
Dept: FAMILY MEDICINE | Facility: CLINIC | Age: 60
End: 2020-01-20

## 2020-01-20 DIAGNOSIS — Z96.651 HISTORY OF TOTAL RIGHT KNEE REPLACEMENT: Primary | ICD-10-CM

## 2020-01-20 NOTE — TELEPHONE ENCOUNTER
Trisha ROCHA from [facility] is requesting orders, needs plan of care approved for frequency.   Two times a week for 6 weeks starting this week.     [phone]  Okay to call anytime and leave message.    Thank you.

## 2020-01-20 NOTE — TELEPHONE ENCOUNTER
Call pt back to talk to her see how her rx for pain meds are doing as they only filled 42# d/t insurance. She has about 30# s/p total knee arthroplasty on 1/15/20. 1-2 tabs Q 4-6 hours brings pain down to 3/10.  Bridging with tylenol in between. Will forward the message to MD so he is aware and can provide RX. [place].    Ginger Pabon RN Specialty Triage 1/20/2020 8:28 AM

## 2020-01-21 RX ORDER — OXYCODONE HYDROCHLORIDE 5 MG/1
5 TABLET ORAL EVERY 4 HOURS PRN
Qty: 42 TABLET | Refills: 0 | Status: SHIPPED | OUTPATIENT
Start: 2020-01-21 | End: 2020-01-24

## 2020-01-21 RX ORDER — HYDROXYZINE HYDROCHLORIDE 25 MG/1
25 TABLET, FILM COATED ORAL 3 TIMES DAILY PRN
Qty: 30 TABLET | Refills: 1 | Status: SHIPPED | OUTPATIENT
Start: 2020-01-21

## 2020-01-21 NOTE — TELEPHONE ENCOUNTER
Routing to provider to review and advise, see request for PT orders below.  Patient had recent knee replacement surgery, is now at home and needing PT for several weeks. Original order for PT came from surgeon, Dr. George, upon discharge from hospital.    Considered change in patient status, RN's unable to approve order per [facility] protocol. Provider to please review.    Susan Heath RN  [facility]

## 2020-01-21 NOTE — TELEPHONE ENCOUNTER
Called and spoke with PT, Trisha, with home care [facility].  Verbal approval given for requested PT orders, as noted below. Approved per covering provider for PCP( out of office), Annabelle Bach CNP.  Trisha agreed and noted.    Susan Heath RN  [facility]

## 2020-01-27 ENCOUNTER — MYC REFILL (OUTPATIENT)
Dept: FAMILY MEDICINE | Facility: CLINIC | Age: 60
End: 2020-01-27

## 2020-01-27 DIAGNOSIS — N39.46 MIXED INCONTINENCE: ICD-10-CM

## 2020-01-27 DIAGNOSIS — E11.21 TYPE 2 DIABETES MELLITUS WITH DIABETIC NEPHROPATHY, WITHOUT LONG-TERM CURRENT USE OF INSULIN (H): ICD-10-CM

## 2020-01-27 DIAGNOSIS — E66.01 MORBID OBESITY WITH BODY MASS INDEX OF 45.0-49.9 IN ADULT (H): ICD-10-CM

## 2020-01-27 DIAGNOSIS — R10.13 DYSPEPSIA: ICD-10-CM

## 2020-01-27 DIAGNOSIS — G89.4 CHRONIC PAIN SYNDROME: ICD-10-CM

## 2020-01-27 DIAGNOSIS — M54.2 NECK PAIN: ICD-10-CM

## 2020-01-27 DIAGNOSIS — J45.20 MILD INTERMITTENT ASTHMA WITHOUT COMPLICATION: Chronic | ICD-10-CM

## 2020-01-27 DIAGNOSIS — E11.21 TYPE 2 DIABETES MELLITUS WITH DIABETIC NEPHROPATHY (H): ICD-10-CM

## 2020-01-28 ENCOUNTER — ANCILLARY PROCEDURE (OUTPATIENT)
Dept: GENERAL RADIOLOGY | Facility: CLINIC | Age: 60
End: 2020-01-28
Attending: ORTHOPAEDIC SURGERY
Payer: MEDICARE

## 2020-01-28 ENCOUNTER — OFFICE VISIT (OUTPATIENT)
Dept: ORTHOPEDICS | Facility: CLINIC | Age: 60
End: 2020-01-28
Payer: MEDICARE

## 2020-01-28 VITALS
DIASTOLIC BLOOD PRESSURE: 64 MMHG | RESPIRATION RATE: 16 BRPM | BODY MASS INDEX: 44.41 KG/M2 | HEIGHT: 68 IN | WEIGHT: 293 LBS | HEART RATE: 79 BPM | SYSTOLIC BLOOD PRESSURE: 118 MMHG

## 2020-01-28 DIAGNOSIS — Z96.651 HISTORY OF TOTAL RIGHT KNEE REPLACEMENT: ICD-10-CM

## 2020-01-28 DIAGNOSIS — Z96.651 HISTORY OF TOTAL RIGHT KNEE REPLACEMENT: Primary | ICD-10-CM

## 2020-01-28 PROCEDURE — 99024 POSTOP FOLLOW-UP VISIT: CPT | Performed by: ORTHOPAEDIC SURGERY

## 2020-01-28 PROCEDURE — 73562 X-RAY EXAM OF KNEE 3: CPT | Mod: RT

## 2020-01-28 ASSESSMENT — MIFFLIN-ST. JEOR: SCORE: 1965.79

## 2020-01-28 NOTE — PROGRESS NOTES
Follow up right total knee arthroplasty on 1/15/2020.   She will be moving to [place] soon.  She has no complaints  Pain: mild  Wound is healing well.   Erythema: none   Increased warmth: none   Tenderness: mild  Effusion: mild  Range of motion 5-95 degrees  Stability: good.    Xray: today's images were reviewed with the patient.  This shows excellent position of total knee arthroplasty components    Assessment/Plan: right total knee arthroplasty doing well.  Continue physical therapy 2-3 x week for 4 weeks.   Use TEDS for 2 more weeks.  They may be off at night.  Use aspirin for 4 more weeks.  Start scar massage with vitamin-E cream.   Return to clinic 4 weeks.    Medication renewal:  None

## 2020-01-28 NOTE — PATIENT INSTRUCTIONS
Assessment/Plan: right total knee arthroplasty doing well.  Continue physical therapy 2-3 x week for 4 weeks.   Use TEDS for 2 more weeks.  They may be off at night.  Use aspirin for 4 more weeks.  Start scar massage with vitamin-E cream.   Return to clinic 4 weeks.

## 2020-01-28 NOTE — LETTER
1/28/2020         RE: Melody Ferguson  [address]  [place] [address]        Dear Colleague,    Thank you for referring your patient, Melody Ferguson, to the [facility]. Please see a copy of my visit note below.    Follow up right total knee arthroplasty on 1/15/2020.   She will be moving to [place] soon.  She has no complaints  Pain: mild  Wound is healing well.   Erythema: none   Increased warmth: none   Tenderness: mild  Effusion: mild  Range of motion 5-95 degrees  Stability: good.    Xray: today's images were reviewed with the patient.  This shows excellent position of total knee arthroplasty components    Assessment/Plan: right total knee arthroplasty doing well.  Continue physical therapy 2-3 x week for 4 weeks.   Use TEDS for 2 more weeks.  They may be off at night.  Use aspirin for 4 more weeks.  Start scar massage with vitamin-E cream.   Return to clinic 4 weeks.    Medication renewal:  None         Again, thank you for allowing me to participate in the care of your patient.        Sincerely,        Armond George MD

## 2020-01-29 NOTE — TELEPHONE ENCOUNTER
"Requested Prescriptions   Pending Prescriptions Disp Refills     solifenacin (VESICARE) 10 MG tablet  Last Written Prescription Date:  06/04/19  Last Fill Quantity: 90,  # refills: 1   Last Office Visit with [facility], [facility] or [facility] prescribing provider:  01/07/2020-Mikala   Future Office Visit:    90 tablet 1     Sig: Take 1 tablet (10 mg) by mouth daily       Muscarinic Antagonists (Urinary Incontinence Agents) Passed - 1/28/2020  6:10 PM        Passed - Recent (12 mo) or future (30 days) visit within the authorizing provider's specialty     Patient has had an office visit with the authorizing provider or a provider within the authorizing providers department within the previous 12 mos or has a future within next 30 days. See \"Patient Info\" tab in inbasket, or \"Choose Columns\" in Meds & Orders section of the refill encounter.              Passed - Patient does not have a diagnosis of glaucoma on the problem list     If glaucoma diagnosis is new, refer refill to physician.          Passed - Medication is active on med list        Passed - Patient is 18 years of age or older        blood glucose (ACCU-CHEK DAMIEN PLUS) test strip  Last Written Prescription Date:  10/09/19  Last Fill Quantity: 200,  # refills: 3   Last Office Visit with [facility], [facility] or [facility] prescribing provider:  01/07/2020-Mikala   Future Office Visit:    200 strip 3     Sig: Use to test blood sugar 1 times daily or as directed.       Diabetic Supplies Protocol Passed - 1/28/2020  6:10 PM        Passed - Medication is active on med list        Passed - Patient is 18 years of age or older        Passed - Recent (6 mo) or future (30 days) visit within the authorizing provider's specialty     Patient had office visit in the last 6 months or has a visit in the next 30 days with authorizing provider.  See \"Patient Info\" tab in inbasket, or \"Choose Columns\" in Meds & Orders section of the refill encounter.            cyclobenzaprine (FLEXERIL) 5 MG tablet    " "    Last Written Prescription Date:  12/05/19  Last Fill Quantity: 60,   # refills: 0  Last Office Visit: 01/07/2020-Mikala  Future Office visit:       Routing refill request to provider for review/approval because:  Drug not on the [facility], [facility] or [facility] refill protocol or controlled substance 60 tablet 0     Sig: Take 2 tablets (10 mg) by mouth 3 times daily as needed for muscle spasms       There is no refill protocol information for this order        esomeprazole (NEXIUM) 40 MG DR capsule  Last Written Prescription Date:  12/05/19  Last Fill Quantity: 90,  # refills: 3   Last Office Visit with [facility], [facility] or [facility] prescribing provider:  01/07/2020-Mikala   Future Office Visit:    90 capsule 3     Sig: TAKE 1 CAPSULE(40 MG) BY MOUTH EVERY MORNING 30 TO 60 MINUTES BEFORE BREAKFAST       PPI Protocol Passed - 1/28/2020  6:10 PM        Passed - Not on Clopidogrel (unless Pantoprazole ordered)        Passed - No diagnosis of osteoporosis on record        Passed - Recent (12 mo) or future (30 days) visit within the authorizing provider's specialty     Patient has had an office visit with the authorizing provider or a provider within the authorizing providers department within the previous 12 mos or has a future within next 30 days. See \"Patient Info\" tab in inbasket, or \"Choose Columns\" in Meds & Orders section of the refill encounter.              Passed - Medication is active on med list        Passed - Patient is age 18 or older        Passed - No active pregnacy on record        Passed - No positive pregnancy test in past 12 months        liraglutide (VICTOZA PEN) 18 MG/3ML solution  Last Written Prescription Date:  12/05/19  Last Fill Quantity: 30ml,  # refills: 3   Last Office Visit with [facility], [facility] or  [facility] prescribing provider:  01/07/2020   Future Office Visit:    30 mL 3     Sig: Inject 1.8 mg Subcutaneous daily       GLP-1 Agonists Protocol Failed - 1/28/2020  6:10 PM        Failed - Normal serum creatinine " "on file in past 12 months     Recent Labs   Lab Test 01/03/20  0837   CR 0.47*             Passed - Blood pressure less than 140/90 in past 6 months     BP Readings from Last 3 Encounters:   01/28/20 118/64   01/07/20 129/83   12/05/19 128/81                 Passed - LDL on file in past 12 months     Recent Labs   Lab Test 01/03/20  0837   LDL 92             Passed - Microalbumin on file in past 12 months     Recent Labs   Lab Test 01/03/20  0851   MICROL 13   UMALCR 28.19*             Passed - HgbA1C in past 3 or 6 months     If HgbA1C is 8 or greater, it needs to be on file within the past 3 months.  If less than 8, must be on file within the past 6 months.     Recent Labs   Lab Test 01/03/20  0837   A1C 7.9*             Passed - Medication is active on med list        Passed - Patient is age 18 or older        Passed - No active pregnancy on record        Passed - No positive pregnancy test in past 12 months        Passed - Recent (6 mo) or future (30 days) visit within the authorizing provider's specialty     Patient had office visit in the last 6 months or has a visit in the next 30 days with authorizing provider.  See \"Patient Info\" tab in inbasket, or \"Choose Columns\" in Meds & Orders section of the refill encounter.              "

## 2020-01-30 RX ORDER — ALBUTEROL SULFATE 90 UG/1
2 AEROSOL, METERED RESPIRATORY (INHALATION) EVERY 6 HOURS PRN
Qty: 18 G | Refills: 5 | Status: SHIPPED | OUTPATIENT
Start: 2020-01-30

## 2020-01-30 RX ORDER — SOLIFENACIN SUCCINATE 10 MG/1
10 TABLET, FILM COATED ORAL DAILY
Qty: 90 TABLET | Refills: 1 | Status: SHIPPED | OUTPATIENT
Start: 2020-01-30 | End: 2020-04-28

## 2020-01-30 RX ORDER — ESOMEPRAZOLE MAGNESIUM 40 MG/1
CAPSULE, DELAYED RELEASE ORAL
Qty: 90 CAPSULE | Refills: 3 | Status: SHIPPED | OUTPATIENT
Start: 2020-01-30

## 2020-01-30 RX ORDER — NYSTATIN 100000 [USP'U]/G
POWDER TOPICAL 2 TIMES DAILY
Qty: 60 G | Refills: 3 | Status: SHIPPED | OUTPATIENT
Start: 2020-01-30

## 2020-01-30 RX ORDER — LIRAGLUTIDE 6 MG/ML
1.8 INJECTION SUBCUTANEOUS DAILY
Qty: 30 ML | Refills: 3 | Status: SHIPPED | OUTPATIENT
Start: 2020-01-30

## 2020-01-30 RX ORDER — POLYETHYLENE GLYCOL 3350 17 G/17G
17 POWDER, FOR SOLUTION ORAL DAILY
Qty: 510 G | Refills: 3 | Status: SHIPPED | OUTPATIENT
Start: 2020-01-30

## 2020-01-30 RX ORDER — CYCLOBENZAPRINE HCL 5 MG
10 TABLET ORAL 3 TIMES DAILY PRN
Qty: 60 TABLET | Refills: 0 | Status: SHIPPED | OUTPATIENT
Start: 2020-01-30

## 2020-01-30 NOTE — TELEPHONE ENCOUNTER
Routing refill request to provider for review/approval because:  Drug not on the [facility] refill protocol   Labs out of range:  creat

## 2020-02-02 ENCOUNTER — MYC REFILL (OUTPATIENT)
Dept: FAMILY MEDICINE | Facility: CLINIC | Age: 60
End: 2020-02-02

## 2020-02-02 DIAGNOSIS — N39.46 MIXED INCONTINENCE: ICD-10-CM

## 2020-02-04 NOTE — TELEPHONE ENCOUNTER
Should have refills at [facility]:    solifenacin (VESICARE) 10 MG tablet 90 tablet 1 1/30/2020

## 2020-02-05 DIAGNOSIS — Z53.9 DIAGNOSIS NOT YET DEFINED: Primary | ICD-10-CM

## 2020-02-05 PROCEDURE — G0180 MD CERTIFICATION HHA PATIENT: HCPCS | Performed by: PREVENTIVE MEDICINE

## 2020-02-05 RX ORDER — SOLIFENACIN SUCCINATE 10 MG/1
10 TABLET, FILM COATED ORAL DAILY
Qty: 90 TABLET | Refills: 1 | OUTPATIENT
Start: 2020-02-05

## 2020-02-06 NOTE — TELEPHONE ENCOUNTER
solifenacin (VESICARE) 10 MG tablet 90 tablet 1 1/30/2020  No   Sig - Route: Take 1 tablet (10 mg) by mouth daily - Oral   Sent to pharmacy as: solifenacin (VESICARE) 10 MG tablet   Class: E-Prescribe   Order: [id number]       too soon to refill    Kiersten Woods RN  [facility]

## 2020-02-10 ENCOUNTER — MEDICAL CORRESPONDENCE (OUTPATIENT)
Dept: HEALTH INFORMATION MANAGEMENT | Facility: CLINIC | Age: 60
End: 2020-02-10

## 2020-04-23 ENCOUNTER — TELEPHONE (OUTPATIENT)
Dept: FAMILY MEDICINE | Facility: CLINIC | Age: 60
End: 2020-04-23

## 2020-04-23 NOTE — TELEPHONE ENCOUNTER
Central Prior Authorization Team   Phone: [phone]      PA Initiation    Medication: Vesicare 10mg tabs  Insurance Company: [employer] - Phone [phone] Fax [phone]  Pharmacy Filling the Rx: [id number] - [place], [place] - [address]  Filling Pharmacy Phone: [phone]  Filling Pharmacy Fax:    Start Date: 4/23/2020

## 2020-04-23 NOTE — TELEPHONE ENCOUNTER
PRIOR AUTHORIZATION DENIED    Medication: Vesicare 10mg tabs    Denial Date: 4/23/2020    Denial Rational:              Appeal Information:    If you would like to appeal, please supply P/A team with a letter of medical necessity with clinical reason.

## 2020-04-23 NOTE — TELEPHONE ENCOUNTER
Plan does not cover Vesicare 10mg tabs.     Change medication or Initiate PA, pls advise?       Call: [phone] for PA

## 2020-04-27 ENCOUNTER — MYC MEDICAL ADVICE (OUTPATIENT)
Dept: FAMILY MEDICINE | Facility: CLINIC | Age: 60
End: 2020-04-27

## 2020-04-27 DIAGNOSIS — N32.81 OAB (OVERACTIVE BLADDER): Primary | ICD-10-CM

## 2020-04-28 ENCOUNTER — MYC MEDICAL ADVICE (OUTPATIENT)
Dept: FAMILY MEDICINE | Facility: CLINIC | Age: 60
End: 2020-04-28

## 2020-04-28 DIAGNOSIS — N32.81 OAB (OVERACTIVE BLADDER): ICD-10-CM

## 2020-04-28 RX ORDER — MIRABEGRON 25 MG/1
25 TABLET, FILM COATED, EXTENDED RELEASE ORAL DAILY
Qty: 30 TABLET | Refills: 11 | Status: SHIPPED | OUTPATIENT
Start: 2020-04-28

## 2020-04-28 RX ORDER — MIRABEGRON 25 MG/1
25 TABLET, FILM COATED, EXTENDED RELEASE ORAL DAILY
Qty: 30 TABLET | Refills: 11 | Status: SHIPPED | OUTPATIENT
Start: 2020-04-28 | End: 2020-04-28

## 2020-04-28 NOTE — TELEPHONE ENCOUNTER
Please resend the prescription to the pended pharmacy per patient request.  Mike Prado,  For 1st Floor Primary Care ([facility])

## 2020-12-14 DIAGNOSIS — M54.42 ACUTE LEFT-SIDED LOW BACK PAIN WITH LEFT-SIDED SCIATICA: ICD-10-CM

## 2020-12-16 RX ORDER — GABAPENTIN 300 MG/1
300 CAPSULE ORAL 2 TIMES DAILY
Qty: 180 CAPSULE | Refills: 3 | OUTPATIENT
Start: 2020-12-16

## 2020-12-17 RX ORDER — GABAPENTIN 300 MG/1
300 CAPSULE ORAL 2 TIMES DAILY
Qty: 180 CAPSULE | Refills: 3 | OUTPATIENT
Start: 2020-12-17

## 2020-12-18 NOTE — TELEPHONE ENCOUNTER
How do I tell which medication is being requested?  Yuli Giang MA  [facility]  2nd Floor  Primary Care    
Last Written Prescription Date:  12/5/2019  Last Fill Quantity: 180,  # refills: 3   Last office visit: 1/7/2020 with prescribing provider:  CLEMENCIA   Future Office Visit:            
Needs appointment for gabapentin refill.  CARMEN Bueno CNP     
Routing refill request to provider for review/approval because:  Drug not on the [facility] refill protocol       Shaheen Head RN, BSN, PHN          
This writer attempted to contact patient on 12/18/20      Reason for call needs visit and Unable to leave a message and sent letter.      If patient calls back:   Schedule  appointment within 1 week with PCP, document that pt called and close encounter         Yuli Giang MA    
Transient cerebral ischemia, unspecified type

## 2020-12-27 ENCOUNTER — HEALTH MAINTENANCE LETTER (OUTPATIENT)
Age: 60
End: 2020-12-27

## 2021-02-16 DIAGNOSIS — E11.21 TYPE 2 DIABETES MELLITUS WITH DIABETIC NEPHROPATHY, WITHOUT LONG-TERM CURRENT USE OF INSULIN (H): ICD-10-CM

## 2021-02-16 DIAGNOSIS — M54.42 ACUTE LEFT-SIDED LOW BACK PAIN WITH LEFT-SIDED SCIATICA: ICD-10-CM

## 2021-02-16 NOTE — TELEPHONE ENCOUNTER
Routing refill request to provider for review/approval because:  Drug not on the [facility] refill protocol     Mirna CORNEJON, RN

## 2021-02-16 NOTE — LETTER
Melody Ferguson  [address]  [place] [address]          02/18/21      Dear Melody Ferguson        At [facility] we care about your health and are committed to providing quality patient care. Regular appointments are a vital part of the care and management of your health and can help prevent many of the complications that can occur.      We have refilled your medication(s) for month  We will need you to schedule a Office visit before any additional refills can be given.  Please call [phone] to schedule this appointment.      Thank you,    [facility]

## 2021-02-17 RX ORDER — GABAPENTIN 300 MG/1
300 CAPSULE ORAL 2 TIMES DAILY
Qty: 60 CAPSULE | Refills: 0 | Status: SHIPPED | OUTPATIENT
Start: 2021-02-17

## 2021-02-17 RX ORDER — GLIPIZIDE AND METFORMIN HCL 5; 500 MG/1; MG/1
1 TABLET, FILM COATED ORAL
Qty: 60 TABLET | Refills: 0 | Status: SHIPPED | OUTPATIENT
Start: 2021-02-17

## 2021-02-17 NOTE — TELEPHONE ENCOUNTER
One month refill sent.  Needs to be seen for further refills.  Please notify patient.    CARMEN Bueno CNP

## 2021-04-20 DIAGNOSIS — E11.21 TYPE 2 DIABETES MELLITUS WITH DIABETIC NEPHROPATHY, WITHOUT LONG-TERM CURRENT USE OF INSULIN (H): ICD-10-CM

## 2021-04-20 DIAGNOSIS — I25.2 OLD MYOCARDIAL INFARCTION: ICD-10-CM

## 2021-04-20 DIAGNOSIS — M54.42 ACUTE LEFT-SIDED LOW BACK PAIN WITH LEFT-SIDED SCIATICA: ICD-10-CM

## 2021-04-21 RX ORDER — METOPROLOL SUCCINATE 25 MG/1
25 TABLET, EXTENDED RELEASE ORAL DAILY
Qty: 90 TABLET | Refills: 0 | OUTPATIENT
Start: 2021-04-21

## 2021-04-21 RX ORDER — GABAPENTIN 300 MG/1
300 CAPSULE ORAL 2 TIMES DAILY
Qty: 60 CAPSULE | Refills: 0 | OUTPATIENT
Start: 2021-04-21

## 2021-04-21 RX ORDER — GLIPIZIDE AND METFORMIN HCL 5; 500 MG/1; MG/1
1 TABLET, FILM COATED ORAL
Qty: 60 TABLET | Refills: 0 | OUTPATIENT
Start: 2021-04-21

## 2021-04-23 DIAGNOSIS — E11.21 TYPE 2 DIABETES MELLITUS WITH DIABETIC NEPHROPATHY, WITHOUT LONG-TERM CURRENT USE OF INSULIN (H): ICD-10-CM

## 2021-04-23 NOTE — TELEPHONE ENCOUNTER
Routing refill request to provider for review/approval because:  Patient needs to be seen because it has been more than 1 year since last office visit.  Overdue for lab work also.     No appointment pending at this time.  Routing to provider to advise.    Mirna CORNEJON, RN

## 2021-04-25 RX ORDER — GLIPIZIDE AND METFORMIN HCL 5; 500 MG/1; MG/1
1 TABLET, FILM COATED ORAL
Qty: 60 TABLET | Refills: 0 | OUTPATIENT
Start: 2021-04-25

## 2021-08-14 ENCOUNTER — HEALTH MAINTENANCE LETTER (OUTPATIENT)
Age: 61
End: 2021-08-14

## 2021-09-02 DIAGNOSIS — E11.21 TYPE 2 DIABETES MELLITUS WITH DIABETIC NEPHROPATHY, WITHOUT LONG-TERM CURRENT USE OF INSULIN (H): ICD-10-CM

## 2021-09-02 NOTE — TELEPHONE ENCOUNTER
"Requested Prescriptions   Pending Prescriptions Disp Refills    liraglutide (VICTOZA PEN) 18 MG/3ML solution 30 mL 3     Sig: Inject 1.8 mg Subcutaneous daily        GLP-1 Agonists Protocol Failed - 9/2/2021  1:59 PM        Failed - HgbA1C in past 3 or 6 months     If HgbA1C is 8 or greater, it needs to be on file within the past 3 months.  If less than 8, must be on file within the past 6 months.     Recent Labs   Lab Test 01/03/20  0837   A1C 7.9*             Failed - Normal serum creatinine on file in past 12 months     Recent Labs   Lab Test 01/03/20  0837   CR 0.47*       Ok to refill medication if creatinine is low          Failed - Recent (6 mo) or future (30 days) visit within the authorizing provider's specialty     Patient had office visit in the last 6 months or has a visit in the next 30 days with authorizing provider.  See \"Patient Info\" tab in inbasket, or \"Choose Columns\" in Meds & Orders section of the refill encounter.            Passed - Medication is active on med list        Passed - Patient is age 18 or older        Passed - No active pregnancy on record        Passed - No positive pregnancy test in past 12 months              "

## 2021-09-08 RX ORDER — LIRAGLUTIDE 6 MG/ML
1.8 INJECTION SUBCUTANEOUS DAILY
Qty: 30 ML | Refills: 3 | OUTPATIENT
Start: 2021-09-08

## 2021-10-04 ENCOUNTER — HEALTH MAINTENANCE LETTER (OUTPATIENT)
Age: 61
End: 2021-10-04

## 2021-11-23 NOTE — TELEPHONE ENCOUNTER
Denied as duplicate- RX was sent to [facility] mail away pharmacy on 1/26/17. Advised local [facility] that is requesting via escribe response.    Brodie Mccarthy RN         SUBJECTIVE:    Patient ID: Yefri Galan is a 46 y.o. male. Chief Complaint   Patient presents with    Diabetes     f/u    Foot Injury     stepped on nail with right foot       HPI: office visit  He is in the office today follow-up on his diabetes. He does feel he is done quite a bit better in the last few weeks with his blood sugars. He said he does not maybe show in the A1c because it is really been mostly the last 3 weeks. He is definitely cut down on the things he was eating. He says is been more active. He is not having as much difficulty with the diet. He says his dad's been really sick. He has been trying to help quite a bit with him. He is not having any chest pain or increasing shortness of breath. He is not had any significant blood pressure problems. He did step on a nail with his right foot and it is still little tender. He said that he has had a little redness. Review of Systems   Constitutional: Positive for fatigue. HENT: Positive for dental problem. Multiple dental caries   Eyes: Negative. Cardiovascular: Negative. Gastrointestinal: Negative. Genitourinary: Negative. Musculoskeletal: Negative. Skin: Negative. Neurological: Positive for weakness. Psychiatric/Behavioral: The patient is nervous/anxious. All other systems reviewed and are negative. OBJECTIVE:  /80   Pulse 81   Temp 97.3 °F (36.3 °C) (Temporal)   Resp 18   Ht 6' 4\" (1.93 m)   Wt (!) 345 lb 12.8 oz (156.9 kg)   SpO2 97% Comment: RA  BMI 42.09 kg/m²    Wt Readings from Last 3 Encounters:   11/23/21 (!) 345 lb 12.8 oz (156.9 kg)   08/26/21 (!) 350 lb (158.8 kg)   04/27/21 (!) 354 lb 12.8 oz (160.9 kg)     BP Readings from Last 3 Encounters:   11/23/21 136/80   08/26/21 138/78   04/27/21 124/78      Pulse Readings from Last 3 Encounters:   11/23/21 81   08/26/21 70   04/27/21 83     Body mass index is 42.09 kg/m².    Resp Readings from Last 3 Encounters:   11/23/21 18 08/26/21 18   04/27/21 18     Past medical, surgical, family and social history were reviewed and updated with the patient. Physical Exam  Vitals and nursing note reviewed. Constitutional:       Appearance: Normal appearance. He is well-developed. HENT:      Head: Normocephalic and atraumatic. Right Ear: Hearing and external ear normal.      Left Ear: Hearing and external ear normal.      Nose: Nose normal.      Mouth/Throat:      Lips: Pink. Mouth: Mucous membranes are moist.   Eyes:      General: Lids are normal.      Conjunctiva/sclera: Conjunctivae normal.      Pupils: Pupils are equal, round, and reactive to light. Cardiovascular:      Rate and Rhythm: Normal rate and regular rhythm. Heart sounds: Normal heart sounds. No murmur heard. No friction rub. No gallop. Pulmonary:      Effort: Pulmonary effort is normal.      Breath sounds: No decreased breath sounds, wheezing or rhonchi. Abdominal:      General: Bowel sounds are normal.      Palpations: Abdomen is soft. Musculoskeletal:         General: Normal range of motion. Cervical back: Full passive range of motion without pain, normal range of motion and neck supple. Right lower leg: No edema. Left lower leg: No edema. Feet:      Right foot:      Skin integrity: Erythema and warmth present. Comments: Noted puncture wound on the right plantar surface  Skin:     General: Skin is warm and dry. Neurological:      Mental Status: He is alert and oriented to person, place, and time. Deep Tendon Reflexes: Reflexes are normal and symmetric. Psychiatric:         Behavior: Behavior normal. Behavior is cooperative. Thought Content:  Thought content normal.         Judgment: Judgment normal.          Results in Past 30 Days  Result Component Current Result Ref Range Previous Result Ref Range   Albumin/Globulin Ratio 1.9 (11/23/2021) 0.8 - 2.0 Not in Time Range    Albumin 4.9 (H) (11/23/2021) 3.4 - 4.8 g/dL Not in Time Range    Alkaline Phosphatase 72 (11/23/2021) 25 - 100 U/L Not in Time Range    ALT 24 (11/23/2021) 4 - 36 U/L Not in Time Range    AST 18 (11/23/2021) 8 - 33 U/L Not in Time Range    BUN 11 (11/23/2021) 6 - 20 mg/dL Not in Time Range    Calcium 9.3 (11/23/2021) 8.5 - 10.5 mg/dL Not in Time Range    Chloride 92 (L) (11/23/2021) 98 - 107 mmol/L Not in Time Range    CO2 23 (11/23/2021) 20 - 30 mmol/L Not in Time Range    CREATININE 0.8 (11/23/2021) 0.4 - 1.2 mg/dL Not in Time Range    GFR  >59 (11/23/2021) >59 Not in Time Range    GFR Non- >59 (11/23/2021) >59 Not in Time Range    Globulin 2.6 (11/23/2021) Not Established g/dL Not in Time Range    Glucose 191 (H) (11/23/2021) 74 - 106 mg/dL Not in Time Range    Potassium 4.6 (11/23/2021) 3.4 - 5.1 mmol/L Not in Time Range    Sodium 131 (L) (11/23/2021) 136 - 145 mmol/L Not in Time Range    Total Bilirubin 0.5 (11/23/2021) 0.3 - 1.2 mg/dL Not in Time Range    Total Protein 7.5 (11/23/2021) 6.4 - 8.3 g/dL Not in Time Range      Hemoglobin A1C (%)   Date Value   11/23/2021 7.6 (H)     Microalbumin, Random Urine (mg/dL)   Date Value   07/08/2020 <1.20     LDL Calculated (mg/dL)   Date Value   11/23/2021 137 (H)       Lab Results   Component Value Date    WBC 10.3 11/23/2021    NEUTROABS 2.4 11/29/2016    HGB 17.4 11/23/2021    HCT 52.6 11/23/2021    MCV 85.8 11/23/2021     11/23/2021     Lab Results   Component Value Date    TSH 2.29 08/28/2018       ASSESSMENT:    Diagnosis Orders   1. Puncture wound of right foot, initial encounter     2. Type 2 diabetes mellitus with complication, without long-term current use of insulin (HCC)  COMPREHENSIVE METABOLIC PANEL    HEMOGLOBIN A1C   3. Essential hypertension  CBC   4. Vitamin D deficiency  VITAMIN D 25 HYDROXY   5. Seizure disorder ([facility] [address]     6. Screening, lipid  LIPID PANEL   7. Encounter for prostate cancer screening  PSA screening   8.  B12 deficiency  VITAMIN B12 & FOLATE        PLAN:  Orders Placed This Encounter   Medications    cephALEXin (KEFLEX) 500 MG capsule     Sig: Take 1 capsule by mouth 3 times daily for 10 days     Dispense:  30 capsule     Refill:  0    primidone (MYSOLINE) 250 MG tablet     Sig: Take one tablet twice a day     Dispense:  180 tablet     Refill:  3    fluticasone (FLONASE) 50 MCG/ACT nasal spray     Si spray by Each Nostril route daily 1 Spray in each nostril     Dispense:  16 g     Refill:  5    albuterol sulfate HFA (VENTOLIN HFA) 108 (90 Base) MCG/ACT inhaler     Sig: INHALE 2 PUFFS BY MOUTH INTO LUNGS EVERY 6 HOURS AS NEEDED FOR WHEEZING     Dispense:  18 g     Refill:  5     Please consider 90 day supplies to promote better adherence        Medications Discontinued During This Encounter   Medication Reason    primidone (MYSOLINE) 250 MG tablet REORDER    albuterol sulfate HFA (VENTOLIN HFA) 108 (90 Base) MCG/ACT inhaler REORDER    fluticasone (FLONASE) 50 MCG/ACT nasal spray REORDER       Controlled Substances Monitoring:      Please note: This chart was generated using Dragon dictation software. Although every effort was made to ensure the accuracy of this automated transcription, some errors in transcription may have occurred.

## 2022-01-23 ENCOUNTER — HEALTH MAINTENANCE LETTER (OUTPATIENT)
Age: 62
End: 2022-01-23

## 2022-03-20 ENCOUNTER — HEALTH MAINTENANCE LETTER (OUTPATIENT)
Age: 62
End: 2022-03-20

## 2022-06-15 NOTE — LETTER
1/8/2019         RE: Melody Ferguson  [address]  [place] [address]        Dear Colleague,    Thank you for referring your patient, Melody Ferguson, to the [facility]. Please see a copy of my visit note below.    Chief Complaint   Patient presents with     Diabetic Eye Exam        Lab Results   Component Value Date    A1C 6.8 10/30/2018    A1C 6.3 04/12/2018    A1C 6.9 01/05/2018    A1C 6.1 04/18/2017    A1C 6.7 11/01/2016       Last Eye Exam: 7-  Dilated Previously: Yes    What are you currently using to see? otc  readers    Distance Vision Acuity: Noticed gradual change in both eyes    Near Vision Acuity: Satisfied with vision while reading  with otc readers    Eye Comfort: good  Do you use eye drops? : No  Occupation or Hobbies: disabled    Sudha Fay Optometric Assistant, A.B.O.C.     Medical, surgical and family histories reviewed and updated 1/8/2019.       OBJECTIVE: See Ophthalmology exam    ASSESSMENT:    ICD-10-CM    1. Type 2 diabetes mellitus without complication, without long-term current use of insulin (H) E11.9    2. Age-related nuclear cataract of both eyes H25.13    3. Regular astigmatism of both eyes H52.223    4. Myopia of left eye H52.12    5. Presbyopia H52.4       PLAN:    Melody Ferguson aware  eye exam results will be sent to Elizabeth Lamas.  Patient Instructions   There are not any signs of the diabetes affecting the eyes today.  It is important that you get your eyes dilated once yearly and keep good control of your diabetes.    Diabetes weakens the blood vessels all over the body, including the eyes. Damage to the blood vessels in the eyes can cause swelling or bleeding into part of the eye (called the retina). This is called diabetic retinopathy (HENRRY-tin-AH-puh-thee). If not treated, this disease can cause vision loss or blindness.   Symptoms may include blurred or distorted vision, but many people have no symptoms. It's important to see your eye  [de-identified] : 6/15/22 sinus rhythm 71 bpm, LVH and diffuse ST/Tw abnormalities (unchanged). QTc 443 ms [de-identified] : 6/9/22 mild-mod abnormal, anterolateral ischemia, with infarct of anterolateral wall doctor regularly to check for problems.   Early treatment and good control can help protect your vision. Here are the things you can do to help prevent vision loss:      1. Keep your blood sugar levels under tight control.      2. Bring high blood pressure under control.      3. No smoking.      4. Have yearly dilated eye exams.    You have the start of mild cataracts.  You may notice some blurred vision or glare with night driving.  It is important that you wear good sunglasses to protect your eyes from the ultraviolet light from the sun.  Taking a supplement with at least 300 mg/day of vitamin C appears to help prevent cataract development.  I recommend that you return in 1 year for an eye exam unless there are any sudden changes in your vision.       Eyeglass prescription given.    Return in 1 year for a complete eye exam or sooner if needed.    Mark Crockett, OD                .           Again, thank you for allowing me to participate in the care of your patient.        Sincerely,        Mark Crockett, OD     [de-identified] : TTE 3/15/22 LVEF 70%, LA 3.9cm, mod septal hypertrophy and HCM with outflow obstruction, resting gradient 73mmHg, 130mmHg post valsalva, mild MR, mild TR, RVSP <35  [___] : [unfilled]

## 2022-09-11 ENCOUNTER — HEALTH MAINTENANCE LETTER (OUTPATIENT)
Age: 62
End: 2022-09-11

## 2023-01-22 ENCOUNTER — HEALTH MAINTENANCE LETTER (OUTPATIENT)
Age: 63
End: 2023-01-22

## 2023-04-30 ENCOUNTER — HEALTH MAINTENANCE LETTER (OUTPATIENT)
Age: 63
End: 2023-04-30

## 2023-07-29 ENCOUNTER — HEALTH MAINTENANCE LETTER (OUTPATIENT)
Age: 63
End: 2023-07-29

## 2023-12-11 NOTE — TELEPHONE ENCOUNTER
Letter mailed   Type of surgery: Right total knee arthroplasty [address]00  Primary osteoarthritis of right knee [M17.11]  - Primary   Location of surgery: [facility]  Date and time of surgery: 1/15/2020 / 7:30 am   Surgeon: Doris  Pre-Op Appt Date: 01/07/2020  Post-Op Appt Date: 01/28/2020  Packet sent out: Yes  Pre-cert/Authorization completed:  CHECK insurance in New Year. Patient says will be the same.   Date: 10/22/2019     No prior auth required per Medicare - Medically necessary. MA follows Medicare guidelines. 01/02/2019    Sent to [facility] and was received. Insurance valid. 01/02/2020

## 2023-12-16 ENCOUNTER — HEALTH MAINTENANCE LETTER (OUTPATIENT)
Age: 63
End: 2023-12-16

## 2024-02-24 ENCOUNTER — HEALTH MAINTENANCE LETTER (OUTPATIENT)
Age: 64
End: 2024-02-24

## 2024-07-22 NOTE — TELEPHONE ENCOUNTER
Last well visit with Dr. Holguin on 1/23/2024.    To Dr. Holguin for advice on anesthesia-  redirect to ENT who is doing procedure?    Brittney Rodriguez RN     Refill provided but is due for a follow up. Thanks,   Elizabeth Lamas MD MPH   (Routing comment)     Faxed Rx for the Ultram to [facility] , [place], [phone], right fax confirmed at 1:31 pm today. Called and sent a My Chart message regarding needing an appointment.  Yuli Giang MA/  For Teams Juan Jose and Radha         Electronically signed by Yuli Giang MA at 10/3/2018  2:32 PM     No appointment made and My Chart message not read.    .This writer attempted to contact Patient on 10/04/18      Reason for call Patient to schedule an appointment and left a voicemail message.      If patient calls back:   Schedule Office Visit appointment within 2 weeks with PCP.         Yuli Giang MA

## 2025-02-10 NOTE — LETTER
1/16/2018         RE: Melody Ferguson  [address]  [facility] [address]        Dear Colleague,    Thank you for referring your patient, Melody Ferguson, to the [facility]. Please see a copy of my visit note below.    The patient's right knee was prepped with betadine solution after verification of allergies. Area approximately 10 cm x 10 cm prepped in a sterile fashion. After injection, betadine removed with soap and water and band-aids applied.    1ml depo-medrol with 1% lidocaine plain injected into patient's right knee by Dr. Armond George  LOT# [id number]  Exp. 03/2020    Talon Fenton PA-C  Supervising physician: Armond George MD  Dept. of Orthopedics  [facility]          Follow up right knee primary osteoarthritis.  Last injection 11/14/17.  Range of motion 5-115.    She  desires injection today of right knee(s).  Risks, benefits, potential complications and alternatives were discussed.   With the patient's consent, sterile prep was performed of right knee(s).  Right knee was injected with Depo Medrol 80 mg and lidocaine at anterolateral site.  Return to clinic as needed.    Left total knee arthroplasty from 4/26/17 is doing well.  Good stability.  Range of motion 0-120 degrees.    Continue activities as tolerated.  Return to clinic April with x-ray left knee, [place].         Again, thank you for allowing me to participate in the care of your patient.        Sincerely,        Armond George MD     Good morning Dr. Marvin,    Please advise. Thank you.

## 2025-04-16 NOTE — NURSING NOTE
"Chief Complaint   Patient presents with     Heart Problem     EKG       Initial /72  Pulse 91  Temp 98.7  F (37.1  C)  Ht 5' 7\" (1.702 m)  Wt (!) 322 lb (146.1 kg)  SpO2 98%  Breastfeeding? No  BMI 50.43 kg/m2 Estimated body mass index is 50.43 kg/(m^2) as calculated from the following:    Height as of this encounter: 5' 7\" (1.702 m).    Weight as of this encounter: 322 lb (146.1 kg).  Medication Reconciliation: complete   Arianne ROBERTS        " Cigarettes

## (undated) DEVICE — RETR ELASTIC STAYS LONE STAR SHARP 5MM 8/PACK 3311-8G

## (undated) DEVICE — LIGHT HANDLE X2

## (undated) DEVICE — SU VICRYL 2-0 UR-5 27" J375H

## (undated) DEVICE — PAD CHUX UNDERPAD 30X30"

## (undated) DEVICE — LUBRICATING JELLY 4.25OZ

## (undated) DEVICE — BLADE CLIPPER 4406

## (undated) DEVICE — DECANTER BAG 2002S

## (undated) DEVICE — SYR BULB IRRIG DOVER 60 ML LATEX FREE 67000

## (undated) DEVICE — SYR 10ML FINGER CONTROL W/O NDL 309695

## (undated) DEVICE — GLOVE PROTEXIS BLUE W/NEU-THERA 6.5  2D73EB65

## (undated) DEVICE — GLOVE PROTEXIS W/NEU-THERA 8.0  2D73TE80

## (undated) DEVICE — RETR RING LONE STAR 14.1X14.1CM 3307G

## (undated) DEVICE — GLOVE PROTEXIS W/NEU-THERA 6.5  2D73TE65

## (undated) DEVICE — SPONGE PACK VAGINAL 2X36"

## (undated) DEVICE — BLADE KNIFE SURG 15 371115

## (undated) DEVICE — ADHESIVE SWIFTSET 0.8ML OCTYL SS6

## (undated) DEVICE — NDL 18GA 1.5" 305196

## (undated) DEVICE — NDL COUNTER 20CT 31142493

## (undated) DEVICE — ESU PENCIL W/COATED BLADE E2450H

## (undated) DEVICE — GOWN LG DISP 9515

## (undated) DEVICE — STOCKING SLEEVE COMPRESSION CALF LG

## (undated) DEVICE — CATH FOLEY 20FR 5ML SIL 165820

## (undated) DEVICE — SOL WATER IRRIG 1000ML BOTTLE 2F7114

## (undated) DEVICE — SOL NACL 0.9% INJ 1000ML BAG 2B1324X

## (undated) DEVICE — PAD FLOOR SURGISAFE

## (undated) DEVICE — DRAPE VAGI BAG 18X9" 1072

## (undated) DEVICE — SUCTION TIP YANKAUER STR K87

## (undated) DEVICE — PAD PERI INDIV WRAP 11" 2022

## (undated) DEVICE — Device

## (undated) RX ORDER — NITROGLYCERIN 5 MG/ML
VIAL (ML) INTRAVENOUS
Status: DISPENSED
Start: 2017-04-24

## (undated) RX ORDER — VERAPAMIL HYDROCHLORIDE 2.5 MG/ML
INJECTION, SOLUTION INTRAVENOUS
Status: DISPENSED
Start: 2017-04-24

## (undated) RX ORDER — FENTANYL CITRATE 50 UG/ML
INJECTION, SOLUTION INTRAMUSCULAR; INTRAVENOUS
Status: DISPENSED
Start: 2017-07-14

## (undated) RX ORDER — PROPOFOL 10 MG/ML
INJECTION, EMULSION INTRAVENOUS
Status: DISPENSED
Start: 2017-07-14

## (undated) RX ORDER — FENTANYL CITRATE 50 UG/ML
INJECTION, SOLUTION INTRAMUSCULAR; INTRAVENOUS
Status: DISPENSED
Start: 2017-04-24

## (undated) RX ORDER — DEXAMETHASONE SODIUM PHOSPHATE 4 MG/ML
INJECTION, SOLUTION INTRA-ARTICULAR; INTRALESIONAL; INTRAMUSCULAR; INTRAVENOUS; SOFT TISSUE
Status: DISPENSED
Start: 2017-07-14

## (undated) RX ORDER — REGADENOSON 0.08 MG/ML
INJECTION, SOLUTION INTRAVENOUS
Status: DISPENSED
Start: 2017-04-21

## (undated) RX ORDER — ONDANSETRON 2 MG/ML
INJECTION INTRAMUSCULAR; INTRAVENOUS
Status: DISPENSED
Start: 2017-07-14

## (undated) RX ORDER — HEPARIN SODIUM 1000 [USP'U]/ML
INJECTION, SOLUTION INTRAVENOUS; SUBCUTANEOUS
Status: DISPENSED
Start: 2017-04-24